# Patient Record
Sex: MALE | Race: WHITE | NOT HISPANIC OR LATINO | Employment: FULL TIME | URBAN - METROPOLITAN AREA
[De-identification: names, ages, dates, MRNs, and addresses within clinical notes are randomized per-mention and may not be internally consistent; named-entity substitution may affect disease eponyms.]

---

## 2017-01-27 ENCOUNTER — OFFICE VISIT (OUTPATIENT)
Dept: PSYCHIATRY | Facility: CLINIC | Age: 27
End: 2017-01-27
Payer: COMMERCIAL

## 2017-01-27 DIAGNOSIS — F43.23 ADJUSTMENT DISORDER WITH MIXED ANXIETY AND DEPRESSED MOOD: Primary | ICD-10-CM

## 2017-01-27 PROCEDURE — 90791 PSYCH DIAGNOSTIC EVALUATION: CPT | Mod: S$GLB,,, | Performed by: PSYCHOLOGIST

## 2017-01-27 NOTE — PROGRESS NOTES
Psychiatry Initial Visit (PhD/LCSW)  Diagnostic Interview - CPT 39110    Date: 1/27/2017    Site: [facility]    Referral source: mother-in-law    Clinical status of patient: Outpatient    Harinder Elliott, a 26 y.o. male, for initial evaluation visit.  Met with patient.    Chief complaint/reason for encounter: depression and interpersonal    History of present illness: Pt is a 26 y.o  man (x3 years) with one child and one on the way. He is seeking help with ambivalence about his marriage Over the course of the marriage he has felt increasing dissatisfaction regarding their differences interests, lifestyle choices, and hopes for the future. He cares for his wife and his child but he is uncertain as to whether he can stay in the marriage and be happy. He c/o of depressed mood, irritability, decreased appetite, and low energy. He is hoping that psychotherapy will help him resolve his ambivalence on one direction or another and offer him suggestions as to how he can communicate with his wife in a more open manner.     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 He and his wife have been  3 years. They have one child and one on the way. She is stufying to be a hairdresser. Pt is the younger of 2 sibs; parents are alive and together. He has B.A from [employer] in . Pt's [employer] background is [employer].     Substance use:   Alcohol:  social   Drugs: none   Tobacco: none   Caffeine: about 2 cups per day     Current medications and drug reactions (include OTC, herbal): see medication list     Strengths and liabilities: Strength: Patient accepts guidance/feedback, Strength: Patient is expressive/articulate., Strength: Patient is intelligent., Strength: Patient is motivated for change., Strength: Patient is physically healthy., Strength: Patient has positive support network., Strength: Patient has reasonable judgment.    Current Evaluation:     Mental Status Exam:  General Appearance:  unremarkable, age appropriate   Speech: normal tone, normal rate, normal pitch, normal volume      Level of Cooperation: cooperative      Thought Processes: normal and logical   Mood: anxious, depressed      Thought Content: normal, no suicidality, no homicidality, delusions, or paranoia   Affect: congruent and appropriate   Orientation: Oriented x3   Memory: intact per interview    Attention Span & Concentration: intact   Fund of General Knowledge: intact and appropriate to age and level of education   Abstract Reasoning: adequate    Judgment & Insight: good     Language  intact     Diagnostic Impression - Plan:     Diagnosis: Adjustment disorder with mixed anxiety and depressed mood    Plan:individual psychotherapy    Return to Clinic: 1 week    Length of Service (minutes): 60

## 2017-01-30 ENCOUNTER — OFFICE VISIT (OUTPATIENT)
Dept: PSYCHIATRY | Facility: CLINIC | Age: 27
End: 2017-01-30
Payer: COMMERCIAL

## 2017-01-30 DIAGNOSIS — F43.23 ADJUSTMENT DISORDER WITH MIXED ANXIETY AND DEPRESSED MOOD: Primary | ICD-10-CM

## 2017-01-30 DIAGNOSIS — Z63.0 PARTNER RELATIONAL PROBLEM: ICD-10-CM

## 2017-01-30 PROCEDURE — 90834 PSYTX W PT 45 MINUTES: CPT | Mod: S$GLB,,, | Performed by: PSYCHOLOGIST

## 2017-01-30 SDOH — SOCIAL DETERMINANTS OF HEALTH (SDOH): PROBLEMS IN RELATIONSHIP WITH SPOUSE OR PARTNER: Z63.0

## 2017-01-30 NOTE — PROGRESS NOTES
Individual Psychotherapy (PhD/LCSW)    1/30/2017    Site:  [facility]         Therapeutic Intervention: Met with patient.  Outpatient - Insight oriented psychotherapy 45 min - CPT code 90097    Chief complaint/reason for encounter: depression, anxiety, and interpersonal     Interval history and content of current session: Pt remains ambivalent and indecisive about his marriage. He has not yet broached the subject with his wife. He recognizes that whatever conclusion he comes to (whether he stays or goes) he first will have to talk with her about his feelings. He is dreading this because he sees her as basically a good, loving person who is happy in their marriage and he doesn't want to hurt her or his children. On the other hand, he feels burdened by excessive responsibility and a lack of interpersonal energy between them due to her complacency. We talked about how he might approach her and he will decide whether he wants to bring her with him at our next meeting or continue to talk individually at this time.      Treatment plan:  · Target symptoms: depression, anxiety , adjustment, partner relational problem   · Why chosen therapy is appropriate versus another modality: relevant to diagnosis, patient responds to this modality  · Outcome monitoring methods: self-report, observation  · Therapeutic intervention type: insight oriented psychotherapy    Risk parameters:  Patient reports no suicidal ideation  Patient reports no homicidal ideation  Patient reports no self-injurious behavior  Patient reports no violent behavior    Verbal deficits: None    Patient's response to intervention:  The patient's response to intervention is accepting.    Progress toward goals and other mental status changes:  The patient's progress toward goals is fair .    Diagnosis: Adjustment D/O with mixed anxiety and depressed mood    Plan:  individual psychotherapy and family psychotherapy, as indicated     Return to clinic: 1  week    Length of Service (minutes): 45

## 2017-01-30 NOTE — PROGRESS NOTES
Psychiatry Initial Visit (PhD/LCSW)  Diagnostic Interview - CPT 04356    Date: 1/30/2017    Site: [facility]    Referral source: mother-in-law    Clinical status of patient: Outpatient    Harinder Elliott, a 26 y.o. male, for initial evaluation visit.  Met with patient.    Chief complaint/reason for encounter: depression and interpersonal    History of present illness: Pt is a 26 y.o  man (x3 years) with one child and one on the way. He is seeking help with ambivalence about his marriage Over the course of the marriage he has felt increasing dissatisfaction regarding their differences interests, lifestyle choices, and hopes for the future. He cares for his wife and his child but he is uncertain as to whether he can stay in the marriage and be happy. He c/o of depressed mood, irritability, decreased appetite, and low energy. He is hoping that psychotherapy will help him resolve his ambivalence on one direction or another and offer him suggestions as to how he can communicate with his wife in a more open manner.     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 He and his wife have been  3 years. They have one child and one on the way. She is stufying to be a hairdresser. Pt is the younger of 2 sibs; parents are alive and together. He has B.A from [employer] in . Pt's [employer] background is [employer].     Substance use:   Alcohol:  social   Drugs: none   Tobacco: none   Caffeine: about 2 cups per day     Current medications and drug reactions (include OTC, herbal): see medication list     Strengths and liabilities: Strength: Patient accepts guidance/feedback, Strength: Patient is expressive/articulate., Strength: Patient is intelligent., Strength: Patient is motivated for change., Strength: Patient is physically healthy., Strength: Patient has positive support network., Strength: Patient has reasonable judgment.    Current Evaluation:     Mental Status Exam:  General Appearance:  unremarkable, age appropriate   Speech: normal tone, normal rate, normal pitch, normal volume      Level of Cooperation: cooperative      Thought Processes: normal and logical   Mood: anxious, depressed      Thought Content: normal, no suicidality, no homicidality, delusions, or paranoia   Affect: congruent and appropriate   Orientation: Oriented x3   Memory: intact per interview    Attention Span & Concentration: intact   Fund of General Knowledge: intact and appropriate to age and level of education   Abstract Reasoning: adequate    Judgment & Insight: good     Language  intact     Diagnostic Impression - Plan:     Diagnosis: Adjustment disorder with mixed anxiety and depressed mood    Plan:individual psychotherapy    Return to Clinic: 1 week    Length of Service (minutes): 60

## 2017-02-07 ENCOUNTER — OFFICE VISIT (OUTPATIENT)
Dept: PSYCHIATRY | Facility: CLINIC | Age: 27
End: 2017-02-07
Payer: COMMERCIAL

## 2017-02-07 DIAGNOSIS — F43.23 ADJUSTMENT DISORDER WITH MIXED ANXIETY AND DEPRESSED MOOD: Primary | ICD-10-CM

## 2017-02-07 DIAGNOSIS — Z63.0 PARTNER RELATIONAL PROBLEM: ICD-10-CM

## 2017-02-07 PROCEDURE — 90834 PSYTX W PT 45 MINUTES: CPT | Mod: S$GLB,,, | Performed by: PSYCHOLOGIST

## 2017-02-07 SDOH — SOCIAL DETERMINANTS OF HEALTH (SDOH): PROBLEMS IN RELATIONSHIP WITH SPOUSE OR PARTNER: Z63.0

## 2017-02-08 NOTE — PROGRESS NOTES
Individual Psychotherapy (PhD/LCSW)    2/7/2017    Site:  [facility]         Therapeutic Intervention: Met with patient.  Outpatient - Insight oriented psychotherapy 45 min - CPT code 34960    Chief complaint/reason for encounter: depression, anxiety, and interpersonal     Interval history and content of current session: Pt has come to the conclusion that he needs to talk with his wife about his marital dissatisfaction. Session focused on helping him articulate his feelings in a way that will be non-judgemental, honest, caring, and truthful. He notes that the interactions between them have become increasingly irritable and he feels he will need to address his feelings sooner rather than later.       Treatment plan:  · Target symptoms: depression, anxiety , adjustment, partner relational problem   · Why chosen therapy is appropriate versus another modality: relevant to diagnosis, patient responds to this modality  · Outcome monitoring methods: self-report, observation  · Therapeutic intervention type: insight oriented psychotherapy    Risk parameters:  Patient reports no suicidal ideation  Patient reports no homicidal ideation  Patient reports no self-injurious behavior  Patient reports no violent behavior    Verbal deficits: None    Patient's response to intervention:  The patient's response to intervention is accepting.    Progress toward goals and other mental status changes:  The patient's progress toward goals is fair .    Diagnosis: Adjustment D/O with mixed anxiety and depressed mood    Plan:  individual psychotherapy and family psychotherapy, as indicated     Return to clinic: 1 week    Length of Service (minutes): 45

## 2017-02-15 ENCOUNTER — OFFICE VISIT (OUTPATIENT)
Dept: PSYCHIATRY | Facility: CLINIC | Age: 27
End: 2017-02-15
Payer: COMMERCIAL

## 2017-02-15 DIAGNOSIS — Z63.0 PARTNER RELATIONAL PROBLEM: ICD-10-CM

## 2017-02-15 DIAGNOSIS — F43.23 ADJUSTMENT DISORDER WITH MIXED ANXIETY AND DEPRESSED MOOD: Primary | ICD-10-CM

## 2017-02-15 PROCEDURE — 90834 PSYTX W PT 45 MINUTES: CPT | Mod: S$GLB,,, | Performed by: PSYCHOLOGIST

## 2017-02-15 SDOH — SOCIAL DETERMINANTS OF HEALTH (SDOH): PROBLEMS IN RELATIONSHIP WITH SPOUSE OR PARTNER: Z63.0

## 2017-02-15 NOTE — PROGRESS NOTES
Individual Psychotherapy (PhD/LCSW)    2/15/2017    Site:  [facility]         Therapeutic Intervention: Met with patient.  Outpatient - Insight oriented psychotherapy 45 min - CPT code 64100    Chief complaint/reason for encounter: depression, anxiety, and interpersonal     Interval history and content of current session: Pt continues to hold off on talking to his wife about his marital dissatisfaction. He has been able to clarify his feelings and describe them better: the emptiness he feels in their interactions [other than around the children]; the lack of engagement; their differing interest and values;  the double bind between feeling guilt or resentment; etc.  He was able to say that, all things being equal, he wishes they could work things out. However, he does not see how this can be this is possible. Challenged him to recognize that he can't know what's possible unless he opens up and tells his truth. Also, reiterated that whatever happens, he needs to allow the marriage to make it or not on its own merits and refrain from allowing himself to become involved with anyone else. He agreed and reiterated that this was not a factor in his dissatisfaction. He was also able to see at the end of the session that if he revealed more of himself (his thoughts and attitudes) to her, he wasn't sure that she would want to be with him.      Treatment plan:  · Target symptoms: depression, anxiety , adjustment, partner relational problem   · Why chosen therapy is appropriate versus another modality: relevant to diagnosis, patient responds to this modality  · Outcome monitoring methods: self-report, observation  · Therapeutic intervention type: insight oriented psychotherapy    Risk parameters:  Patient reports no suicidal ideation  Patient reports no homicidal ideation  Patient reports no self-injurious behavior  Patient reports no violent behavior    Verbal deficits: None    Patient's response to intervention:  The  patient's response to intervention is accepting.    Progress toward goals and other mental status changes:  The patient's progress toward goals is fair .    Diagnosis: Adjustment D/O with mixed anxiety and depressed mood    Plan:  individual psychotherapy and family psychotherapy, as indicated     Return to clinic: 1 week    Length of Service (minutes): 45

## 2017-02-21 ENCOUNTER — OFFICE VISIT (OUTPATIENT)
Dept: PSYCHIATRY | Facility: CLINIC | Age: 27
End: 2017-02-21
Payer: COMMERCIAL

## 2017-02-21 DIAGNOSIS — F43.23 ADJUSTMENT DISORDER WITH MIXED ANXIETY AND DEPRESSED MOOD: Primary | ICD-10-CM

## 2017-02-21 DIAGNOSIS — Z63.0 PARTNER RELATIONAL PROBLEM: ICD-10-CM

## 2017-02-21 PROCEDURE — 90847 FAMILY PSYTX W/PT 50 MIN: CPT | Mod: S$GLB,,, | Performed by: PSYCHOLOGIST

## 2017-02-21 SDOH — SOCIAL DETERMINANTS OF HEALTH (SDOH): PROBLEMS IN RELATIONSHIP WITH SPOUSE OR PARTNER: Z63.0

## 2017-02-21 NOTE — PROGRESS NOTES
Family Psychotherapy (MD)    2/21/2017    Site: [facility]    Length of service: 60    Therapeutic intervention: 90847-Family therapy with patient; needed because marital problems     Persons present: patient and spouse     Interval history: Pt seen with spouse. He was able to talk with his wife about his marital dissatisfaction and ambivalence about the marriage. Wife was surprised and hurt. She feels he is looking at things from a one sided point of view and creating a kind of self-fulfilling prophecy by projecting on to her feelings that that she doesn't necessarily have. Pt acknowledged the way he values the marriage as well as the problem he has with wanting more time for himself. Spouse states she understands he needs time for himself but asks that he be open about this, give her notice, be reliable about following up on his commitments, and respond to her when she calls or texts. She is very clear that she wants the marriage to work and believes it can. Pt appears less sure about the prognosis for the marriage but says he wants it to work as well.     Target symptoms: depression, anxiety, partner/relational problem      Patient's interpersonal/verbal exchanges: 90847-Family therapy with patient:  active listening and self-disclosure    Progress toward goals: progressing slowly    Diagnosis: Adjustment d/o with anxiety and depressed mood; partner relational problem     Plan: individual psychotherapy  family psychotherapy    Return to clinic: as scheduled

## 2017-02-22 ENCOUNTER — OFFICE VISIT (OUTPATIENT)
Dept: PSYCHIATRY | Facility: CLINIC | Age: 27
End: 2017-02-22
Payer: COMMERCIAL

## 2017-02-22 DIAGNOSIS — Z63.0 PARTNER RELATIONAL PROBLEM: ICD-10-CM

## 2017-02-22 DIAGNOSIS — F43.23 ADJUSTMENT DISORDER WITH MIXED ANXIETY AND DEPRESSED MOOD: Primary | ICD-10-CM

## 2017-02-22 PROCEDURE — 90834 PSYTX W PT 45 MINUTES: CPT | Mod: S$GLB,,, | Performed by: PSYCHOLOGIST

## 2017-02-22 SDOH — SOCIAL DETERMINANTS OF HEALTH (SDOH): PROBLEMS IN RELATIONSHIP WITH SPOUSE OR PARTNER: Z63.0

## 2017-02-22 NOTE — PROGRESS NOTES
"Individual Psychotherapy (PhD/LCSW)    2/22/2017    Site:  [facility]         Therapeutic Intervention: Met with patient.  Outpatient - Insight oriented psychotherapy 45 min - CPT code 71516    Chief complaint/reason for encounter: depression, anxiety, and interpersonal     Interval history and content of current session: Pt and his wife met with me yesterday to discuss the impact and implications of what he revealed to her about his marital dissatisfaction. They are now talking openly about their feelings. Pt can see how he has unintentionally contributed to the problems that are making him unhappy in the marriage (eg, by avoiding her need to be in contact he stimulates her need for contact). Pt also notes that spouse has become more attentive to her behavior that is an irritant for him (eg picking up after herself). Pt discussed fears that his desire to stay in the marriage is not for the best reasons (eg he doesn't want to be seen as a selfish person). We discussed the concept of multidetermination and how this concept makes it "ok" to have both positive and negative reasons for staying in the marriage. Also noted that whether or not the marriage fails or succeeds, he needs to give it his best shot and he realizes that he has not done that yet (nor has she). So, for the present he will continue to work with his wife to do what each can to make the marriage work as well as it can.     Treatment plan:  · Target symptoms: depression, anxiety , adjustment, partner relational problem   · Why chosen therapy is appropriate versus another modality: relevant to diagnosis, patient responds to this modality  · Outcome monitoring methods: self-report, observation  · Therapeutic intervention type: insight oriented psychotherapy    Risk parameters:  Patient reports no suicidal ideation  Patient reports no homicidal ideation  Patient reports no self-injurious behavior  Patient reports no violent behavior    Verbal " deficits: None    Patient's response to intervention:  The patient's response to intervention is accepting.    Progress toward goals and other mental status changes:  The patient's progress toward goals is fair .    Diagnosis: Adjustment D/O with mixed anxiety and depressed mood    Plan:  individual psychotherapy and family psychotherapy, as indicated     Return to clinic: 1 week    Length of Service (minutes): 45

## 2017-03-01 ENCOUNTER — OFFICE VISIT (OUTPATIENT)
Dept: PSYCHIATRY | Facility: CLINIC | Age: 27
End: 2017-03-01
Payer: COMMERCIAL

## 2017-03-01 DIAGNOSIS — F43.23 ADJUSTMENT DISORDER WITH MIXED ANXIETY AND DEPRESSED MOOD: Primary | ICD-10-CM

## 2017-03-01 DIAGNOSIS — Z63.0 PARTNER RELATIONAL PROBLEM: ICD-10-CM

## 2017-03-01 PROCEDURE — 90847 FAMILY PSYTX W/PT 50 MIN: CPT | Mod: S$GLB,,, | Performed by: PSYCHOLOGIST

## 2017-03-01 SDOH — SOCIAL DETERMINANTS OF HEALTH (SDOH): PROBLEMS IN RELATIONSHIP WITH SPOUSE OR PARTNER: Z63.0

## 2017-03-01 NOTE — PROGRESS NOTES
"Family Psychotherapy (MD)    3/1/2017    Site: [facility]    Length of service: 60    Therapeutic intervention: 90847-Family therapy with patient; needed because marital problems     Persons present: patient and spouse     Interval history: Pt seen with spouse. They agreed that things have started to move in a better direction in their marriage since they first saw me together (see my note 2/21/17). They are talking more openly and doing more things together. However, when they went to a [employer] party over the weekend, they ran into problems where he became very engaged in socializing and she felt neglected. They were able to talk through their feelings about this later; but, it left a residue of hurt feelings on her part and frustration on his.  Noted that he proposed they go to the party to include her in his social involvements with work friends. They both recognized this as "a positive" for their relationship. Addressed how they could learn from the experience to enhance it for both of them by each making small changes in their behavior. Gave them an observation task for their next session in which each will identify things that happen that are positive and hope that they will continue to happen.     Target symptoms: depression, anxiety, partner/relational problem      Patient's interpersonal/verbal exchanges: 90847-Family therapy with patient:  active listening and self-disclosure    Progress toward goals: progressing slowly    Diagnosis: Adjustment d/o with anxiety and depressed mood; partner relational problem     Plan: individual psychotherapy  family psychotherapy    Return to clinic: as scheduled  "

## 2017-04-03 ENCOUNTER — OFFICE VISIT (OUTPATIENT)
Dept: PSYCHIATRY | Facility: CLINIC | Age: 27
End: 2017-04-03
Payer: COMMERCIAL

## 2017-04-03 DIAGNOSIS — Z63.0 PARTNER RELATIONAL PROBLEM: ICD-10-CM

## 2017-04-03 DIAGNOSIS — F43.23 ADJUSTMENT DISORDER WITH MIXED ANXIETY AND DEPRESSED MOOD: Primary | ICD-10-CM

## 2017-04-03 PROCEDURE — 90834 PSYTX W PT 45 MINUTES: CPT | Mod: S$GLB,,, | Performed by: PSYCHOLOGIST

## 2017-04-03 SDOH — SOCIAL DETERMINANTS OF HEALTH (SDOH): PROBLEMS IN RELATIONSHIP WITH SPOUSE OR PARTNER: Z63.0

## 2017-04-03 NOTE — PROGRESS NOTES
"Individual Psychotherapy (PhD/LCSW)    4/3/2017    Site:  [facility]         Therapeutic Intervention: Met with patient.  Outpatient - Insight oriented psychotherapy 45 min - CPT code 20670    Chief complaint/reason for encounter: depression, anxiety, and interpersonal     Interval history and content of current session: Pt continues to feel beset with ambivalence about the marriage. He states he is putting more effort into his role as , but he is uncertain if it is making a difference in his feelings towards his wife (or her towards him). He worries that he is has an underlying apathy about things that may spill over into the marriage. He finds therapy helpful in mitigating his self-denigrating criticism of himself as being a "bad person" for being ambivalent. He agreed to consciously direct his attention to anything positive that happens in his marital/family life over the next week and bring in a list of same to our next session.     Treatment plan:  · Target symptoms: depression, anxiety , adjustment, partner relational problem   · Why chosen therapy is appropriate versus another modality: relevant to diagnosis, patient responds to this modality  · Outcome monitoring methods: self-report, observation  · Therapeutic intervention type: insight oriented psychotherapy    Risk parameters:  Patient reports no suicidal ideation  Patient reports no homicidal ideation  Patient reports no self-injurious behavior  Patient reports no violent behavior    Verbal deficits: None    Patient's response to intervention:  The patient's response to intervention is accepting.    Progress toward goals and other mental status changes:  The patient's progress toward goals is fair .    Diagnosis: Adjustment D/O with mixed anxiety and depressed mood    Plan:  individual psychotherapy and family psychotherapy, as indicated     Return to clinic: 1 week    Length of Service (minutes): 45  "

## 2017-04-13 ENCOUNTER — OFFICE VISIT (OUTPATIENT)
Dept: PSYCHIATRY | Facility: CLINIC | Age: 27
End: 2017-04-13
Payer: COMMERCIAL

## 2017-04-13 DIAGNOSIS — Z63.0 PARTNER RELATIONAL PROBLEM: ICD-10-CM

## 2017-04-13 DIAGNOSIS — F43.23 ADJUSTMENT DISORDER WITH MIXED ANXIETY AND DEPRESSED MOOD: Primary | ICD-10-CM

## 2017-04-13 PROCEDURE — 90834 PSYTX W PT 45 MINUTES: CPT | Mod: S$GLB,,, | Performed by: PSYCHOLOGIST

## 2017-04-13 SDOH — SOCIAL DETERMINANTS OF HEALTH (SDOH): PROBLEMS IN RELATIONSHIP WITH SPOUSE OR PARTNER: Z63.0

## 2017-04-13 NOTE — PROGRESS NOTES
Individual Psychotherapy (PhD/LCSW)    4/13/2017    Site:  [facility]         Therapeutic Intervention: Met with patient.  Outpatient - Insight oriented psychotherapy 45 min - CPT code 30097    Chief complaint/reason for encounter: depression, anxiety, and interpersonal     Interval history and content of current session: Pt states that things stable with him and his wife, but his feelings of ambivalence about the marriage and his future (with or without his wife) remain essentially unchanged. We discussed his fx of origin dynamics and what emerged was that he is afraid of repeating the experience he observed in his parents' lives. He says he does not want to settle for a humdrum existence as he has observed in them. He notes that during college he needed the reassurance of a stable relationship with his girlfriend (now his wife). But once he began to realize that he could live with more energy and aliveness by being more social and ambitious he began to be dissatisfied in his marriage because he sees his wife like his parents. He agreed that he needs to be open and talk with his wife about the way he sees his family, his hopes to avoid this, and his difficulty disassociating her from this kind of lifestyle.    Treatment plan:  · Target symptoms: depression, anxiety , adjustment, partner relational problem   · Why chosen therapy is appropriate versus another modality: relevant to diagnosis, patient responds to this modality  · Outcome monitoring methods: self-report, observation  · Therapeutic intervention type: insight oriented psychotherapy    Risk parameters:  Patient reports no suicidal ideation  Patient reports no homicidal ideation  Patient reports no self-injurious behavior  Patient reports no violent behavior    Verbal deficits: None    Patient's response to intervention:  The patient's response to intervention is accepting.    Progress toward goals and other mental status changes:  The patient's  progress toward goals is fair .    Diagnosis: Adjustment D/O with mixed anxiety and depressed mood    Plan:  individual psychotherapy and family psychotherapy, as indicated     Return to clinic: 1 week    Length of Service (minutes): 45

## 2017-07-13 ENCOUNTER — TELEPHONE (OUTPATIENT)
Dept: GASTROENTEROLOGY | Facility: CLINIC | Age: 27
End: 2017-07-13

## 2017-07-13 NOTE — TELEPHONE ENCOUNTER
----- Message from Zenobia Sigala sent at 7/13/2017 11:09 AM CDT -----  Contact: Self- [phone]  Ned- pt called to schedule a np appt with Dr. Marino- this would be for a c-scope consult- also suffering with diarrhea and cramping after meals- please call pt back at [phone]

## 2017-09-08 ENCOUNTER — LAB VISIT (OUTPATIENT)
Dept: LAB | Facility: HOSPITAL | Age: 27
End: 2017-09-08
Attending: INTERNAL MEDICINE
Payer: COMMERCIAL

## 2017-09-08 ENCOUNTER — OFFICE VISIT (OUTPATIENT)
Dept: GASTROENTEROLOGY | Facility: CLINIC | Age: 27
End: 2017-09-08
Payer: COMMERCIAL

## 2017-09-08 VITALS
DIASTOLIC BLOOD PRESSURE: 72 MMHG | HEART RATE: 80 BPM | SYSTOLIC BLOOD PRESSURE: 120 MMHG | WEIGHT: 160.25 LBS | BODY MASS INDEX: 22.35 KG/M2

## 2017-09-08 DIAGNOSIS — Z86.010 HISTORY OF COLON POLYPS: ICD-10-CM

## 2017-09-08 DIAGNOSIS — R14.0 BLOATING SYMPTOM: Primary | ICD-10-CM

## 2017-09-08 DIAGNOSIS — R14.0 BLOATING SYMPTOM: ICD-10-CM

## 2017-09-08 PROBLEM — Z86.0100 HISTORY OF COLON POLYPS: Status: ACTIVE | Noted: 2017-09-08

## 2017-09-08 LAB
ALBUMIN SERPL BCP-MCNC: 4.2 G/DL
ALP SERPL-CCNC: 94 U/L
ALT SERPL W/O P-5'-P-CCNC: 10 U/L
ANION GAP SERPL CALC-SCNC: 11 MMOL/L
AST SERPL-CCNC: 15 U/L
BASOPHILS # BLD AUTO: 0.03 K/UL
BASOPHILS NFR BLD: 0.6 %
BILIRUB DIRECT SERPL-MCNC: 0.2 MG/DL
BILIRUB SERPL-MCNC: 0.5 MG/DL
BUN SERPL-MCNC: 10 MG/DL
CALCIUM SERPL-MCNC: 9.4 MG/DL
CHLORIDE SERPL-SCNC: 105 MMOL/L
CO2 SERPL-SCNC: 26 MMOL/L
CREAT SERPL-MCNC: 0.9 MG/DL
DIFFERENTIAL METHOD: ABNORMAL
EOSINOPHIL # BLD AUTO: 0.1 K/UL
EOSINOPHIL NFR BLD: 2.2 %
ERYTHROCYTE [DISTWIDTH] IN BLOOD BY AUTOMATED COUNT: 12.7 %
EST. GFR  (AFRICAN AMERICAN): >60 ML/MIN/1.73 M^2
EST. GFR  (NON AFRICAN AMERICAN): >60 ML/MIN/1.73 M^2
GLUCOSE SERPL-MCNC: 70 MG/DL
HCT VFR BLD AUTO: 41.1 %
HGB BLD-MCNC: 14 G/DL
IGA SERPL-MCNC: 224 MG/DL
LIPASE SERPL-CCNC: 35 U/L
LYMPHOCYTES # BLD AUTO: 1.5 K/UL
LYMPHOCYTES NFR BLD: 32.9 %
MCH RBC QN AUTO: 27.6 PG
MCHC RBC AUTO-ENTMCNC: 34.1 G/DL
MCV RBC AUTO: 81 FL
MONOCYTES # BLD AUTO: 0.3 K/UL
MONOCYTES NFR BLD: 7.3 %
NEUTROPHILS # BLD AUTO: 2.7 K/UL
NEUTROPHILS NFR BLD: 57 %
PLATELET # BLD AUTO: 279 K/UL
PMV BLD AUTO: 9.2 FL
POTASSIUM SERPL-SCNC: 3.8 MMOL/L
PROT SERPL-MCNC: 7.5 G/DL
RBC # BLD AUTO: 5.07 M/UL
SODIUM SERPL-SCNC: 142 MMOL/L
TSH SERPL DL<=0.005 MIU/L-ACNC: 1.09 UIU/ML
WBC # BLD AUTO: 4.65 K/UL

## 2017-09-08 PROCEDURE — 99204 OFFICE O/P NEW MOD 45 MIN: CPT | Mod: S$GLB,,, | Performed by: INTERNAL MEDICINE

## 2017-09-08 PROCEDURE — 3008F BODY MASS INDEX DOCD: CPT | Mod: S$GLB,,, | Performed by: INTERNAL MEDICINE

## 2017-09-08 PROCEDURE — 80076 HEPATIC FUNCTION PANEL: CPT

## 2017-09-08 PROCEDURE — 80048 BASIC METABOLIC PNL TOTAL CA: CPT

## 2017-09-08 PROCEDURE — 83690 ASSAY OF LIPASE: CPT

## 2017-09-08 PROCEDURE — 82784 ASSAY IGA/IGD/IGG/IGM EACH: CPT

## 2017-09-08 PROCEDURE — 36415 COLL VENOUS BLD VENIPUNCTURE: CPT

## 2017-09-08 PROCEDURE — 99999 PR PBB SHADOW E&M-EST. PATIENT-LVL II: CPT | Mod: PBBFAC,,, | Performed by: INTERNAL MEDICINE

## 2017-09-08 PROCEDURE — 83516 IMMUNOASSAY NONANTIBODY: CPT

## 2017-09-08 PROCEDURE — 84443 ASSAY THYROID STIM HORMONE: CPT

## 2017-09-08 PROCEDURE — 30000890 MISCELLANEOUS SENDOUT TEST

## 2017-09-08 PROCEDURE — 85025 COMPLETE CBC W/AUTO DIFF WBC: CPT
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
## 2017-09-10 DIAGNOSIS — R71.8 LOW MEAN CORPUSCULAR VOLUME (MCV): Primary | ICD-10-CM

## 2017-09-11 LAB — TTG IGA SER IA-ACNC: 5 UNITS

## 2017-09-12 ENCOUNTER — TELEPHONE (OUTPATIENT)
Dept: GASTROENTEROLOGY | Facility: CLINIC | Age: 27
End: 2017-09-12

## 2017-09-12 NOTE — TELEPHONE ENCOUNTER
Pt informed of test results. He will call to schedule his labs after he schedules his colonoscopy.

## 2017-09-12 NOTE — TELEPHONE ENCOUNTER
----- Message from Graham Marino MD sent at 9/10/2017  4:41 PM CDT -----  Calin valenzuela- please tell Harinder that overall his labs look ok.  Recommend fasting Ferritin and iron studies morning of colonoscopy of in next few weeks.  Orders placed.

## 2017-09-13 LAB
MISCELLANEOUS TEST NAME: NORMAL
REFERENCE LAB: NORMAL
SPECIMEN TYPE: NORMAL
TEST RESULT: NORMAL

## 2017-10-11 ENCOUNTER — OFFICE VISIT (OUTPATIENT)
Dept: INTERNAL MEDICINE | Facility: CLINIC | Age: 27
End: 2017-10-11
Payer: COMMERCIAL

## 2017-10-11 ENCOUNTER — PATIENT MESSAGE (OUTPATIENT)
Dept: INTERNAL MEDICINE | Facility: CLINIC | Age: 27
End: 2017-10-11

## 2017-10-11 VITALS
OXYGEN SATURATION: 97 % | WEIGHT: 161.19 LBS | SYSTOLIC BLOOD PRESSURE: 116 MMHG | BODY MASS INDEX: 22.56 KG/M2 | DIASTOLIC BLOOD PRESSURE: 62 MMHG | HEIGHT: 71 IN | HEART RATE: 82 BPM

## 2017-10-11 DIAGNOSIS — N50.82 SCROTAL PAIN: ICD-10-CM

## 2017-10-11 LAB
BILIRUB SERPL-MCNC: NORMAL MG/DL
BLOOD URINE, POC: NORMAL
COLOR, POC UA: YELLOW
GLUCOSE UR QL STRIP: NORMAL
KETONES UR QL STRIP: NORMAL
LEUKOCYTE ESTERASE URINE, POC: NORMAL
NITRITE, POC UA: NORMAL
PH, POC UA: 7
PROTEIN, POC: NORMAL
SPECIFIC GRAVITY, POC UA: 1.01
UROBILINOGEN, POC UA: NORMAL

## 2017-10-11 PROCEDURE — 99999 PR PBB SHADOW E&M-EST. PATIENT-LVL III: CPT | Mod: PBBFAC,,, | Performed by: STUDENT IN AN ORGANIZED HEALTH CARE EDUCATION/TRAINING PROGRAM

## 2017-10-11 PROCEDURE — 99213 OFFICE O/P EST LOW 20 MIN: CPT | Mod: 25,S$GLB,, | Performed by: STUDENT IN AN ORGANIZED HEALTH CARE EDUCATION/TRAINING PROGRAM

## 2017-10-11 PROCEDURE — 87591 N.GONORRHOEAE DNA AMP PROB: CPT

## 2017-10-11 PROCEDURE — 81002 URINALYSIS NONAUTO W/O SCOPE: CPT | Mod: S$GLB,,, | Performed by: STUDENT IN AN ORGANIZED HEALTH CARE EDUCATION/TRAINING PROGRAM

## 2017-10-12 ENCOUNTER — PATIENT MESSAGE (OUTPATIENT)
Dept: INTERNAL MEDICINE | Facility: CLINIC | Age: 27
End: 2017-10-12

## 2017-10-12 LAB
C TRACH DNA SPEC QL NAA+PROBE: NOT DETECTED
N GONORRHOEA DNA SPEC QL NAA+PROBE: NOT DETECTED

## 2017-10-12 NOTE — PROGRESS NOTES
Preceptor note    Patient's history and physical discussed, please refer to resident physician's note for specific details. Pt seen and with resident physician. Medical record reviewed. I agree with resident's assessment and plan.

## 2017-10-13 ENCOUNTER — HOSPITAL ENCOUNTER (OUTPATIENT)
Dept: RADIOLOGY | Facility: HOSPITAL | Age: 27
Discharge: HOME | End: 2017-10-13
Attending: STUDENT IN AN ORGANIZED HEALTH CARE EDUCATION/TRAINING PROGRAM
Payer: COMMERCIAL

## 2017-10-13 DIAGNOSIS — N50.82 SCROTAL PAIN: Primary | ICD-10-CM

## 2017-10-13 DIAGNOSIS — N50.82 SCROTAL PAIN: ICD-10-CM

## 2017-10-13 PROCEDURE — 76705 ECHO EXAM OF ABDOMEN: CPT | Mod: TC

## 2017-10-13 PROCEDURE — 76870 US EXAM SCROTUM: CPT | Mod: 26,,, | Performed by: RADIOLOGY

## 2017-10-13 PROCEDURE — 76705 ECHO EXAM OF ABDOMEN: CPT | Mod: 26,,, | Performed by: RADIOLOGY

## 2017-10-13 PROCEDURE — 76870 US EXAM SCROTUM: CPT | Mod: TC

## 2018-07-23 ENCOUNTER — PATIENT MESSAGE (OUTPATIENT)
Dept: ENDOSCOPY | Facility: HOSPITAL | Age: 28
End: 2018-07-23

## 2018-07-25 DIAGNOSIS — Z86.010 HISTORY OF COLON POLYPS: Primary | ICD-10-CM

## 2018-11-15 ENCOUNTER — OFFICE VISIT (OUTPATIENT)
Dept: URGENT CARE | Facility: CLINIC | Age: 28
End: 2018-11-15
Payer: COMMERCIAL

## 2018-11-15 VITALS
SYSTOLIC BLOOD PRESSURE: 103 MMHG | TEMPERATURE: 97 F | DIASTOLIC BLOOD PRESSURE: 72 MMHG | OXYGEN SATURATION: 98 % | BODY MASS INDEX: 23.8 KG/M2 | RESPIRATION RATE: 18 BRPM | HEIGHT: 71 IN | WEIGHT: 170 LBS | HEART RATE: 73 BPM

## 2018-11-15 DIAGNOSIS — J01.00 ACUTE MAXILLARY SINUSITIS, RECURRENCE NOT SPECIFIED: Primary | ICD-10-CM

## 2018-11-15 PROCEDURE — 96372 THER/PROPH/DIAG INJ SC/IM: CPT | Mod: S$GLB,,, | Performed by: FAMILY MEDICINE

## 2018-11-15 PROCEDURE — 3008F BODY MASS INDEX DOCD: CPT | Mod: CPTII,S$GLB,, | Performed by: FAMILY MEDICINE

## 2018-11-15 PROCEDURE — 99214 OFFICE O/P EST MOD 30 MIN: CPT | Mod: 25,S$GLB,, | Performed by: FAMILY MEDICINE

## 2018-11-15 RX ORDER — AMOXICILLIN AND CLAVULANATE POTASSIUM 875; 125 MG/1; MG/1
1 TABLET, FILM COATED ORAL 2 TIMES DAILY
Qty: 20 TABLET | Refills: 0 | Status: SHIPPED | OUTPATIENT
Start: 2018-11-15 | End: 2018-11-25

## 2018-11-15 RX ORDER — TRIAMCINOLONE ACETONIDE 55 UG/1
2 SPRAY, METERED NASAL DAILY
Qty: 10.8 ML | Refills: 0 | Status: SHIPPED | OUTPATIENT
Start: 2018-11-15 | End: 2019-01-29

## 2018-11-15 RX ORDER — BETAMETHASONE SODIUM PHOSPHATE AND BETAMETHASONE ACETATE 3; 3 MG/ML; MG/ML
6 INJECTION, SUSPENSION INTRA-ARTICULAR; INTRALESIONAL; INTRAMUSCULAR; SOFT TISSUE
Status: COMPLETED | OUTPATIENT
Start: 2018-11-15 | End: 2018-11-15

## 2018-11-15 RX ORDER — SERTRALINE HYDROCHLORIDE 25 MG/1
25 TABLET, FILM COATED ORAL 2 TIMES DAILY
Refills: 5 | COMMUNITY
Start: 2018-10-20 | End: 2021-10-06 | Stop reason: SDUPTHER

## 2018-11-15 RX ADMIN — BETAMETHASONE SODIUM PHOSPHATE AND BETAMETHASONE ACETATE 6 MG: 3; 3 INJECTION, SUSPENSION INTRA-ARTICULAR; INTRALESIONAL; INTRAMUSCULAR; SOFT TISSUE at 10:11

## 2018-11-15 NOTE — PROGRESS NOTES
"Subjective:       Patient ID: Harinder Elliott is a 28 y.o. male.    Vitals:  height is 5' 11" (1.803 m) and weight is 77.1 kg (170 lb). His oral temperature is 97.2 °F (36.2 °C). His blood pressure is 103/72 and his pulse is 73. His respiration is 18 and oxygen saturation is 98%.     Chief Complaint: Sinus Problem    This is a 28 y.o. male who presents today with a chief complaint of   Sinus congestion and cant breath out of nose. Cough, scratchy throat taking Advil cold and sinus no relief       Sinus Problem   This is a new problem. The current episode started yesterday. The problem has been gradually worsening since onset. There has been no fever. He is experiencing no pain. Associated symptoms include congestion, coughing, headaches, sinus pressure and a sore throat. Pertinent negatives include no chills, diaphoresis, ear pain or shortness of breath. Treatments tried: advil cold and sinus. The treatment provided no relief.       Constitution: Positive for fatigue. Negative for chills, sweating and fever.   HENT: Positive for congestion, postnasal drip, sinus pressure and sore throat. Negative for ear pain and voice change.    Neck: Negative for painful lymph nodes.   Eyes: Negative for eye redness.   Respiratory: Positive for cough. Negative for chest tightness, sputum production, bloody sputum, COPD, shortness of breath, stridor, wheezing and asthma.    Gastrointestinal: Negative for nausea and vomiting.   Musculoskeletal: Negative for muscle ache.   Skin: Negative for rash.   Allergic/Immunologic: Negative for seasonal allergies and asthma.   Neurological: Positive for headaches.   Hematologic/Lymphatic: Negative for swollen lymph nodes.       Objective:      Physical Exam   Constitutional: He appears well-developed and well-nourished.   HENT:   Head: Normocephalic and atraumatic.   Nose: Mucosal edema and rhinorrhea present. Right sinus exhibits maxillary sinus tenderness and frontal sinus tenderness. " Left sinus exhibits maxillary sinus tenderness and frontal sinus tenderness.   Mouth/Throat: Posterior oropharyngeal erythema present. No oropharyngeal exudate.   Cardiovascular: Normal rate, regular rhythm and normal heart sounds.   Pulmonary/Chest: Effort normal and breath sounds normal.   Lymphadenopathy:     He has cervical adenopathy (nontender).   Nursing note and vitals reviewed.      Assessment:       1. Acute maxillary sinusitis, recurrence not specified        Plan:         Acute maxillary sinusitis, recurrence not specified  -     betamethasone acetate-betamethasone sodium phosphate injection 6 mg  -     amoxicillin-clavulanate 875-125mg (AUGMENTIN) 875-125 mg per tablet; Take 1 tablet by mouth 2 (two) times daily. for 10 days  Dispense: 20 tablet; Refill: 0  -     triamcinolone (NASACORT) 55 mcg nasal inhaler; 2 sprays by Nasal route once daily.  Dispense: 10.8 mL; Refill: 0

## 2018-11-15 NOTE — PATIENT INSTRUCTIONS
Sinusitis (Antibiotic Treatment)    The sinuses are air-filled spaces within the bones of the face. They connect to the inside of the nose. Sinusitis is an inflammation of the tissue lining the sinus cavity. Sinus inflammation can occur during a cold. It can also be due to allergies to pollens and other particles in the air. Sinusitis can cause symptoms of sinus congestion and fullness. A sinus infection causes fever, headache and facial pain. There is often green or yellow drainage from the nose or into the back of the throat (post-nasal drip). You have been given antibiotics to treat this condition.  Home care:  · Take the full course of antibiotics as instructed. Do not stop taking them, even if you feel better.  · Drink plenty of water, hot tea, and other liquids. This may help thin mucus. It also may promote sinus drainage.  · Heat may help soothe painful areas of the face. Use a towel soaked in hot water. Or,  the shower and direct the hot spray onto your face. Using a vaporizer along with a menthol rub at night may also help.   · An expectorant containing guaifenesin may help thin the mucus and promote drainage from the sinuses.  · Over-the-counter decongestants may be used unless a similar medicine was prescribed. Nasal sprays work the fastest. Use one that contains phenylephrine or oxymetazoline. First blow the nose gently. Then use the spray. Do not use these medicines more often than directed on the label or symptoms may get worse. You may also use tablets containing pseudoephedrine. Avoid products that combine ingredients, because side effects may be increased. Read labels. You can also ask the pharmacist for help. (NOTE: Persons with high blood pressure should not use decongestants. They can raise blood pressure.)  · Over-the-counter antihistamines may help if allergies contributed to your sinusitis.    · Do not use nasal rinses or irrigation during an acute sinus infection, unless told to by  your health care provider. Rinsing may spread the infection to other sinuses.  · Use acetaminophen or ibuprofen to control pain, unless another pain medicine was prescribed. (If you have chronic liver or kidney disease or ever had a stomach ulcer, talk with your doctor before using these medicines. Aspirin should never be used in anyone under 18 years of age who is ill with a fever. It may cause severe liver damage.)  · Don't smoke. This can worsen symptoms.  Follow-up care  Follow up with your healthcare provider or our staff if you are not improving within the next week.  When to seek medical advice  Call your healthcare provider if any of these occur:  · Facial pain or headache becoming more severe  · Stiff neck  · Unusual drowsiness or confusion  · Swelling of the forehead or eyelids  · Vision problems, including blurred or double vision  · Fever of 100.4ºF (38ºC) or higher, or as directed by your healthcare provider  · Seizure  · Breathing problems  · Symptoms not resolving within 10 days  Date Last Reviewed: 4/13/2015  © 2879-6086 [employer] [address] [place] [address], [place] [address]. All rights reserved. This information is not intended as a substitute for professional medical care. Always follow your healthcare professional's instructions.

## 2018-12-19 ENCOUNTER — HOSPITAL ENCOUNTER (EMERGENCY)
Facility: HOSPITAL | Age: 28
Discharge: HOME | End: 2018-12-19
Attending: EMERGENCY MEDICINE
Payer: COMMERCIAL

## 2018-12-19 ENCOUNTER — NURSE TRIAGE (OUTPATIENT)
Dept: ADMINISTRATIVE | Facility: CLINIC | Age: 28
End: 2018-12-19

## 2018-12-19 VITALS
OXYGEN SATURATION: 97 % | BODY MASS INDEX: 23.8 KG/M2 | HEART RATE: 91 BPM | SYSTOLIC BLOOD PRESSURE: 111 MMHG | WEIGHT: 170 LBS | HEIGHT: 71 IN | DIASTOLIC BLOOD PRESSURE: 58 MMHG | TEMPERATURE: 100 F

## 2018-12-19 DIAGNOSIS — R50.9 HYPERTHERMIA: ICD-10-CM

## 2018-12-19 DIAGNOSIS — R25.1 TREMORS OF NERVOUS SYSTEM: ICD-10-CM

## 2018-12-19 DIAGNOSIS — R00.0 TACHYCARDIA: Primary | ICD-10-CM

## 2018-12-19 LAB
ALBUMIN SERPL BCP-MCNC: 4.7 G/DL
ALP SERPL-CCNC: 92 U/L
ALT SERPL W/O P-5'-P-CCNC: 12 U/L
ANION GAP SERPL CALC-SCNC: 14 MMOL/L
AST SERPL-CCNC: 18 U/L
BASOPHILS # BLD AUTO: 0.04 K/UL
BASOPHILS NFR BLD: 0.3 %
BILIRUB SERPL-MCNC: 0.8 MG/DL
BUN SERPL-MCNC: 17 MG/DL
CALCIUM SERPL-MCNC: 9.5 MG/DL
CHLORIDE SERPL-SCNC: 102 MMOL/L
CK SERPL-CCNC: 174 U/L
CO2 SERPL-SCNC: 24 MMOL/L
CREAT SERPL-MCNC: 0.8 MG/DL
DIFFERENTIAL METHOD: ABNORMAL
EOSINOPHIL # BLD AUTO: 0.1 K/UL
EOSINOPHIL NFR BLD: 0.5 %
ERYTHROCYTE [DISTWIDTH] IN BLOOD BY AUTOMATED COUNT: 12.2 %
EST. GFR  (AFRICAN AMERICAN): >60 ML/MIN/1.73 M^2
EST. GFR  (NON AFRICAN AMERICAN): >60 ML/MIN/1.73 M^2
GLUCOSE SERPL-MCNC: 99 MG/DL
HCT VFR BLD AUTO: 45.3 %
HGB BLD-MCNC: 14.7 G/DL
IMM GRANULOCYTES # BLD AUTO: 0.04 K/UL
IMM GRANULOCYTES NFR BLD AUTO: 0.3 %
LYMPHOCYTES # BLD AUTO: 0.6 K/UL
LYMPHOCYTES NFR BLD: 4.6 %
MCH RBC QN AUTO: 26.6 PG
MCHC RBC AUTO-ENTMCNC: 32.5 G/DL
MCV RBC AUTO: 82 FL
MONOCYTES # BLD AUTO: 0.6 K/UL
MONOCYTES NFR BLD: 4.4 %
NEUTROPHILS # BLD AUTO: 11.6 K/UL
NEUTROPHILS NFR BLD: 89.9 %
NRBC BLD-RTO: 0 /100 WBC
PLATELET # BLD AUTO: 288 K/UL
PMV BLD AUTO: 9.7 FL
POTASSIUM SERPL-SCNC: 3.7 MMOL/L
PROT SERPL-MCNC: 7.9 G/DL
RBC # BLD AUTO: 5.53 M/UL
SODIUM SERPL-SCNC: 140 MMOL/L
WBC # BLD AUTO: 12.96 K/UL

## 2018-12-19 PROCEDURE — 99283 EMERGENCY DEPT VISIT LOW MDM: CPT

## 2018-12-19 PROCEDURE — 85025 COMPLETE CBC W/AUTO DIFF WBC: CPT

## 2018-12-19 PROCEDURE — 25000003 PHARM REV CODE 250: Performed by: EMERGENCY MEDICINE

## 2018-12-19 PROCEDURE — 82550 ASSAY OF CK (CPK): CPT

## 2018-12-19 PROCEDURE — 99284 EMERGENCY DEPT VISIT MOD MDM: CPT | Mod: ,,, | Performed by: EMERGENCY MEDICINE

## 2018-12-19 PROCEDURE — 80053 COMPREHEN METABOLIC PANEL: CPT

## 2018-12-19 RX ADMIN — SODIUM CHLORIDE 1000 ML: 0.9 INJECTION, SOLUTION INTRAVENOUS at 03:12

## 2018-12-19 NOTE — ED PROVIDER NOTES
Encounter Date: 12/19/2018    SCRIBE #1 NOTE: I, Last Farley, am scribing for, and in the presence of,  Dr. LEDY Hill. I have scribed the entire note.       History     Chief Complaint   Patient presents with    Emesis     pt reports possible serotonin syndrome, states that he takes zoloft daily and has been feeling weak, nauseated, and vomting      Time patient was seen by the provider: 5:24 AM    28 y.o. male here with concerns for serotonin syndrome. Pt has hx of depression and on Zoloft. Pt has been on this medicine for some time. Last night after dinner at 9pm became nauseated and took wife's 8mg Zofran. About an hour and a half later he felt more nauseated, anxious, jittery, shaky. He was googling symptoms and saw serotonin syndrome and came to ED for further evaluation. On my exam, pt continues to feel jittery and anxious, still nauseated but has improved, denies pain or blurry vision.          Review of patient's allergies indicates:  No Known Allergies  Past Medical History:   Diagnosis Date    Colon polyp     Depression     Migraine     Ureteral stricture      Past Surgical History:   Procedure Laterality Date    ARTHROSCOPY-SHOULDER Left 10/11/2016    [facility]    ARTHROSCOPY-SHOULDER WITH SUBACROMIAL DECOMPRESSION Left 10/11/2016    [facility]    COLON SURGERY      colon polyp    HERNIA REPAIR      KIDNEY SURGERY      RECONSTRUCTION-JOINT-AC-SHOULDER / THERMAL CAPSULORRHAPHY / AC JOINT RESECTION Left 10/11/2016    [facility]    lali labriceasar      WISDOM TOOTH EXTRACTION       Family History   Problem Relation Age of Onset    Hypertension Father     Hyperlipidemia Father     Pulmonary fibrosis Maternal Grandfather     Hyperlipidemia Paternal Grandfather     Heart disease Paternal Grandfather      Social History     Tobacco Use    Smoking status: Never Smoker    Smokeless tobacco:  Never Used   Substance Use Topics    Alcohol use: Yes     Comment: occasional    Drug use: No     Review of Systems   Constitutional:        Jittery, Anxious, Shaky.    Gastrointestinal: Positive for nausea.     General: No fever.  No chills.  Eyes: No visual changes.  Head: No headache.    Integument: No rashes or lesions.  Chest: No shortness of breath.  Cardiovascular: No chest pain.  Abdomen: No abdominal pain.   or vomiting.  Urinary: No abnormal urination.  Neurologic: No focal weakness.  No numbness.  Hematologic: No easy bruising.  Endocrine: No excessive thirst or urination.    Physical Exam     Initial Vitals [12/19/18 0233]   BP Pulse Resp Temp SpO2   135/70 (!) 114 18 98.2 °F (36.8 °C) 100 %      MAP       --         Physical Exam    Nursing note and vitals reviewed.    Appearance: No acute distress.  Skin: No rashes seen.  Good turgor.  No abrasions.  No ecchymoses. No diaphoresis.  Eyes: No conjunctival injection. Mild Nystagmus  ENT: Oropharynx clear.    Chest: Clear to auscultation bilaterally.  Good air movement.  No wheezes.  No rhonchi.  Cardiovascular: Regular rate and rhythm.  No murmurs. No gallops. No rubs.  Abdomen: Soft.  Not distended.  Nontender.  No guarding.  No rebound. No Masses  Musculoskeletal: Good range of motion all joints.  No deformities.  Neck supple.  No meningismus.  Neurologic: No hyperreflexia, myoclonus, Equal strength in upper and lower extremities bilaterally.  Normal sensation.  No facial droop.  Normal speech.    Mental Status:  Alert and oriented x 3.  Appropriate, conversant.            ED Course   Procedures  Labs Reviewed   CBC W/ AUTO DIFFERENTIAL - Abnormal; Notable for the following components:       Result Value    WBC 12.96 (*)     MCH 26.6 (*)     Gran # (ANC) 11.6 (*)     Lymph # 0.6 (*)     Gran% 89.9 (*)     Lymph% 4.6 (*)     All other components within normal limits   COMPREHENSIVE METABOLIC PANEL   CK          Imaging Results    None          Medical  Decision Making:   History:   Old Medical Records: I decided to obtain old medical records.  Initial Assessment:   Pt here with concerning for possible serotonin syndrome vs viral illness. Pt is slightly tachycardic and hyperthermic. On reevaluation after 30 min pt feels somewhat improved but not back to baseline. Labs show mild leukocytosis otherwise unremarkable. Given 1 liter fluids with improvement of tachycardia. After 3 hours in ED pt feels back to normal. Stable vitals, normal temp. This coincides with duration of action of Zofran he took. Pt otherwise stable for discharge and advised to avoid Zofran in future.     Advised pt to follow up with PCP or return if concerning symptoms arise. Pt understands and agrees with plan. Will d/c home.      Clinical Tests:   Lab Tests: Ordered and Reviewed            Scribe Attestation:   Scribe #1: I performed the above scribed service and the documentation accurately describes the services I performed. I attest to the accuracy of the note.               Clinical Impression:   The primary encounter diagnosis was Tachycardia. Diagnoses of Hyperthermia and Tremors of nervous system were also pertinent to this visit.                             Jeferson Hill MD  12/19/18 [id number]

## 2018-12-19 NOTE — TELEPHONE ENCOUNTER
Takes zoloft and took zofran and was not aware of drug interaction. Pt took Zofran that was not prescribed for him at about 9 pm. Woke up 30 min ago feeling bad. Zoloft was taken at 9 pm and zofran was taken around the same time. Pt feeling shaky, vomiting and a change in his interior body temp.pt was advised to ED. Immediate call back placed and advised to call poison control before going to ED.  Pt advised to call poison control at this time.    Reason for Disposition   Took another person's prescription drug    Protocols used: ST MEDICATION QUESTION CALL-A-AH

## 2018-12-19 NOTE — ED TRIAGE NOTES
Harinder Elliott, a 28 y.o. male presents to the ED     Triage note:  Chief Complaint   Patient presents with    Emesis     pt reports possible serotonin syndrome, states that he takes zoloft daily and has been feeling weak, nauseated, and vomting      Pt states he took 8mg zofran at 2100 last night but then threw up 4 times afterwards. Pt still complains of nausea currently. He c/o generalized body aches described as pins and needles. Also chills. Temp recheck is 100.4.      Review of patient's allergies indicates:  No Known Allergies  Past Medical History:   Diagnosis Date    Colon polyp     Depression     Migraine     Ureteral stricture

## 2019-01-28 ENCOUNTER — TELEPHONE (OUTPATIENT)
Dept: UROLOGY | Facility: CLINIC | Age: 29
End: 2019-01-28

## 2019-01-29 ENCOUNTER — OFFICE VISIT (OUTPATIENT)
Dept: UROLOGY | Facility: CLINIC | Age: 29
End: 2019-01-29
Payer: COMMERCIAL

## 2019-01-29 VITALS
WEIGHT: 171.5 LBS | SYSTOLIC BLOOD PRESSURE: 131 MMHG | BODY MASS INDEX: 24.01 KG/M2 | HEIGHT: 71 IN | DIASTOLIC BLOOD PRESSURE: 69 MMHG | HEART RATE: 83 BPM

## 2019-01-29 DIAGNOSIS — Z30.2 ENCOUNTER FOR MALE STERILIZATION PROCEDURE: Primary | ICD-10-CM

## 2019-01-29 PROBLEM — Z86.010 HISTORY OF COLON POLYPS: Status: RESOLVED | Noted: 2017-09-08 | Resolved: 2019-01-29

## 2019-01-29 PROBLEM — N50.82 SCROTAL PAIN: Status: RESOLVED | Noted: 2017-10-11 | Resolved: 2019-01-29

## 2019-01-29 PROBLEM — Z86.0100 HISTORY OF COLON POLYPS: Status: RESOLVED | Noted: 2017-09-08 | Resolved: 2019-01-29

## 2019-01-29 PROCEDURE — 3008F PR BODY MASS INDEX (BMI) DOCUMENTED: ICD-10-PCS | Mod: CPTII,S$GLB,, | Performed by: UROLOGY

## 2019-01-29 PROCEDURE — 99999 PR PBB SHADOW E&M-EST. PATIENT-LVL III: ICD-10-PCS | Mod: PBBFAC,,, | Performed by: UROLOGY

## 2019-01-29 PROCEDURE — 3008F BODY MASS INDEX DOCD: CPT | Mod: CPTII,S$GLB,, | Performed by: UROLOGY

## 2019-01-29 PROCEDURE — 99204 PR OFFICE/OUTPT VISIT, NEW, LEVL IV, 45-59 MIN: ICD-10-PCS | Mod: S$GLB,,, | Performed by: UROLOGY

## 2019-01-29 PROCEDURE — 99999 PR PBB SHADOW E&M-EST. PATIENT-LVL III: CPT | Mod: PBBFAC,,, | Performed by: UROLOGY

## 2019-01-29 PROCEDURE — 99204 OFFICE O/P NEW MOD 45 MIN: CPT | Mod: S$GLB,,, | Performed by: UROLOGY

## 2019-01-29 RX ORDER — LIDOCAINE HYDROCHLORIDE 10 MG/ML
20 INJECTION INFILTRATION; PERINEURAL ONCE
Status: CANCELLED | OUTPATIENT
Start: 2019-01-29 | End: 2019-01-29

## 2019-01-29 NOTE — TELEPHONE ENCOUNTER
----- Message from Valencia Matute sent at 1/28/2019 11:36 AM CST -----  Contact: pt: [phone]  Needs Advice    Reason for call: pt would like to have consult for vas sooner than Thurs, and also would like to have procedure done as soon as possible         Communication Preference: pt: [phone]

## 2019-01-29 NOTE — TELEPHONE ENCOUNTER
Pt notified of new appt date time. Pt verbalized understanding. Confirmed new date time and location.

## 2019-01-29 NOTE — H&P (VIEW-ONLY)
Chief Complaint:   Undesired fertility    HPI:  Mr. Elliott is a 28 y.o.  male who presents for evaluation re vasectomy. He has 2 children, ages 1 and 3 yo, and he is certain that he desires no more. He is aware of other forms of contraception.  He's currently using condoms for contraception. He is aware that vasectomy is to be considered permanent/irreversible.    He denies orchalgia.  No dysuria.  No hematuria.    ROS:  Harinder Elliott denies headache, blurred vision, fever, nausea, vomiting, chills, flank discomfort, abdominal pain, bleeding per rectum, cough, SOB, recent loss of consciousness, recent mental status changes, seizures, dizziness, or upper or lower extremity weakness.    Past Medical History    Past Medical History:   Diagnosis Date    Colon polyp     Depression     Migraine     Ureteral stricture        Past Surgical History    Past Surgical History:   Procedure Laterality Date    ARTHROSCOPY-SHOULDER Left 10/11/2016    [facility]    ARTHROSCOPY-SHOULDER WITH SUBACROMIAL DECOMPRESSION Left 10/11/2016    [facility]    COLON SURGERY      colon polyp    HERNIA REPAIR      KIDNEY SURGERY      RECONSTRUCTION-JOINT-AC-SHOULDER / THERMAL CAPSULORRHAPHY / AC JOINT RESECTION Left 10/11/2016    [facility]    torn labrim      WISDOM TOOTH EXTRACTION         Social History    Social History     Socioeconomic History    Marital status:      Spouse name: Not on file    Number of children: Not on file    Years of education: Not on file    Highest education level: Not on file   Social Needs    Financial resource strain: Not on file    Food insecurity - worry: Not on file    Food insecurity - inability: Not on file    Transportation needs - medical: Not on file    Transportation needs - non-medical: Not on file   Occupational History    Occupation: sells wine   Tobacco Use     Smoking status: Never Smoker    Smokeless tobacco: Never Used   Substance and Sexual Activity    Alcohol use: Yes     Comment: occasional    Drug use: No    Sexual activity: Not on file   Other Topics Concern    Not on file   Social History Narrative    Not on file       Family History  Family History   Problem Relation Age of Onset    Hypertension Father     Hyperlipidemia Father     Pulmonary fibrosis Maternal Grandfather     Hyperlipidemia Paternal Grandfather     Heart disease Paternal Grandfather          Medications    Current Outpatient Medications:     sertraline (ZOLOFT) 25 MG tablet, Take 25 mg by mouth 2 (two) times daily., Disp: , Rfl: 5    sumatriptan (IMITREX) 100 MG tablet, Take 1 tablet (100 mg total) by mouth every 2 (two) hours as needed., Disp: 9 tablet, Rfl: 0    Allergies  Review of patient's allergies indicates:  No Known Allergies      ?  PHYSICAL EXAM:    The patient generally appears in good health, is appropriately interactive, and is in no apparent distress.     Eyes: anicteric sclerae, moist conjunctivae; no lid-lag; PERRLA     HENT: Atraumatic; oropharynx clear with moist mucous membranes and no mucosal ulcerations;normal hard and soft palate.  No evidence of lymphadenopathy.    Neck: Trachea midline.  No thyromegaly.    Musculoskeletal: No abnormal gait.    Skin: No lesions.    Mental: Cooperative with normal affect.  Is oriented to time, place, and person.    Neuro: Grossly intact.    Chest: Normal inspiratory effort.   No accessory muscles.  No audible wheezes.  Respirations symmetric on inspiration and expiration.    Heart: Regular rhythm.      Abdomen:  Soft, non-tender. No masses or organomegaly. Bladder is not palpable. No evidence of flank discomfort. No evidence of inguinal hernia.    Genitourinary: The penis has no evidence of plaques or induration. The urethral meatus is normal. The testes, epididymides, and cord structures are normal in size and contour  bilaterally. The scrotum is normal in size and contour.    Extremities: No clubbing, cyanosis, or edema          A/P:  Harinder Elliott is a 28 y.o. male who presents for evaluation re vasectomy.    1.I discussed with the patient risks and benefits, as well as alternatives. We discussed possible complications at length, including fever, infection, bleeding--possibly requiring reoperation,testicular injury or atrophy with loss of function, chronic pain, persistent or recurrent presence of sperm in the ejaculate, vasal recanalization, as well as the risks attendant to the anesthetic.  2.We discussed that he should stop aspirin, ibuprofen, and similar products at least one week prior to the procedure. Acetominophen is okay to use for headaches, pain, etc.  3. He should bring an athletic supporter and loose fitting sweat pants on the day of the procedure.  4. We discussed that he must continue to use contraception until two consecutive semen analyses (checked at approximately 4-6 weeks post-vasectomy) reveal azoospermia.  5. He will schedule an elective vasectomy.

## 2019-01-29 NOTE — PATIENT INSTRUCTIONS
Having a Vasectomy: Before, During, and After the Procedure  Vasectomy is an outpatient (same day) procedure. It can be done in a doctors office, clinic, or hospital. Your doctor will talk with you about preparing for surgery. He or she will also discuss the possible risks and complications with you. After the procedure, follow all your doctors advice for recovery.  Preparing for Surgery      The cut ends of the vas may be tied, closed with a clip, or sealed by heat (cauterized).    Your doctor will talk with you about getting ready for surgery. You may be asked to do the following:  · Sign a consent form. This must be done at least a few days before surgery. It gives your doctor permission to do the procedure. It also states that a vasectomy is not guaranteed to make you sterile.  · Dont take aspirin, ibuprofen, or naproxen for 1 week before surgery or 1 week after. These medications can cause bleeding after the procedure. Also, tell your doctor if you take any medications, supplements, or herbal remedies.  · Tell your doctor if youve had any prior scrotal surgery.  · Arrange for an adult family member or friend to give you a ride home after surgery.  · Shower and clean your scrotum the day of surgery. Your doctor may also ask you to shave your scrotum.  · Bring an athletic supporter (jockstrap) and a pair of loose fitting pants to the doctors office or hospital.  · Eat no more than a light snack before surgery.  During Surgery  The entire procedure usually lasts less than 30 minutes.  · Youll be asked to undress and lie on a table.  · You may be given medication to help you relax. To prevent pain during surgery, youll be given an injection of local anesthetic in your scrotum or lower groin.  · Once the area is numb, one or two small incisions are made in the scrotum.   · The vas deferens are lifted through the incision and cut. The ends of the vas are then sealed off using one of several methods.  · If  needed, the incision is closed with stitches.  · You can rest for a while until youre ready to go home.  Recovering at Home  For about a week, your scrotum may look bruised and slightly swollen. You may also have a small amount of bloody discharge from the incision. This is normal.  To help make your recovery more comfortable, follow the tips below.  · Stay off your feet as much as possible for the first 2 days.   · Wear an athletic supporter or snug cotton briefs for support.  · Ice the area for 24 hours  · Take medications with acetaminophen (such as Tylenol) to relieve any discomfort. Dont use aspirin, ibuprofen, or naproxen.  · Avoid heavy lifting, exercise, or intercourse for 7 days.  · Remember: You must use another form of birth control until youre completely sterile.  Call your doctor if you notice any of the following after surgery:   · Increasing pain or swelling in your scrotum  · A large black-and-blue area, or a growing lump  · Fever or chills  · Increasing redness or drainage of the incision  · Trouble urinating    Sex After Vasectomy  Vasectomy doesnt change your sexual function. So when you start having sex again, it should feel the same as before. A vasectomy also shouldnt affect your relationship with your partner. Its important to remember, though, that you wont become sterile right away. It will take time before you can have sex without the need for birth control.  · Until youre sterile: After a vasectomy, some active sperm still remain in your semen. It will take time and many ejaculations before the sperm are completely gone. During this period, you must use another birth control method to prevent pregnancy. To make sure no sperm are left in your semen, youll need to have one or more semen exams. You usually collect a semen sample at home and bring it to a lab. The sample is then checked under a microscope. Youre sterile only when these samples show no evidence of sperm. Ask your  doctor whether additional follow-up is needed.  · After youre sterile: After your doctor tells you youre sterile, you no longer need to use any form of birth control. Youre free to have sex without the fear of unwanted pregnancy. However, a vasectomy does not protect you from sexually transmitted diseases (STDs). If you have more than one sex partner, be sure to practice safer sex by using condoms.  © 7467-0880 [facility], [address] [place] [address], [place] [address]. All rights reserved. This information is not intended as a substitute for professional medical care. Always follow your healthcare professional's instructions.    Vasectomy: Risks and Complications  A vasectomy is an outpatient procedure. This means youll go home the same day. Its done in a doctors office, clinic, or hospital. Before your procedure, youll be asked to read and sign a consent form. This form states youre aware of the possible risks and complications. It also says that you understand that the procedure, though most often successful, cant promise to make you sterile. Be sure you have all your questions answered before you sign this form. Below is a list of risks and possible complications of the procedure.  Risks and Possible Complications of Vasectomy   Vasectomy is safe. But it does have risks. They include the following:  · Bleeding or infection.  · Sperm granuloma. This is a small, harmless lump. It may form where the vas deferens is sealed off.  · Loss of Testicle  · Epididymitis.This is inflammation that may cause scrotal aching. It often goes away without treatment. Anti-inflammatory medications can provide relief.  · Reconnection of the vas deferens. This can occur in rare cases. It makes you fertile again. This can result in an unwanted pregnancy.  · Sperm antibodies.These are a common response of the body to absorbed sperm. The antibodies can make you sterile. This is true even if you later try to reverse your  vasectomy.  · Long-term testicular discomfort.This may occur after surgery. But its very rare.    © 7617-7796 [facility], [address] [place] [address], [place] [address]. All rights reserved. This information is not intended as a substitute for professional medical care. Always follow your healthcare professional's instructions.

## 2019-01-29 NOTE — PROGRESS NOTES
Chief Complaint:   Undesired fertility    HPI:  Mr. Elliott is a 28 y.o.  male who presents for evaluation re vasectomy. He has 2 children, ages 1 and 3 yo, and he is certain that he desires no more. He is aware of other forms of contraception.  He's currently using condoms for contraception. He is aware that vasectomy is to be considered permanent/irreversible.    He denies orchalgia.  No dysuria.  No hematuria.    ROS:  Harinder Elliott denies headache, blurred vision, fever, nausea, vomiting, chills, flank discomfort, abdominal pain, bleeding per rectum, cough, SOB, recent loss of consciousness, recent mental status changes, seizures, dizziness, or upper or lower extremity weakness.    Past Medical History    Past Medical History:   Diagnosis Date    Colon polyp     Depression     Migraine     Ureteral stricture        Past Surgical History    Past Surgical History:   Procedure Laterality Date    ARTHROSCOPY-SHOULDER Left 10/11/2016    Performed by Gene Rene [facility] at [facility], [place], operated by [facility] OR    ARTHROSCOPY-SHOULDER WITH SUBACROMIAL DECOMPRESSION Left 10/11/2016    Performed by Gene Rene MD at [facility], [place], operated by [facility] OR    COLON SURGERY      colon polyp    HERNIA REPAIR      KIDNEY SURGERY      RECONSTRUCTION-JOINT-AC-SHOULDER / THERMAL CAPSULORRHAPHY / AC JOINT RESECTION Left 10/11/2016    Performed by Gene Rene MD at [facility], [place], operated by [facility] OR    torn labrim      WISDOM TOOTH EXTRACTION         Social History    Social History     Socioeconomic History    Marital status:      Spouse name: Not on file    Number of children: Not on file    Years of education: Not on file    Highest education level: Not on file   Social Needs    Financial resource strain: Not on file    Food insecurity - worry: Not on file    Food insecurity - inability: Not on file    Transportation needs - medical: Not on file    Transportation needs - non-medical: Not on file   Occupational History    Occupation: sells wine   Tobacco Use     Smoking status: Never Smoker    Smokeless tobacco: Never Used   Substance and Sexual Activity    Alcohol use: Yes     Comment: occasional    Drug use: No    Sexual activity: Not on file   Other Topics Concern    Not on file   Social History Narrative    Not on file       Family History  Family History   Problem Relation Age of Onset    Hypertension Father     Hyperlipidemia Father     Pulmonary fibrosis Maternal Grandfather     Hyperlipidemia Paternal Grandfather     Heart disease Paternal Grandfather          Medications    Current Outpatient Medications:     sertraline (ZOLOFT) 25 MG tablet, Take 25 mg by mouth 2 (two) times daily., Disp: , Rfl: 5    sumatriptan (IMITREX) 100 MG tablet, Take 1 tablet (100 mg total) by mouth every 2 (two) hours as needed., Disp: 9 tablet, Rfl: 0    Allergies  Review of patient's allergies indicates:  No Known Allergies      ?  PHYSICAL EXAM:    The patient generally appears in good health, is appropriately interactive, and is in no apparent distress.     Eyes: anicteric sclerae, moist conjunctivae; no lid-lag; PERRLA     HENT: Atraumatic; oropharynx clear with moist mucous membranes and no mucosal ulcerations;normal hard and soft palate.  No evidence of lymphadenopathy.    Neck: Trachea midline.  No thyromegaly.    Musculoskeletal: No abnormal gait.    Skin: No lesions.    Mental: Cooperative with normal affect.  Is oriented to time, place, and person.    Neuro: Grossly intact.    Chest: Normal inspiratory effort.   No accessory muscles.  No audible wheezes.  Respirations symmetric on inspiration and expiration.    Heart: Regular rhythm.      Abdomen:  Soft, non-tender. No masses or organomegaly. Bladder is not palpable. No evidence of flank discomfort. No evidence of inguinal hernia.    Genitourinary: The penis has no evidence of plaques or induration. The urethral meatus is normal. The testes, epididymides, and cord structures are normal in size and contour  bilaterally. The scrotum is normal in size and contour.    Extremities: No clubbing, cyanosis, or edema          A/P:  Harinder Elliott is a 28 y.o. male who presents for evaluation re vasectomy.    1.I discussed with the patient risks and benefits, as well as alternatives. We discussed possible complications at length, including fever, infection, bleeding--possibly requiring reoperation,testicular injury or atrophy with loss of function, chronic pain, persistent or recurrent presence of sperm in the ejaculate, vasal recanalization, as well as the risks attendant to the anesthetic.  2.We discussed that he should stop aspirin, ibuprofen, and similar products at least one week prior to the procedure. Acetominophen is okay to use for headaches, pain, etc.  3. He should bring an athletic supporter and loose fitting sweat pants on the day of the procedure.  4. We discussed that he must continue to use contraception until two consecutive semen analyses (checked at approximately 4-6 weeks post-vasectomy) reveal azoospermia.  5. He will schedule an elective vasectomy.

## 2019-01-31 ENCOUNTER — HOSPITAL ENCOUNTER (OUTPATIENT)
Dept: UROLOGY | Facility: HOSPITAL | Age: 29
Discharge: HOME | End: 2019-01-31
Attending: UROLOGY
Payer: COMMERCIAL

## 2019-01-31 VITALS
DIASTOLIC BLOOD PRESSURE: 88 MMHG | HEIGHT: 71 IN | BODY MASS INDEX: 23.58 KG/M2 | TEMPERATURE: 98 F | RESPIRATION RATE: 18 BRPM | SYSTOLIC BLOOD PRESSURE: 147 MMHG | WEIGHT: 168.44 LBS | HEART RATE: 76 BPM

## 2019-01-31 DIAGNOSIS — Z30.2 ENCOUNTER FOR MALE STERILIZATION PROCEDURE: ICD-10-CM

## 2019-01-31 PROCEDURE — 27200994 HC ELECTROCAUTERY DEVICE

## 2019-01-31 PROCEDURE — 55250 PR REMOVAL OF SPERM DUCT(S): ICD-10-PCS | Mod: ,,, | Performed by: UROLOGY

## 2019-01-31 PROCEDURE — 55250 REMOVAL OF SPERM DUCT(S): CPT

## 2019-01-31 PROCEDURE — 55250 REMOVAL OF SPERM DUCT(S): CPT | Mod: ,,, | Performed by: UROLOGY

## 2019-01-31 RX ORDER — OXYCODONE AND ACETAMINOPHEN 5; 325 MG/1; MG/1
1 TABLET ORAL EVERY 4 HOURS PRN
Qty: 8 TABLET | Refills: 0 | Status: SHIPPED | OUTPATIENT
Start: 2019-01-31 | End: 2019-02-10

## 2019-01-31 RX ORDER — LIDOCAINE HYDROCHLORIDE 10 MG/ML
20 INJECTION INFILTRATION; PERINEURAL ONCE
Status: COMPLETED | OUTPATIENT
Start: 2019-01-31 | End: 2019-01-31

## 2019-01-31 RX ORDER — CEPHALEXIN 500 MG/1
500 CAPSULE ORAL 4 TIMES DAILY
Qty: 8 CAPSULE | Refills: 0 | Status: SHIPPED | OUTPATIENT
Start: 2019-01-31 | End: 2019-02-02

## 2019-01-31 RX ADMIN — LIDOCAINE HYDROCHLORIDE 20 ML: 10 INJECTION INFILTRATION; PERINEURAL at 04:01

## 2019-01-31 NOTE — PATIENT INSTRUCTIONS
What to Expect After a Vasectomy  You cannot drive or operate heavy machinery on the day of the procedure. Begin prescription antibiotics today and take as directed until finished. Dr. Sosa will give you a prescription for a narcotic pain medication. You may choose to take the prescription medication or Tylenol only for minor discomfort. Do not take any NSAIDS (e.g., Motrin, Naprosyn, etc.) or aspirin for at least one week, as these products can thin the blood.    Apply ice packs to the scrotal area for 24-48 hours. Avoid direct contact of the ice pack with the skin. Scrotal supports, jock straps, or fitted underwear help elevate the scrotum and reduce discomfort.    You may shower the next day. Gently apply soapy water to the scrotum to wash. Rinse and dry yourself by blotting the skin, not rubbing.    Avoid strenuous physical exercises or sexual relations for at least one week after a vasectomy.    Continue to use birth control for at least 6-8 weeks or 20 ejaculations. You are still considered fertile until your urologist examines a post-vasectomy semen analysis after 20 ejaculations and a second one a few days after the first. Drop off the specimens at the 4th floor [facility] department between 8am and 4pm.    Do NOT resume unprotected sexual activity until your physician finds no sperm in your semen.    All stitches will dissolve on their own in 1-2 weeks.    Signs and Symptoms to Report  · A large amount of bleeding at the site.  · An unusual amount of pain.  · A large amount of swelling in the scrotum.  · Fever and chills.  · Any signs of infection, such as redness at the site or foul-smelling discharge    Risks  The risks of complication after vasectomy are very low.    A few of the risks include:  · Bleeding.  · Infection.  · Scrotal hematoma - a collection of blood in the scrotum.  · Inflammation of the epididymis - inflammation of a structure next to the testicle that helps in maturation of the  sperm.  · Sperm granuloma - a collection of sperm that leaks out from the vas deferens, forming a small nodule or lump. This does not usually cause any discomfort, but you may feel it in the scrotum.  · Recanalization - the restoration of the lumen or transport tube between the two ends of the vas deferens, possibly causing fertility.    If you have any questions or concerns, please call Dr. Sosa at [phone] during regular office hours. If there are any problems after hours or on weekends, you may call [phone] and ask for the urology resident on call.

## 2019-01-31 NOTE — PROCEDURES
Date: 01/31/2019     Surgeon: Sheila    Assistant: None    Procedure performed: Bilateral vasectomy    Blood loss: Min.    Specimen: None    Procedure in detail:  After informed consent was obtained, the patient was placed in the supine position.  The skin was sterilized with a prep.  Attention was then turned to the patient's left hemiscrotum.    The vas was isolated on this side.  Local anesthesia was applied with 1% lidocaine.  The skin overlying the vas was incised sharply.  The vas was then isolated and grasped with a ring forceps.  It was brought through the incision.  The adventia overlying the vas was incised sharply.  The vas was then doubly clamped with a hemostat.  The vas was divided between the two hemostats with a 15 blade scalpel.    The ends of the vas were cauterized and tied with 2-0 silk sutures.  Two sutures were placed on each side. Electrocautery was used to obtain hemostasis.  A fascial interposition was then done with a 3-0 chromic.    The wound was then closed with a 3-0 chromic.    Attention was then turned to the right hemiscrotum and the exact same procedure was done. The vas was isolated on this side.  Local anesthesia was applied with 1% lidocaine.  The skin overlying the vas was incised sharply.  The vas was then isolated and grasped with a ring forceps.  It was brought through the incision.  The adventia overlying the vas was incised sharply.  The vas was then doubly clamped with a hemostat.  The vas was divided between the two hemostats with a 15 blade scalpel.    The ends of the vas were cauterized and tied with 2-0 silk sutures.  Two sutures were placed on each side. Electrocautery was used to obtain hemostasis.  A fascial interposition was then done with a 3-0 chromic.    The wound was then closed with a 3-0 chromic.    He tolerated the procedure well without complication.  He was advised to continue contraception until he brings in 2 semen samples that show no sperm.  He is also  to avoid lifting, strenuous exercise, intercourse, NSAIDS, and ASA for 1 week.  He will be discharged home is stable condition.  He is to follow up prn.

## 2019-02-06 ENCOUNTER — PATIENT MESSAGE (OUTPATIENT)
Dept: UROLOGY | Facility: CLINIC | Age: 29
End: 2019-02-06

## 2019-02-18 ENCOUNTER — PATIENT MESSAGE (OUTPATIENT)
Dept: UROLOGY | Facility: CLINIC | Age: 29
End: 2019-02-18

## 2019-04-18 ENCOUNTER — TELEPHONE (OUTPATIENT)
Dept: UROLOGY | Facility: CLINIC | Age: 29
End: 2019-04-18

## 2019-04-18 NOTE — TELEPHONE ENCOUNTER
Patient was informed that his post vas specimen showed 0 sperm.  He was instructed to wait at least 1 week before submitting another sample.  He was instructed to have multiple ejaculations in the meantime and continue to use some form of birth control until he is cleared. He voiced understanding.

## 2019-04-30 ENCOUNTER — OFFICE VISIT (OUTPATIENT)
Dept: URGENT CARE | Facility: CLINIC | Age: 29
End: 2019-04-30
Payer: COMMERCIAL

## 2019-04-30 VITALS
OXYGEN SATURATION: 99 % | TEMPERATURE: 98 F | BODY MASS INDEX: 23.8 KG/M2 | DIASTOLIC BLOOD PRESSURE: 80 MMHG | RESPIRATION RATE: 18 BRPM | WEIGHT: 170 LBS | HEIGHT: 71 IN | SYSTOLIC BLOOD PRESSURE: 128 MMHG | HEART RATE: 99 BPM

## 2019-04-30 DIAGNOSIS — S91.331A PUNCTURE WOUND OF RIGHT FOOT, INITIAL ENCOUNTER: Primary | ICD-10-CM

## 2019-04-30 PROCEDURE — 3008F PR BODY MASS INDEX (BMI) DOCUMENTED: ICD-10-PCS | Mod: CPTII,S$GLB,, | Performed by: PHYSICIAN ASSISTANT

## 2019-04-30 PROCEDURE — 3008F BODY MASS INDEX DOCD: CPT | Mod: CPTII,S$GLB,, | Performed by: PHYSICIAN ASSISTANT

## 2019-04-30 PROCEDURE — 99214 OFFICE O/P EST MOD 30 MIN: CPT | Mod: S$GLB,,, | Performed by: PHYSICIAN ASSISTANT

## 2019-04-30 PROCEDURE — 99214 PR OFFICE/OUTPT VISIT, EST, LEVL IV, 30-39 MIN: ICD-10-PCS | Mod: S$GLB,,, | Performed by: PHYSICIAN ASSISTANT

## 2019-04-30 RX ORDER — MUPIROCIN 20 MG/G
OINTMENT TOPICAL
Qty: 22 G | Refills: 1 | Status: SHIPPED | OUTPATIENT
Start: 2019-04-30 | End: 2020-10-13

## 2019-04-30 NOTE — PROGRESS NOTES
"Subjective:       Patient ID: Harinder Elliott is a 28 y.o. male.    Vitals:  height is 5' 11" (1.803 m) and weight is 77.1 kg (170 lb). His oral temperature is 98.3 °F (36.8 °C). His blood pressure is 128/80 and his pulse is 99. His respiration is 18 and oxygen saturation is 99%.     Chief Complaint: Trauma    This is a 28 y.o. male who presents today with a chief complaint of stepped on a nail in his right foot.  He was wearing shoes at the time.  His last tdap was 3 years ago when his 1st child was born, but he may have had one 1 year ago when his second child was born.    Trauma   The incident occurred less than 1 hour ago. Injury mechanism: stepped on a nail. The injury occurred in the context of other (nail to foot ). Restrained: shoes  There is an injury to the right foot. The pain is moderate. It is unknown if a foreign body is present. Pertinent negatives include no abdominal pain, light-headedness or loss of consciousness. There have been no prior injuries to these areas. His tetanus status is unknown.       Constitution: Negative for fatigue.   HENT: Negative for facial swelling and facial trauma.    Neck: Negative for neck stiffness.   Cardiovascular: Negative for chest trauma.   Eyes: Negative for eye trauma, double vision and blurred vision.   Gastrointestinal: Negative for abdominal trauma, abdominal pain and rectal bleeding.   Genitourinary: Negative for hematuria, genital trauma and pelvic pain.   Musculoskeletal: Positive for pain and trauma. Negative for joint swelling, abnormal ROM of joint and pain with walking.   Skin: Positive for puncture wound. Negative for color change, wound, abrasion, laceration and erythema.   Neurological: Negative for dizziness, history of vertigo, light-headedness, coordination disturbances, altered mental status and loss of consciousness.   Hematologic/Lymphatic: Negative for history of bleeding disorder.   Psychiatric/Behavioral: Negative for altered mental " status.       Objective:      Physical Exam   Constitutional: He is oriented to person, place, and time. He appears well-developed and well-nourished. No distress.   HENT:   Head: Normocephalic and atraumatic.   Eyes: Conjunctivae are normal.   Neck: Normal range of motion. Neck supple.   Cardiovascular: Normal rate and regular rhythm. Exam reveals no gallop and no friction rub.   No murmur heard.  Pulmonary/Chest: Effort normal and breath sounds normal. He has no wheezes. He has no rales.   Musculoskeletal: Normal range of motion.        Right foot: There is tenderness and laceration (puncture wound to plantar foot). There is normal range of motion, no bony tenderness and no swelling.        Feet:    Neurological: He is alert and oriented to person, place, and time.   Skin: Skin is warm and dry. No rash noted. No erythema.   Psychiatric: He has a normal mood and affect. His behavior is normal. Judgment and thought content normal.   Nursing note and vitals reviewed.      4:39 PM - the wound was cleaned with Betadine.  Bactroban ointment and a Band-Aid was applied.  Patient tolerated well.    Assessment:       1. Puncture wound of right foot, initial encounter        Plan:         Puncture wound of right foot, initial encounter  -     mupirocin (BACTROBAN) 2 % ointment; Apply to affected area 3 times daily  Dispense: 22 g; Refill: 1        Harinder was seen today for trauma.    Diagnoses and all orders for this visit:    Puncture wound of right foot, initial encounter  -     mupirocin (BACTROBAN) 2 % ointment; Apply to affected area 3 times daily      Patient Instructions   - Rest.    - Drink plenty of fluids.    - Tylenol or Ibuprofen as directed as needed for fever/pain.    - Follow up with your PCP or specialty clinic as directed in the next 1-2 weeks if not improved or as needed.  You can call [phone] to schedule an appointment with the appropriate provider.    - Go to the ED if your symptoms worsen.  - You  must understand that you have received an Urgent Care treatment only and that you may be released before all of your medical problems are known or treated.   - You, the patient, will arrange for follow up care as instructed.   - If your condition worsens or fails to improve we recommend that you receive another evaluation at the ER immediately or contact your PCP to discuss your concerns or return here.       Puncture Wound: Foot       A puncture wound occurs when a pointed object (such as a nail) pushes into the skin. It may go into the tissues below the skin of the foot, including fat and muscle. This type of wound is narrow and deep. They can be hard to clean. Puncture wounds are at high risk for becoming infected. One type of serious infection is more likely if you were wearing a rubber-soled shoe at the time of injury. Bacteria from the sole of the shoe may be dragged into the wound. Symptoms of infection may appear as late as 2 to 3 weeks after the injury. Be sure to watch for symptoms of infection and call your healthcare provider right away if any them appear.  X-rays may be done to see whether any objects remain under the skin. Your may also need a tetanus shot. This is given if you are not up to date on this vaccination and the object that caused the wound may lead to tetanus.  Puncture wounds can easily become infected.   Home care  · When you sit or lie down, raise the foot above the level of your heart. This helps reduce swelling and pain.  · Do not put weight on the injured foot if it hurts to do so or if you were told to keep weight off the injury.  · Your healthcare provider may prescribe an antibiotic. This is to help prevent infection. Follow all instructions for taking this medicine. Take the medicine every day until it is gone or you are told to stop. You should not have any left over.  · The healthcare provider may prescribe medicines for pain. Follow instructions for taking them.  · You can  take acetaminophen or ibuprofen for pain, unless you were given a different pain medicine to use.   · Follow the healthcare providers instructions on how to care for the wound.  · Keep the wound clean and dry. Do not get the wound wet until you are told it is okay to do so. If the area gets wet, gently pat it dry with a clean cloth. Replace the wet bandage with a dry one.  · If a bandage was applied and it becomes wet or dirty, replace it. Otherwise, leave it in place for the first 24 hours.  · Once you can get the wound wet, you may shower as usual but do not soak the wound in water (no tub baths or swimming)  · Check the wound daily for symptoms of infection. These include:  ¨ Increasing redness or swelling around the wound  ¨ Increased warmth of the wound  ¨ Worsening pain  ¨ Red streaking lines away from the wound  ¨ Draining pus  Follow-up care  Follow up with your healthcare provider as advised.   When to seek medical advice  Call your healthcare provider right away if any of these occur:  · Any symptoms of infection (listed above)  · Fever of 100.4°F (38.ºC) or higher, or as directed by your healthcare provider  · Wound changes colors  · Numbness around the wound  · Decreased movement around the injured area  Date Last Reviewed: 8/24/2015 © 2000-2017 [employer]. [address] [place] [address], [place] [address]. All rights reserved. This information is not intended as a substitute for professional medical care. Always follow your healthcare professional's instructions.

## 2019-04-30 NOTE — PATIENT INSTRUCTIONS
- Rest.    - Drink plenty of fluids.    - Tylenol or Ibuprofen as directed as needed for fever/pain.    - Follow up with your PCP or specialty clinic as directed in the next 1-2 weeks if not improved or as needed.  You can call [phone] to schedule an appointment with the appropriate provider.    - Go to the ED if your symptoms worsen.  - You must understand that you have received an Urgent Care treatment only and that you may be released before all of your medical problems are known or treated.   - You, the patient, will arrange for follow up care as instructed.   - If your condition worsens or fails to improve we recommend that you receive another evaluation at the ER immediately or contact your PCP to discuss your concerns or return here.       Puncture Wound: Foot       A puncture wound occurs when a pointed object (such as a nail) pushes into the skin. It may go into the tissues below the skin of the foot, including fat and muscle. This type of wound is narrow and deep. They can be hard to clean. Puncture wounds are at high risk for becoming infected. One type of serious infection is more likely if you were wearing a rubber-soled shoe at the time of injury. Bacteria from the sole of the shoe may be dragged into the wound. Symptoms of infection may appear as late as 2 to 3 weeks after the injury. Be sure to watch for symptoms of infection and call your healthcare provider right away if any them appear.  X-rays may be done to see whether any objects remain under the skin. Your may also need a tetanus shot. This is given if you are not up to date on this vaccination and the object that caused the wound may lead to tetanus.  Puncture wounds can easily become infected.   Home care  · When you sit or lie down, raise the foot above the level of your heart. This helps reduce swelling and pain.  · Do not put weight on the injured foot if it hurts to do so or if you were told to keep weight off the injury.  · Your  healthcare provider may prescribe an antibiotic. This is to help prevent infection. Follow all instructions for taking this medicine. Take the medicine every day until it is gone or you are told to stop. You should not have any left over.  · The healthcare provider may prescribe medicines for pain. Follow instructions for taking them.  · You can take acetaminophen or ibuprofen for pain, unless you were given a different pain medicine to use.   · Follow the healthcare providers instructions on how to care for the wound.  · Keep the wound clean and dry. Do not get the wound wet until you are told it is okay to do so. If the area gets wet, gently pat it dry with a clean cloth. Replace the wet bandage with a dry one.  · If a bandage was applied and it becomes wet or dirty, replace it. Otherwise, leave it in place for the first 24 hours.  · Once you can get the wound wet, you may shower as usual but do not soak the wound in water (no tub baths or swimming)  · Check the wound daily for symptoms of infection. These include:  ¨ Increasing redness or swelling around the wound  ¨ Increased warmth of the wound  ¨ Worsening pain  ¨ Red streaking lines away from the wound  ¨ Draining pus  Follow-up care  Follow up with your healthcare provider as advised.   When to seek medical advice  Call your healthcare provider right away if any of these occur:  · Any symptoms of infection (listed above)  · Fever of 100.4°F (38.ºC) or higher, or as directed by your healthcare provider  · Wound changes colors  · Numbness around the wound  · Decreased movement around the injured area  Date Last Reviewed: 8/24/2015 © 2000-2017 [employer]. [address] [place] [address], [place] [address]. All rights reserved. This information is not intended as a substitute for professional medical care. Always follow your healthcare professional's instructions.

## 2019-06-12 ENCOUNTER — PATIENT MESSAGE (OUTPATIENT)
Dept: UROLOGY | Facility: CLINIC | Age: 29
End: 2019-06-12

## 2019-09-09 ENCOUNTER — PATIENT MESSAGE (OUTPATIENT)
Dept: INTERNAL MEDICINE | Facility: CLINIC | Age: 29
End: 2019-09-09

## 2019-09-09 ENCOUNTER — PATIENT MESSAGE (OUTPATIENT)
Dept: UROLOGY | Facility: CLINIC | Age: 29
End: 2019-09-09

## 2019-09-09 ENCOUNTER — TELEPHONE (OUTPATIENT)
Dept: UROLOGY | Facility: CLINIC | Age: 29
End: 2019-09-09

## 2019-09-24 ENCOUNTER — PATIENT MESSAGE (OUTPATIENT)
Dept: UROLOGY | Facility: CLINIC | Age: 29
End: 2019-09-24

## 2019-10-02 ENCOUNTER — OFFICE VISIT (OUTPATIENT)
Dept: UROLOGY | Facility: CLINIC | Age: 29
End: 2019-10-02
Payer: COMMERCIAL

## 2019-10-02 VITALS
DIASTOLIC BLOOD PRESSURE: 80 MMHG | BODY MASS INDEX: 23.36 KG/M2 | HEIGHT: 71 IN | HEART RATE: 77 BPM | SYSTOLIC BLOOD PRESSURE: 124 MMHG | WEIGHT: 166.88 LBS

## 2019-10-02 DIAGNOSIS — N41.9 PROSTATITIS, UNSPECIFIED PROSTATITIS TYPE: ICD-10-CM

## 2019-10-02 DIAGNOSIS — N50.819 TESTICLE PAIN: Primary | ICD-10-CM

## 2019-10-02 PROCEDURE — 99214 OFFICE O/P EST MOD 30 MIN: CPT | Mod: 25,S$GLB,, | Performed by: NURSE PRACTITIONER

## 2019-10-02 PROCEDURE — 81002 URINALYSIS NONAUTO W/O SCOPE: CPT | Mod: S$GLB,,, | Performed by: NURSE PRACTITIONER

## 2019-10-02 PROCEDURE — 99214 PR OFFICE/OUTPT VISIT, EST, LEVL IV, 30-39 MIN: ICD-10-PCS | Mod: 25,S$GLB,, | Performed by: NURSE PRACTITIONER

## 2019-10-02 PROCEDURE — 81002 PR URINALYSIS NONAUTO W/O SCOPE: ICD-10-PCS | Mod: S$GLB,,, | Performed by: NURSE PRACTITIONER

## 2019-10-02 PROCEDURE — 99999 PR PBB SHADOW E&M-EST. PATIENT-LVL III: ICD-10-PCS | Mod: PBBFAC,,, | Performed by: NURSE PRACTITIONER

## 2019-10-02 PROCEDURE — 3008F PR BODY MASS INDEX (BMI) DOCUMENTED: ICD-10-PCS | Mod: CPTII,S$GLB,, | Performed by: NURSE PRACTITIONER

## 2019-10-02 PROCEDURE — 3008F BODY MASS INDEX DOCD: CPT | Mod: CPTII,S$GLB,, | Performed by: NURSE PRACTITIONER

## 2019-10-02 PROCEDURE — 99999 PR PBB SHADOW E&M-EST. PATIENT-LVL III: CPT | Mod: PBBFAC,,, | Performed by: NURSE PRACTITIONER

## 2019-10-02 RX ORDER — OXAPROZIN 600 MG/1
600 TABLET, FILM COATED ORAL 2 TIMES DAILY WITH MEALS
Qty: 28 TABLET | Refills: 0 | Status: SHIPPED | OUTPATIENT
Start: 2019-10-02 | End: 2019-10-16

## 2019-10-02 NOTE — PATIENT INSTRUCTIONS
Chronic Prostatitis/Chronic Pelvic Pain Syndrome (CP/CPPS)      With a healthy prostate, urine flows easily through the urethra.     Chronic prostatitis/chronic pelvic pain syndrome (CP/CPPS) is ongoing pain in the area of the prostate gland. CP/CPPS is the most common of the 4 types of prostatitis which also include acute bacterial prostatitis, chronic bacterial prostatitis, and asymptomatic inflammatory prostatitis. The prostate gland is part of the male reproductive system. It sits just below the bladder and surrounds the urethra. The urethra is the tube that takes urine and semen out of the body. CP/CPPS is the most common form of pain of the gland. It is also known as nonbacterial prostatitis. Symptoms such as pain and trouble urinating may come and go.  What causes CP/CPPS?  The exact cause of CP/CPPS isnt known. It may be caused by an infection that comes back again and again. It may be caused by inflammation of the gland. Muscle spasms in the pelvis may be a cause. Other causes of CP/CPPS may include:  · Stress that tightens the pelvic muscles  · Urine flowing back up into the prostate ducts  · Not ejaculating often  In many cases, the cause isnt clear.  What are the symptoms of CP/CPPS?  Some men dont have symptoms. Or, they may have symptoms that come and go. The symptoms can include:  · Pain in the genitals and pelvic area  · Trouble urinating  · Pain while urinating  · Pain during or after ejaculation  How is CP/CPPS diagnosed?  Your healthcare provider will ask about your medical history and your symptoms. He or she may give you a physical exam, including a rectal exam. Your urine, blood, and semen may be tested for bacteria or certain chemicals. In some cases, you may have other tests. You may have an ultrasound or a transrectal ultrasound-guided biopsy. This is done to take tiny pieces of tissue to look at with a microscope. Or, you may have imaging tests such as a CT scan, MRI, or urodynamic  studies that look at urine flow and other issues. These are done to look at your abdomen and pelvic areas.  How is CP/CPPS treated?  The goal of treatment is to help relieve symptoms. Treatments can include one or more of these:  · Antibiotics  · Anti-inflammatory or muscle-relaxing medicines  · Alpha-blocker medicines, which relax the muscles in and around the gland  · Sitz baths  · Prostate massage  · Dietary changes  · Biofeedback  · Surgery  · Other medicines or herbal treatments  Date Last Reviewed: 1/1/2017 © 2000-2017 [employer]. [address] [place] [id number]. All rights reserved. This information is not intended as a substitute for professional medical care. Always follow your healthcare professional's instructions.

## 2019-10-03 NOTE — PROGRESS NOTES
CHIEF COMPLAINT:    Mr. Elliott is a 29 y.o. male presenting for testicle pain.  PRESENTING ILLNESS:    Harinder Elliott is a 29 y.o. male who presents for testicle pain. This is his initial clinic visit.     1/31/19 Vasectomy    Today patient presents to clinic for bilateral testicle and lower abdominal pain during intercourse that started 2-3 weeks ago. Patient reports mild discomfort with intercourse, more intense pain during ejaculation and then dull, ache following intercourse. Pain will resolve on its own after 1-2 hours. Pain only occurs during intercourse. Denies scrotal swelling, penile pain or swelling, or penile discharge. Denies dysuria, hematuria or flank pain. Denies fever or chills. Denies urinary issues. FOS strong.       REVIEW OF SYSTEMS:    Review of Systems    Constitutional: Negative for fever and chills.   HENT: Negative for hearing loss.   Eyes: Negative for visual disturbance.   Respiratory: Negative for shortness of breath.   Cardiovascular: Negative for chest pain.   Gastrointestinal: Negative for nausea, vomiting, and constipation.   Genitourinary:  Negative for urgency, frequency, difficulty urinating, nocturia, incontinence, dysuria, hematuria, intermittency, hesitancy, incomplete bladder emptying, and decreased urine volume.   Neurological: Negative for dizziness.   Hematological: Does not bruise/bleed easily.   Psychiatric/Behavioral: Negative for confusion.       PATIENT HISTORY:    Past Medical History:   Diagnosis Date    Colon polyp     Depression     Migraine     Ureteral stricture        Past Surgical History:   Procedure Laterality Date    COLON SURGERY      colon polyp    HERNIA REPAIR      KIDNEY SURGERY      torn labrim      WISDOM TOOTH EXTRACTION         Family History   Problem Relation Age of Onset    Hypertension Father     Hyperlipidemia Father     Pulmonary fibrosis Maternal Grandfather     Hyperlipidemia Paternal Grandfather     Heart disease  Paternal Grandfather        Social History     Socioeconomic History    Marital status:      Spouse name: Not on file    Number of children: Not on file    Years of education: Not on file    Highest education level: Not on file   Occupational History    Occupation: sells wine   Social Needs    Financial resource strain: Not on file    Food insecurity:     Worry: Not on file     Inability: Not on file    Transportation needs:     Medical: Not on file     Non-medical: Not on file   Tobacco Use    Smoking status: Never Smoker    Smokeless tobacco: Never Used   Substance and Sexual Activity    Alcohol use: Yes     Comment: occasional    Drug use: No    Sexual activity: Not on file   Lifestyle    Physical activity:     Days per week: Not on file     Minutes per session: Not on file    Stress: Not on file   Relationships    Social connections:     Talks on phone: Not on file     Gets together: Not on file     Attends Yazidi service: Not on file     Active member of club or organization: Not on file     Attends meetings of clubs or organizations: Not on file     Relationship status: Not on file   Other Topics Concern    Not on file   Social History Narrative    Not on file       Allergies:  Patient has no known allergies.    Medications:    Current Outpatient Medications:     sertraline (ZOLOFT) 25 MG tablet, Take 25 mg by mouth 2 (two) times daily., Disp: , Rfl: 5    mupirocin (BACTROBAN) 2 % ointment, Apply to affected area 3 times daily, Disp: 22 g, Rfl: 1    oxaprozin (DAYPRO) 600 mg tablet, Take 1 tablet (600 mg total) by mouth 2 (two) times daily with meals. for 14 days, Disp: 28 tablet, Rfl: 0    sumatriptan (IMITREX) 100 MG tablet, Take 1 tablet (100 mg total) by mouth every 2 (two) hours as needed. (Patient not taking: Reported on 10/2/2019), Disp: 9 tablet, Rfl: 0    PHYSICAL EXAMINATION:    Constitutional: He is oriented to person, place, and time. He appears well-developed and  well-nourished.  He is in no apparent distress.    Neck: Normal ROM.     Cardiovascular: Normal rate.      Pulmonary/Chest: Effort normal. No respiratory distress.     Abdominal:  He exhibits no distension.  There is no CVA tenderness.     Lymphadenopathy:        Right: No supraclavicular adenopathy present.        Left: No supraclavicular adenopathy present.     Neurological: He is alert and oriented to person, place, and time.     Skin: Skin is warm and dry.     Extremities: Normal ROM    Psych: Cooperative with normal affect.    Genitourinary: The penis is normal. The urethral meatus is normal. The testes, epididymides, and cord structures are normal in size and contour bilaterally. The scrotum is normal in size and contour.    The prostate is 20 g.  Prostate is smooth, no nodules noted. + tenderness on exam.    Physical Exam      LABS:    U/a: sp grav 1.020, pH 5, negative    No results found for: PSA, PSADIAG, PSATOTAL, PSAFREE, PSAFREEPCT    IMPRESSION:    Encounter Diagnoses   Name Primary?    Testicle pain Yes    Prostatitis, unspecified prostatitis type          PLAN:  -Discussed prostatitis.  Discussed side effects, indications, and MOA for daypro. Prescription sent to the pharmacy. Pt verbalized understanding. Take with meals. If symptoms continue after 14 days of daypro, will continue with additional 14 days.  Avoid straining / constipation  Avoid Bladder Irritants: Tea, coffee, caffeine, alcohol, artificial sweeteners, citrus, spicy foods, acidic foods,chocolate, tomato-based foods, smoking  Good water intake  Do not hold urine, void soon after urge arises  If sex is uncomfortable or painful, avoid until symptoms get better.  -Patient had 1 specimen following vasectomy with no sperm seen under [facility]. Recommended for him to submit 1 more specimen after symptoms improve. Specimen cup provided to patient.  -RTC 4 weeks or worsening of symptoms      I spent 35 minutes with the patient of which more than  half was spent in coordinating the patient's care as well as in direct consultation with the patient in regards to our treatment and plan.

## 2019-10-22 ENCOUNTER — OFFICE VISIT (OUTPATIENT)
Dept: URGENT CARE | Facility: CLINIC | Age: 29
End: 2019-10-22
Payer: COMMERCIAL

## 2019-10-22 VITALS
TEMPERATURE: 97 F | HEIGHT: 71 IN | OXYGEN SATURATION: 98 % | BODY MASS INDEX: 24.5 KG/M2 | RESPIRATION RATE: 20 BRPM | HEART RATE: 69 BPM | SYSTOLIC BLOOD PRESSURE: 126 MMHG | WEIGHT: 175 LBS | DIASTOLIC BLOOD PRESSURE: 81 MMHG

## 2019-10-22 DIAGNOSIS — L03.012 PARONYCHIA OF LEFT THUMB: Primary | ICD-10-CM

## 2019-10-22 PROCEDURE — 99214 PR OFFICE/OUTPT VISIT, EST, LEVL IV, 30-39 MIN: ICD-10-PCS | Mod: S$GLB,,, | Performed by: PHYSICIAN ASSISTANT

## 2019-10-22 PROCEDURE — 3008F BODY MASS INDEX DOCD: CPT | Mod: CPTII,S$GLB,, | Performed by: PHYSICIAN ASSISTANT

## 2019-10-22 PROCEDURE — 99214 OFFICE O/P EST MOD 30 MIN: CPT | Mod: S$GLB,,, | Performed by: PHYSICIAN ASSISTANT

## 2019-10-22 PROCEDURE — 3008F PR BODY MASS INDEX (BMI) DOCUMENTED: ICD-10-PCS | Mod: CPTII,S$GLB,, | Performed by: PHYSICIAN ASSISTANT

## 2019-10-22 RX ORDER — CEPHALEXIN 500 MG/1
500 CAPSULE ORAL EVERY 12 HOURS
Qty: 14 CAPSULE | Refills: 0 | Status: SHIPPED | OUTPATIENT
Start: 2019-10-22 | End: 2019-10-29

## 2019-10-22 RX ORDER — MUPIROCIN 20 MG/G
OINTMENT TOPICAL
Qty: 22 G | Refills: 1 | Status: SHIPPED | OUTPATIENT
Start: 2019-10-22 | End: 2020-10-13

## 2019-10-22 NOTE — PATIENT INSTRUCTIONS
Paronychia of the Finger or Toe  Paronychia is an infection near a fingernail or toenail. It usually occurs when an opening in the cuticle or an ingrown toenail lets bacteria under the skin.  The infection will need to be drained if pus is present. If the infection has been caught early, you may need only antibiotic treatment. Healing will take about 1 to 2 weeks.  Home care  Follow these guidelines when caring for yourself at home:  · Clean and soak the toe or finger. Do this 2 times a day for the first 3 days. To do so:  ¨ Soak your foot or hand in a tub of warm water for 5 minutes. Or hold your toe or finger under a faucet of warm running water for 5 minutes.  ¨ Clean any crust away with soap and water using a cotton swab.  ¨ Put antibiotic ointment on the infected area.  · Change the dressing daily or any time it gets dirty.  · If you were given antibiotics, take them as directed until they are all gone.  · If your infection is on a toe, wear comfortable shoes with a lot of toe room. You can also wear open-toed sandals while your toe heals.  · You may use over-the-counter medicine (acetaminophen or ibuprofen to help with pain, unless another medicine was prescribed. If you have chronic liver or kidney disease, talk with your healthcare provider before using these medicines. Also talk with your provider if you've had a stomach ulcer or GI (gastrointestinal) bleeding.  Prevention  The following can prevent paronychia:  · Avoid cutting or playing with your cuticles at home.  · Don't bite your nails.  · Don't suck on your thumbs or fingers.  Follow-up care  Follow up with your healthcare provider, or as advised.  When to seek medical advice  Call your healthcare provider right away if any of these occur:  · Redness, pain, or swelling of the finger or toe gets worse  · Red streaks in the skin leading away from the wound  · Pus or fluid draining from the nail area  · Fever of 100.4ºF (38ºC) or higher, or as directed  by your provider  Date Last Reviewed: 8/1/2016  © 7669-4941 [email or web address]. [address] [place] [address], [place] [address]. All rights reserved. This information is not intended as a substitute for professional medical care. Always follow your healthcare professional's instructions.      Please follow up with your Primary care provider within 2-5 days if your signs and symptoms have not resolved or worsen.     If your condition worsens or fails to improve we recommend that you receive another evaluation at the emergency room immediately or contact your primary medical clinic to discuss your concerns.   You must understand that you have received an Urgent Care treatment only and that you may be released before all of your medical problems are known or treated. You, the patient, will arrange for follow up care as instructed.     RED FLAGS/WARNING SYMPTOMS DISCUSSED WITH PATIENT THAT WOULD WARRANT EMERGENT MEDICAL ATTENTION. PATIENT VERBALIZED UNDERSTANDING.

## 2019-11-05 ENCOUNTER — TELEPHONE (OUTPATIENT)
Dept: UROLOGY | Facility: CLINIC | Age: 29
End: 2019-11-05

## 2019-11-18 ENCOUNTER — PATIENT MESSAGE (OUTPATIENT)
Dept: UROLOGY | Facility: CLINIC | Age: 29
End: 2019-11-18

## 2019-11-18 DIAGNOSIS — N41.9 PROSTATITIS, UNSPECIFIED PROSTATITIS TYPE: Primary | ICD-10-CM

## 2019-11-18 RX ORDER — OXAPROZIN 600 MG/1
600 TABLET, FILM COATED ORAL 2 TIMES DAILY WITH MEALS
Qty: 28 TABLET | Refills: 0 | Status: SHIPPED | OUTPATIENT
Start: 2019-11-18 | End: 2019-12-02

## 2020-02-14 ENCOUNTER — OFFICE VISIT (OUTPATIENT)
Dept: URGENT CARE | Facility: CLINIC | Age: 30
End: 2020-02-14
Payer: COMMERCIAL

## 2020-02-14 VITALS
BODY MASS INDEX: 24.5 KG/M2 | HEART RATE: 71 BPM | OXYGEN SATURATION: 98 % | TEMPERATURE: 97 F | SYSTOLIC BLOOD PRESSURE: 110 MMHG | RESPIRATION RATE: 18 BRPM | WEIGHT: 175 LBS | HEIGHT: 71 IN | DIASTOLIC BLOOD PRESSURE: 75 MMHG

## 2020-02-14 DIAGNOSIS — S69.91XA INJURY OF RIGHT THUMB, INITIAL ENCOUNTER: ICD-10-CM

## 2020-02-14 DIAGNOSIS — M79.644 THUMB PAIN, RIGHT: ICD-10-CM

## 2020-02-14 DIAGNOSIS — S66.411A STRAIN OF INTRINSIC MUSCLE OF RIGHT THUMB: Primary | ICD-10-CM

## 2020-02-14 PROCEDURE — 73140 XR FINGER 2 OR MORE VIEWS RIGHT: ICD-10-PCS | Mod: RT,S$GLB,, | Performed by: RADIOLOGY

## 2020-02-14 PROCEDURE — 99214 PR OFFICE/OUTPT VISIT, EST, LEVL IV, 30-39 MIN: ICD-10-PCS | Mod: S$GLB,,, | Performed by: NURSE PRACTITIONER

## 2020-02-14 PROCEDURE — 99214 OFFICE O/P EST MOD 30 MIN: CPT | Mod: S$GLB,,, | Performed by: NURSE PRACTITIONER

## 2020-02-14 PROCEDURE — 73140 X-RAY EXAM OF FINGER(S): CPT | Mod: RT,S$GLB,, | Performed by: RADIOLOGY

## 2020-02-14 RX ORDER — IBUPROFEN 200 MG
600 TABLET ORAL
Status: COMPLETED | OUTPATIENT
Start: 2020-02-14 | End: 2020-02-14

## 2020-02-14 RX ORDER — IBUPROFEN 600 MG/1
600 TABLET ORAL EVERY 8 HOURS PRN
Qty: 30 TABLET | Refills: 0 | Status: ON HOLD | OUTPATIENT
Start: 2020-02-14 | End: 2021-02-03 | Stop reason: HOSPADM

## 2020-02-14 RX ADMIN — Medication 600 MG: at 09:02

## 2020-02-14 NOTE — PATIENT INSTRUCTIONS
Return to Urgent Care or go to ER if symptoms worsen or fail to improve.  Follow up with PCP as recommended for further management.   Self-Care for Strains and Sprains  Most minor strains and sprains can be treated with self-care. Recovering from a strain or sprain may take 6 to 8 weeks. Your self-care goal is to reduce pain and immobilize the injury to speed healing.     A sprain injures ligaments (tissue that connects bones to bones).        A strain injures muscles or tendons (tissue that connects muscles to bones).   Support the injured area  Wrapping the injured area provides support for short, necessary activities. Be careful not to wrap the area too tightly. This could cut off the blood supply.  · Support a wrist, elbow, or shoulder with a sling.  · Wrap an ankle or knee with an elastic bandage.  · Tape a finger or toe to the one next to it.  Use cold and heat  Cold reduces swelling. Both cold and heat reduce pain. Heat should not be used in the initial treatment of the injury. When using cold or heat, always place a towel between the pack and your skin.  · Apply ice or a cold pack 10 to 15 minutes every hour youre awake for the first 2 days.  · After the swelling goes down, use cold or heat to control pain. Dont use heat late in the day, since it can cause swelling when youre not active.  Rest and elevate  Rest and elevation help your injury heal faster.  · Raise the injured area above your heart level.  · Keep the injured area from moving.  · Limit the use of the joint or limb.  Use medicine  · Aspirin reduces pain and swelling. (Note: Dont give aspirin to a child 18 or younger unless prescribed by the doctor.)  · Aspirin substitutes, such as ibuprofen, can reduce pain. Some substitutes reduce swelling, too. Ask your pharmacist which substitutes you can use.  Call your doctor if:  · The injured joint wont move, or bones make a grating sound when they move.  · You cant put weight on the injured area,  even after 24 hours.  · The injured body part is cold, blue, or numb.  · The joint or limb appears bent or crooked.  · Pain increases or doesnt improve in 4 days.  · When pressing along the injured area, you notice a spot that is especially painful.   Date Last Reviewed: 9/29/2015  © 6520-3336 [employer]. [address] [place] [address], [place] [id number]. All rights reserved. This information is not intended as a substitute for professional medical care. Always follow your healthcare professional's instructions.

## 2020-02-14 NOTE — PROGRESS NOTES
"Subjective:       Patient ID: Harinder Elliott is a 29 y.o. male.    Vitals:  height is 5' 11" (1.803 m) and weight is 79.4 kg (175 lb). His oral temperature is 97.1 °F (36.2 °C). His blood pressure is 110/75 and his pulse is 71. His respiration is 18 and oxygen saturation is 98%.     Chief Complaint: Hand Pain    This is a 29 y.o. male who presents today with a chief complaint of right thumb pain after playing with children last night. He had his palms down on floor and spinning. Applied ice and Advil last night. Right hand dominant.      Hand Pain    The incident occurred 6 to 12 hours ago. The incident occurred at home. The injury mechanism was twisted and repetitive motion. The pain is present in the right fingers. The quality of the pain is described as burning and aching. The pain is at a severity of 7/10. The pain is moderate. The pain has been intermittent since the incident. Associated symptoms include muscle weakness. Pertinent negatives include no chest pain, numbness or tingling. He has tried ice and NSAIDs for the symptoms. The treatment provided moderate relief.       Constitution: Positive for activity change. Negative for international travel in last 60 days.   HENT: Negative for sinus pain, sinus pressure and sore throat.    Neck: Negative for neck pain.   Cardiovascular: Negative for chest pain.   Respiratory: Negative for chest tightness.    Gastrointestinal: Negative for abdominal pain.   Genitourinary: Negative for dysuria.   Musculoskeletal: Positive for trauma, joint swelling and muscle ache. Negative for arthritis.   Allergic/Immunologic: Positive for immunizations up-to-date and flu shot.   Neurological: Negative for dizziness, light-headedness, altered mental status and numbness.   Psychiatric/Behavioral: Negative for altered mental status and confusion.       Objective:      Physical Exam   Constitutional: He is oriented to person, place, and time. He appears well-developed and " well-nourished. He is cooperative.  Non-toxic appearance. He does not appear ill. No distress.   HENT:   Head: Normocephalic and atraumatic.   Right Ear: Hearing, tympanic membrane, external ear and ear canal normal.   Left Ear: Hearing, tympanic membrane, external ear and ear canal normal.   Nose: Nose normal. No mucosal edema, rhinorrhea or nasal deformity. No epistaxis. Right sinus exhibits no maxillary sinus tenderness and no frontal sinus tenderness. Left sinus exhibits no maxillary sinus tenderness and no frontal sinus tenderness.   Mouth/Throat: Uvula is midline, oropharynx is clear and moist and mucous membranes are normal. No trismus in the jaw. Normal dentition. No uvula swelling. No posterior oropharyngeal erythema.   Eyes: Conjunctivae and lids are normal. Right eye exhibits no discharge. Left eye exhibits no discharge. No scleral icterus.   Neck: Trachea normal, normal range of motion, full passive range of motion without pain and phonation normal. Neck supple.   Cardiovascular: Intact distal pulses and normal pulses.   No murmur heard.  Pulmonary/Chest: Effort normal. No respiratory distress.   Abdominal: Soft. Normal appearance. He exhibits no distension, no pulsatile midline mass and no mass. There is no tenderness.   Musculoskeletal: He exhibits no edema or deformity.        Right hand: He exhibits decreased range of motion (slight decrease in 1 mcp joint flexion) and tenderness. Normal strength noted. He exhibits no thumb/finger opposition.        Hands:  Neurological: He is alert and oriented to person, place, and time. He exhibits normal muscle tone. Coordination normal.   Skin: Skin is warm, dry, intact, not diaphoretic and not pale.   Psychiatric: He has a normal mood and affect. His speech is normal and behavior is normal. Cognition and memory are normal.   Nursing note and vitals reviewed.      X-ray Finger 2 Or More Views Right    Result Date: 2/14/2020  EXAMINATION: XR FINGER 2 OR MORE  VIEWS RIGHT CLINICAL HISTORY: Unspecified injury of right wrist, hand and finger(s), initial encounter TECHNIQUE: AP oblique and lateral views of the right thumb acquired COMPARISON: None FINDINGS: No fracture.  No malalignment.  No opaque soft tissue foreign body.  Joint spaces appear preserved.  Suggested soft tissue swelling about the thenar region.     No fracture Electronically signed by: Morgan Durán Date:    02/14/2020 Time:    08:53    Xray right thumb reviewed-- no fracture; no displacement; soft tissue swelling--   Assessment:       1. Strain of intrinsic muscle of right thumb    2. Injury of right thumb, initial encounter    3. Thumb pain, right        Plan:         Strain of intrinsic muscle of right thumb  -     ibuprofen tablet 600 mg  -     BANDAGE ELASTIC 3IN ACE  -     ibuprofen (ADVIL,MOTRIN) 600 MG tablet; Take 1 tablet (600 mg total) by mouth every 8 (eight) hours as needed for Pain.  Dispense: 30 tablet; Refill: 0    Injury of right thumb, initial encounter  -     X-Ray Finger 2 or More Views Right; Future; Expected date: 02/14/2020  -     BANDAGE ELASTIC 3IN ACE  -     ibuprofen (ADVIL,MOTRIN) 600 MG tablet; Take 1 tablet (600 mg total) by mouth every 8 (eight) hours as needed for Pain.  Dispense: 30 tablet; Refill: 0    Thumb pain, right  -     X-Ray Finger 2 or More Views Right; Future; Expected date: 02/14/2020  -     ibuprofen tablet 600 mg  -     BANDAGE ELASTIC 3IN ACE  -     ibuprofen (ADVIL,MOTRIN) 600 MG tablet; Take 1 tablet (600 mg total) by mouth every 8 (eight) hours as needed for Pain.  Dispense: 30 tablet; Refill: 0

## 2020-02-27 ENCOUNTER — PATIENT MESSAGE (OUTPATIENT)
Dept: UROLOGY | Facility: CLINIC | Age: 30
End: 2020-02-27

## 2020-04-21 DIAGNOSIS — Z01.84 ANTIBODY RESPONSE EXAMINATION: ICD-10-CM

## 2020-04-29 ENCOUNTER — LAB VISIT (OUTPATIENT)
Dept: LAB | Facility: HOSPITAL | Age: 30
End: 2020-04-29
Attending: INTERNAL MEDICINE
Payer: COMMERCIAL

## 2020-04-29 DIAGNOSIS — Z01.84 ANTIBODY RESPONSE EXAMINATION: ICD-10-CM

## 2020-04-29 LAB — SARS-COV-2 IGG SERPL QL IA: NEGATIVE

## 2020-04-29 PROCEDURE — 86769 SARS-COV-2 COVID-19 ANTIBODY: CPT

## 2020-04-29 PROCEDURE — 36415 COLL VENOUS BLD VENIPUNCTURE: CPT

## 2020-07-15 ENCOUNTER — OFFICE VISIT (OUTPATIENT)
Dept: URGENT CARE | Facility: CLINIC | Age: 30
End: 2020-07-15
Payer: COMMERCIAL

## 2020-07-15 VITALS
OXYGEN SATURATION: 98 % | BODY MASS INDEX: 23.1 KG/M2 | HEIGHT: 71 IN | SYSTOLIC BLOOD PRESSURE: 118 MMHG | RESPIRATION RATE: 17 BRPM | WEIGHT: 165 LBS | HEART RATE: 86 BPM | DIASTOLIC BLOOD PRESSURE: 80 MMHG | TEMPERATURE: 99 F

## 2020-07-15 DIAGNOSIS — Z20.822 EXPOSURE TO COVID-19 VIRUS: Primary | ICD-10-CM

## 2020-07-15 DIAGNOSIS — R05.9 COUGH: ICD-10-CM

## 2020-07-15 DIAGNOSIS — U07.1 COVID-19 VIRUS DETECTED: ICD-10-CM

## 2020-07-15 LAB — SARS-COV-2 RNA RESP QL NAA+PROBE: DETECTED

## 2020-07-15 PROCEDURE — 99212 PR OFFICE/OUTPT VISIT, EST, LEVL II, 10-19 MIN: ICD-10-PCS | Mod: S$GLB,,, | Performed by: FAMILY MEDICINE

## 2020-07-15 PROCEDURE — 99212 OFFICE O/P EST SF 10 MIN: CPT | Mod: S$GLB,,, | Performed by: FAMILY MEDICINE

## 2020-07-15 PROCEDURE — U0003 INFECTIOUS AGENT DETECTION BY NUCLEIC ACID (DNA OR RNA); SEVERE ACUTE RESPIRATORY SYNDROME CORONAVIRUS 2 (SARS-COV-2) (CORONAVIRUS DISEASE [COVID-19]), AMPLIFIED PROBE TECHNIQUE, MAKING USE OF HIGH THROUGHPUT TECHNOLOGIES AS DESCRIBED BY CMS-2020-01-R: HCPCS

## 2020-07-15 NOTE — PROGRESS NOTES
"Subjective:       Patient ID: Harinder Elliott is a 30 y.o. male.    Vitals:  height is 5' 11" (1.803 m) and weight is 74.8 kg (165 lb). His temperature is 98.6 °F (37 °C). His blood pressure is 118/80 and his pulse is 86. His respiration is 17 and oxygen saturation is 98%.     Chief Complaint: Sore Throat    This is a 30 y.o. male who presents today with a chief complaint of   Sore throat, sinus pressure, and post nasal drip. Slight cough and SOB. Sx started 07/14/2020. Pt exposed to family member with positivbe covid     Sore Throat   This is a new problem. The current episode started yesterday. The problem has been gradually worsening. Neither side of throat is experiencing more pain than the other. There has been no fever. The patient is experiencing no pain. Associated symptoms include congestion, coughing and shortness of breath. Pertinent negatives include no ear pain, stridor or vomiting. He has had no exposure to strep or mono. He has tried NSAIDs for the symptoms. The treatment provided mild relief.       Constitution: Positive for chills, fatigue and generalized weakness. Negative for sweating and fever.   HENT: Positive for congestion, postnasal drip, sinus pressure and sore throat. Negative for ear pain, sinus pain and voice change.    Neck: Negative for painful lymph nodes.   Eyes: Negative for eye redness.   Respiratory: Positive for cough and shortness of breath. Negative for chest tightness, sputum production, bloody sputum, COPD, stridor, wheezing and asthma.    Gastrointestinal: Positive for nausea. Negative for vomiting.   Musculoskeletal: Positive for muscle ache.   Skin: Negative for rash.   Allergic/Immunologic: Negative for seasonal allergies and asthma.   Hematologic/Lymphatic: Negative for swollen lymph nodes.       Objective:      Physical Exam   Constitutional: He does not appear ill.   HENT:   Head: Atraumatic.   Abdominal: Normal appearance.   Neurological: He is alert.   Nursing " note and vitals reviewed.        Assessment:       1. Exposure to Covid-19 Virus    2. Cough      Vitals stable. No evidence of respiratory distress noted, able to speak in complete sentences without pause.     Requesting COVID-19 testing post exposure and given symptoms and risk factors.   Tested for COVID-19 today. Educated on COVID-19 and COVID-19 testing. Advised on COVID-19 precautions. Advised on return/follow-up precautions. Advised on ER precautions. Answered all patient questions. Patient verbalized understanding and voiced agreement with current treatment plan.     Plan:         Exposure to Covid-19 Virus    Cough  -     Cancel: COVID-19 Routine Screening    Other orders  -     COVID-19 Routine Screening    recommended OTC symptom relief.

## 2020-07-15 NOTE — PATIENT INSTRUCTIONS

## 2020-09-23 ENCOUNTER — TELEPHONE (OUTPATIENT)
Dept: GASTROENTEROLOGY | Facility: CLINIC | Age: 30
End: 2020-09-23

## 2020-09-23 NOTE — TELEPHONE ENCOUNTER
----- Message from Leandra Guzman sent at 9/22/2020  4:33 PM CDT -----    ----- Message -----  From: Betsey Sandoval  Sent: 9/22/2020  12:08 PM CDT  To: Ned [facility]O Staff    Pt wife called to schedule an appt with Dr. Marino for testing.  Call back [phone]

## 2020-09-24 ENCOUNTER — OFFICE VISIT (OUTPATIENT)
Dept: GASTROENTEROLOGY | Facility: CLINIC | Age: 30
End: 2020-09-24
Payer: COMMERCIAL

## 2020-09-24 DIAGNOSIS — R10.31 RIGHT LOWER QUADRANT PAIN: ICD-10-CM

## 2020-09-24 DIAGNOSIS — Z91.018 FOOD ALLERGY: ICD-10-CM

## 2020-09-24 DIAGNOSIS — Z86.010 HISTORY OF COLON POLYPS: ICD-10-CM

## 2020-09-24 DIAGNOSIS — K21.9 GASTROESOPHAGEAL REFLUX DISEASE, ESOPHAGITIS PRESENCE NOT SPECIFIED: ICD-10-CM

## 2020-09-24 DIAGNOSIS — R19.4 CHANGE IN BOWEL HABITS: Primary | ICD-10-CM

## 2020-09-24 DIAGNOSIS — R10.32 LEFT LOWER QUADRANT PAIN: ICD-10-CM

## 2020-09-24 DIAGNOSIS — Z83.79 FAMILY HISTORY OF CELIAC SPRUE: ICD-10-CM

## 2020-09-24 PROCEDURE — 99204 PR OFFICE/OUTPT VISIT, NEW, LEVL IV, 45-59 MIN: ICD-10-PCS | Mod: 95,,, | Performed by: INTERNAL MEDICINE

## 2020-09-24 PROCEDURE — 99204 OFFICE O/P NEW MOD 45 MIN: CPT | Mod: 95,,, | Performed by: INTERNAL MEDICINE

## 2020-09-24 NOTE — PROGRESS NOTES
The patient location is:  at his home  The chief complaint leading to consultation is:  GERD, lower abdominal pain, change in bowel habits, history of colon polyp    Visit type:  Telemedicine video visit    Face to Face time with patient:  40 minutes of total time spent on the encounter, which includes face to face time and non-face to face time preparing to see the patient (eg, review of tests), Obtaining and/or reviewing separately obtained history, Documenting clinical information in the electronic or other health record, Independently interpreting results (not separately reported) and communicating results to the patient/family/caregiver, or Care coordination (not separately reported).       Each patient to whom he or she provides medical services by telemedicine is:  (1) informed of the relationship between the physician and patient and the respective role of any other health care provider with respect to management of the patient; and (2) notified that he or she may decline to receive medical services by telemedicine and may withdraw from such care at any time.    Notes:         [facility] Consultation Note    Reason for Consult:  The primary encounter diagnosis was Change in bowel habits. Diagnoses of Left lower quadrant pain, Right lower quadrant pain, Gastroesophageal reflux disease, esophagitis presence not specified, History of colon polyps, Family history of celiac sprue, and Food allergy were also pertinent to this visit.    PCP:   Primary Doctor No   No address on file    Referring MD:  Aaareferral Self  No address on file    Initial History of Present Illness (HPI):  This is a 30 y.o. male here for evaluation of lower abdominal cramps inability to gain weight GERD and change in bowel habits.  Harinder was last seen around August of 2009 for crampy abdominal pain pasty stools and heartburn that have been going on for about 3-4 years before that he said when he was in 8th grade he had  some bright red blood per rectum [facility] what they described as 6 cm colon polyp that took 2 times remove it completely they said it was completely benign no need for any follow-up that it was benign colon polyp.  He did have a colonoscopy after that in October 2009 that found 2 benign polyps no dysplasia.  He was complete all the way into the terminal ileum.  In August of 2009 he had an EGD which showed no H pylori no celiac sprue celiac sprue serologies have been negative in the past.  He denies any fever chills no shortness of breath occasional nausea no vomiting no blood in his stool has maybe 3 bowel movements a day lose pasty sometimes oral or grease or fat is seen no blood.  His sister has celiac sprue he does not think he has celiac sprue is testings been negative in the past he is not on a gluten free diet but gluten does not seem to be bothering him he does notice meet bothers him the most pork he would like referral to an allergist for further evaluation he denies any fever chills no shortness of breath no weight loss but inability to gain weight.  He occasional have bilateral lower quadrant abdominal pain or discomfort similar what he had maybe about 12 years ago.  He has no problems urinating not taking a lot a NSAIDs.  Occasional heartburn no dysphagia no odynophagia.  No family history of any GI malignancies no FAP no attenuated FAP no MA P no Khalil syndrome nobody with celiac sprue or inflammatory bowel disease nobody with Peutz-Jeghers syndrome nobody with juvenile polyposis syndrome.  He would like to schedule an EGD and colonoscopy for further evaluation.  No flushing no wheezing no palpitations.  No nocturnal symptoms.    Abdominal pain - as above  Reflux - as above  Dysphagia - no   Bowel habits -as above  GI bleeding - none  NSAID usage - none    Interval HPI 09/24/2020:  The patient's last visit with me was on 9/8/2017.      ROS:  Constitutional: No  fevers, chills, No weight loss  ENT:  As above heartburn no dysphagia no odynophagia no hoarseness  CV: No chest pain, no palpitation  Pulm: No cough, No shortness of breath, no wheezing, no flushing no palpitation  Ophtho: No vision changes  GI: see HPI  Derm: No rash, no itching  Heme: No lymphadenopathy, No easy bruising  MSK: No significant arthritis  : No dysuria, No hematuria  Endo: No hot or cold intolerance  Neuro: No syncope, No seizure, no strokes  Psych: No uncontrolled anxiety, No uncontrolled depression    Medical History:  has a past medical history of Colon polyp, Depression, Migraine, and Ureteral stricture.    Surgical History:  has a past surgical history that includes Kidney surgery; Hernia repair; [place] tooth extraction; Colon surgery; and torn labrim.    Family History: family history includes Celiac disease in his sister; Heart disease in his paternal grandfather; Hyperlipidemia in his father and paternal grandfather; Hypertension in his father; Pulmonary fibrosis in his maternal grandfather; Skin cancer in his mother..     Social History:  reports that he has never smoked. He has never used smokeless tobacco. He reports current alcohol use. He reports that he does not use drugs.    Review of patient's allergies indicates:  No Known Allergies    Medication List with Changes/Refills   Current Medications    IBUPROFEN (ADVIL,MOTRIN) 600 MG TABLET    Take 1 tablet (600 mg total) by mouth every 8 (eight) hours as needed for Pain.    MUPIROCIN (BACTROBAN) 2 % OINTMENT    Apply to affected area 3 times daily    MUPIROCIN (BACTROBAN) 2 % OINTMENT    Apply to affected area 3 times daily    SERTRALINE (ZOLOFT) 25 MG TABLET    Take 25 mg by mouth 2 (two) times daily.    SUMATRIPTAN (IMITREX) 100 MG TABLET    Take 1 tablet (100 mg total) by mouth every 2 (two) hours as needed.         Objective Findings:    Vital Signs:  There were no vitals taken for this visit.  There is no height or weight on file  to calculate BMI.    Physical Exam:  Telemedicine video visit  ABD:  Nondistended  GEN:  Well-nourished well-developed not in any acute distress  Neurologic:  Alert and oriented x4  Psychiatric:  Normal speech mentation and affect    Labs:  Lab Results   Component Value Date    WBC 12.96 (H) 12/19/2018    HGB 14.7 12/19/2018    HCT 45.3 12/19/2018     12/19/2018    ALT 12 12/19/2018    AST 18 12/19/2018     12/19/2018    K 3.7 12/19/2018     12/19/2018    CREATININE 0.8 12/19/2018    BUN 17 12/19/2018    CO2 24 12/19/2018    TSH 1.087 09/08/2017               Medical Decision Making:  History obtained from patient and his wife today the video visit  Labs have been reviewed  Celiac sprue talk given  EGD and colonoscopy talk given  Stool studies  2 days apart talk given  Fasting lab work talk given  Possibility of future small-bowel imaging talk given  Prior CT images personally reviewed by myself  Prior EGD colonoscopy images and path reviewed      Assessment:  1. Change in bowel habits    2. Left lower quadrant pain    3. Right lower quadrant pain    4. Gastroesophageal reflux disease, esophagitis presence not specified    5. History of colon polyps    6. Family history of celiac sprue    7. Food allergy         Recommendations:   1.  Change in bowel habits with oral or grease seen in stool and history of colon polyp in the past and GERD  Will do stool studies  least 2 days apart will set up patient for EGD and colonoscopy for further evaluation.  2.  Family history of celiac sprue in a first-degree relative will check for celiac sprue serology.  3.  Concerns for food allergy patient would like referral to allergist for testing.  4.  History of large colon polyp in 8th grade removed at [facility] took 2 procedures  May need imaging of the small bowel plus or minus video capsule to exclude further juvenile polyps  5.  Return to GI clinic in 8 weeks for follow-up okay for  telemedicine video visit    Follow up in about 8 weeks (around 11/19/2020).      Order summary:  Orders Placed This Encounter    Clostridium difficile EIA    Hepatic function panel    Basic metabolic panel    Lipase    CBC auto differential    Iron and TIBC    Ferritin    TSH    Giardia / Cryptosporidum, EIA    Stool Exam-Ova,Cysts,Parasites    Stool Exam-Ova,Cysts,Parasites    Stool Exam-Ova,Cysts,Parasites    Pancreatic elastase, fecal    Fecal fat, qualitative    Micropsoridia species, PCR    Cyclospora    Anti-Ent. Histolytica Ab    Strongyloides IgG Antibodies    Gastrointestinal Pathogens Panel, PCR    Ambulatory referral/consult to Allergy    Case request GI: EGD (ESOPHAGOGASTRODUODENOSCOPY), COLONOSCOPY         Thank you so much for allowing me to participate in the care of Harinder Marino MD

## 2020-09-24 NOTE — Clinical Note
Leandra please schedule Harinder for 12 hr fasting labs tomorrow morning Friday September 25, 2020 at 8:00 a.m. 2nd floor [facility] [place] lab.  Orders placed    Harinder will then come up to the GI clinic in  his hat for his toilet in his 4 stool containers  least 2 days apart 1 of those stools needs to be a solid stool please label that container fecal elastase.    Orders for EGD and colonoscopy placed    Return to GI clinic in 8 weeks okay for telemedicine video visit.

## 2020-09-25 ENCOUNTER — LAB VISIT (OUTPATIENT)
Dept: LAB | Facility: HOSPITAL | Age: 30
End: 2020-09-25
Attending: INTERNAL MEDICINE
Payer: COMMERCIAL

## 2020-09-25 DIAGNOSIS — Z86.010 HISTORY OF COLON POLYPS: ICD-10-CM

## 2020-09-25 DIAGNOSIS — R10.31 RIGHT LOWER QUADRANT PAIN: ICD-10-CM

## 2020-09-25 DIAGNOSIS — K21.9 GASTROESOPHAGEAL REFLUX DISEASE, ESOPHAGITIS PRESENCE NOT SPECIFIED: ICD-10-CM

## 2020-09-25 DIAGNOSIS — R19.4 CHANGE IN BOWEL HABITS: ICD-10-CM

## 2020-09-25 DIAGNOSIS — R71.8 LOW MEAN CORPUSCULAR VOLUME (MCV): ICD-10-CM

## 2020-09-25 DIAGNOSIS — Z83.79 FAMILY HISTORY OF CELIAC SPRUE: ICD-10-CM

## 2020-09-25 DIAGNOSIS — R10.32 LEFT LOWER QUADRANT PAIN: ICD-10-CM

## 2020-09-25 LAB
ALBUMIN SERPL BCP-MCNC: 4.5 G/DL (ref 3.5–5.2)
ALP SERPL-CCNC: 64 U/L (ref 55–135)
ALT SERPL W/O P-5'-P-CCNC: 9 U/L (ref 10–44)
ANION GAP SERPL CALC-SCNC: 7 MMOL/L (ref 8–16)
AST SERPL-CCNC: 17 U/L (ref 10–40)
BASOPHILS # BLD AUTO: 0.04 K/UL (ref 0–0.2)
BASOPHILS NFR BLD: 1.1 % (ref 0–1.9)
BILIRUB DIRECT SERPL-MCNC: 0.3 MG/DL (ref 0.1–0.3)
BILIRUB SERPL-MCNC: 0.7 MG/DL (ref 0.1–1)
BUN SERPL-MCNC: 13 MG/DL (ref 6–20)
CALCIUM SERPL-MCNC: 9.2 MG/DL (ref 8.7–10.5)
CHLORIDE SERPL-SCNC: 104 MMOL/L (ref 95–110)
CO2 SERPL-SCNC: 29 MMOL/L (ref 23–29)
CREAT SERPL-MCNC: 0.8 MG/DL (ref 0.5–1.4)
DIFFERENTIAL METHOD: ABNORMAL
EOSINOPHIL # BLD AUTO: 0.1 K/UL (ref 0–0.5)
EOSINOPHIL NFR BLD: 1.3 % (ref 0–8)
ERYTHROCYTE [DISTWIDTH] IN BLOOD BY AUTOMATED COUNT: 12.8 % (ref 11.5–14.5)
EST. GFR  (AFRICAN AMERICAN): >60 ML/MIN/1.73 M^2
EST. GFR  (NON AFRICAN AMERICAN): >60 ML/MIN/1.73 M^2
FERRITIN SERPL-MCNC: 41 NG/ML (ref 20–300)
FERRITIN SERPL-MCNC: 41 NG/ML (ref 20–300)
GLUCOSE SERPL-MCNC: 91 MG/DL (ref 70–110)
HCT VFR BLD AUTO: 41.7 % (ref 40–54)
HGB BLD-MCNC: 13.1 G/DL (ref 14–18)
HGB BLD-MCNC: 13.1 G/DL (ref 14–18)
IMM GRANULOCYTES # BLD AUTO: 0.01 K/UL (ref 0–0.04)
IMM GRANULOCYTES NFR BLD AUTO: 0.3 % (ref 0–0.5)
IRON SERPL-MCNC: 101 UG/DL (ref 45–160)
IRON SERPL-MCNC: 101 UG/DL (ref 45–160)
LIPASE SERPL-CCNC: 36 U/L (ref 4–60)
LYMPHOCYTES # BLD AUTO: 1.4 K/UL (ref 1–4.8)
LYMPHOCYTES NFR BLD: 37.4 % (ref 18–48)
MCH RBC QN AUTO: 27.1 PG (ref 27–31)
MCHC RBC AUTO-ENTMCNC: 31.4 G/DL (ref 32–36)
MCV RBC AUTO: 86 FL (ref 82–98)
MONOCYTES # BLD AUTO: 0.3 K/UL (ref 0.3–1)
MONOCYTES NFR BLD: 8.8 % (ref 4–15)
NEUTROPHILS # BLD AUTO: 1.9 K/UL (ref 1.8–7.7)
NEUTROPHILS NFR BLD: 51.1 % (ref 38–73)
NRBC BLD-RTO: 0 /100 WBC
PLATELET # BLD AUTO: 270 K/UL (ref 150–350)
PMV BLD AUTO: 9.5 FL (ref 9.2–12.9)
POTASSIUM SERPL-SCNC: 4.3 MMOL/L (ref 3.5–5.1)
PROT SERPL-MCNC: 7.3 G/DL (ref 6–8.4)
RBC # BLD AUTO: 4.83 M/UL (ref 4.6–6.2)
SATURATED IRON: 23 % (ref 20–50)
SATURATED IRON: 23 % (ref 20–50)
SODIUM SERPL-SCNC: 140 MMOL/L (ref 136–145)
TOTAL IRON BINDING CAPACITY: 445 UG/DL (ref 250–450)
TOTAL IRON BINDING CAPACITY: 445 UG/DL (ref 250–450)
TRANSFERRIN SERPL-MCNC: 301 MG/DL (ref 200–375)
TRANSFERRIN SERPL-MCNC: 301 MG/DL (ref 200–375)
TSH SERPL DL<=0.005 MIU/L-ACNC: 1.16 UIU/ML (ref 0.4–4)
WBC # BLD AUTO: 3.74 K/UL (ref 3.9–12.7)

## 2020-09-25 PROCEDURE — 83690 ASSAY OF LIPASE: CPT

## 2020-09-25 PROCEDURE — 86682 HELMINTH ANTIBODY: CPT

## 2020-09-25 PROCEDURE — 80048 BASIC METABOLIC PNL TOTAL CA: CPT

## 2020-09-25 PROCEDURE — 86753 PROTOZOA ANTIBODY NOS: CPT

## 2020-09-25 PROCEDURE — 84443 ASSAY THYROID STIM HORMONE: CPT

## 2020-09-25 PROCEDURE — 80076 HEPATIC FUNCTION PANEL: CPT

## 2020-09-25 PROCEDURE — 83540 ASSAY OF IRON: CPT

## 2020-09-25 PROCEDURE — 82728 ASSAY OF FERRITIN: CPT

## 2020-09-25 PROCEDURE — 85025 COMPLETE CBC W/AUTO DIFF WBC: CPT

## 2020-09-28 LAB — STRONGYLOIDES ANTIBODY IGG: NEGATIVE

## 2020-09-29 LAB — E HISTOLYT AB SER IA-ACNC: NEGATIVE

## 2020-10-05 ENCOUNTER — PATIENT MESSAGE (OUTPATIENT)
Dept: GASTROENTEROLOGY | Facility: CLINIC | Age: 30
End: 2020-10-05

## 2020-10-13 ENCOUNTER — OFFICE VISIT (OUTPATIENT)
Dept: ALLERGY | Facility: CLINIC | Age: 30
End: 2020-10-13
Payer: COMMERCIAL

## 2020-10-13 VITALS — HEART RATE: 62 BPM | BODY MASS INDEX: 23.12 KG/M2 | HEIGHT: 71 IN | OXYGEN SATURATION: 98 % | WEIGHT: 165.13 LBS

## 2020-10-13 DIAGNOSIS — G43.009 MIGRAINE WITHOUT AURA AND WITHOUT STATUS MIGRAINOSUS, NOT INTRACTABLE: ICD-10-CM

## 2020-10-13 DIAGNOSIS — Z83.79 FAMILY HISTORY OF CELIAC SPRUE: ICD-10-CM

## 2020-10-13 DIAGNOSIS — Z91.018 FOOD ALLERGY: Primary | ICD-10-CM

## 2020-10-13 DIAGNOSIS — R19.4 CHANGE IN BOWEL HABITS: ICD-10-CM

## 2020-10-13 PROCEDURE — 95004 PERQ TESTS W/ALRGNC XTRCS: CPT | Mod: S$GLB,,, | Performed by: ALLERGY & IMMUNOLOGY

## 2020-10-13 PROCEDURE — 99999 PR PBB SHADOW E&M-EST. PATIENT-LVL III: ICD-10-PCS | Mod: PBBFAC,,, | Performed by: ALLERGY & IMMUNOLOGY

## 2020-10-13 PROCEDURE — 99244 OFF/OP CNSLTJ NEW/EST MOD 40: CPT | Mod: S$GLB,,, | Performed by: ALLERGY & IMMUNOLOGY

## 2020-10-13 PROCEDURE — 99999 PR PBB SHADOW E&M-EST. PATIENT-LVL III: CPT | Mod: PBBFAC,,, | Performed by: ALLERGY & IMMUNOLOGY

## 2020-10-13 PROCEDURE — 99244 PR OFFICE CONSULTATION,LEVEL IV: ICD-10-PCS | Mod: S$GLB,,, | Performed by: ALLERGY & IMMUNOLOGY

## 2020-10-13 PROCEDURE — 95004 PR ALLERGY SKIN TESTS,ALLERGENS: ICD-10-PCS | Mod: S$GLB,,, | Performed by: ALLERGY & IMMUNOLOGY

## 2020-10-13 NOTE — LETTER
October 13, 2020      Graham Marino MD  [address]  [place] [address]           [facility]  [address]  [facility] [address]  Phone: [phone]  Fax: [phone]          Patient: Harinder Elliott   MR Number: [id number]   YOB: 1990   Date of Visit: 10/13/2020       Dear Dr. Graham Marino:    Thank you for referring Harinder Elliott to me for evaluation. Attached you will find relevant portions of my assessment and plan of care.    If you have questions, please do not hesitate to call me. I look forward to following Harinder Elliott along with you.    Sincerely,    MIRIAM Nair III, MD    Enclosure  CC:  No Recipients    If you would like to receive this communication electronically, please contact [email or web address] or [phone] to request more information on [employer] access.    For providers and/or their staff who would like to refer a patient to [facility], please contact us through our one-stop-shop provider referral line, [facility], at [phone].    If you feel you have received this communication in error or would no longer like to receive these types of communications, please e-mail [email or web address]

## 2020-10-13 NOTE — PROGRESS NOTES
Harinder Elliott is referred by Dr. Graham Marino for a consult regarding a possible food allergy.  He is here alone.  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      When he was in the eighth grade, he developed rectal bleeding and was evaluated at  [facility].  He was subsequently found have a large polyp that required a second colonoscopy to remove it.  He has not any further rectal bleeding or known colon polyps.    Several years ago he developed recurrent abdominal symptoms with cramping abdominal pain,  nausea, and occasional diarrhea.  He did change to a vegan diet and felt better.      He has a son age five and a daughter age three.  After his second child, he was not able to follow a vegan diet.    Since then he has continued to symptoms particularly when he eats certain foods including dairy products, meat, and spicy foods.  He does have increased symptoms after eating cheese.    He says that he was tested at [facility] for lactose intolerance and this was negative.  He has less symptoms with Lactaid milk but does not have any change in symptoms with Lactaid pills.    His sister has celiac disease.  His test was negative.  He is scheduled for an EGD and colonoscopy.    He denies any rhinitis or conjunctivitis.  He denies any cough, wheezing or shortness of breath.  He denies any history asthma.    He denies any urticaria or angioedema.      He does have history of heartburn in the past but is currently asymptomatic.    He has a history of migraine headaches but is currently not having any symptoms.    [facility] PEQ ALLERGY QUESTIONNAIRE LONG 10/12/2020   Head or facial pain: No symptoms   Eyes: No symptoms   Ears: No symptoms   Do you have ear infections? No   Do you have ear tubes? No   Did you have ear surgery? No   Nose: No symptoms   Did you have a blocked nose? No   Did you have nasal surgery? No   Has your nose ever been broken? No   Throat: No symptoms   Sinuses: No symptoms   Have you had  x-rays done for your sinuses? No   Have you had a CT scan done for your sinuses? No   Lungs: No symptoms   Was your last chest x-ray normal or abnormal, if applicable? Normal   Have you ever has a tuberculosis skin test?  Yes   Was your tuberculosis skin test positive or negative? Negative   Have you ever had a lung-function test? No   Have you had a flu shot this year? No   Have you had the pneumonia vaccine?  No   Do you have any known problems with your immune system? No   Do you suspect you may have problems with your immune system? No   Do you have frequent infections? No   Skin: No symptoms   When did these symptoms first occur? A few years.   Are they getting worse or better? Worse   How often do these symptoms occur? Weekly   When do these symptoms occur? When i eat.   Do they occur year round? Yes   If there is any seasonal variation in your symptoms, when are they worse? No   Is there a particular time of the day or night when the symptoms are worse? No   Is there anything you have identified, which can cause symptoms or make them worse? (such as dust, grass, plant or animal products, mold, heat, cold, strong odors, exercise) Possibly meat and cheese   Is there anything you have identified, which can make symptoms better?  Vegan diet maybe   What medications have you tried in the past to help control these symptoms?  Lactaid   Please list all the vitamins or herbal medications you are taking. N/a   Have you ever seen an allergist for these symptoms? No   Have you ever had skin tests? No   Have you ever had any other type of allergy testing? No   Have you ever had allergy shots? No   Do you have food allergies? No   Do you have insect allergies? No   Do you have latex allergies? No   Constitution No symptoms   Cardiovascular: No symptoms   Gastrointestinal: Abdominal bloating, Diarrhea   Genital/ urinary: No symptoms   Musculoskeletal: No symptoms   Endocrine: No symptoms   Hematologic: No symptoms   Please  note which family members have allergies or asthma and specify which they have. Sister has celicic   How long have you lived at your current address? 1 year   Has your residence ever had water or flood damage? No   Is there any evidence of mold in the house? No   Does your house have: Central air conditioning, Wood-burning fireplace, Gas heat, Humidifier, Electrostatic air , HEPA filters   Does your bedroom have: Ceiling fan, Potted plants   What type of pillow do you have, for example feather, foam and fiberfill?  Feather   Do you have pets? Yes   Please list the type of pet(s), how many, how long you have had the pet(s), whether or not the pet(s) are living inside or outside, and whether the pet(s) aggravate your symptoms.  2 dogs for 8 years. No affect on allergiea   Does anyone in the house smoke? 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  No     Physical Examination:  General: Well-developed, well-nourished, no acute distress.  Head: No sinus tenderness.  Eyes: Conjunctivae:  No bulbar or palpebral conjunctival injection.  Ears: EAC's clear.  TM's clear.  No pre-auricular nodes.  Nose: Nasal Mucosa:  Pink.  Septum: No apparent deviation.  Turbinates:  No significant edema.  Polyps/Mass:  None visible.  Teeth/Gums:  No bleeding noted.  Oropharynx: No exudates.  Neck: Supple without thyromegaly. No cervical lymphadenopathy.    Respiratory/Chest: Effort: Good.  Auscultation:  Clear bilaterally.  Cardiovascular:  No murmur, rubs, or gallop heard.   GI:  Non-tender.  No masses.  No organomegaly.  Extremities:  No cyanosis, clubbing, or edema.  Skin: Good turgor.  No urticaria or angioedema.  Neuro/Psych: Oriented x 3.    Food skin tests 10/13/2020:  3+ histamine control.  All tests were negative.    Assessment:  1.  Abdominal cramping, nausea, and diarrhea, no evidence of food allergy.  2.  History of colon polyps.  3.  Mild GERD.  4.  Migraine  headaches.    Recommendations:  1.  Continue to monitor diet.  2.  Avoid foods that cause increased symptoms.  3.  EGD and colonoscopy as scheduled.  3.  Return to clinic as needed.

## 2020-10-14 DIAGNOSIS — Z12.11 SPECIAL SCREENING FOR MALIGNANT NEOPLASMS, COLON: Primary | ICD-10-CM

## 2020-10-14 RX ORDER — SODIUM, POTASSIUM,MAG SULFATES 17.5-3.13G
1 SOLUTION, RECONSTITUTED, ORAL ORAL DAILY
Qty: 1 KIT | Refills: 0 | Status: SHIPPED | OUTPATIENT
Start: 2020-10-14 | End: 2020-10-16

## 2020-10-15 ENCOUNTER — LAB VISIT (OUTPATIENT)
Dept: LAB | Facility: HOSPITAL | Age: 30
End: 2020-10-15
Attending: INTERNAL MEDICINE
Payer: COMMERCIAL

## 2020-10-15 DIAGNOSIS — R10.32 LEFT LOWER QUADRANT PAIN: ICD-10-CM

## 2020-10-15 DIAGNOSIS — R10.31 RIGHT LOWER QUADRANT PAIN: ICD-10-CM

## 2020-10-15 DIAGNOSIS — Z83.79 FAMILY HISTORY OF CELIAC SPRUE: ICD-10-CM

## 2020-10-15 DIAGNOSIS — K21.9 GASTROESOPHAGEAL REFLUX DISEASE: ICD-10-CM

## 2020-10-15 DIAGNOSIS — Z86.010 HISTORY OF COLON POLYPS: ICD-10-CM

## 2020-10-15 DIAGNOSIS — R19.4 CHANGE IN BOWEL HABITS: ICD-10-CM

## 2020-10-15 PROCEDURE — 87209 SMEAR COMPLEX STAIN: CPT

## 2020-10-19 ENCOUNTER — CLINICAL SUPPORT (OUTPATIENT)
Dept: URGENT CARE | Facility: CLINIC | Age: 30
End: 2020-10-19
Payer: COMMERCIAL

## 2020-10-19 DIAGNOSIS — U07.1 COVID-19: Primary | ICD-10-CM

## 2020-10-19 DIAGNOSIS — Z23 FLU VACCINE NEED: ICD-10-CM

## 2020-10-19 LAB — O+P STL MICRO: NORMAL

## 2020-10-19 PROCEDURE — 86769 SARS-COV-2 COVID-19 ANTIBODY: CPT

## 2020-10-19 PROCEDURE — 90686 FLU VACCINE (QUAD) GREATER THAN OR EQUAL TO 3YO PRESERVATIVE FREE IM: ICD-10-PCS | Mod: S$GLB,,, | Performed by: NURSE PRACTITIONER

## 2020-10-19 PROCEDURE — 90471 IMMUNIZATION ADMIN: CPT | Mod: S$GLB,,, | Performed by: NURSE PRACTITIONER

## 2020-10-19 PROCEDURE — 90686 IIV4 VACC NO PRSV 0.5 ML IM: CPT | Mod: S$GLB,,, | Performed by: NURSE PRACTITIONER

## 2020-10-19 PROCEDURE — 90471 FLU VACCINE (QUAD) GREATER THAN OR EQUAL TO 3YO PRESERVATIVE FREE IM: ICD-10-PCS | Mod: S$GLB,,, | Performed by: NURSE PRACTITIONER

## 2020-10-20 LAB — SARS-COV-2 IGG SERPLBLD QL IA.RAPID: POSITIVE

## 2020-12-22 ENCOUNTER — IMMUNIZATION (OUTPATIENT)
Dept: INTERNAL MEDICINE | Facility: CLINIC | Age: 30
End: 2020-12-22
Payer: COMMERCIAL

## 2020-12-22 DIAGNOSIS — Z23 NEED FOR VACCINATION: ICD-10-CM

## 2020-12-22 PROCEDURE — 0001A COVID-19, MRNA, LNP-S, PF, 30 MCG/0.3 ML DOSE VACCINE: CPT | Mod: CV19,,, | Performed by: INTERNAL MEDICINE

## 2020-12-22 PROCEDURE — 91300 COVID-19, MRNA, LNP-S, PF, 30 MCG/0.3 ML DOSE VACCINE: ICD-10-PCS | Mod: ,,, | Performed by: INTERNAL MEDICINE

## 2020-12-22 PROCEDURE — 0001A COVID-19, MRNA, LNP-S, PF, 30 MCG/0.3 ML DOSE VACCINE: ICD-10-PCS | Mod: CV19,,, | Performed by: INTERNAL MEDICINE

## 2020-12-22 PROCEDURE — 91300 COVID-19, MRNA, LNP-S, PF, 30 MCG/0.3 ML DOSE VACCINE: CPT | Mod: ,,, | Performed by: INTERNAL MEDICINE

## 2021-01-07 ENCOUNTER — LAB VISIT (OUTPATIENT)
Dept: LAB | Facility: HOSPITAL | Age: 31
End: 2021-01-07
Payer: COMMERCIAL

## 2021-01-07 DIAGNOSIS — R10.32 LEFT LOWER QUADRANT PAIN: ICD-10-CM

## 2021-01-07 DIAGNOSIS — Z86.010 HISTORY OF COLON POLYPS: ICD-10-CM

## 2021-01-07 DIAGNOSIS — K21.9 GASTROESOPHAGEAL REFLUX DISEASE: ICD-10-CM

## 2021-01-07 DIAGNOSIS — R10.31 RIGHT LOWER QUADRANT PAIN: ICD-10-CM

## 2021-01-07 DIAGNOSIS — Z83.79 FAMILY HISTORY OF CELIAC SPRUE: ICD-10-CM

## 2021-01-07 DIAGNOSIS — R19.4 CHANGE IN BOWEL HABITS: ICD-10-CM

## 2021-01-07 PROCEDURE — 87209 SMEAR COMPLEX STAIN: CPT

## 2021-01-11 ENCOUNTER — CLINICAL SUPPORT (OUTPATIENT)
Dept: URGENT CARE | Facility: CLINIC | Age: 31
End: 2021-01-11
Payer: COMMERCIAL

## 2021-01-11 DIAGNOSIS — Z11.59 SCREENING FOR VIRAL DISEASE: Primary | ICD-10-CM

## 2021-01-11 LAB
CTP QC/QA: YES
O+P STL MICRO: NORMAL
SARS-COV-2 RDRP RESP QL NAA+PROBE: NEGATIVE

## 2021-01-11 PROCEDURE — U0002 COVID-19 LAB TEST NON-CDC: HCPCS | Mod: QW,S$GLB,, | Performed by: PHYSICIAN ASSISTANT

## 2021-01-11 PROCEDURE — U0002: ICD-10-PCS | Mod: QW,S$GLB,, | Performed by: PHYSICIAN ASSISTANT

## 2021-01-13 ENCOUNTER — IMMUNIZATION (OUTPATIENT)
Dept: INTERNAL MEDICINE | Facility: CLINIC | Age: 31
End: 2021-01-13
Payer: COMMERCIAL

## 2021-01-13 DIAGNOSIS — Z23 NEED FOR VACCINATION: ICD-10-CM

## 2021-01-13 PROCEDURE — 0002A COVID-19, MRNA, LNP-S, PF, 30 MCG/0.3 ML DOSE VACCINE: CPT | Mod: PBBFAC | Performed by: INTERNAL MEDICINE

## 2021-01-13 PROCEDURE — 91300 COVID-19, MRNA, LNP-S, PF, 30 MCG/0.3 ML DOSE VACCINE: CPT | Mod: PBBFAC | Performed by: INTERNAL MEDICINE

## 2021-01-22 ENCOUNTER — OCCUPATIONAL HEALTH (OUTPATIENT)
Dept: URGENT CARE | Facility: CLINIC | Age: 31
End: 2021-01-22

## 2021-01-22 DIAGNOSIS — R06.02 SHORTNESS OF BREATH: ICD-10-CM

## 2021-01-22 DIAGNOSIS — R50.9 FEVER, UNSPECIFIED FEVER CAUSE: ICD-10-CM

## 2021-01-22 DIAGNOSIS — R50.9 FEVER, UNSPECIFIED FEVER CAUSE: Primary | ICD-10-CM

## 2021-01-22 DIAGNOSIS — R52 ACHES: ICD-10-CM

## 2021-01-22 LAB
CTP QC/QA: YES
SARS-COV-2 RDRP RESP QL NAA+PROBE: NEGATIVE

## 2021-01-22 PROCEDURE — U0002 COVID-19 LAB TEST NON-CDC: HCPCS | Mod: QW,S$GLB,, | Performed by: INTERNAL MEDICINE

## 2021-01-22 PROCEDURE — U0002: ICD-10-PCS | Mod: QW,S$GLB,, | Performed by: INTERNAL MEDICINE

## 2021-01-23 ENCOUNTER — TELEPHONE (OUTPATIENT)
Dept: PRIMARY CARE CLINIC | Facility: OTHER | Age: 31
End: 2021-01-23

## 2021-01-30 ENCOUNTER — PATIENT MESSAGE (OUTPATIENT)
Dept: ENDOSCOPY | Facility: HOSPITAL | Age: 31
End: 2021-01-30

## 2021-02-01 ENCOUNTER — PATIENT MESSAGE (OUTPATIENT)
Dept: ENDOSCOPY | Facility: HOSPITAL | Age: 31
End: 2021-02-01

## 2021-02-03 ENCOUNTER — TELEPHONE (OUTPATIENT)
Dept: ENDOSCOPY | Facility: HOSPITAL | Age: 31
End: 2021-02-03

## 2021-02-03 ENCOUNTER — HOSPITAL ENCOUNTER (OUTPATIENT)
Facility: HOSPITAL | Age: 31
Discharge: HOME | End: 2021-02-03
Attending: INTERNAL MEDICINE | Admitting: INTERNAL MEDICINE
Payer: COMMERCIAL

## 2021-02-03 ENCOUNTER — ANESTHESIA (OUTPATIENT)
Dept: ENDOSCOPY | Facility: HOSPITAL | Age: 31
End: 2021-02-03
Payer: COMMERCIAL

## 2021-02-03 ENCOUNTER — ANESTHESIA EVENT (OUTPATIENT)
Dept: ENDOSCOPY | Facility: HOSPITAL | Age: 31
End: 2021-02-03
Payer: COMMERCIAL

## 2021-02-03 VITALS
HEIGHT: 71 IN | WEIGHT: 165 LBS | BODY MASS INDEX: 23.1 KG/M2 | HEART RATE: 69 BPM | OXYGEN SATURATION: 98 % | TEMPERATURE: 98 F | SYSTOLIC BLOOD PRESSURE: 100 MMHG | DIASTOLIC BLOOD PRESSURE: 70 MMHG | RESPIRATION RATE: 15 BRPM

## 2021-02-03 DIAGNOSIS — R19.4 CHANGE IN BOWEL HABITS: ICD-10-CM

## 2021-02-03 PROCEDURE — 45385 COLONOSCOPY W/LESION REMOVAL: CPT | Mod: ,,, | Performed by: INTERNAL MEDICINE

## 2021-02-03 PROCEDURE — 25000003 PHARM REV CODE 250: Performed by: NURSE ANESTHETIST, CERTIFIED REGISTERED

## 2021-02-03 PROCEDURE — 63600175 PHARM REV CODE 636 W HCPCS: Performed by: NURSE ANESTHETIST, CERTIFIED REGISTERED

## 2021-02-03 PROCEDURE — 27201012 HC FORCEPS, HOT/COLD, DISP: Performed by: INTERNAL MEDICINE

## 2021-02-03 PROCEDURE — 37000008 HC ANESTHESIA 1ST 15 MINUTES: Performed by: INTERNAL MEDICINE

## 2021-02-03 PROCEDURE — 88342 IMHCHEM/IMCYTCHM 1ST ANTB: CPT | Mod: 26,,, | Performed by: PATHOLOGY

## 2021-02-03 PROCEDURE — 45385 PR COLONOSCOPY,REMV LESN,SNARE: ICD-10-PCS | Mod: ,,, | Performed by: INTERNAL MEDICINE

## 2021-02-03 PROCEDURE — 27201089 HC SNARE, DISP (ANY): Performed by: INTERNAL MEDICINE

## 2021-02-03 PROCEDURE — 45385 COLONOSCOPY W/LESION REMOVAL: CPT | Performed by: INTERNAL MEDICINE

## 2021-02-03 PROCEDURE — 88305 TISSUE EXAM BY PATHOLOGIST: CPT | Mod: 26,,, | Performed by: PATHOLOGY

## 2021-02-03 PROCEDURE — 82657 ENZYME CELL ACTIVITY: CPT | Performed by: PATHOLOGY

## 2021-02-03 PROCEDURE — E9220 PRA ENDO ANESTHESIA: HCPCS | Mod: ,,, | Performed by: NURSE ANESTHETIST, CERTIFIED REGISTERED

## 2021-02-03 PROCEDURE — 45380 COLONOSCOPY AND BIOPSY: CPT | Performed by: INTERNAL MEDICINE

## 2021-02-03 PROCEDURE — 43239 EGD BIOPSY SINGLE/MULTIPLE: CPT | Mod: 51,,, | Performed by: INTERNAL MEDICINE

## 2021-02-03 PROCEDURE — 88305 TISSUE EXAM BY PATHOLOGIST: ICD-10-PCS | Mod: 26,,, | Performed by: PATHOLOGY

## 2021-02-03 PROCEDURE — E9220 PRA ENDO ANESTHESIA: ICD-10-PCS | Mod: ,,, | Performed by: NURSE ANESTHETIST, CERTIFIED REGISTERED

## 2021-02-03 PROCEDURE — 25000003 PHARM REV CODE 250: Performed by: INTERNAL MEDICINE

## 2021-02-03 PROCEDURE — 45380 PR COLONOSCOPY,BIOPSY: ICD-10-PCS | Mod: 59,,, | Performed by: INTERNAL MEDICINE

## 2021-02-03 PROCEDURE — 37000009 HC ANESTHESIA EA ADD 15 MINS: Performed by: INTERNAL MEDICINE

## 2021-02-03 PROCEDURE — 43239 EGD BIOPSY SINGLE/MULTIPLE: CPT | Performed by: INTERNAL MEDICINE

## 2021-02-03 PROCEDURE — 88342 IMHCHEM/IMCYTCHM 1ST ANTB: CPT | Performed by: PATHOLOGY

## 2021-02-03 PROCEDURE — 43239 PR EGD, FLEX, W/BIOPSY, SGL/MULTI: ICD-10-PCS | Mod: 51,,, | Performed by: INTERNAL MEDICINE

## 2021-02-03 PROCEDURE — 88342 CHG IMMUNOCYTOCHEMISTRY: ICD-10-PCS | Mod: 26,,, | Performed by: PATHOLOGY

## 2021-02-03 PROCEDURE — 45380 COLONOSCOPY AND BIOPSY: CPT | Mod: 59,,, | Performed by: INTERNAL MEDICINE

## 2021-02-03 PROCEDURE — 88305 TISSUE EXAM BY PATHOLOGIST: CPT | Performed by: PATHOLOGY

## 2021-02-03 RX ORDER — SODIUM CHLORIDE 9 MG/ML
INJECTION, SOLUTION INTRAVENOUS CONTINUOUS
Status: DISCONTINUED | OUTPATIENT
Start: 2021-02-03 | End: 2021-02-03 | Stop reason: HOSPADM

## 2021-02-03 RX ORDER — PROPOFOL 10 MG/ML
VIAL (ML) INTRAVENOUS
Status: DISCONTINUED | OUTPATIENT
Start: 2021-02-03 | End: 2021-02-03

## 2021-02-03 RX ORDER — LIDOCAINE HCL/PF 100 MG/5ML
SYRINGE (ML) INTRAVENOUS
Status: DISCONTINUED | OUTPATIENT
Start: 2021-02-03 | End: 2021-02-03

## 2021-02-03 RX ADMIN — Medication 100 MG: at 09:02

## 2021-02-03 RX ADMIN — PROPOFOL 50 MG: 10 INJECTION, EMULSION INTRAVENOUS at 10:02

## 2021-02-03 RX ADMIN — SODIUM CHLORIDE: 0.9 INJECTION, SOLUTION INTRAVENOUS at 09:02

## 2021-02-03 RX ADMIN — PROPOFOL 50 MG: 10 INJECTION, EMULSION INTRAVENOUS at 09:02

## 2021-02-03 RX ADMIN — GLYCOPYRROLATE 0.2 MG: 0.2 INJECTION, SOLUTION INTRAMUSCULAR; INTRAVITREAL at 09:02

## 2021-02-03 RX ADMIN — PROPOFOL 100 MG: 10 INJECTION, EMULSION INTRAVENOUS at 09:02

## 2021-02-09 DIAGNOSIS — Z83.79 FAMILY HISTORY OF CELIAC SPRUE: Primary | ICD-10-CM

## 2021-02-09 LAB
FINAL PATHOLOGIC DIAGNOSIS: NORMAL
GROSS: NORMAL
Lab: NORMAL
MICROSCOPIC EXAM: NORMAL

## 2021-02-10 ENCOUNTER — TELEPHONE (OUTPATIENT)
Dept: GASTROENTEROLOGY | Facility: CLINIC | Age: 31
End: 2021-02-10

## 2021-02-11 LAB
FINAL PATHOLOGIC DIAGNOSIS: NORMAL
GROSS: NORMAL
Lab: NORMAL

## 2021-02-12 ENCOUNTER — LAB VISIT (OUTPATIENT)
Dept: LAB | Facility: HOSPITAL | Age: 31
End: 2021-02-12
Attending: INTERNAL MEDICINE
Payer: COMMERCIAL

## 2021-02-12 DIAGNOSIS — Z83.79 FAMILY HISTORY OF CELIAC SPRUE: ICD-10-CM

## 2021-02-12 PROCEDURE — 36415 COLL VENOUS BLD VENIPUNCTURE: CPT

## 2021-02-12 PROCEDURE — 83516 IMMUNOASSAY NONANTIBODY: CPT | Mod: 59

## 2021-02-15 LAB
GLIADIN PEPTIDE IGA SER-ACNC: 5 UNITS
GLIADIN PEPTIDE IGG SER-ACNC: 2 UNITS
IGA SERPL-MCNC: 233 MG/DL (ref 70–400)
TTG IGA SER-ACNC: 5 UNITS
TTG IGG SER-ACNC: 2 UNITS

## 2021-03-03 ENCOUNTER — OFFICE VISIT (OUTPATIENT)
Dept: URGENT CARE | Facility: CLINIC | Age: 31
End: 2021-03-03
Payer: COMMERCIAL

## 2021-03-03 VITALS
HEIGHT: 71 IN | OXYGEN SATURATION: 98 % | SYSTOLIC BLOOD PRESSURE: 115 MMHG | WEIGHT: 165 LBS | DIASTOLIC BLOOD PRESSURE: 76 MMHG | TEMPERATURE: 99 F | BODY MASS INDEX: 23.1 KG/M2 | RESPIRATION RATE: 15 BRPM | HEART RATE: 70 BPM

## 2021-03-03 DIAGNOSIS — M77.8 TENDONITIS OF WRIST, RIGHT: Primary | ICD-10-CM

## 2021-03-03 DIAGNOSIS — M25.531 RIGHT WRIST PAIN: ICD-10-CM

## 2021-03-03 PROCEDURE — 99214 PR OFFICE/OUTPT VISIT, EST, LEVL IV, 30-39 MIN: ICD-10-PCS | Mod: S$GLB,,, | Performed by: PHYSICIAN ASSISTANT

## 2021-03-03 PROCEDURE — 73110 X-RAY EXAM OF WRIST: CPT | Mod: RT,S$GLB,, | Performed by: RADIOLOGY

## 2021-03-03 PROCEDURE — 3008F BODY MASS INDEX DOCD: CPT | Mod: CPTII,S$GLB,, | Performed by: PHYSICIAN ASSISTANT

## 2021-03-03 PROCEDURE — 99214 OFFICE O/P EST MOD 30 MIN: CPT | Mod: S$GLB,,, | Performed by: PHYSICIAN ASSISTANT

## 2021-03-03 PROCEDURE — 3008F PR BODY MASS INDEX (BMI) DOCUMENTED: ICD-10-PCS | Mod: CPTII,S$GLB,, | Performed by: PHYSICIAN ASSISTANT

## 2021-03-03 PROCEDURE — 73110 XR WRIST COMPLETE 3 VIEWS RIGHT: ICD-10-PCS | Mod: RT,S$GLB,, | Performed by: RADIOLOGY

## 2021-03-03 RX ORDER — IBUPROFEN 600 MG/1
600 TABLET ORAL EVERY 6 HOURS PRN
Qty: 30 TABLET | Refills: 0 | Status: SHIPPED | OUTPATIENT
Start: 2021-03-03 | End: 2022-03-03

## 2021-07-19 ENCOUNTER — LAB VISIT (OUTPATIENT)
Dept: URGENT CARE | Facility: CLINIC | Age: 31
End: 2021-07-19
Payer: COMMERCIAL

## 2021-07-19 DIAGNOSIS — R52 BODY ACHES: ICD-10-CM

## 2021-07-19 DIAGNOSIS — R52 BODY ACHES: Primary | ICD-10-CM

## 2021-07-19 LAB
CTP QC/QA: YES
SARS-COV-2 RDRP RESP QL NAA+PROBE: NEGATIVE

## 2021-07-19 PROCEDURE — U0002: ICD-10-PCS | Mod: QW,S$GLB,, | Performed by: PREVENTIVE MEDICINE

## 2021-07-19 PROCEDURE — U0002 COVID-19 LAB TEST NON-CDC: HCPCS | Mod: QW,S$GLB,, | Performed by: PREVENTIVE MEDICINE

## 2021-10-04 ENCOUNTER — PATIENT MESSAGE (OUTPATIENT)
Dept: INTERNAL MEDICINE | Facility: CLINIC | Age: 31
End: 2021-10-04

## 2021-10-06 ENCOUNTER — OFFICE VISIT (OUTPATIENT)
Dept: INTERNAL MEDICINE | Facility: CLINIC | Age: 31
End: 2021-10-06
Payer: COMMERCIAL

## 2021-10-06 DIAGNOSIS — F41.8 MIXED ANXIETY AND DEPRESSIVE DISORDER: Primary | ICD-10-CM

## 2021-10-06 PROCEDURE — 99213 OFFICE O/P EST LOW 20 MIN: CPT | Mod: 95,,, | Performed by: FAMILY MEDICINE

## 2021-10-06 PROCEDURE — 99213 PR OFFICE/OUTPT VISIT, EST, LEVL III, 20-29 MIN: ICD-10-PCS | Mod: 95,,, | Performed by: FAMILY MEDICINE

## 2021-10-06 PROCEDURE — 1159F MED LIST DOCD IN RCRD: CPT | Mod: CPTII,95,, | Performed by: FAMILY MEDICINE

## 2021-10-06 PROCEDURE — 1159F PR MEDICATION LIST DOCUMENTED IN MEDICAL RECORD: ICD-10-PCS | Mod: CPTII,95,, | Performed by: FAMILY MEDICINE

## 2021-10-06 PROCEDURE — 1160F PR REVIEW ALL MEDS BY PRESCRIBER/CLIN PHARMACIST DOCUMENTED: ICD-10-PCS | Mod: CPTII,95,, | Performed by: FAMILY MEDICINE

## 2021-10-06 PROCEDURE — 1160F RVW MEDS BY RX/DR IN RCRD: CPT | Mod: CPTII,95,, | Performed by: FAMILY MEDICINE

## 2021-10-06 RX ORDER — SERTRALINE HYDROCHLORIDE 50 MG/1
75 TABLET, FILM COATED ORAL DAILY
Qty: 135 TABLET | Refills: 1 | Status: SHIPPED | OUTPATIENT
Start: 2021-10-06 | End: 2021-12-09

## 2021-10-12 ENCOUNTER — OFFICE VISIT (OUTPATIENT)
Dept: PSYCHIATRY | Facility: CLINIC | Age: 31
End: 2021-10-12
Payer: COMMERCIAL

## 2021-10-12 DIAGNOSIS — F41.1 GAD (GENERALIZED ANXIETY DISORDER): ICD-10-CM

## 2021-10-12 DIAGNOSIS — F41.0 PANIC ATTACKS: Primary | ICD-10-CM

## 2021-10-12 PROCEDURE — 99205 OFFICE O/P NEW HI 60 MIN: CPT | Mod: 95,,, | Performed by: NURSE PRACTITIONER

## 2021-10-12 PROCEDURE — 99205 PR OFFICE/OUTPT VISIT, NEW, LEVL V, 60-74 MIN: ICD-10-PCS | Mod: 95,,, | Performed by: NURSE PRACTITIONER

## 2021-11-13 ENCOUNTER — IMMUNIZATION (OUTPATIENT)
Dept: INTERNAL MEDICINE | Facility: CLINIC | Age: 31
End: 2021-11-13
Payer: COMMERCIAL

## 2021-11-13 DIAGNOSIS — Z23 NEED FOR VACCINATION: Primary | ICD-10-CM

## 2021-11-13 PROCEDURE — 0004A COVID-19, MRNA, LNP-S, PF, 30 MCG/0.3 ML DOSE VACCINE: CPT | Mod: CV19,PBBFAC

## 2021-11-28 ENCOUNTER — PATIENT MESSAGE (OUTPATIENT)
Dept: PSYCHIATRY | Facility: CLINIC | Age: 31
End: 2021-11-28
Payer: COMMERCIAL

## 2021-12-08 ENCOUNTER — PATIENT MESSAGE (OUTPATIENT)
Dept: DERMATOLOGY | Facility: CLINIC | Age: 31
End: 2021-12-08

## 2021-12-08 ENCOUNTER — OFFICE VISIT (OUTPATIENT)
Dept: DERMATOLOGY | Facility: CLINIC | Age: 31
End: 2021-12-08
Payer: COMMERCIAL

## 2021-12-08 DIAGNOSIS — L30.9 HAND ECZEMA: Primary | ICD-10-CM

## 2021-12-08 PROCEDURE — 99204 OFFICE O/P NEW MOD 45 MIN: CPT | Mod: 95,,, | Performed by: STUDENT IN AN ORGANIZED HEALTH CARE EDUCATION/TRAINING PROGRAM

## 2021-12-08 PROCEDURE — 99204 PR OFFICE/OUTPT VISIT, NEW, LEVL IV, 45-59 MIN: ICD-10-PCS | Mod: 95,,, | Performed by: STUDENT IN AN ORGANIZED HEALTH CARE EDUCATION/TRAINING PROGRAM

## 2021-12-08 RX ORDER — CLOBETASOL PROPIONATE 0.5 MG/G
CREAM TOPICAL 2 TIMES DAILY
Qty: 60 G | Refills: 2 | Status: SHIPPED | OUTPATIENT
Start: 2021-12-08 | End: 2023-02-20

## 2021-12-09 ENCOUNTER — OFFICE VISIT (OUTPATIENT)
Dept: PSYCHIATRY | Facility: CLINIC | Age: 31
End: 2021-12-09
Payer: COMMERCIAL

## 2021-12-09 DIAGNOSIS — F41.1 GAD (GENERALIZED ANXIETY DISORDER): Primary | ICD-10-CM

## 2021-12-09 DIAGNOSIS — F41.0 PANIC ATTACKS: ICD-10-CM

## 2021-12-09 PROCEDURE — 99214 OFFICE O/P EST MOD 30 MIN: CPT | Mod: 95,,, | Performed by: NURSE PRACTITIONER

## 2021-12-09 PROCEDURE — 99214 PR OFFICE/OUTPT VISIT, EST, LEVL IV, 30-39 MIN: ICD-10-PCS | Mod: 95,,, | Performed by: NURSE PRACTITIONER

## 2021-12-09 RX ORDER — ESCITALOPRAM OXALATE 5 MG/1
5 TABLET ORAL DAILY
Qty: 30 TABLET | Refills: 1 | Status: SHIPPED | OUTPATIENT
Start: 2021-12-09 | End: 2022-01-01

## 2021-12-21 ENCOUNTER — PATIENT MESSAGE (OUTPATIENT)
Dept: PSYCHIATRY | Facility: CLINIC | Age: 31
End: 2021-12-21
Payer: COMMERCIAL

## 2022-05-16 ENCOUNTER — PATIENT MESSAGE (OUTPATIENT)
Dept: PSYCHIATRY | Facility: CLINIC | Age: 32
End: 2022-05-16
Payer: COMMERCIAL

## 2022-08-06 ENCOUNTER — PATIENT MESSAGE (OUTPATIENT)
Dept: PSYCHIATRY | Facility: CLINIC | Age: 32
End: 2022-08-06
Payer: COMMERCIAL

## 2022-08-10 DIAGNOSIS — F41.1 GAD (GENERALIZED ANXIETY DISORDER): Primary | ICD-10-CM

## 2022-08-10 RX ORDER — ESCITALOPRAM OXALATE 5 MG/1
5 TABLET ORAL DAILY
Qty: 30 TABLET | Refills: 0 | Status: SHIPPED | OUTPATIENT
Start: 2022-08-10 | End: 2022-09-09

## 2022-08-10 RX ORDER — ALPRAZOLAM 0.25 MG/1
0.25 TABLET ORAL DAILY PRN
Qty: 10 TABLET | Refills: 0 | Status: SHIPPED | OUTPATIENT
Start: 2022-08-10 | End: 2022-08-20

## 2022-08-10 RX ORDER — ESCITALOPRAM OXALATE 5 MG/1
5 TABLET ORAL DAILY
Qty: 90 TABLET | Refills: 0 | OUTPATIENT
Start: 2022-08-10

## 2022-09-01 ENCOUNTER — OFFICE VISIT (OUTPATIENT)
Dept: PSYCHIATRY | Facility: CLINIC | Age: 32
End: 2022-09-01
Payer: COMMERCIAL

## 2022-09-01 DIAGNOSIS — F41.1 GAD (GENERALIZED ANXIETY DISORDER): Primary | ICD-10-CM

## 2022-09-01 DIAGNOSIS — F41.0 PANIC ATTACKS: ICD-10-CM

## 2022-09-01 PROCEDURE — 99214 OFFICE O/P EST MOD 30 MIN: CPT | Mod: 95,,, | Performed by: NURSE PRACTITIONER

## 2022-09-01 PROCEDURE — 99214 PR OFFICE/OUTPT VISIT, EST, LEVL IV, 30-39 MIN: ICD-10-PCS | Mod: 95,,, | Performed by: NURSE PRACTITIONER

## 2022-09-01 RX ORDER — ESCITALOPRAM OXALATE 10 MG/1
10 TABLET ORAL DAILY
Qty: 30 TABLET | Refills: 2 | Status: SHIPPED | OUTPATIENT
Start: 2022-09-01 | End: 2022-12-07

## 2022-09-01 NOTE — PROGRESS NOTES
Outpatient Psychiatry Follow-Up Visit (MD/NP)    9/1/2022     The patient location is: [place], [place]  The chief complaint leading to consultation is: Anxiety and panic attacks    Visit type: audiovisual    Face to Face time with patient: 25 minutes  30 minutes of total time spent on the encounter, which includes face to face time and non-face to face time preparing to see the patient (eg, review of tests), Obtaining and/or reviewing separately obtained history, Documenting clinical information in the electronic or other health record, Independently interpreting results (not separately reported) and communicating results to the patient/family/caregiver, or Care coordination (not separately reported).         Each patient to whom he or she provides medical services by telemedicine is:  (1) informed of the relationship between the physician and patient and the respective role of any other health care provider with respect to management of the patient; and (2) notified that he or she may decline to receive medical services by telemedicine and may withdraw from such care at any time.    Notes:       Clinical Status of Patient:  Outpatient (Ambulatory)    Chief Complaint:  Harinder Elliott is a 32 y.o. male who presents today for follow-up of anxiety and panic attacks .  Met with patient.      Interval History and Content of Current Session:  Interim Events/Subjective Report/Content of Current Session:  He arrived on time for his virtual visit. He stated he is still taking Lexapro 5 mg po daily and only takes xanax 0.25 mg po daily prn as needed for severe panic attacks. He stated he does not think Lexapro 5 mg daily is effective enough in improving his anxiety. He stated he has been having increased panic attacks and worsening of anxiety. He rated his anxiety at 7/10 with 10/10 being the most severe. He stated he had 6-7 panic attacks since his last visit and coped with it thought deep breathing and focusing on  "his senses. Provided supportive therapy and skills to manage his panic attacks. He denied feeling depressed and denied feelings of guilt, helplessness, hopelessness, anhedonia, amotivation, or fatigue. He reported overall good sleep and appetite. Patient denied SI/HI/AH/VH. No objective signs of ozzie, hypomania, or psychosis. He denied alcohol abuse or any type of illicit drug use.      Psychiatric Review Of Systems - Is patient experiencing or having changes in:  sleep: no. Overall sleeps well   appetite: no  weight: no  energy/anergy: no  interest/pleasure/anhedonia: no  Motivation: no. Replaced by other   somatic symptoms: no  anxiety/panic: yes.   guilty/hopelessness: no  concentration: no  S.I.B.s/risky behavior: no  Irritability: yes but mild over the last 2 weeks because around getting his house ready.  Racing thoughts: yes. Only at night  Impulsive behaviors: no  Paranoia:no  AVH:no  Suicidal thoughts/plan/intent: no    Current Medication:  Xanax (took 1/2 xanax) Rarely takes it  Lexapro 5 mg po daily        Previous Medications:  Paxil (was not effective and didn't like the way he felt while taking it  Zoloft 75 mg po daily ineffective and "decreased sex drive and apathy".        Psychotherapy:  Target symptoms: anxiety , panic attacks  Why chosen therapy is appropriate versus another modality: relevant to diagnosis, patient responds to this modality, evidence based practice  Outcome monitoring methods: self-report, observation  Therapeutic intervention type: insight oriented psychotherapy, behavior modifying psychotherapy, supportive psychotherapy  Topics discussed/themes: building skills sets for symptom management, symptom recognition  The patient's response to the intervention is accepting, motivated. The patient's progress toward treatment goals is good.   Duration of intervention: 10 minutes.    Review of Systems   PSYCHIATRIC: Pertinant items are noted in the narrative.  CONSTITUTIONAL: No weight " gain or loss.   MUSCULOSKELETAL: No pain or stiffness of the joints.  NEUROLOGIC: No weakness, sensory changes, seizures, confusion, memory loss, tremor or other abnormal movements.  ENDOCRINE: No polydipsia or polyuria.  INTEGUMENTARY: No rashes or lacerations.  EYES: No exophthalmos, jaundice or blindness.  ENT: No dizziness, tinnitus or hearing loss.  RESPIRATORY: No shortness of breath.  CARDIOVASCULAR: No tachycardia or chest pain.  GASTROINTESTINAL: No nausea, vomiting, pain, constipation or diarrhea.  GENITOURINARY: No frequency, dysuria or sexual dysfunction.  HEMATOLOGIC/LYMPHATIC: No excessive bleeding, prolonged or excessive bleeding after dental extraction/injury.  ALLERGIC/IMMUNOLOGIC: No allergic response to materials, foods or animals at this time.    Past Medical, Family and Social History: The patient's past medical, family and social history have been reviewed and updated as appropriate within the electronic medical record - see encounter notes.    Compliance: yes    Side effects:  sexual side effects  and apathy with Zoloft 75 mg daily    Risk Parameters:  Patient reports no suicidal ideation  Patient reports no homicidal ideation  Patient reports no self-injurious behavior  Patient reports no violent behavior    Exam (detailed: at least 9 elements; comprehensive: all 15 elements)   Constitutional  Vitals:  Most recent vital signs, dated greater than 90 days prior to this appointment, were reviewed.   There were no vitals filed for this visit.     General:  unremarkable, age appropriate     Musculoskeletal  Muscle Strength/Tone:  not examined   Gait & Station:  non-ataxic     Psychiatric  Speech:  no latency; no press   Mood & Affect:  Anxious  anxious   Thought Process:  normal and logical   Associations:  intact   Thought Content:  normal, no suicidality, no homicidality, delusions, or paranoia   Insight:  intact, has awareness of illness   Judgement: behavior is adequate to circumstances    Orientation:  grossly intact   Memory: intact for content of interview   Language: grossly intact, able to name, able to repeat   Attention Span & Concentration:  able to focus   Fund of Knowledge:  intact and appropriate to age and level of education, familiar with aspects of current personal life     Assessment and Diagnosis   Status/Progress: Based on the examination today, the patient's problem(s) is/are improved.  New problems have not been presented today.   Co-morbidities, Diagnostic uncertainty and Lack of compliance are not complicating management of the primary condition.  There are no active rule-out diagnoses for this patient at this time.     General Impression: Still feels anxious with Lexapro 5 mg po daily.       Patient meets the criteria for generalized anxiety disorder; panic attacks, and some depressive symptoms that they don't meed the criteria for MDD because his depressive symptoms never lasted 2 weeks more days than not.          ICD-10-CM ICD-9-CM   1. JOSE RAFAEL (generalized anxiety disorder)  F41.1 300.02   2. Panic attacks  F41.0 300.01         Intervention/Counseling/Treatment Plan     Treatment Plan/Recommendations:   Medication Management: Continue current medications. The risks and benefits of medication were discussed with the patient.  -Increase Lexapro to 10 mg po daily for anxiety and panic attacks  -Recommended psychotherapy and a referral is sent  We discussed risk of Serotonin Syndrome including symptoms in order of appearance: diarrhea, restlessness, extreme agitation, autonomic instability, hyperthermia, rigidity, coma, and death.  -Continue utilization of effective CBT skills, positive self-talk, cognitive reframing, meditation, mindfulness, deep breathing, and relaxations skills.  --Discussed informed consent, diagnosis, risks and benefits of proposed treatment above vs alternative treatments vs no treatment, and potential side effects of these treatments. The patient expresses  understanding of the above and displays the capacity to agree with this treatment given said understanding. Patient also agrees that, currently, the benefits outweigh the risks and would like to pursue treatment at this time. Answered all questions and discussed follow up. Encouraged patient to contact us with any questions or concerns.   -Encouraged Patient to keep future appointments.  -Take medications as prescribed   -Patient to present to ED for thoughts to harm herself or others    Return to Clinic: 1 month or earlier as needed      MINERVA Mills-BC

## 2022-09-01 NOTE — PATIENT INSTRUCTIONS
I have a list of recommended books for anxiety  The Anxiety and Worry Workbook: The Cognitive Behavioral Solution by Xavier Page and Vito Ramirez  How To Control Your Anxiety Before It Controls You. Chu Nugent  The Gifts of Imperfection: Let Go of Who You Think Youre Supposed to Be and Embrace Who You Are by Justen Mckeon  How to Stop Worrying and Start Living by Jeremiah Jackman   The anxiety and Phobia Workbook by Dangelo Rosado  I have listed a name off APPs for meditations such headspace, calm, and virtual hope box.

## 2022-09-08 RX ORDER — ESCITALOPRAM OXALATE 10 MG/1
10 TABLET ORAL DAILY
Qty: 30 TABLET | Refills: 11 | OUTPATIENT
Start: 2022-09-08 | End: 2023-09-08

## 2022-10-06 ENCOUNTER — OFFICE VISIT (OUTPATIENT)
Dept: OPTOMETRY | Facility: CLINIC | Age: 32
End: 2022-10-06
Payer: COMMERCIAL

## 2022-10-06 DIAGNOSIS — Z46.0 FITTING AND ADJUSTMENT OF SPECTACLES AND CONTACT LENSES: ICD-10-CM

## 2022-10-06 DIAGNOSIS — H52.203 MYOPIA OF BOTH EYES WITH ASTIGMATISM: ICD-10-CM

## 2022-10-06 DIAGNOSIS — Z97.3 WEARS CONTACT LENSES: ICD-10-CM

## 2022-10-06 DIAGNOSIS — Z01.00 COMPLETE EYE EXAM, ENCOUNTER FOR: Primary | ICD-10-CM

## 2022-10-06 DIAGNOSIS — H52.13 MYOPIA OF BOTH EYES WITH ASTIGMATISM: ICD-10-CM

## 2022-10-06 PROCEDURE — 92004 COMPRE OPH EXAM NEW PT 1/>: CPT | Mod: S$GLB,,, | Performed by: OPTOMETRIST

## 2022-10-06 PROCEDURE — 99999 PR PBB SHADOW E&M-EST. PATIENT-LVL III: ICD-10-PCS | Mod: PBBFAC,,, | Performed by: OPTOMETRIST

## 2022-10-06 PROCEDURE — 92310 PR CONTACT LENS FITTING (NO CHANGE): ICD-10-PCS | Mod: CSM,S$GLB,, | Performed by: OPTOMETRIST

## 2022-10-06 PROCEDURE — 92015 PR REFRACTION: ICD-10-PCS | Mod: S$GLB,,, | Performed by: OPTOMETRIST

## 2022-10-06 PROCEDURE — 92015 DETERMINE REFRACTIVE STATE: CPT | Mod: S$GLB,,, | Performed by: OPTOMETRIST

## 2022-10-06 PROCEDURE — 92004 PR EYE EXAM, NEW PATIENT,COMPREHESV: ICD-10-PCS | Mod: S$GLB,,, | Performed by: OPTOMETRIST

## 2022-10-06 PROCEDURE — 99999 PR PBB SHADOW E&M-EST. PATIENT-LVL III: CPT | Mod: PBBFAC,,, | Performed by: OPTOMETRIST

## 2022-10-06 PROCEDURE — 1159F MED LIST DOCD IN RCRD: CPT | Mod: CPTII,S$GLB,, | Performed by: OPTOMETRIST

## 2022-10-06 PROCEDURE — 1159F PR MEDICATION LIST DOCUMENTED IN MEDICAL RECORD: ICD-10-PCS | Mod: CPTII,S$GLB,, | Performed by: OPTOMETRIST

## 2022-10-06 PROCEDURE — 92310 CONTACT LENS FITTING OU: CPT | Mod: CSM,S$GLB,, | Performed by: OPTOMETRIST

## 2022-10-06 NOTE — PROGRESS NOTES
HPI    CC: Pt is here today for a Routine eye exam with contacts. He states that   he has noticed a gradual decrease in his vision far away.  SARAH: 5 years    (+) Changes in vision , distance  (-) Pain  (-) Irritation   (-) Itching   (-) Flashes  (-) Floaters  (+) Glasses wearer  (+) CL wearer  (-) Uses eye gtts    Does patient want a refraction today? yes    (-) Eye injury  (-) Eye surgery   (-)POHx  (-)FOHx    (-)DM  No results found for: HGBA1C        Last edited by Ajith Ty on 10/6/2022 10:51 AM.        ROS    Negative for: Constitutional, Gastrointestinal, Neurological, Skin,   Genitourinary, Musculoskeletal, HENT, Endocrine, Cardiovascular, Eyes,   Respiratory, Psychiatric, Allergic/Imm, Heme/Lymph  Last edited by Mabel Gutierrez, OD on 10/6/2022 11:35 AM.        Assessment /Plan     For exam results, see Encounter Report.    Complete eye exam, encounter for    Myopia of both eyes with astigmatism    Fitting and adjustment of spectacles and contact lenses    Wears contact lenses    MONITOR. ED PT ON ALL EXAM FINDINGS  RX FINAL SPECS AND CLS. OK TO CALL TO MODIFY CL RX PRN   [facility] WNL OD, OS   RTC 1 YR//PRN FOR REE/DFE

## 2022-10-10 ENCOUNTER — PATIENT MESSAGE (OUTPATIENT)
Dept: OPTOMETRY | Facility: CLINIC | Age: 32
End: 2022-10-10
Payer: COMMERCIAL

## 2023-01-24 ENCOUNTER — PATIENT MESSAGE (OUTPATIENT)
Dept: OPTOMETRY | Facility: CLINIC | Age: 33
End: 2023-01-24
Payer: COMMERCIAL

## 2023-02-14 ENCOUNTER — OFFICE VISIT (OUTPATIENT)
Dept: URGENT CARE | Facility: CLINIC | Age: 33
End: 2023-02-14
Payer: COMMERCIAL

## 2023-02-14 VITALS
WEIGHT: 170 LBS | SYSTOLIC BLOOD PRESSURE: 126 MMHG | BODY MASS INDEX: 23.8 KG/M2 | OXYGEN SATURATION: 98 % | HEART RATE: 79 BPM | TEMPERATURE: 98 F | DIASTOLIC BLOOD PRESSURE: 81 MMHG | HEIGHT: 71 IN | RESPIRATION RATE: 17 BRPM

## 2023-02-14 DIAGNOSIS — J20.9 ACUTE BRONCHITIS, UNSPECIFIED ORGANISM: Primary | ICD-10-CM

## 2023-02-14 DIAGNOSIS — M54.2 NECK PAIN: ICD-10-CM

## 2023-02-14 LAB
CTP QC/QA: YES
SARS-COV-2 AG RESP QL IA.RAPID: NEGATIVE

## 2023-02-14 PROCEDURE — 87811 SARS-COV-2 COVID19 W/OPTIC: CPT | Mod: QW,S$GLB,, | Performed by: FAMILY MEDICINE

## 2023-02-14 PROCEDURE — 99214 OFFICE O/P EST MOD 30 MIN: CPT | Mod: S$GLB,,, | Performed by: FAMILY MEDICINE

## 2023-02-14 PROCEDURE — 3079F PR MOST RECENT DIASTOLIC BLOOD PRESSURE 80-89 MM HG: ICD-10-PCS | Mod: CPTII,S$GLB,, | Performed by: FAMILY MEDICINE

## 2023-02-14 PROCEDURE — 3074F SYST BP LT 130 MM HG: CPT | Mod: CPTII,S$GLB,, | Performed by: FAMILY MEDICINE

## 2023-02-14 PROCEDURE — 3008F BODY MASS INDEX DOCD: CPT | Mod: CPTII,S$GLB,, | Performed by: FAMILY MEDICINE

## 2023-02-14 PROCEDURE — 3008F PR BODY MASS INDEX (BMI) DOCUMENTED: ICD-10-PCS | Mod: CPTII,S$GLB,, | Performed by: FAMILY MEDICINE

## 2023-02-14 PROCEDURE — 1160F RVW MEDS BY RX/DR IN RCRD: CPT | Mod: CPTII,S$GLB,, | Performed by: FAMILY MEDICINE

## 2023-02-14 PROCEDURE — 1159F PR MEDICATION LIST DOCUMENTED IN MEDICAL RECORD: ICD-10-PCS | Mod: CPTII,S$GLB,, | Performed by: FAMILY MEDICINE

## 2023-02-14 PROCEDURE — 1160F PR REVIEW ALL MEDS BY PRESCRIBER/CLIN PHARMACIST DOCUMENTED: ICD-10-PCS | Mod: CPTII,S$GLB,, | Performed by: FAMILY MEDICINE

## 2023-02-14 PROCEDURE — 3074F PR MOST RECENT SYSTOLIC BLOOD PRESSURE < 130 MM HG: ICD-10-PCS | Mod: CPTII,S$GLB,, | Performed by: FAMILY MEDICINE

## 2023-02-14 PROCEDURE — 1159F MED LIST DOCD IN RCRD: CPT | Mod: CPTII,S$GLB,, | Performed by: FAMILY MEDICINE

## 2023-02-14 PROCEDURE — 3079F DIAST BP 80-89 MM HG: CPT | Mod: CPTII,S$GLB,, | Performed by: FAMILY MEDICINE

## 2023-02-14 PROCEDURE — 99214 PR OFFICE/OUTPT VISIT, EST, LEVL IV, 30-39 MIN: ICD-10-PCS | Mod: S$GLB,,, | Performed by: FAMILY MEDICINE

## 2023-02-14 PROCEDURE — 87811 SARS CORONAVIRUS 2 ANTIGEN POCT, MANUAL READ: ICD-10-PCS | Mod: QW,S$GLB,, | Performed by: FAMILY MEDICINE

## 2023-02-14 RX ORDER — BENZONATATE 100 MG/1
100 CAPSULE ORAL EVERY 6 HOURS PRN
Qty: 30 CAPSULE | Refills: 1 | Status: SHIPPED | OUTPATIENT
Start: 2023-02-14 | End: 2024-02-14

## 2023-02-14 RX ORDER — IBUPROFEN 600 MG/1
600 TABLET ORAL EVERY 8 HOURS PRN
Qty: 30 TABLET | Refills: 0 | Status: SHIPPED | OUTPATIENT
Start: 2023-02-14 | End: 2024-03-14

## 2023-02-14 RX ORDER — PROMETHAZINE HYDROCHLORIDE AND DEXTROMETHORPHAN HYDROBROMIDE 6.25; 15 MG/5ML; MG/5ML
5 SYRUP ORAL NIGHTLY PRN
Qty: 118 ML | Refills: 0 | Status: SHIPPED | OUTPATIENT
Start: 2023-02-14 | End: 2023-02-24

## 2023-02-14 RX ORDER — CYCLOBENZAPRINE HCL 10 MG
10 TABLET ORAL 3 TIMES DAILY PRN
Qty: 21 TABLET | Refills: 0 | Status: SHIPPED | OUTPATIENT
Start: 2023-02-14 | End: 2023-02-24

## 2023-02-14 RX ORDER — DOXYCYCLINE 100 MG/1
100 CAPSULE ORAL 2 TIMES DAILY
Qty: 14 CAPSULE | Refills: 0 | Status: SHIPPED | OUTPATIENT
Start: 2023-02-14 | End: 2023-02-21

## 2023-02-14 NOTE — PROGRESS NOTES
"Subjective:       Patient ID: Harinder Elliott is a 32 y.o. male.    Vitals:  height is 5' 10.98" (1.803 m) and weight is 77.1 kg (170 lb). His oral temperature is 98.1 °F (36.7 °C). His blood pressure is 126/81 and his pulse is 79. His respiration is 17 and oxygen saturation is 98%.     Chief Complaint: Sinus Problem    32 y.o male presents today c/o cough, nasal congestion, headaches, body aches, fatigue x3 days.   Patient has been taking Advil with mild improvement.   Patient did not take medicine today.     Sinus Problem  This is a new problem. The current episode started in the past 7 days. There has been no fever. Associated symptoms include chills, congestion and coughing. Pertinent negatives include no diaphoresis, ear pain, headaches, hoarse voice, neck pain, shortness of breath, sinus pressure, sneezing or sore throat. Treatments tried: Advil.     Constitution: Positive for chills. Negative for sweating.   HENT:  Positive for congestion. Negative for ear pain, sinus pressure and sore throat.    Neck: Negative for neck pain.   Respiratory:  Positive for cough. Negative for shortness of breath.    Allergic/Immunologic: Negative for sneezing.   Neurological:  Negative for headaches.     Objective:      Physical Exam   Constitutional: He does not appear ill. No distress. normal  HENT:   Head: Normocephalic and atraumatic.   Nose: Rhinorrhea and congestion present.   Mouth/Throat: Mucous membranes are moist. Posterior oropharyngeal erythema present.   Eyes: Pupils are equal, round, and reactive to light. Extraocular movement intact   Neck: Neck supple.   Cardiovascular: Normal rate, regular rhythm, normal heart sounds and normal pulses.   Pulmonary/Chest: Effort normal. He has rhonchi.   Abdominal: Normal appearance. Soft.   Musculoskeletal:      Cervical back: He exhibits tenderness (right trapezius distribution).   Neurological: He is alert.   Nursing note and vitals reviewed.      Results for orders " placed or performed in visit on 02/14/23   SARS Coronavirus 2 Antigen, POCT Manual Read   Result Value Ref Range    SARS Coronavirus 2 Antigen Negative Negative     Acceptable Yes       Assessment:       1. Acute bronchitis, unspecified organism    2. Neck pain          Plan:         Acute bronchitis, unspecified organism  -     SARS Coronavirus 2 Antigen, POCT Manual Read  -     doxycycline (VIBRAMYCIN) 100 MG Cap; Take 1 capsule (100 mg total) by mouth 2 (two) times daily. for 7 days  Dispense: 14 capsule; Refill: 0  -     benzonatate (TESSALON PERLES) 100 MG capsule; Take 1 capsule (100 mg total) by mouth every 6 (six) hours as needed for Cough.  Dispense: 30 capsule; Refill: 1  -     promethazine-dextromethorphan (PROMETHAZINE-DM) 6.25-15 mg/5 mL Syrp; Take 5 mLs by mouth nightly as needed (cough).  Dispense: 118 mL; Refill: 0    Neck pain  -     cyclobenzaprine (FLEXERIL) 10 MG tablet; Take 1 tablet (10 mg total) by mouth 3 (three) times daily as needed for Muscle spasms.  Dispense: 21 tablet; Refill: 0  -     ibuprofen (ADVIL,MOTRIN) 600 MG tablet; Take 1 tablet (600 mg total) by mouth every 8 (eight) hours as needed for Pain.  Dispense: 30 tablet; Refill: 0    Heat application to neck. RTC prn worsening symptoms

## 2023-02-20 DIAGNOSIS — M54.2 NECK PAIN: Primary | ICD-10-CM

## 2023-02-20 RX ORDER — METHYLPREDNISOLONE 4 MG/1
TABLET ORAL
Qty: 21 EACH | Refills: 0 | Status: SHIPPED | OUTPATIENT
Start: 2023-02-20 | End: 2023-03-13

## 2023-02-22 NOTE — PROGRESS NOTES
Continues to appreciate neck pain.  Has been minimally responding to Tylenol and over-the-counter anti-inflammatories.  Was initially more diffuse to the entire neck and trapezius and was causing headache, but now has localized to mid neck on the left.  OMT was done which helped a little bit.    Medrol Dosepak prescribed.  If not improving with this then imaging will be recommended.

## 2023-04-12 ENCOUNTER — OFFICE VISIT (OUTPATIENT)
Dept: SPORTS MEDICINE | Facility: CLINIC | Age: 33
End: 2023-04-12
Payer: COMMERCIAL

## 2023-04-12 ENCOUNTER — HOSPITAL ENCOUNTER (OUTPATIENT)
Dept: RADIOLOGY | Facility: HOSPITAL | Age: 33
Discharge: HOME | End: 2023-04-12
Attending: PHYSICIAN ASSISTANT
Payer: COMMERCIAL

## 2023-04-12 VITALS
SYSTOLIC BLOOD PRESSURE: 142 MMHG | WEIGHT: 186 LBS | BODY MASS INDEX: 26.04 KG/M2 | HEART RATE: 82 BPM | HEIGHT: 71 IN | DIASTOLIC BLOOD PRESSURE: 90 MMHG

## 2023-04-12 DIAGNOSIS — M54.12 CERVICAL RADICULOPATHY: ICD-10-CM

## 2023-04-12 DIAGNOSIS — M54.2 CERVICAL PAIN: ICD-10-CM

## 2023-04-12 DIAGNOSIS — M54.2 CERVICAL PAIN: Primary | ICD-10-CM

## 2023-04-12 DIAGNOSIS — F40.240 CLAUSTROPHOBIA: ICD-10-CM

## 2023-04-12 PROCEDURE — 99999 PR PBB SHADOW E&M-EST. PATIENT-LVL III: ICD-10-PCS | Mod: PBBFAC,,, | Performed by: PHYSICIAN ASSISTANT

## 2023-04-12 PROCEDURE — 99204 OFFICE O/P NEW MOD 45 MIN: CPT | Mod: S$GLB,,, | Performed by: PHYSICIAN ASSISTANT

## 2023-04-12 PROCEDURE — 3008F PR BODY MASS INDEX (BMI) DOCUMENTED: ICD-10-PCS | Mod: CPTII,S$GLB,, | Performed by: PHYSICIAN ASSISTANT

## 2023-04-12 PROCEDURE — 72050 X-RAY EXAM NECK SPINE 4/5VWS: CPT | Mod: 26,,, | Performed by: RADIOLOGY

## 2023-04-12 PROCEDURE — 3008F BODY MASS INDEX DOCD: CPT | Mod: CPTII,S$GLB,, | Performed by: PHYSICIAN ASSISTANT

## 2023-04-12 PROCEDURE — 3077F SYST BP >= 140 MM HG: CPT | Mod: CPTII,S$GLB,, | Performed by: PHYSICIAN ASSISTANT

## 2023-04-12 PROCEDURE — 99999 PR PBB SHADOW E&M-EST. PATIENT-LVL III: CPT | Mod: PBBFAC,,, | Performed by: PHYSICIAN ASSISTANT

## 2023-04-12 PROCEDURE — 1160F RVW MEDS BY RX/DR IN RCRD: CPT | Mod: CPTII,S$GLB,, | Performed by: PHYSICIAN ASSISTANT

## 2023-04-12 PROCEDURE — 72050 XR CERVICAL SPINE AP LAT WITH FLEX EXTEN: ICD-10-PCS | Mod: 26,,, | Performed by: RADIOLOGY

## 2023-04-12 PROCEDURE — 1159F PR MEDICATION LIST DOCUMENTED IN MEDICAL RECORD: ICD-10-PCS | Mod: CPTII,S$GLB,, | Performed by: PHYSICIAN ASSISTANT

## 2023-04-12 PROCEDURE — 1160F PR REVIEW ALL MEDS BY PRESCRIBER/CLIN PHARMACIST DOCUMENTED: ICD-10-PCS | Mod: CPTII,S$GLB,, | Performed by: PHYSICIAN ASSISTANT

## 2023-04-12 PROCEDURE — 3080F DIAST BP >= 90 MM HG: CPT | Mod: CPTII,S$GLB,, | Performed by: PHYSICIAN ASSISTANT

## 2023-04-12 PROCEDURE — 3080F PR MOST RECENT DIASTOLIC BLOOD PRESSURE >= 90 MM HG: ICD-10-PCS | Mod: CPTII,S$GLB,, | Performed by: PHYSICIAN ASSISTANT

## 2023-04-12 PROCEDURE — 3077F PR MOST RECENT SYSTOLIC BLOOD PRESSURE >= 140 MM HG: ICD-10-PCS | Mod: CPTII,S$GLB,, | Performed by: PHYSICIAN ASSISTANT

## 2023-04-12 PROCEDURE — 99204 PR OFFICE/OUTPT VISIT, NEW, LEVL IV, 45-59 MIN: ICD-10-PCS | Mod: S$GLB,,, | Performed by: PHYSICIAN ASSISTANT

## 2023-04-12 PROCEDURE — 72050 X-RAY EXAM NECK SPINE 4/5VWS: CPT | Mod: TC

## 2023-04-12 PROCEDURE — 1159F MED LIST DOCD IN RCRD: CPT | Mod: CPTII,S$GLB,, | Performed by: PHYSICIAN ASSISTANT

## 2023-04-12 RX ORDER — DIAZEPAM 5 MG/1
5 TABLET ORAL EVERY 6 HOURS PRN
Qty: 2 TABLET | Refills: 0 | Status: SHIPPED | OUTPATIENT
Start: 2023-04-12 | End: 2023-05-12

## 2023-04-12 NOTE — PROGRESS NOTES
Subjective:     Chief Complaint: Harinder Elliott is a 32 y.o. male who had concerns including Pain of the Spine and Pain of the Neck.    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 The following morning, he woke up with a headache and neck pain. He reports feeling severe pain in the bilateral occipital region. He thought this was a migraine so he took imitrex, which decreased the headache, but the neck pain continued. He was seen by Dr. Sanchez around 6 weeks ago and received OMT with mild relief of pain. He has been completing a HEP consisting of cervical stretches x 6 weeks. He took a medrol dose pack starting 2/26/23 with a decrease in pain. He has also been taking daily Advil 600mg. He is very active and runs/weight lifts 5x a week. He stopped running due to this pain since the pain began.  Pain is predominantly located along the right side of his neck which radiates to his shoulder and base of skull. Pain increases with neck rotation to the left. He occasionally feels numbness and tingling radiating down his right arm to his palms with specific neck range of motion. He is here today to discuss treatment options.      Review of Systems   Constitutional: Negative. Negative for chills, fever, weight gain and weight loss.   HENT:  Negative for congestion and sore throat.    Eyes:  Negative for blurred vision and double vision.   Cardiovascular:  Negative for chest pain, leg swelling and palpitations.   Respiratory:  Negative for cough and shortness of breath.    Hematologic/Lymphatic: Does not bruise/bleed easily.   Skin:  Negative for itching, poor wound healing and rash.   Musculoskeletal:  Positive for joint pain. Negative for back pain, joint swelling, muscle weakness, myalgias and stiffness.   Gastrointestinal:  Negative for abdominal pain, constipation,  diarrhea, nausea and vomiting.   Genitourinary: Negative.  Negative for frequency and hematuria.   Neurological:  Negative for dizziness, headaches, numbness, paresthesias and sensory change.   Psychiatric/Behavioral:  Negative for altered mental status and depression. The patient is not nervous/anxious.    Allergic/Immunologic: Negative for hives.               Objective:     General: Harinder is well-developed, well-nourished, appears stated age, in no acute distress, alert and oriented to time, place and person.     General    Vitals reviewed.  Constitutional: He is oriented to person, place, and time. He appears well-developed and well-nourished. No distress.   Cardiovascular:  Normal rate and regular rhythm.            Pulmonary/Chest: Effort normal. No respiratory distress.   Neurological: He is alert and oriented to person, place, and time.   Psychiatric: He has a normal mood and affect. His behavior is normal. Thought content normal.         Back (L-Spine & T-Spine) / Neck (C-Spine) Exam     Tenderness   The patient is tender to palpation of the right trapezial, right scapular and right SCM.   The patient is experiencing no tenderness in the right left trapezial, left scapular, left SCM, right SC joint and left SC joint. Posterior midline palpation reveals tenderness of the Lower C-Spine. Right paramedian tenderness of the Occ and Lower C-Spine. Left paramedian tenderness of the Occ.     Neck (C-Spine) Range of Motion   Flexion:      Moderate and normal  Extension:  Limited and moderate  Right Lateral Bend: abnormal  Left Lateral Bend: abnormalNeck lateral bend left: pain.  Right Rotation: abnormalNeck rotation right: pain.  Left Rotation: normal    Spinal Sensation   Right Side Sensation  C-Spine Level: normal   Left Side Sensation  C-Spine Level: normal    Neck (C-Spine) Tests   Spurling's Test   Left:  Negative  Right: positive  Cervical Distraction Test  Right:  Negative  Left:  negative    Other   He has  no scoliosis .  Spinal Kyphosis:  Absent  Spinal Lordosis:  Absent  Head Tilt:  Negative      Muscle Strength   Right Upper Extremity   Shoulder Abduction: 5/5   Shoulder Internal Rotation: 5/5   Shoulder External Rotation: 5/5   Supraspinatus: 5/5   Subscapularis: 5/5   Biceps: 5/5   Deltoid:  5/5  Triceps:  5/5  Wrist extension: 5/5   Wrist flexion: 5/5   Finger Flexors:  5/5  Finger Extensors:  5/5  Left Upper Extremity  Shoulder Abduction: 5/5   Shoulder Internal Rotation: 5/5   Shoulder External Rotation: 5/5   Supraspinatus: 5/5   Subscapularis: 5/5   Biceps: 5/5   Deltoid:  5/5  Triceps:  5/5  Wrist extension: 5/5   Wrist flexion: 5/5   Finger Flexors:  5/5  Finger Extensors:  5/5      RADIOGRAPHS: 4/12/23  Cervical spine:  FINDINGS:  The alignment of the cervical spine is normal.     The vertebral body heights are well maintained.     The disc spaces are well maintained.     No significant osteophyte formation or degenerative change.     No fracture no osseous lesions.     Flexion and extension views demonstrate no translational abnormalities.     The prevertebral soft tissues appear normal.    Assessment:     Encounter Diagnoses   Name Primary?    Cervical pain Yes    Cervical radiculopathy     Claustrophobia         Plan:     I made the decision to obtain old records of the patient including previous notes and imaging. New imaging was ordered today of the extremity or extremities evaluated. I independently reviewed and interpreted the radiographs and/or MRIs today as well as prior imaging. Reviewed radiographs with patient in detail.    2. MRI of cervical spine to evaluate for nerve impingement    3. NSAIDS prn pain    4. Valium sent to pharmacy to take 30 min prior to MRI and repeat if necessary    5. Continue cervical neck stretches and ROM    6. Possible referral to formal PT pending MRI results.    7. Will call patient with MRI results.  Patient questionnaires may have been collected.

## 2023-04-18 ENCOUNTER — HOSPITAL ENCOUNTER (OUTPATIENT)
Dept: RADIOLOGY | Facility: OTHER | Age: 33
Discharge: HOME | End: 2023-04-18
Attending: PHYSICIAN ASSISTANT
Payer: COMMERCIAL

## 2023-04-18 DIAGNOSIS — M54.12 CERVICAL RADICULOPATHY: ICD-10-CM

## 2023-04-18 DIAGNOSIS — M54.2 CERVICAL PAIN: ICD-10-CM

## 2023-04-18 PROCEDURE — 72141 MRI NECK SPINE W/O DYE: CPT | Mod: TC

## 2023-04-18 PROCEDURE — 72141 MRI NECK SPINE W/O DYE: CPT | Mod: 26,,, | Performed by: RADIOLOGY

## 2023-04-18 PROCEDURE — 72141 MRI CERVICAL SPINE WITHOUT CONTRAST: ICD-10-PCS | Mod: 26,,, | Performed by: RADIOLOGY

## 2023-04-19 ENCOUNTER — TELEPHONE (OUTPATIENT)
Dept: SPORTS MEDICINE | Facility: CLINIC | Age: 33
End: 2023-04-19
Payer: COMMERCIAL

## 2023-04-19 DIAGNOSIS — M54.2 CERVICAL PAIN: Primary | ICD-10-CM

## 2023-04-19 NOTE — TELEPHONE ENCOUNTER
Reviewed cervical MRI with patient. Normal MRI. Recommended formal PT for cervical ROM and strengthening and dry needling.

## 2023-04-21 ENCOUNTER — CLINICAL SUPPORT (OUTPATIENT)
Dept: REHABILITATION | Facility: HOSPITAL | Age: 33
End: 2023-04-21
Payer: COMMERCIAL

## 2023-04-21 DIAGNOSIS — M54.2 CERVICAL PAIN: ICD-10-CM

## 2023-04-21 PROCEDURE — 97161 PT EVAL LOW COMPLEX 20 MIN: CPT | Performed by: PHYSICAL THERAPIST

## 2023-04-21 PROCEDURE — 97112 NEUROMUSCULAR REEDUCATION: CPT | Performed by: PHYSICAL THERAPIST

## 2023-04-21 PROCEDURE — 97140 MANUAL THERAPY 1/> REGIONS: CPT | Performed by: PHYSICAL THERAPIST

## 2023-04-27 ENCOUNTER — CLINICAL SUPPORT (OUTPATIENT)
Dept: REHABILITATION | Facility: HOSPITAL | Age: 33
End: 2023-04-27
Payer: COMMERCIAL

## 2023-04-27 DIAGNOSIS — M54.2 CERVICAL PAIN: Primary | ICD-10-CM

## 2023-04-27 PROCEDURE — 97140 MANUAL THERAPY 1/> REGIONS: CPT | Performed by: PHYSICAL THERAPIST

## 2023-04-27 PROCEDURE — 97112 NEUROMUSCULAR REEDUCATION: CPT | Performed by: PHYSICAL THERAPIST

## 2023-04-27 NOTE — PLAN OF CARE
[facility]  Physical Therapy Initial Evaluation    Date: 4/21/2023   Name: Harinder Elliott  Clinic Number: [id number]    Therapy Diagnosis:   Encounter Diagnosis   Name Primary?    Cervical pain      Physician: Doretha Mata PA-C    Physician Orders: PT Eval and Treat   Medical Diagnosis from Referral: M54.2 (ICD-10-CM) - Cervicalgia  Evaluation Date: 4/21/2023  Authorization Period Expiration: 4/18/2024  Plan of Care Expiration: 8/1/2023  Next MD appointment:NA  Visit # / Visits authorized: 1/ 1    Time In: 0900  Time Out: 1010  Total Appointment Time (timed & untimed codes): 60 minutes    Precautions: Standard    Subjective   Date of onset: 2 weeks ago  History of current condition - Harinder reports: Gradual onset of neck pain which started a few months ago. Resolved on its own but increased  2 weeks ago and has not resolved. Increases with working at a computer. Does not hurt with exercise.      Pain:  Current 2/10, worst 5/10, best 0/10   Location: Neck, R shoulder  Description: Aching  Aggravating Factors: Sitting and working  Easing Factors:  Exercise    Pts goals: Reduce pain and continue tennis    Medical History:   Past Medical History:   Diagnosis Date    Colon polyp     Depression     Migraine     Ureteral stricture        Surgical History:   Harinder Elliott  has a past surgical history that includes Kidney surgery; Hernia repair; [place] tooth extraction; Colon surgery; torn labrim; Esophagogastroduodenoscopy (N/A, 2/3/2021); Colonoscopy (N/A, 2/3/2021); and Vasectomy.    Medications:   Harinder has a current medication list which includes the following prescription(s): alprazolam, benzonatate, diazepam, escitalopram oxalate, ibuprofen, and sumatriptan.    Allergies:   Review of patient's allergies indicates:  No Known Allergies     Imaging, MRI studies: Mild degenerative changes of the lower cervical spine.  Reversal of cervical lordosis.    Prior Therapy: L  shoulder labral repair  Exercise Routine/Sports Participation: Tennis, exercise  Social History: lives home with family  Occupation: [employer] manager  Prior Level of Function: full  Current Level of Function: Pain with work related activities    Objective     Observation: Cervical extension with bilateral shoulder flexion. Bilateral shoulder depression, decreased shoulder flexion on the R    Cervical Range of Motion:    Limitation (%) Pain   Flexion 25 -     Extension 25 +     Right Rotation 25 +     Left Rotation 25 +     Right Side Bending 25 -   Left Side Bending 25 -   C0-C1 Flex/Ext 10 -   C0-C1 Sidebending - 0   C1-2 Rotation 25 to L -   C1-3 Rotation 0 -      Shoulder Range of Motion:   Shoulder Left Right   Flexion 180 175   Abduction 180 175    100   IR 30 30     Distraction -   Spurlings -   ULTT A -   Cervical rotation impairment -       Special Tests:  Vertebral Artery -   Omar -   Lateral Flexion Alar Ligament -   Transverse Ligament -   Shoulder Abduction Test -   Quadrant Testing +     Cervical joint mobility: Hypomobile upper cervical spine, CT junction    Thoracic mobility: Decreased thoracic extension, rotation to L    Palpation: increased guarding along the paraspinals      Sensation: intact to light touch    Flexibility: short lat on R, long upper trapezius      TREATMENT   Treatment Time In: 0930  Treatment Time Out: 1000  Total Treatment time (time-based codes) separate from Evaluation: 30 minutes      Harinder received the following manual therapy techniques: Joint mobilizations were applied to the: thoracic and cervical spine for 15 minutes, including:  CT junction manipulation, supine  MET C1/2 rotation to L    Palpation for trigger points along the cervical and thoracic spine, trapezius. Those trigger points were dry needled and attached to ESTIM for 10 minutes    Harinder participated in neuromuscular re-education activities to improve: Balance, Coordination, Kinesthetic, and  Proprioception for 15 minutes. The following activities were included:  C1/2 SNAG 2 x 5  Thoracic rotation on wall 2 x 10      Education provided:   Diagnosis and prognosis    Written Home Exercises Provided: Yes  Exercises were reviewed and Harinder was able to demonstrate them prior to the end of the session.  Harinder demonstrated good understanding of the education provided.     See EMR under Patient Instructions for exercises provided 4/21/2023.    Assessment   Harinder is a 32 y.o. male referred to outpatient Physical Therapy with a medical diagnosis of cervical spine pain. Pt presents with impaired cervical, thoracic, and shoulder mobility. Will assess for motor control impairments next session. Responded well to interventions, will need to continue with activity modification, mobility exercises.     Pt prognosis is Excellent.   Pt will benefit from skilled outpatient Physical Therapy to address the deficits stated above and in the chart below, provide pt/family education, and to maximize pt's level of independence.     Plan of care discussed with patient: Yes  Pt's spiritual, cultural and educational needs considered and patient is agreeable to the plan of care and goals as stated below:     Anticipated Barriers for therapy: None    Medical Necessity is demonstrated by the following  History  Co-morbidities and personal factors that may impact the plan of care Co-morbidities:   No deficits    Personal Factors:   no deficits     low   Examination  Body Structures and Functions, activity limitations and participation restrictions that may impact the plan of care Body Regions:   neck    Body Systems:    gross symmetry  ROM  strength    Participation Restrictions:   Pain at work    Activity limitations:   Learning and applying knowledge  No deficits    General Tasks and Commands  {No deficits    Communication  No deficits    Mobility  no deficits    Self care  No deficits    Domestic Life  No  deficits    Interactions/Relationships  No deficits    Life Areas  NA    Community and Social Life  No deficits         low   Clinical Presentation stable and uncomplicated low   Decision Making/ Complexity Score: low       GOALS: Short Term Goals: 6  weeks  1.Report decreased neck pain  <   / =  1  /10  to increase tolerance for exercise  2. Increase cervical ROM by 5-10 degrees in order to perform ADLs with decreased difficulty.  3. Increase strength in B shoulders/scapular stabilizers by 1/3 MMT grade to increase tolerance for ADL and work activities.  4. Pt to tolerate HEP to improve ROM and independence with ADL's    Long Term Goals: 12 weeks  1.Report decreased neck pain  <   / =  0  /10  to increase tolerance for exercise  2. Increase UE/neck flexibility in order to improve posture.    3.Increased strength in B shoulders/scapular stabilizers to >/= 4/5 MMT grade to increase tolerance for ADL and work activities.    Plan   Plan of care Certification: 4/21/2023 to 8/1/2023.    Outpatient Physical Therapy 1 times weekly for 10 weeks to include the following interventions: manual therapy, therapeutic exercise, therapeutic activities, and neuromuscular re-education.    Froy Rothman, PT, DPT  Board Certified in Orthopedic Physical Therapy  Fellow, [employer] Orth[employer] Manual Physical Therapists

## 2023-04-28 NOTE — PROGRESS NOTES
Physical Therapy Daily Treatment Note     Name: Harinder Elliott  Clinic Number: [id number]    Therapy Diagnosis:   Encounter Diagnosis   Name Primary?    Cervical pain Yes     Physician: Doretha Mata PA-C    Visit Date: 4/27/2023  Physician Orders: PT Eval and Treat   Medical Diagnosis from Referral: M54.2 (ICD-10-CM) - Cervicalgia  Evaluation Date: 4/21/2023  Authorization Period Expiration: 4/18/2024  Plan of Care Expiration: 8/1/2023  Next MD appointment:NA  Visit # / Visits authorized: 1/ 20    Time In: 1445  Time Out: 1600  Total Billable Time: 50 minutes    Precautions: Standard    Subjective     Pt reports: Reduced pain for about 5 days following the initial tx. Returning as the week goes on.  He was compliant with home exercise program.  Response to previous treatment: Decreased pain  Functional change: Improved mobility    Pain: 2/10  Location: right neck      Objective   Deficits: Decreased mid cervical and thoracic extension. Decreased shoulder flexion on R. Decreased MT, SA, LT strength on  R    Harinder received the following manual therapy techniques: Joint mobilizations were applied to the: thoracic and cervical spine for 25 minutes, including:  CT junction manipulation, supine  Seated mid thoracic manipulation  MET L SB, mid cervical     Palpation for trigger points along the cervical and thoracic spine, trapezius. Those trigger points were dry needled and attached to ESTIM for 10 minutes     Harinder participated in neuromuscular re-education activities to improve: Balance, Coordination, Kinesthetic, and Proprioception for 25 minutes. The following activities were included:  C1/2 SNAG 2 x 5  Self extension mobilization  Thoracic rotation on wall 2 x 10  Supine thoracic extension over roll, with arm movement 20x  Quadruped T and Y 2 x burn      Home Exercises Provided and Patient Education Provided     Education provided:   Diagnosis and prognosis     Written Home Exercises Provided:  Yes  Exercises were reviewed and Harinder was able to demonstrate them prior to the end of the session.  Harinder demonstrated good understanding of the education provided.      See EMR under Patient Instructions for exercises provided 4/21/2023.    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 Does very well with mid thoracic manipulations. Decreased strength may be resulting in excess strain on the CT junction.       Harinder Is progressing well towards his goals.   Pt prognosis is Excellent.     Pt will continue to benefit from skilled outpatient physical therapy to address the deficits listed in the problem list box on initial evaluation, provide pt/family education and to maximize pt's level of independence in the home and community environment.     Pt's spiritual, cultural and educational needs considered and pt agreeable to plan of care and goals.    Anticipated barriers to physical therapy: None    GOALS: Short Term Goals: 6  weeks  1.Report decreased neck pain  <   / =  1  /10  to increase tolerance for exercise  2. Increase cervical ROM by 5-10 degrees in order to perform ADLs with decreased difficulty.  3. Increase strength in B shoulders/scapular stabilizers by 1/3 MMT grade to increase tolerance for ADL and work activities.  4. Pt to tolerate HEP to improve ROM and independence with ADL's     Long Term Goals: 12 weeks  1.Report decreased neck pain  <   / =  0  /10  to increase tolerance for exercise  2. Increase UE/neck flexibility in order to improve posture.    3.Increased strength in B shoulders/scapular stabilizers to >/= 4/5 MMT grade to increase tolerance for ADL and work activities.    Plan   Plan of care Certification: 4/21/2023 to 8/1/2023.     Outpatient Physical Therapy 1 times weekly for 10 weeks to include the following  interventions: manual therapy, therapeutic exercise, therapeutic activities, and neuromuscular re-education.    Froy Rothman, PT, DPT  Board Certified in Orthopedic Physical Therapy  Fellow, American Academy [employer] Orth[employer] Manual Physical Therapists

## 2023-05-05 ENCOUNTER — CLINICAL SUPPORT (OUTPATIENT)
Dept: REHABILITATION | Facility: HOSPITAL | Age: 33
End: 2023-05-05
Payer: COMMERCIAL

## 2023-05-05 DIAGNOSIS — M54.2 CERVICAL PAIN: Primary | ICD-10-CM

## 2023-05-05 PROCEDURE — 97112 NEUROMUSCULAR REEDUCATION: CPT | Performed by: PHYSICAL THERAPIST

## 2023-05-05 PROCEDURE — 97140 MANUAL THERAPY 1/> REGIONS: CPT | Performed by: PHYSICAL THERAPIST

## 2023-05-08 NOTE — PROGRESS NOTES
Physical Therapy Daily Treatment Note     Name: Harinder Elliott  Clinic Number: [id number]    Therapy Diagnosis:   Encounter Diagnosis   Name Primary?    Cervical pain Yes     Physician: Doretha Mata PA-C    Visit Date: 5/5/2023  Physician Orders: PT Eval and Treat   Medical Diagnosis from Referral: M54.2 (ICD-10-CM) - Cervicalgia  Evaluation Date: 4/21/2023  Authorization Period Expiration: 4/18/2024  Plan of Care Expiration: 8/1/2023  Next MD appointment:NA  Visit # / Visits authorized: 2/ 20    Time In: 0810  Time Out: 0910  Total Billable Time: 50 minutes    Precautions: Standard    Subjective     Pt reports: Has been feeling good since last week  He was compliant with home exercise program.  Response to previous treatment: Decreased pain  Functional change: Improved mobility    Pain: 2/10  Location: right neck      Objective   Deficits: Decreased mid cervical and thoracic extension. Decreased shoulder flexion on R. Decreased MT, SA, LT strength on  R    Harinder received the following manual therapy techniques: Joint mobilizations were applied to the: thoracic and cervical spine for 15 minutes, including:  CT junction manipulation, supine  Seated mid thoracic manipulation  MET L SB, mid cervical     NP - Palpation for trigger points along the cervical and thoracic spine, trapezius. Those trigger points were dry needled and attached to ESTIM for 10 minutes     Harinder participated in neuromuscular re-education activities to improve: Balance, Coordination, Kinesthetic, and Proprioception for 35 minutes. The following activities were included:  C1/2 SNAG 2 x 5  Self extension mobilization  Thoracic rotation on wall 2 x 10  Supine thoracic extension over roll, with arm movement 20x  Quadruped T and Y 3 x burn - 3 lbs  Kettle bell shoulder press to shrug 3 x 10        Home Exercises Provided and Patient Education Provided     Education provided:   Diagnosis and prognosis     Written Home Exercises  Provided: Yes  Exercises were reviewed and Harinder was able to demonstrate them prior to the end of the session.  Harinder demonstrated good understanding of the education provided.      See EMR under Patient Instructions for exercises provided 4/21/2023.    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 Goal will be to improve trapezius and SA strength, thoracic mobility. Continue with POC.      Harinder Is progressing well towards his goals.   Pt prognosis is Excellent.     Pt will continue to benefit from skilled outpatient physical therapy to address the deficits listed in the problem list box on initial evaluation, provide pt/family education and to maximize pt's level of independence in the home and community environment.     Pt's spiritual, cultural and educational needs considered and pt agreeable to plan of care and goals.    Anticipated barriers to physical therapy: None    GOALS: Short Term Goals: 6  weeks  1.Report decreased neck pain  <   / =  1  /10  to increase tolerance for exercise  2. Increase cervical ROM by 5-10 degrees in order to perform ADLs with decreased difficulty.  3. Increase strength in B shoulders/scapular stabilizers by 1/3 MMT grade to increase tolerance for ADL and work activities.  4. Pt to tolerate HEP to improve ROM and independence with ADL's     Long Term Goals: 12 weeks  1.Report decreased neck pain  <   / =  0  /10  to increase tolerance for exercise  2. Increase UE/neck flexibility in order to improve posture.    3.Increased strength in B shoulders/scapular stabilizers to >/= 4/5 MMT grade to increase tolerance for ADL and work activities.    Plan   Plan of care Certification: 4/21/2023 to 8/1/2023.     Outpatient Physical Therapy 1 times weekly for 10 weeks to include the following interventions: manual therapy, therapeutic exercise, therapeutic activities, and neuromuscular re-education.    Froy Rothman  PT, DPT  Board Certified in Orthopedic Physical Therapy  Fellow, American Academy [employer] Orth[employer] Manual Physical Therapists

## 2023-05-15 ENCOUNTER — PATIENT MESSAGE (OUTPATIENT)
Dept: OPTOMETRY | Facility: CLINIC | Age: 33
End: 2023-05-15
Payer: COMMERCIAL

## 2023-05-24 DIAGNOSIS — S69.92XA INJURY OF LEFT WRIST, INITIAL ENCOUNTER: Primary | ICD-10-CM

## 2023-05-25 ENCOUNTER — HOSPITAL ENCOUNTER (OUTPATIENT)
Dept: RADIOLOGY | Facility: HOSPITAL | Age: 33
Discharge: HOME | End: 2023-05-25
Attending: PHYSICIAN ASSISTANT
Payer: COMMERCIAL

## 2023-05-25 DIAGNOSIS — S69.92XA INJURY OF LEFT WRIST, INITIAL ENCOUNTER: ICD-10-CM

## 2023-05-25 PROCEDURE — 73110 X-RAY EXAM OF WRIST: CPT | Mod: 26,LT,, | Performed by: RADIOLOGY

## 2023-05-25 PROCEDURE — 73110 XR WRIST COMPLETE 3 VIEWS LEFT: ICD-10-PCS | Mod: 26,LT,, | Performed by: RADIOLOGY

## 2023-05-25 PROCEDURE — 73110 X-RAY EXAM OF WRIST: CPT | Mod: TC,LT

## 2023-05-26 ENCOUNTER — OFFICE VISIT (OUTPATIENT)
Dept: ORTHOPEDICS | Facility: CLINIC | Age: 33
End: 2023-05-26
Payer: COMMERCIAL

## 2023-05-26 DIAGNOSIS — M77.8 LEFT WRIST TENDONITIS: Primary | ICD-10-CM

## 2023-05-26 PROCEDURE — 20605: ICD-10-PCS | Mod: LT,S$GLB,, | Performed by: PHYSICIAN ASSISTANT

## 2023-05-26 PROCEDURE — 1159F PR MEDICATION LIST DOCUMENTED IN MEDICAL RECORD: ICD-10-PCS | Mod: CPTII,S$GLB,, | Performed by: PHYSICIAN ASSISTANT

## 2023-05-26 PROCEDURE — 99203 OFFICE O/P NEW LOW 30 MIN: CPT | Mod: 25,S$GLB,, | Performed by: PHYSICIAN ASSISTANT

## 2023-05-26 PROCEDURE — 99203 PR OFFICE/OUTPT VISIT, NEW, LEVL III, 30-44 MIN: ICD-10-PCS | Mod: 25,S$GLB,, | Performed by: PHYSICIAN ASSISTANT

## 2023-05-26 PROCEDURE — 1159F MED LIST DOCD IN RCRD: CPT | Mod: CPTII,S$GLB,, | Performed by: PHYSICIAN ASSISTANT

## 2023-05-26 PROCEDURE — 20605 DRAIN/INJ JOINT/BURSA W/O US: CPT | Mod: LT,S$GLB,, | Performed by: PHYSICIAN ASSISTANT

## 2023-05-26 PROCEDURE — 99999 PR PBB SHADOW E&M-EST. PATIENT-LVL II: CPT | Mod: PBBFAC,,, | Performed by: PHYSICIAN ASSISTANT

## 2023-05-26 PROCEDURE — 99999 PR PBB SHADOW E&M-EST. PATIENT-LVL II: ICD-10-PCS | Mod: PBBFAC,,, | Performed by: PHYSICIAN ASSISTANT

## 2023-05-26 RX ORDER — TRIAMCINOLONE ACETONIDE 40 MG/ML
40 INJECTION, SUSPENSION INTRA-ARTICULAR; INTRAMUSCULAR
Status: DISCONTINUED | OUTPATIENT
Start: 2023-05-26 | End: 2023-05-26 | Stop reason: HOSPADM

## 2023-05-26 RX ADMIN — TRIAMCINOLONE ACETONIDE 40 MG: 40 INJECTION, SUSPENSION INTRA-ARTICULAR; INTRAMUSCULAR at 08:05

## 2023-05-26 NOTE — PROCEDURES
Intermediate Joint Aspiration/Injection: L wrist ECU    Date/Time: 5/26/2023 8:00 AM  Performed by: Jamie L. Russo-Digeorge, PA-C  Authorized by: Jamie L. Russo-Digeorge, PA-C     Consent Done?:  Yes (Verbal)  Indications:  Pain  Site marked: The procedure site was marked    Timeout: Prior to procedure the correct patient, procedure, and site was verified      Location:  Wrist  Wrist joint: Left wrist ECU.  Prep: Patient was prepped and draped in usual sterile fashion    Ultrasonic Guidance for needle placement: No  Needle size:  25 G  Approach:  Dorsal  Medications:  40 mg triamcinolone acetonide 40 mg/mL  Patient tolerance:  Patient tolerated the procedure well with no immediate complications

## 2023-05-26 NOTE — PROGRESS NOTES
[facility]  History & Physical  Orthopedics    SUBJECTIVE:      Chief Complaint: Left wrist    Referring Provider: Luciano Quick Dr. is the supervising physician for this encounter/patient    History of Present Illness:  Patient is a 33 y.o. right hand dominant male who presents today with complaints of left wrist pain for about 6 months. He was playing basketball when he fell and landed on the wrist, pain has lingered since. Pain located to the ulnar wrist, present when he deviates the wrist or twists to open a door. He has not tried any treatments for this. Denies any numbness/tingling. No surgical history on the hands.     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    Onset of symptoms/DOI was 6+ months.    Symptoms are aggravated by activity and movement.    Symptoms are alleviated by rest.    Symptoms consist of pain.    The patient rates their pain as a 5/10.    Attempted treatment(s) and/or interventions include activity modifications, rest.     The patient denies any fevers, chills, N/V, D/C and presents for evaluation.       Past Medical History:   Diagnosis Date    Colon polyp     Depression     Migraine     Ureteral stricture      Past Surgical History:   Procedure Laterality Date    COLON SURGERY      colon polyp    COLONOSCOPY N/A 2/3/2021    Procedure: COLONOSCOPY;  Surgeon: Graham Marino MD;  Location: [address]);  Service: Endoscopy;  Laterality: N/A;  COVID + on 7/15/20 , per endo protocol no further testing needed at this time -    ESOPHAGOGASTRODUODENOSCOPY N/A 2/3/2021    Procedure: EGD (ESOPHAGOGASTRODUODENOSCOPY);  Surgeon: Graham Marino MD;  Location: [address]);  Service: Endoscopy;  Laterality: N/A;  COVID + on 7/15/20 , per endo protocol no further testing needed at this time -    HERNIA REPAIR      KIDNEY SURGERY      torn labrim      VASECTOMY      WISDOM TOOTH EXTRACTION       Review of patient's allergies  indicates:  No Known Allergies  Social History     Social History Narrative    Not on file     Family History   Problem Relation Age of Onset    Skin cancer Mother     Hypertension Father     Hyperlipidemia Father     Pulmonary fibrosis Maternal Grandfather     Hyperlipidemia Paternal Grandfather     Heart disease Paternal Grandfather     Celiac disease Sister     Heart disease Paternal Grandmother     Cirrhosis Neg Hx     Colon cancer Neg Hx     Colon polyps Neg Hx     Crohn's disease Neg Hx     Esophageal cancer Neg Hx     Inflammatory bowel disease Neg Hx     Liver cancer Neg Hx     Liver disease Neg Hx     Rectal cancer Neg Hx     Stomach cancer Neg Hx     Ulcerative colitis Neg Hx     Lymphoma Neg Hx     Hemochromatosis Neg Hx     Marquis's disease Neg Hx     Tuberculosis Neg Hx     Scleroderma Neg Hx     Rheum arthritis Neg Hx     Multiple sclerosis Neg Hx     Melanoma Neg Hx     Lupus Neg Hx     Psoriasis Neg Hx          Current Outpatient Medications:     ALPRAZolam (XANAX) 0.25 MG tablet, Take 1 tablet (0.25 mg total) by mouth daily as needed for Anxiety., Disp: 10 tablet, Rfl: 0    benzonatate (TESSALON PERLES) 100 MG capsule, Take 1 capsule (100 mg total) by mouth every 6 (six) hours as needed for Cough., Disp: 30 capsule, Rfl: 1    diazePAM (VALIUM) 5 MG tablet, Take 1 tablet (5 mg total) by mouth every 6 (six) hours as needed for Anxiety., Disp: 2 tablet, Rfl: 0    EScitalopram oxalate (LEXAPRO) 10 MG tablet, Take 1 tablet (10 mg total) by mouth once daily., Disp: 90 tablet, Rfl: 0    ibuprofen (ADVIL,MOTRIN) 600 MG tablet, Take 1 tablet (600 mg total) by mouth every 8 (eight) hours as needed for Pain., Disp: 30 tablet, Rfl: 0    sumatriptan (IMITREX) 100 MG tablet, Take 1 tablet (100 mg total) by mouth every 2 (two) hours as needed., Disp: 9 tablet, Rfl: 0      Review of Systems:  Constitutional: no fever or chills  Eyes: no visual changes  ENT: no nasal congestion or sore throat  Respiratory: no cough  or shortness of breath  Cardiovascular: no chest pain  Gastrointestinal: no nausea or vomiting, tolerating diet  Musculoskeletal: pain and soreness    OBJECTIVE:      Vital Signs (Most Recent):  There were no vitals filed for this visit.  There is no height or weight on file to calculate BMI.      Physical Exam:  Constitutional: The patient appears well-developed and well-nourished. No distress.   Skin: No lesions appreciated  Head: Normocephalic and atraumatic.   Nose: Nose normal.   Ears: No deformities seen  Eyes: Conjunctivae and EOM are normal.   Neck: No tracheal deviation present.   Cardiovascular: Normal rate and intact distal pulses.    Pulmonary/Chest: Effort normal. No respiratory distress.   Abdominal: There is no guarding.   Neurological: The patient is alert.   Psychiatric: The patient has a normal mood and affect.     Left Hand/Wrist Examination:    Observation/Inspection:  Swelling  none    Deformity  none  Discoloration  none     Scars   none    Atrophy  none    HAND/WRIST EXAMINATION:  Finkelstein's Test   Neg  WHAT Test    Neg  Snuff box tenderness   Neg  Bryant's Test    Neg  Hook of Hamate Tenderness  Neg  CMC grind    Neg  Circumduction test   Neg  TTP to the distal ulna along the ECU tendon    Neurovascular Exam:  Digits WWP, brisk CR < 3s throughout  NVI motor/LTS to M/R/U nerves, radial pulse 2+  Tinel's Test - Carpal Tunnel  Neg  Tinel's Test - Cubital Tunnel  Neg  Phalen's Test    Neg  Median Nerve Compression Test Neg    ROM hand full, painless    ROM wrist full, pain to ulnar wrist with radial deviation only.    ROM elbow full, painless    Abdomen not guarded  Respirations nonlabored  Perfusion intact    Diagnostic Results:     Imaging - I independently viewed the patient's imaging as well as the radiology report.  Xrays of the patient's left wrist  demonstrate no evidence of any acute fractures or dislocations or significant degenerative changes.    EMG - none    ASSESSMENT/PLAN:       33 y.o. yo male with Left ulnar sided wrist pain: ECU tendonitis vs TFCC    Plan: The patient and I had a thorough discussion today.  We discussed the working diagnosis as well as several other potential alternative diagnoses.  Treatment options were discussed, both conservative and surgical.  Conservative treatment options would include things such as activity modifications, workplace modifications, a period of rest, oral vs topical OTC and prescription anti-inflammatory medications, occupational therapy, splinting/bracing, immobilization, corticosteroid injections, and others.  Surgical options were discussed as well.     At this time, the patient would like to proceed with a trial of steroid injection to left ulnar wrist ECU performed today without any issues. We discuss NSAIDs/Voltaren gel massage, ice and wrist widget brace. RTC on prn basis.    Should the patient's symptoms worsen, persist, or fail to improve they should return for reevaluation and I would be happy to see them back anytime.           Please do not hesitate to reach out to us via email, phone, or MyChart with any questions, concerns, or feedback.

## 2023-06-03 NOTE — PROGRESS NOTES
"Subjective:       Patient ID: Harinder Elliott is a 29 y.o. male.    Vitals:  height is 5' 11" (1.803 m) and weight is 79.4 kg (175 lb). His oral temperature is 97.2 °F (36.2 °C). His blood pressure is 126/81 and his pulse is 69. His respiration is 20 and oxygen saturation is 98%.     Chief Complaint: Nail Problem (Left Thumb)    This is a 29 y.o. male who presents today with a chief complaint of ingrown nail to the left thumb for 1 week.   Patient states the area is throbbing.  He has pain with palpation or hitting the wound.  He has taken Advil without relief.     Nail Problem   This is a new problem. The current episode started 1 to 4 weeks ago (1 week ago). The problem occurs constantly. The problem has been gradually worsening. Pertinent negatives include no arthralgias, chest pain, chills, congestion, coughing, fatigue, fever, headaches, joint swelling, myalgias, nausea, rash, sore throat, vertigo or vomiting. Exacerbated by: Palpation, The treatment provided no relief.       Constitution: Negative for chills, fatigue and fever.   HENT: Negative for congestion and sore throat.    Neck: Negative for painful lymph nodes.   Cardiovascular: Negative for chest pain and leg swelling.   Eyes: Negative for double vision and blurred vision.   Respiratory: Negative for cough and shortness of breath.    Gastrointestinal: Negative for nausea, vomiting and diarrhea.   Genitourinary: Negative for dysuria, frequency and urgency.   Musculoskeletal: Negative for joint pain, joint swelling, muscle cramps and muscle ache.   Skin: Positive for color change. Negative for pale, rash and erythema.   Allergic/Immunologic: Negative for seasonal allergies.   Neurological: Negative for dizziness, history of vertigo, light-headedness, passing out and headaches.   Hematologic/Lymphatic: Negative for swollen lymph nodes, easy bruising/bleeding and history of blood clots. Does not bruise/bleed easily.   Psychiatric/Behavioral: " Transport to the bedside. Pt to pre op with transport and mother.   Negative for nervous/anxious, sleep disturbance and depression. The patient is not nervous/anxious.        Objective:      Physical Exam   Constitutional: He is oriented to person, place, and time. He appears well-developed and well-nourished.       HENT:   Head: Normocephalic and atraumatic. Head is without abrasion, without contusion and without laceration.   Right Ear: External ear normal.   Left Ear: External ear normal.   Nose: Nose normal.   Mouth/Throat: Oropharynx is clear and moist and mucous membranes are normal.   Eyes: Pupils are equal, round, and reactive to light. Conjunctivae, EOM and lids are normal.   Neck: Trachea normal, full passive range of motion without pain and phonation normal. Neck supple.   Cardiovascular: Normal rate, regular rhythm and normal heart sounds.   Pulmonary/Chest: Effort normal and breath sounds normal. No stridor. No respiratory distress.   Musculoskeletal: Normal range of motion.   Neurological: He is alert and oriented to person, place, and time.   Skin: Skin is warm, dry, intact and no rash. Capillary refill takes less than 2 seconds. abrasion, burn, bruising, erythema and ecchymosis  Psychiatric: He has a normal mood and affect. His speech is normal and behavior is normal. Judgment and thought content normal. Cognition and memory are normal.   Nursing note and vitals reviewed.        Assessment:       1. Paronychia of left thumb        Plan:         Paronychia of left thumb  -     mupirocin (BACTROBAN) 2 % ointment; Apply to affected area 3 times daily  Dispense: 22 g; Refill: 1  -     cephALEXin (KEFLEX) 500 MG capsule; Take 1 capsule (500 mg total) by mouth every 12 (twelve) hours. for 7 days  Dispense: 14 capsule; Refill: 0      Patient Instructions     Paronychia of the Finger or Toe  Paronychia is an infection near a fingernail or toenail. It usually occurs when an opening in the cuticle or an ingrown toenail lets bacteria under the skin.  The infection will need to  be drained if pus is present. If the infection has been caught early, you may need only antibiotic treatment. Healing will take about 1 to 2 weeks.  Home care  Follow these guidelines when caring for yourself at home:  · Clean and soak the toe or finger. Do this 2 times a day for the first 3 days. To do so:  ¨ Soak your foot or hand in a tub of warm water for 5 minutes. Or hold your toe or finger under a faucet of warm running water for 5 minutes.  ¨ Clean any crust away with soap and water using a cotton swab.  ¨ Put antibiotic ointment on the infected area.  · Change the dressing daily or any time it gets dirty.  · If you were given antibiotics, take them as directed until they are all gone.  · If your infection is on a toe, wear comfortable shoes with a lot of toe room. You can also wear open-toed sandals while your toe heals.  · You may use over-the-counter medicine (acetaminophen or ibuprofen to help with pain, unless another medicine was prescribed. If you have chronic liver or kidney disease, talk with your healthcare provider before using these medicines. Also talk with your provider if you've had a stomach ulcer or GI (gastrointestinal) bleeding.  Prevention  The following can prevent paronychia:  · Avoid cutting or playing with your cuticles at home.  · Don't bite your nails.  · Don't suck on your thumbs or fingers.  Follow-up care  Follow up with your healthcare provider, or as advised.  When to seek medical advice  Call your healthcare provider right away if any of these occur:  · Redness, pain, or swelling of the finger or toe gets worse  · Red streaks in the skin leading away from the wound  · Pus or fluid draining from the nail area  · Fever of 100.4ºF (38ºC) or higher, or as directed by your provider  Date Last Reviewed: 8/1/2016  © 7855-3930 [employer]. [address] [place] [address], [place] [address]. All rights reserved. This information is not intended as a substitute for professional  medical care. Always follow your healthcare professional's instructions.      Please follow up with your Primary care provider within 2-5 days if your signs and symptoms have not resolved or worsen.     If your condition worsens or fails to improve we recommend that you receive another evaluation at the emergency room immediately or contact your primary medical clinic to discuss your concerns.   You must understand that you have received an Urgent Care treatment only and that you may be released before all of your medical problems are known or treated. You, the patient, will arrange for follow up care as instructed.     RED FLAGS/WARNING SYMPTOMS DISCUSSED WITH PATIENT THAT WOULD WARRANT EMERGENT MEDICAL ATTENTION. PATIENT VERBALIZED UNDERSTANDING.

## 2023-07-14 ENCOUNTER — HOSPITAL ENCOUNTER (OUTPATIENT)
Dept: RADIOLOGY | Facility: HOSPITAL | Age: 33
Discharge: HOME | End: 2023-07-14
Attending: PHYSICIAN ASSISTANT
Payer: COMMERCIAL

## 2023-07-14 DIAGNOSIS — M25.561 CHRONIC PAIN OF RIGHT KNEE: Primary | ICD-10-CM

## 2023-07-14 DIAGNOSIS — M76.31 IT BAND SYNDROME, RIGHT: ICD-10-CM

## 2023-07-14 DIAGNOSIS — G89.29 CHRONIC PAIN OF RIGHT KNEE: ICD-10-CM

## 2023-07-14 DIAGNOSIS — M25.561 CHRONIC PAIN OF RIGHT KNEE: ICD-10-CM

## 2023-07-14 DIAGNOSIS — G89.29 CHRONIC PAIN OF RIGHT KNEE: Primary | ICD-10-CM

## 2023-07-14 PROCEDURE — 73721 MRI KNEE WITHOUT CONTRAST RIGHT: ICD-10-PCS | Mod: 26,RT,, | Performed by: RADIOLOGY

## 2023-07-14 PROCEDURE — 73721 MRI JNT OF LWR EXTRE W/O DYE: CPT | Mod: TC,RT

## 2023-07-14 PROCEDURE — 73721 MRI JNT OF LWR EXTRE W/O DYE: CPT | Mod: 26,RT,, | Performed by: RADIOLOGY

## 2023-07-14 NOTE — PROGRESS NOTES
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  He was previously seen at an outside orthopedic clinic where he received a steroid injection in his right knee and was diagnosed with IT band syndrome.  Pain resolved and returned approximately 3 months ago.  Patient is very active and runs and plays tennis frequently.  Pain is located along the lateral aspect of his knee.  He reports minimal swelling in the right knee.  Denies instability or mechanical symptoms.  Pain at its worst is 4/10.  He has been using Voltaren gel over his IT band with minimal relief of pain.    MRI of right knee ordered to rule out lateral meniscus tear and evaluate IT band.    Will call patient with results.

## 2023-07-18 NOTE — PROGRESS NOTES
Subjective:     Chief Complaint: Harinder Elliott is a 33 y.o. male who had concerns including Injections of the Right Knee.    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  He was previously seen at an outside orthopedic clinic where he received a steroid injection in his right knee and was diagnosed with IT band syndrome.  Pain resolved and returned approximately 3 months ago.  Patient is very active and runs and plays tennis frequently.  Pain is located along the lateral aspect of his right knee.  He reports minimal swelling in the right knee.  Denies instability or mechanical symptoms.  Pain at its worst is 4/10.  He has been using Voltaren gel over his IT band with minimal relief of pain.      Review of Systems   Constitutional: Negative. Negative for chills, fever, weight gain and weight loss.   HENT:  Negative for congestion and sore throat.    Eyes:  Negative for blurred vision and double vision.   Cardiovascular:  Negative for chest pain, leg swelling and palpitations.   Respiratory:  Negative for cough and shortness of breath.    Hematologic/Lymphatic: Does not bruise/bleed easily.   Skin:  Negative for itching, poor wound healing and rash.   Musculoskeletal:  Positive for joint pain. Negative for back pain, joint swelling, muscle weakness, myalgias and stiffness.   Gastrointestinal:  Negative for abdominal pain, constipation, diarrhea, nausea and vomiting.   Genitourinary: Negative.  Negative for frequency and hematuria.   Neurological:  Negative for dizziness, headaches, numbness, paresthesias and sensory change.   Psychiatric/Behavioral:  Negative for altered mental status and depression. The patient is not nervous/anxious.    Allergic/Immunologic: Negative for hives.               Objective:     General: Harinder is well-developed, well-nourished, appears stated age, in no acute distress, alert and oriented to time, place and person.      General    Vitals reviewed.  Constitutional: He is oriented to person, place, and time. He appears well-developed and well-nourished. No distress.   HENT:   Head: Normocephalic and atraumatic.   Eyes: EOM are normal.   Cardiovascular:  Normal rate and regular rhythm.            Pulmonary/Chest: Effort normal. No respiratory distress.   Neurological: He is alert and oriented to person, place, and time. He has normal reflexes. No cranial nerve deficit. Coordination normal.   Psychiatric: He has a normal mood and affect. His behavior is normal. Judgment and thought content normal.     General Musculoskeletal Exam   Gait: normal       Right Knee Exam     Inspection   Erythema: absent  Scars: absent  Swelling: absent  Effusion: absent  Deformity: absent  Bruising: absent    Tenderness   The patient is tender to palpation of the iliotibial band.    Range of Motion   Extension:  0 normal   Flexion:  140 normal     Tests   Meniscus   Mylene:  Medial - negative Lateral - negative  Ligament Examination   Lachman: normal (-1 to 2mm)   PCL-Posterior Drawer: normal (0 to 2mm)     MCL - Valgus: normal (0 to 2mm)  LCL - Varus: normal  Pivot Shift: normal (Equal)  Reverse Pivot Shift: normal (Equal)  Dial Test at 30 degrees: normal (< 5 degrees)  Dial Test at 90 degrees: normal (< 5 degrees)  Posterolateral Corner: stable  Patella   Passive Patellar Tilt: neutral  Patellar Glide (quadrants): Lateral - 1   Medial - 2  Patellar Grind: negative    Other   Sensation: normal    Left Knee Exam     Inspection   Erythema: absent  Scars: absent  Swelling: absent  Effusion: absent  Deformity: absent  Bruising: absent    Tenderness   The patient is experiencing no tenderness.     Range of Motion   Extension:  0 normal   Flexion:  140 normal     Tests   Meniscus   Mylene:  Medial - negative Lateral - negative  Stability   Lachman: normal (-1 to 2mm)   PCL-Posterior Drawer: normal (0 to 2mm)  MCL - Valgus: normal (0 to 2mm)  LCL -  Varus: normal (0 to 2mm)  Pivot Shift: normal (Equal)  Reverse Pivot Shift: normal (Equal)  Dial Test at 30 degrees: normal (< 5 degrees)  Dial Test at 90 degrees: normal (< 5 degrees)  Posterolateral Corner: stable  Patella   Passive Patellar Tilt: neutral  Patellar Glide (Quadrants): Lateral - 1 Medial - 2  Patellar Grind: negative    Other   Sensation: normal    Right Hip Exam     Tests   Imelda: positive  Left Hip Exam     Tests   Imelda: positive          Muscle Strength   Right Lower Extremity   Hip Abduction: 5/5   Quadriceps:  5/5   Hamstrin/5   Left Lower Extremity   Hip Abduction: 5/5   Quadriceps:  5/5   Hamstrin/5     Reflexes     Left Side  Achilles:  2+  Quadriceps:  2+    Right Side   Achilles:  2+  Quadriceps:  2+    Vascular Exam     Right Pulses  Dorsalis Pedis:      2+  Posterior Tibial:      2+        Left Pulses  Dorsalis Pedis:      2+  Posterior Tibial:      2+      MRI right knee: 23  FINDINGS:  Examination is degraded by patient motion artifact.     Menisci: Medial and lateral menisci are intact.  Anterior and posterior root ligaments are normal.     Ligaments: ACL, PCL, MCL and posterior-lateral corner structures are normal.     Extensor Mechanism: Patellofemoral alignment is maintained.  Quadriceps and patellar tendons are intact.  MPFL and medial/lateral retinacula are normal.     Cartilage:     *Patellofemoral: Intact without partial or full-thickness defects.  No subchondral edema.  *Medial tibiofemoral: Intact without partial or full-thickness defects.  No subchondral edema.  *Lateral tibiofemoral: 0.4 cm focal area of subchondral cystic change with overlying chondral fissuring at the far posterior weight-bearing portion of the femoral condyle.  Tibial cartilage is preserved.  Bones: Ill-defined focus of signal hypointensity within the posterior aspect of the proximal medial tibia, likely a healed NOF.  No fractures.  No avascular necrosis.  No marrow infiltrative process.      Miscellaneous: No joint effusion.  No popliteal cyst.  0.8 x 1.2 cm ganglion cyst adjacent to the posterior aspect of the lateral distal femoral metaphysis.  Medial gastrocnemius, lateral gastrocnemius, distal semimembranosus and visualized pes anserine tendons are intact.  Iliotibial band is normal.  Neurovascular structures are intact.    Bilateral knee radiographs: 7/19/23  FINDINGS:  Evidence of some chondromalacia change posterior patellar articular surface on lateral views.  No fracture dislocation or joint effusion.  Remote hypertrophic change ossification left infrapatellar extensor tendon at anterior tibial tubercle, Osgood Brewster change.    Assessment:     Encounter Diagnoses   Name Primary?    Acute pain of right knee     It band syndrome, right Yes        Plan:     We have discussed a variety of treatment options including medications, injections, physical therapy and other alternative treatments. I also explained the indications, risks and benefits of surgery. Given the patients hx and examination today, I believe he would benefit from physical therapy. Pt agrees and would like to proceed with physical therapy for Active release therapy.    I made the decision to obtain old records of the patient including previous notes and imaging. I independently reviewed and interpreted lab results today as well as prior imaging.      1. Diclofenac gel PRN over painful area  2. Ambulatory referral to physical therapy for active release therapy for IT band syndrome.  3. Ice compress to the affected area 2-3x a day for 15-20 minutes as needed for pain management.  4. Injection Procedure right IT band  A time out was performed, including verification of patient ID, procedure, site and side, availability of information and equipment, review of safety issues, and agreement with consent, the procedure site was marked.    After time out was performed, the patient was prepped aseptically with povidone-iodine  swabsticks. A diagnostic and therapeutic injection of 1:1cc Betamethasone/Naropin was given under sterile technique using a 25g x 1.5 needle from the Lateral  aspect of the right Iliotibial Band in the left lateral position.      Harinder Elliott had no adverse reactions to the medication. Pain decreased. He was instructed to apply ice to the joint for 20 minutes and avoid strenuous activities for 24-36 hours following the injection. He was warned of possible blood sugar and/or blood pressure changes during that time. Following that time, he can resume regular activities.    He was reminded to call the clinic immediately for any adverse side effects as explained in clinic today.    5. I made the decision to obtain old records of the patient including previous notes and imaging. New imaging was ordered today of the extremity or extremities evaluated. I independently reviewed and interpreted the radiographs and/or MRIs today as well as prior imaging.Reviewed MRI with Dr. Cates. He recommended the treatment above.    6. RTC to see Doretha ROPER prn.      All of the patient's questions were answered and the patient will contact us if they have any questions or concerns in the interim.        Patient questionnaires may have been collected.

## 2023-07-19 ENCOUNTER — OFFICE VISIT (OUTPATIENT)
Dept: SPORTS MEDICINE | Facility: CLINIC | Age: 33
End: 2023-07-19
Payer: COMMERCIAL

## 2023-07-19 ENCOUNTER — HOSPITAL ENCOUNTER (OUTPATIENT)
Dept: RADIOLOGY | Facility: HOSPITAL | Age: 33
Discharge: HOME | End: 2023-07-19
Attending: PHYSICIAN ASSISTANT
Payer: COMMERCIAL

## 2023-07-19 VITALS
HEART RATE: 82 BPM | HEIGHT: 71 IN | SYSTOLIC BLOOD PRESSURE: 132 MMHG | BODY MASS INDEX: 26.04 KG/M2 | DIASTOLIC BLOOD PRESSURE: 85 MMHG | WEIGHT: 186 LBS

## 2023-07-19 DIAGNOSIS — M25.561 ACUTE PAIN OF RIGHT KNEE: ICD-10-CM

## 2023-07-19 DIAGNOSIS — M76.31 IT BAND SYNDROME, RIGHT: Primary | ICD-10-CM

## 2023-07-19 PROCEDURE — 3075F PR MOST RECENT SYSTOLIC BLOOD PRESS GE 130-139MM HG: ICD-10-PCS | Mod: CPTII,S$GLB,, | Performed by: PHYSICIAN ASSISTANT

## 2023-07-19 PROCEDURE — 3075F SYST BP GE 130 - 139MM HG: CPT | Mod: CPTII,S$GLB,, | Performed by: PHYSICIAN ASSISTANT

## 2023-07-19 PROCEDURE — 3079F PR MOST RECENT DIASTOLIC BLOOD PRESSURE 80-89 MM HG: ICD-10-PCS | Mod: CPTII,S$GLB,, | Performed by: PHYSICIAN ASSISTANT

## 2023-07-19 PROCEDURE — 20610 DRAIN/INJ JOINT/BURSA W/O US: CPT | Mod: RT,S$GLB,, | Performed by: PHYSICIAN ASSISTANT

## 2023-07-19 PROCEDURE — 99213 OFFICE O/P EST LOW 20 MIN: CPT | Mod: 25,S$GLB,, | Performed by: PHYSICIAN ASSISTANT

## 2023-07-19 PROCEDURE — 20610 PR DRAIN/INJECT LARGE JOINT/BURSA: ICD-10-PCS | Mod: RT,S$GLB,, | Performed by: PHYSICIAN ASSISTANT

## 2023-07-19 PROCEDURE — 3079F DIAST BP 80-89 MM HG: CPT | Mod: CPTII,S$GLB,, | Performed by: PHYSICIAN ASSISTANT

## 2023-07-19 PROCEDURE — 73564 XR KNEE ORTHO BILAT WITH FLEXION: ICD-10-PCS | Mod: 26,50,, | Performed by: RADIOLOGY

## 2023-07-19 PROCEDURE — 73564 X-RAY EXAM KNEE 4 OR MORE: CPT | Mod: TC,50

## 2023-07-19 PROCEDURE — 3008F PR BODY MASS INDEX (BMI) DOCUMENTED: ICD-10-PCS | Mod: CPTII,S$GLB,, | Performed by: PHYSICIAN ASSISTANT

## 2023-07-19 PROCEDURE — 99213 PR OFFICE/OUTPT VISIT, EST, LEVL III, 20-29 MIN: ICD-10-PCS | Mod: 25,S$GLB,, | Performed by: PHYSICIAN ASSISTANT

## 2023-07-19 PROCEDURE — 3008F BODY MASS INDEX DOCD: CPT | Mod: CPTII,S$GLB,, | Performed by: PHYSICIAN ASSISTANT

## 2023-07-19 PROCEDURE — 99999 PR PBB SHADOW E&M-EST. PATIENT-LVL IV: CPT | Mod: PBBFAC,,, | Performed by: PHYSICIAN ASSISTANT

## 2023-07-19 PROCEDURE — 1159F MED LIST DOCD IN RCRD: CPT | Mod: CPTII,S$GLB,, | Performed by: PHYSICIAN ASSISTANT

## 2023-07-19 PROCEDURE — 1160F RVW MEDS BY RX/DR IN RCRD: CPT | Mod: CPTII,S$GLB,, | Performed by: PHYSICIAN ASSISTANT

## 2023-07-19 PROCEDURE — 1160F PR REVIEW ALL MEDS BY PRESCRIBER/CLIN PHARMACIST DOCUMENTED: ICD-10-PCS | Mod: CPTII,S$GLB,, | Performed by: PHYSICIAN ASSISTANT

## 2023-07-19 PROCEDURE — 1159F PR MEDICATION LIST DOCUMENTED IN MEDICAL RECORD: ICD-10-PCS | Mod: CPTII,S$GLB,, | Performed by: PHYSICIAN ASSISTANT

## 2023-07-19 PROCEDURE — 99999 PR PBB SHADOW E&M-EST. PATIENT-LVL IV: ICD-10-PCS | Mod: PBBFAC,,, | Performed by: PHYSICIAN ASSISTANT

## 2023-07-19 PROCEDURE — 73564 X-RAY EXAM KNEE 4 OR MORE: CPT | Mod: 26,50,, | Performed by: RADIOLOGY

## 2023-07-19 RX ORDER — ROPIVACAINE HYDROCHLORIDE 2 MG/ML
1 INJECTION, SOLUTION EPIDURAL; INFILTRATION ONCE
Status: COMPLETED | OUTPATIENT
Start: 2023-07-19 | End: 2023-07-19

## 2023-07-19 RX ORDER — BETAMETHASONE SODIUM PHOSPHATE AND BETAMETHASONE ACETATE 3; 3 MG/ML; MG/ML
6 INJECTION, SUSPENSION INTRA-ARTICULAR; INTRALESIONAL; INTRAMUSCULAR; SOFT TISSUE
Status: COMPLETED | OUTPATIENT
Start: 2023-07-19 | End: 2023-07-19

## 2023-07-19 RX ORDER — MELOXICAM 15 MG/1
15 TABLET ORAL DAILY
Qty: 30 TABLET | Refills: 0 | Status: SHIPPED | OUTPATIENT
Start: 2023-07-19 | End: 2023-08-18

## 2023-07-19 RX ADMIN — BETAMETHASONE SODIUM PHOSPHATE AND BETAMETHASONE ACETATE 6 MG: 3; 3 INJECTION, SUSPENSION INTRA-ARTICULAR; INTRALESIONAL; INTRAMUSCULAR; SOFT TISSUE at 01:07

## 2023-07-19 RX ADMIN — ROPIVACAINE HYDROCHLORIDE 1 ML: 2 INJECTION, SOLUTION EPIDURAL; INFILTRATION at 01:07

## 2023-07-20 ENCOUNTER — PATIENT MESSAGE (OUTPATIENT)
Dept: SPORTS MEDICINE | Facility: CLINIC | Age: 33
End: 2023-07-20
Payer: COMMERCIAL

## 2023-07-28 ENCOUNTER — CLINICAL SUPPORT (OUTPATIENT)
Dept: REHABILITATION | Facility: HOSPITAL | Age: 33
End: 2023-07-28
Payer: COMMERCIAL

## 2023-07-28 DIAGNOSIS — M25.561 ACUTE PAIN OF RIGHT KNEE: ICD-10-CM

## 2023-07-28 DIAGNOSIS — M25.561 LATERAL KNEE PAIN, RIGHT: ICD-10-CM

## 2023-07-28 DIAGNOSIS — M76.31 IT BAND SYNDROME, RIGHT: ICD-10-CM

## 2023-07-28 PROCEDURE — 97112 NEUROMUSCULAR REEDUCATION: CPT

## 2023-07-28 PROCEDURE — 97161 PT EVAL LOW COMPLEX 20 MIN: CPT

## 2023-07-28 NOTE — PLAN OF CARE
[facility]   Physical Therapy Initial Evaluation      Name: Harinder Elliott  Clinic Number: [id number]    Therapy Diagnosis:   Encounter Diagnoses   Name Primary?    Acute pain of right knee     It band syndrome, right         Physician: Doretha Mata PA-C    Physician Orders: PT Eval and Treat   Medical Diagnosis from Referral: It band syndrome, right; Acute pain of right knee   Evaluation Date: 7/28/2023  Authorization Period Expiration: 07/19/202307/18/2024   Plan of Care Expiration: 10/28/23  Progress Note Due: 8/28/23  Visit # / Visits authorized: 1/ 1   FOTO: 56  DC @ 70%    Precautions: Standard     Time In: 808  Time Out: 900  Total Appointment Time (timed & untimed codes): 52 minutes    Subjective     Date of onset: 1 year ago     History of current condition - Harinder reports: has history of knee pain which he saw someone at [facility] a while back (~1-2years ago), received CSI in joint and had relief up until this past April 2023 after he started playing tennis for the first time in a while and had knee pain afterwards. Denies trauma, or specfic point in time where he felt like he tweaked it during tennis, denies specific injury, twisting, or falling. Pain remained so he went and saw FARRAH Coyne and then it improved a bit initially but ultimately continued worsening. Had MRI/Xray. Doretha said it wasn't concerning. Dx'd with ITB syndrome, given CSI in lateral IT Band which he states did not help. He doesn note that his prior injection was in the joint and that it eliminated pain. Denies swelling.     He states mostly Lateral R knee pain that sometimes is tender upwards towards high and over his hip. The pain is typically always there but He notices it is worse with Walking up and down stairs, deep squatting, and after activity (tennis). His activities include: Tennis 2x/week. Bike 1-2x/week for ~30-45 min - hasn't been doing bike to lay low. Most significantly,  he notes that his R knee just feels generally fatigued compared to left.     Imaging: MRI studies: R KNEE  1. Small focal area of subchondral cystic change with overlying chondral fissuring at the far posterior weight-bearing lateral femoral condyle.  2. Small ganglion cyst adjacent to the posterior aspect of the lateral distal femoral metaphysis.  3. No meniscal or ligamentous injury.    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)  Prior Level of Function: active, tennis, biking pain free  Current Level of Function: right knee pain, constant, unable to work without pain     Pain:  Current 3/10, worst 6/10, best 0/10   Location: LEFT KNEE   Description:   Aggravating Factors: day after playing tennis; towards end of playing tennis   Easing Factors: nothing and rest    Patients goals: pain-free     Medical History:   Past Medical History:   Diagnosis Date    Colon polyp     Depression     Migraine     Ureteral stricture        Surgical History:   Harinder Elliott  has a past surgical history that includes Kidney surgery; Hernia repair; [place] tooth extraction; Colon surgery; torn labrim; Esophagogastroduodenoscopy (N/A, 2/3/2021); Colonoscopy (N/A, 2/3/2021); and Vasectomy.    Medications:   Harinder has a current medication list which includes the following prescription(s): alprazolam, benzonatate, diazepam, escitalopram oxalate, ibuprofen, meloxicam, and sumatriptan.    Allergies:   Review of patient's allergies indicates:  No Known Allergies     Objective      Observation: 33 year old male    Pain reproduced with:  (+) full flexion/extension compression  (+) varus   (+) single leg squat  (+) Double leg squat     Handheld Dynamometer Strength (Quads):  R = 69 + 72 + 71 = 70 LBS  L = 80 + 86 + 89 = 85 LBS       Range of Motion (Active):   Knee Right  Left    Flexion 150  p! 150   Extension  0       Lower Extremity Strength   Right LE Left LE  "  Quadriceps: See above See above   Hamstrings: 4/5 4/5   Hip flexion (supine): 4/5 4/5   Hip extension:  4/5 4/5   PGM: 3/5 3/5   Hip ER:  3/5 3/5   Hip IR: 3/5 3/5     Special Tests:   Right   Valgus Stress Test -   Varus Stress test - P!   Lachman's test -   Posterior Lachman -   Stacy's Test -   Thessaly's Test nt   Patellar Grind Test -     Joint Mobility: patellar mobility - normal     Palpation: distal and proximal IT Band, R glute med/min; no pain over greater trochanter    Sensation: light touch in tact BLE    Flexibility:    Hamstrings: R = - ; L = -    Imelda's test: R = - ; L = -   Sergio test: R = - ; L = -      Limitation/Restriction for FOTO KNEE Survey    Therapist reviewed FOTO scores for Harinder Elliott on 7/28/2023.   FOTO documents entered into [email or web address] - see Media section.    Limitation Score: 56%         Treatment     Total Treatment time (time-based codes) separate from Evaluation: 20 minutes     Harinder received the treatments listed below:      Harinder participated in neuromuscular re-education activities to improve: Balance, Coordination, Kinesthetic, Sense, Proprioception and Posture for 20 minutes. The following activities were included:    SLR 10x  Upright SLR  Banded clams R 3" x 10  Sidelying hip abd on wall 10x   W/ IR/ER 10x    Patient Education and Home Exercises     Education provided:   - anatomy  - prognosis  - HEP    Written Home Exercises Provided: yes. Exercises were reviewed and Harinder was able to demonstrate them prior to the end of the session.  Harinder demonstrated good  understanding of the education provided. See EMR under Patient Instructions for exercises provided during therapy sessions.    Assessment     Harinder is a 33 y.o. male referred to outpatient Physical Therapy with a medical diagnosis of Acute pain of right knee It band syndrome, right . Patient referred to PT for R knee pain and IT Band Syndrome. Pt present with chronic R lateral knee and thigh pain s/p CSI " to R ITB. Pt demonstrating pain with overpressure and both flex/extension, decreased right hip rotator strength. Most significantly, right quad is demonstrating a 15 lb deficit compared to unaffected side. His IT band test (manpreet's) was negative, but he was tender along IT Band and glute med/min on right. IT Band is probably but may be secondary versus primary problem. Overall, patient is good candidate for PT.    Patient prognosis is Good.   Patient will benefit from skilled outpatient Physical Therapy to address the deficits stated above and in the chart below, provide patient /family education, and to maximize patientt's level of independence.     Plan of care discussed with patient: Yes  Patient's spiritual, cultural and educational needs considered and patient is agreeable to the plan of care and goals as stated below:     Anticipated Barriers for therapy: none    Medical Necessity is demonstrated by the following  History  Co-morbidities and personal factors that may impact the plan of care [x] LOW: no personal factors / co-morbidities  [] MODERATE: 1-2 personal factors / co-morbidities  [] HIGH: 3+ personal factors / co-morbidities    Moderate / High Support Documentation:   Co-morbidities affecting plan of care:     Personal Factors:   no deficits     Examination  Body Structures and Functions, activity limitations and participation restrictions that may impact the plan of care [x] LOW: addressing 1-2 elements  [] MODERATE: 3+ elements  [] HIGH: 4+ elements (please support below)    Moderate / High Support Documentation:      Clinical Presentation [x] LOW: stable  [] MODERATE: Evolving  [] HIGH: Unstable     Decision Making/ Complexity Score: low       Goals:  Short Term Goals: 4 weeks   1. Indep with HEP  2. Increase pain-free R knee AROM with overpressure WNL   3. Pt reports DECREASED R knee pain >75% of time is </= /10    Long Term Goals: 8 weeks   1. R quad increased strength within 5 lbs of left quad  2.  Increase strength of R hip IR/ER, and glute med to 4/5 pain-free  3. Pt reports pain-free stairs and squatting  4. Pt full return to tennis without pain/symptoms afterwards  5. Decreased tenderness to IT Band or glute med as sign of decreased inflammation      Plan     Plan of care Certification: 7/28/2023 to 10/28/23.    Outpatient Physical Therapy 2 times weekly for 8 weeks to include the following interventions: Gait Training, Manual Therapy, Moist Heat/ Ice, Neuromuscular Re-ed, Patient Education, Self Care, Therapeutic Activities, and Therapeutic Exercise.     Regina Duarte, PT

## 2023-08-01 ENCOUNTER — CLINICAL SUPPORT (OUTPATIENT)
Dept: REHABILITATION | Facility: HOSPITAL | Age: 33
End: 2023-08-01
Payer: COMMERCIAL

## 2023-08-01 DIAGNOSIS — M25.561 LATERAL KNEE PAIN, RIGHT: Primary | ICD-10-CM

## 2023-08-01 PROCEDURE — 97110 THERAPEUTIC EXERCISES: CPT

## 2023-08-01 PROCEDURE — 97112 NEUROMUSCULAR REEDUCATION: CPT

## 2023-08-01 NOTE — PROGRESS NOTES
"[facility]   Physical Therapy Treatment Note      Name: Harinder Elliott  Clinic Number: [id number]    Therapy Diagnosis:   Encounter Diagnosis   Name Primary?    Lateral knee pain, right Yes     Physician: Doretha Mata PA-C    Visit Date: 8/1/2023    Physician Orders: PT Eval and Treat   Medical Diagnosis from Referral: It band syndrome, right; Acute pain of right knee   Evaluation Date: 7/28/2023  Authorization Period Expiration: 07/19/202307/18/2024   Plan of Care Expiration: 10/28/23  Progress Note Due: 8/28/23  Visit # / Visits authorized: 3 / 20  FOTO: 56  DC @ 70%    Time In: 203 pm  Time Out: 300 pm  Total Billable Time: 57 minutes    Subjective     Pt reports: no better, no worse. Does note that posterior knee pain with sitting down on ground sometimes.  He was compliant with home exercise program.  Response to previous treatment: no difference   Functional change: no difference     Pain: 6/10  Location: R knee     Objective      Objective Measures updated at progress report unless specified.     Treatment     Harinder received the treatments listed below:      Harinder received therapeutic exercises to develop strength, endurance, ROM, flexibility, posture and core stabilization for 38 minutes including:   Upright bike 5' knee ROM  Hip flexion abduction isometric 45" x 5   BFR 60% R QUAD 30-15-15-15:   LAQ 4#   SL leg press 25#  Supine SLR 3 x 10    Harinder participated in neuromuscular re-education activities to improve: Balance, Coordination, Kinesthetic, Sense, Proprioception and Posture for 19 minutes. The following activities were included:    B Captain Lance isometrics B 5 x 25"   B wall clams GTB 2 x 10  B hip hikes 2 x 20      Patient Education and Home Exercises       Education provided:   - continue HEP     Written Home Exercises Provided: Patient instructed to cont prior HEP. Exercises were reviewed and Harinder was able to demonstrate them prior to the end of " the session.  Harinder demonstrated good  understanding of the education provided. See EMR under Patient Instructions for exercises provided during therapy sessions    Assessment     Quad fatigued out during second exercise. Continue with focused R quad, glute, and hip strengthening.     Next visit: hammer curl, leg press, BFR SLR    Harinder Is progressing well towards his goals.   Pt prognosis is Good.     Pt will continue to benefit from skilled outpatient physical therapy to address the deficits listed in the problem list box on initial evaluation, provide pt/family education and to maximize pt's level of independence in the home and community environment.     Pt's spiritual, cultural and educational needs considered and pt agreeable to plan of care and goals.     Anticipated barriers to physical therapy: none    Goals:   Short Term Goals: 4 weeks   1. Indep with HEP  2. Increase pain-free R knee AROM with overpressure WNL   3. Pt reports DECREASED R knee pain >75% of time is </= /10     Long Term Goals: 8 weeks   1. R quad increased strength within 5 lbs of left quad  2. Increase strength of R hip IR/ER, and glute med to 4/5 pain-free  3. Pt reports pain-free stairs and squatting  4. Pt full return to tennis without pain/symptoms afterwards  5. Decreased tenderness to IT Band or glute med as sign of decreased inflammation    Plan     Continue with PT POC    Regina Duarte, PT

## 2023-08-08 ENCOUNTER — CLINICAL SUPPORT (OUTPATIENT)
Dept: REHABILITATION | Facility: HOSPITAL | Age: 33
End: 2023-08-08
Payer: COMMERCIAL

## 2023-08-08 DIAGNOSIS — M25.561 LATERAL KNEE PAIN, RIGHT: Primary | ICD-10-CM

## 2023-08-08 PROCEDURE — 97112 NEUROMUSCULAR REEDUCATION: CPT

## 2023-08-08 PROCEDURE — 97110 THERAPEUTIC EXERCISES: CPT

## 2023-08-08 NOTE — PROGRESS NOTES
"[facility]   Physical Therapy Treatment Note      Name: Harinder Elliott  Clinic Number: [id number]    Therapy Diagnosis:   Encounter Diagnosis   Name Primary?    Lateral knee pain, right Yes     Physician: Doretha Mata PA-C    Visit Date: 8/8/2023    Physician Orders: PT Eval and Treat   Medical Diagnosis from Referral: It band syndrome, right; Acute pain of right knee   Evaluation Date: 7/28/2023  Authorization Period Expiration: 07/19/2023 - 07/18/2024   Plan of Care Expiration: 10/28/23  Progress Note Due: 8/28/23  Visit # / Visits authorized: 4 / 20  FOTO: 56  DC @ 70%    Time In: 802 am  Time Out: 902 am  Total Billable Time: 60 minutes    Subjective     Pt reports: no worse. Was able to ride bike without increased symptoms. And was able to do HEP 3x.   He was compliant with home exercise program.  Response to previous treatment: no difference   Functional change: no difference     Pain: 3/10  Location: R knee     Objective      Objective Measures updated at progress report unless specified.     Treatment     Harinder received the treatments listed below:      Harinder received therapeutic exercises to develop strength, endurance, ROM, flexibility, posture and core stabilization for 45 minutes including:   Upright bike 5' L5 knee ROM  Hip flexion abduction isometric 45" x 5   BFR 80% R QUAD 30-15-15-15:   SLR   Leg Press (R) 100# 4 x 15  HS matrix (R) 35# 4 x 15  LAQ machine 10# 4 x 15  Standing hip abd 4# (R) 4 x 10    Harinder participated in neuromuscular re-education activities to improve: Balance, Coordination, Kinesthetic, Sense, Proprioception and Posture for 15 minutes. The following activities were included:    B Captain Lance isometrics B 10 x 10"   R lateral heel taps 6" 3 x 12  Lateral walks GTB @ ankles 3 laps <>turf   Wall sit 20" x 5     Patient Education and Home Exercises       Education provided:   - continue HEP     Written Home Exercises Provided: Patient " instructed to cont prior HEP. Exercises were reviewed and Harinder was able to demonstrate them prior to the end of the session.  Harinder demonstrated good  understanding of the education provided. See EMR under Patient Instructions for exercises provided during therapy sessions    Assessment     Quad fatigued out during second exercise. Pt had medial pain after lateral heel taps and during captain morgans (may be closed chain related). Continue with focused R quad, glute, and hip strengthening.     Next visit: hammer curl, leg press, BFR SLR    Harinder Is progressing well towards his goals.   Pt prognosis is Good.     Pt will continue to benefit from skilled outpatient physical therapy to address the deficits listed in the problem list box on initial evaluation, provide pt/family education and to maximize pt's level of independence in the home and community environment.     Pt's spiritual, cultural and educational needs considered and pt agreeable to plan of care and goals.     Anticipated barriers to physical therapy: none    Goals:   Short Term Goals: 4 weeks   1. Indep with HEP  2. Increase pain-free R knee AROM with overpressure WNL   3. Pt reports DECREASED R knee pain >75% of time is </= /10     Long Term Goals: 8 weeks   1. R quad increased strength within 5 lbs of left quad  2. Increase strength of R hip IR/ER, and glute med to 4/5 pain-free  3. Pt reports pain-free stairs and squatting  4. Pt full return to tennis without pain/symptoms afterwards  5. Decreased tenderness to IT Band or glute med as sign of decreased inflammation    Plan     Continue with PT EDILMA Duarte, PT

## 2023-08-10 ENCOUNTER — CLINICAL SUPPORT (OUTPATIENT)
Dept: REHABILITATION | Facility: HOSPITAL | Age: 33
End: 2023-08-10
Payer: COMMERCIAL

## 2023-08-10 DIAGNOSIS — M25.561 LATERAL KNEE PAIN, RIGHT: Primary | ICD-10-CM

## 2023-08-10 PROCEDURE — 97140 MANUAL THERAPY 1/> REGIONS: CPT

## 2023-08-10 PROCEDURE — 97112 NEUROMUSCULAR REEDUCATION: CPT

## 2023-08-10 PROCEDURE — 97110 THERAPEUTIC EXERCISES: CPT

## 2023-08-10 NOTE — PROGRESS NOTES
"[facility]   Physical Therapy Treatment Note      Name: Harinder Elliott  Clinic Number: [id number]    Therapy Diagnosis:   Encounter Diagnosis   Name Primary?    Lateral knee pain, right Yes     Physician: Doretha Mata PA-C    Visit Date: 8/10/2023    Physician Orders: PT Eval and Treat   Medical Diagnosis from Referral: It band syndrome, right; Acute pain of right knee   Evaluation Date: 7/28/2023  Authorization Period Expiration: 07/19/2023 - 07/18/2024   Plan of Care Expiration: 10/28/23  Progress Note Due: 8/28/23  Visit # / Visits authorized: 5 / 20  FOTO: 56  DC @ 70%    Time In: 802 am  Time Out: 902 am  Total Billable Time: 60 minutes    Subjective     Pt reports:had anterior knee pain after last session. At about a 6/10 today.   He was compliant with home exercise program.  Response to previous treatment: no difference   Functional change: worse    Pain: 6/10  Location: R knee     Objective      Objective Measures updated at progress report unless specified.     Observation:   DL squat: bilateral genu valgus, pain onset at 80-90 deg  SL squat: genu valgus    Joint mobility:  Decreased right patellar mobility   Left hip decreased lateral mobility   R AP talocrural hypomobility    Palpation: tender to inferior pole of patella       Treatment     Harinder received the treatments listed below:      Harinder received the following manual therapy techniques: Joint mobilizations, Manual traction, Myofacial release, Soft tissue Mobilization, Friction Massage and Functional Dry Needling were applied for 10 minutes, including:  Grade V ankle mob  Grade III-IV AP talocrural mobs      Harinder received therapeutic exercises to develop strength, endurance, ROM, flexibility, posture and core stabilization for 20 minutes including:   Re-assessment performed by Froy Rothman DPT  B Side plank 30" x 3  B Side plank hip abd 2 x 10  B SL bridge 2 x 10 ~ R knee pain    Harinder participated in " "neuromuscular re-education activities to improve: Balance, Coordination, Kinesthetic, Sense, Proprioception and Posture for 25 minutes. The following activities were included:    Seated R quad isometrics 5x45" 2 min rest breaks in between      Patient Education and Home Exercises       Education provided:   - continue HEP     Written Home Exercises Provided: Patient instructed to cont prior HEP. Exercises were reviewed and Harinder was able to demonstrate them prior to the end of the session.  Harinder demonstrated good  understanding of the education provided. See EMR under Patient Instructions for exercises provided during therapy sessions    Assessment     Patient exhibiting sx/symptoms of patellofemoral tendonopathy. PT Froy re-assessed patient after patient presented with more pain from last session. Pt began isometric tendon loading protocol and we will assess response to that. Updated HEP      Harinder Is progressing well towards his goals.   Pt prognosis is Good.     Pt will continue to benefit from skilled outpatient physical therapy to address the deficits listed in the problem list box on initial evaluation, provide pt/family education and to maximize pt's level of independence in the home and community environment.     Pt's spiritual, cultural and educational needs considered and pt agreeable to plan of care and goals.     Anticipated barriers to physical therapy: none    Goals:   Short Term Goals: 4 weeks   1. Indep with HEP  2. Increase pain-free R knee AROM with overpressure WNL   3. Pt reports DECREASED R knee pain >75% of time is </= /10     Long Term Goals: 8 weeks   1. R quad increased strength within 5 lbs of left quad  2. Increase strength of R hip IR/ER, and glute med to 4/5 pain-free  3. Pt reports pain-free stairs and squatting  4. Pt full return to tennis without pain/symptoms afterwards  5. Decreased tenderness to IT Band or glute med as sign of decreased inflammation    Plan     Continue with PT " POC    Regina Duarte, PT

## 2023-08-16 ENCOUNTER — CLINICAL SUPPORT (OUTPATIENT)
Dept: REHABILITATION | Facility: HOSPITAL | Age: 33
End: 2023-08-16
Payer: COMMERCIAL

## 2023-08-16 DIAGNOSIS — M25.561 LATERAL KNEE PAIN, RIGHT: Primary | ICD-10-CM

## 2023-08-16 PROCEDURE — 97110 THERAPEUTIC EXERCISES: CPT

## 2023-08-16 PROCEDURE — 97112 NEUROMUSCULAR REEDUCATION: CPT

## 2023-08-16 NOTE — PROGRESS NOTES
"[facility]   Physical Therapy Treatment Note      Name: Harinder Elliott  Clinic Number: [id number]    Therapy Diagnosis:   Encounter Diagnosis   Name Primary?    Lateral knee pain, right Yes     Physician: Doretha Mata PA-C    Visit Date: 8/16/2023    Physician Orders: PT Eval and Treat   Medical Diagnosis from Referral: It band syndrome, right; Acute pain of right knee   Evaluation Date: 7/28/2023  Authorization Period Expiration: 07/19/2023 - 07/18/2024   Plan of Care Expiration: 10/28/23  Progress Note Due: 8/28/23  Visit # / Visits authorized: 6 / 20  FOTO: 56  DC @ 70%    Time In: 808 am  Time Out: 901 am  Total Billable Time: 53 minutes    Subjective     Pt reports: wasn't worse afterwards, normal soreness, but no better. He only did the new HEP once  He was not compliant with home exercise program.  Response to previous treatment: no difference   Functional change: worse    Pain: 4/10  Location: R knee     Objective      Objective Measures updated at progress report unless specified.       Treatment     Harinder received the treatments listed below:      Harinder received therapeutic exercises to develop strength, endurance, ROM, flexibility, posture and core stabilization for 30 minutes including:     Lateral walks BTB @ ankles 15 steps <> 3 laps  B hip abduction 4# 3 x 10  B Side plank hip taps 3 x 10  B clam at wall BTB 3 x 10  B hip hike 3" 4# 3 x 30    Harinder participated in neuromuscular re-education activities to improve: Balance, Coordination, Kinesthetic, Sense, Proprioception and Posture for 23 minutes. The following activities were included    Seated R quad isometrics 5x45" 2 min rest breaks in between        Patient Education and Home Exercises       Education provided:   - continue HEP     Written Home Exercises Provided: Patient instructed to cont prior HEP. Exercises were reviewed and Harinder was able to demonstrate them prior to the end of the session.  " Harinder demonstrated good  understanding of the education provided. See EMR under Patient Instructions for exercises provided during therapy sessions    Assessment     Demonstrated how to do isometrics in chair at home without ball. Educated pt to perform this, if any of the HEP 1x/day. Pt playing 2 hrs of tennis tomorrow. Will assess symptoms upon RTC Friday.    Harinder Is progressing well towards his goals.   Pt prognosis is Good.     Pt will continue to benefit from skilled outpatient physical therapy to address the deficits listed in the problem list box on initial evaluation, provide pt/family education and to maximize pt's level of independence in the home and community environment.     Pt's spiritual, cultural and educational needs considered and pt agreeable to plan of care and goals.     Anticipated barriers to physical therapy: none    Goals:   Short Term Goals: 4 weeks   1. Indep with HEP  2. Increase pain-free R knee AROM with overpressure WNL   3. Pt reports DECREASED R knee pain >75% of time is </= /10     Long Term Goals: 8 weeks   1. R quad increased strength within 5 lbs of left quad  2. Increase strength of R hip IR/ER, and glute med to 4/5 pain-free  3. Pt reports pain-free stairs and squatting  4. Pt full return to tennis without pain/symptoms afterwards  5. Decreased tenderness to IT Band or glute med as sign of decreased inflammation    Plan     Continue with PT EDILMA Duarte, PT

## 2023-08-23 ENCOUNTER — CLINICAL SUPPORT (OUTPATIENT)
Dept: REHABILITATION | Facility: HOSPITAL | Age: 33
End: 2023-08-23
Payer: COMMERCIAL

## 2023-08-23 DIAGNOSIS — M25.561 LATERAL KNEE PAIN, RIGHT: Primary | ICD-10-CM

## 2023-08-23 PROCEDURE — 97110 THERAPEUTIC EXERCISES: CPT

## 2023-08-23 PROCEDURE — 97112 NEUROMUSCULAR REEDUCATION: CPT

## 2023-08-23 NOTE — PROGRESS NOTES
"[facility]   Physical Therapy Treatment Note      Name: Harinder Elliott  Clinic Number: [id number]    Therapy Diagnosis:   Encounter Diagnosis   Name Primary?    Lateral knee pain, right Yes     Physician: Doretha Mata PA-C    Visit Date: 8/23/2023    Physician Orders: PT Eval and Treat   Medical Diagnosis from Referral: It band syndrome, right; Acute pain of right knee   Evaluation Date: 7/28/2023  Authorization Period Expiration: 07/19/2023 - 07/18/2024   Plan of Care Expiration: 10/28/23  Progress Note Due: 8/28/23  Visit # / Visits authorized: 7 / 20  FOTO: 56  DC @ 70%    Time In: 302 pm  Time Out: 342 pm  Total Billable Time: 40 minutes    Subjective     Pt reports: was able to play tennis - knee felt fatigued but no pain. Ended up having covid last Friday so had to cancel his prior Monday aptmt. Has been able to do his HEP exercises and feels they are helping. Has mild retropatellar pain today that is less intense than normal.  He was not compliant with home exercise program.  Response to previous treatment: no difference   Functional change: worse    Pain: 2 /10  Location: R knee     Objective      Objective Measures updated at progress report unless specified.       Treatment     Harinder received the treatments listed below:      Harinder received therapeutic exercises to develop strength, endurance, ROM, flexibility, posture and core stabilization for 17 minutes including:     Lateral walks GTB @ ankles 15 steps <> 2 laps  B hip abduction 4# 3 x 10  B Side plank hip taps 3 x 10  B clam at wall BTB 3 x 10  B hip hike 3" 4# 3 x 30  B sidelplank hip abd 3 x 7    Harinder participated in neuromuscular re-education activities to improve: Balance, Coordination, Kinesthetic, Sense, Proprioception and Posture for 23 minutes. The following activities were included    Hammer machine R quad isometrics 5x45" 2 min rest breaks in between (doing        Patient Education and Home " Exercises       Education provided:   - continue HEP     Written Home Exercises Provided: Patient instructed to cont prior HEP. Exercises were reviewed and Harinder was able to demonstrate them prior to the end of the session.  Harinder demonstrated good  understanding of the education provided. See EMR under Patient Instructions for exercises provided during therapy sessions    Assessment     Harinder was able to play tennis pain-free without increased symptoms afterwards. Patient's daily pain has gone from a 6 to a 3/10 at rest. Improved symptoms by 50% with daily tendon loading protocol.     Harinder Is progressing well towards his goals.   Pt prognosis is Good.     Pt will continue to benefit from skilled outpatient physical therapy to address the deficits listed in the problem list box on initial evaluation, provide pt/family education and to maximize pt's level of independence in the home and community environment.     Pt's spiritual, cultural and educational needs considered and pt agreeable to plan of care and goals.     Anticipated barriers to physical therapy: none    Goals:   Short Term Goals: 4 weeks   1. Indep with HEP  2. Increase pain-free R knee AROM with overpressure WNL   3. Pt reports DECREASED R knee pain >75% of time is </= /10     Long Term Goals: 8 weeks   1. R quad increased strength within 5 lbs of left quad  2. Increase strength of R hip IR/ER, and glute med to 4/5 pain-free  3. Pt reports pain-free stairs and squatting  4. Pt full return to tennis without pain/symptoms afterwards  5. Decreased tenderness to IT Band or glute med as sign of decreased inflammation    Plan     Continue with PT EDILMA Duarte, PT

## 2023-08-25 ENCOUNTER — CLINICAL SUPPORT (OUTPATIENT)
Dept: REHABILITATION | Facility: HOSPITAL | Age: 33
End: 2023-08-25
Payer: COMMERCIAL

## 2023-08-25 DIAGNOSIS — M25.561 LATERAL KNEE PAIN, RIGHT: Primary | ICD-10-CM

## 2023-08-25 PROCEDURE — 97110 THERAPEUTIC EXERCISES: CPT

## 2023-08-25 PROCEDURE — 97112 NEUROMUSCULAR REEDUCATION: CPT

## 2023-08-25 NOTE — PROGRESS NOTES
"[facility]   Physical Therapy Treatment Note      Name: Harinder Elliott  Clinic Number: [id number]    Therapy Diagnosis:   Encounter Diagnosis   Name Primary?    Lateral knee pain, right Yes     Physician: Doretha Mata PA-C    Visit Date: 8/25/2023    Physician Orders: PT Eval and Treat   Medical Diagnosis from Referral: It band syndrome, right; Acute pain of right knee   Evaluation Date: 7/28/2023  Authorization Period Expiration: 07/19/2023 - 07/18/2024   Plan of Care Expiration: 10/28/23  Progress Note Due: 8/28/23  Visit # / Visits authorized: 8 / 20  FOTO: 56  DC @ 70%    Time In: 805 am  Time Out: 845 pm  Total Billable Time: 40 minutes  Subjective     Pt reports: little sore after last time.   He was not compliant with home exercise program.  Response to previous treatment: no difference   Functional change: worse    Pain: 2 /10  Location: R knee     Objective      Objective Measures updated at progress report unless specified.       Treatment     Harinder received the treatments listed below:      Harinder received therapeutic exercises to develop strength, endurance, ROM, flexibility, posture and core stabilization for 17 minutes including:     Lateral walks GTB @ ankles 15 steps <> 2 laps  B hip abduction 4# 3 x 10  B Side plank hip taps 3 x 10  B clam at wall BTB 3 x 10  B hip hike 3" 5# 3 x 30    Harinder participated in neuromuscular re-education activities to improve: Balance, Coordination, Kinesthetic, Sense, Proprioception and Posture for 23 minutes. The following activities were included    Hammer machine R quad isometrics 5x45" 2 min rest breaks in between (doing        Patient Education and Home Exercises       Education provided:   - continue HEP     Written Home Exercises Provided: Patient instructed to cont prior HEP. Exercises were reviewed and Harinder was able to demonstrate them prior to the end of the session.  Harinder demonstrated good  understanding of the " education provided. See EMR under Patient Instructions for exercises provided during therapy sessions    Assessment     Patient's daily pain has gone from a 6 to a 2-3/10 at rest. Trending in right direction. Improved symptoms by 50% with daily tendon loading protocol.     Harinder Is progressing well towards his goals.   Pt prognosis is Good.     Pt will continue to benefit from skilled outpatient physical therapy to address the deficits listed in the problem list box on initial evaluation, provide pt/family education and to maximize pt's level of independence in the home and community environment.     Pt's spiritual, cultural and educational needs considered and pt agreeable to plan of care and goals.     Anticipated barriers to physical therapy: none    Goals:   Short Term Goals: 4 weeks   1. Indep with HEP  2. Increase pain-free R knee AROM with overpressure WNL   3. Pt reports DECREASED R knee pain >75% of time is </= /10     Long Term Goals: 8 weeks   1. R quad increased strength within 5 lbs of left quad  2. Increase strength of R hip IR/ER, and glute med to 4/5 pain-free  3. Pt reports pain-free stairs and squatting  4. Pt full return to tennis without pain/symptoms afterwards  5. Decreased tenderness to IT Band or glute med as sign of decreased inflammation    Plan     Continue with PT EDILMA Duarte, PT

## 2023-08-31 ENCOUNTER — CLINICAL SUPPORT (OUTPATIENT)
Dept: REHABILITATION | Facility: HOSPITAL | Age: 33
End: 2023-08-31
Payer: COMMERCIAL

## 2023-08-31 DIAGNOSIS — M25.561 LATERAL KNEE PAIN, RIGHT: Primary | ICD-10-CM

## 2023-08-31 PROCEDURE — 97110 THERAPEUTIC EXERCISES: CPT | Performed by: PHYSICAL THERAPIST

## 2023-08-31 PROCEDURE — 97112 NEUROMUSCULAR REEDUCATION: CPT | Performed by: PHYSICAL THERAPIST

## 2023-09-02 NOTE — PROGRESS NOTES
"[facility]   Physical Therapy Treatment Note      Name: Harinder Elliott  Clinic Number: [id number]    Therapy Diagnosis:   Encounter Diagnosis   Name Primary?    Lateral knee pain, right Yes     Physician: Doretha Mata PA-C    Visit Date: 8/31/2023    Physician Orders: PT Eval and Treat   Medical Diagnosis from Referral: It band syndrome, right; Acute pain of right knee   Evaluation Date: 7/28/2023  Authorization Period Expiration: 07/19/2023 - 07/18/2024   Plan of Care Expiration: 10/28/23  Progress Note Due: 8/28/23  Visit # / Visits authorized: 9 / 20  FOTO: 56  DC @ 70%    Time In: 1500  Time Out: 1600  Total Billable Time: 50 minutes  Subjective     Pt reports: Overall feeling much better. Has been able to work on isometrics which he thinks is helping.   He was not compliant with home exercise program.  Response to previous treatment: no difference   Functional change: worse    Pain: 2 /10  Location: R knee     Objective      Objective Measures updated at progress report unless specified.       Treatment     Harinder received the treatments listed below:      Harinder received therapeutic exercises to develop strength, endurance, ROM, flexibility, posture and core stabilization for 25 minutes including:     Lateral walks GTB @ ankles 15 steps <> 2 laps  B hip abduction 4# 3 x 10  B Side plank hip taps 3 x 10  LAQ Matrix 55 lbs 4 x 8  DF self mobilizations    Harinder participated in neuromuscular re-education activities to improve: Balance, Coordination, Kinesthetic, Sense, Proprioception and Posture for 25 minutes. The following activities were included    Hammer machine R quad isometrics 5x45" 2 min rest breaks in between   HS Bridge        Patient Education and Home Exercises       Education provided:   - continue HEP     Written Home Exercises Provided: Patient instructed to cont prior HEP. Exercises were reviewed and Harinder was able to demonstrate them prior to the end of " Radha Garnica,   It was a pleasure to see David today. His fasting blood sugars ranges are . 105 to 125 mg/dL. He does consume a high sodium meal plan and was receptive to reduce his intake. He was receptive to continue to make dietary and lifestyle changes for management of his diabetes. He will follow up in one month to continue to review the initial educational material for diabetes education. Thank you very much for the referral and the opportunity to participate in the care of your patient. Have a great day!      the session.  Harinder demonstrated good  understanding of the education provided. See EMR under Patient Instructions for exercises provided during therapy sessions    Assessment     Progressed to isotonics with matrix machine. No pain. Can continue to progress through tendon loading program with secondary focuses on DF ROM and posterior chain strength.    Harinder Is progressing well towards his goals.   Pt prognosis is Good.     Pt will continue to benefit from skilled outpatient physical therapy to address the deficits listed in the problem list box on initial evaluation, provide pt/family education and to maximize pt's level of independence in the home and community environment.     Pt's spiritual, cultural and educational needs considered and pt agreeable to plan of care and goals.     Anticipated barriers to physical therapy: none    Goals:   Short Term Goals: 4 weeks   1. Indep with HEP  2. Increase pain-free R knee AROM with overpressure WNL   3. Pt reports DECREASED R knee pain >75% of time is </= /10     Long Term Goals: 8 weeks   1. R quad increased strength within 5 lbs of left quad  2. Increase strength of R hip IR/ER, and glute med to 4/5 pain-free  3. Pt reports pain-free stairs and squatting  4. Pt full return to tennis without pain/symptoms afterwards  5. Decreased tenderness to IT Band or glute med as sign of decreased inflammation    Plan     Continue with PT EDILMA Rothman, PT

## 2023-09-18 ENCOUNTER — OFFICE VISIT (OUTPATIENT)
Dept: PSYCHIATRY | Facility: CLINIC | Age: 33
End: 2023-09-18
Payer: COMMERCIAL

## 2023-09-18 DIAGNOSIS — F41.0 PANIC ATTACKS: ICD-10-CM

## 2023-09-18 DIAGNOSIS — F90.2 ATTENTION DEFICIT HYPERACTIVITY DISORDER (ADHD), COMBINED TYPE: Primary | ICD-10-CM

## 2023-09-18 DIAGNOSIS — F41.1 GAD (GENERALIZED ANXIETY DISORDER): ICD-10-CM

## 2023-09-18 PROCEDURE — 99214 OFFICE O/P EST MOD 30 MIN: CPT | Mod: 95,,, | Performed by: NURSE PRACTITIONER

## 2023-09-18 PROCEDURE — 99214 PR OFFICE/OUTPT VISIT, EST, LEVL IV, 30-39 MIN: ICD-10-PCS | Mod: 95,,, | Performed by: NURSE PRACTITIONER

## 2023-09-18 NOTE — PROGRESS NOTES
Outpatient Psychiatry Follow-Up Visit (MD/NP)    9/18/2023     The patient location is: [place], [place]  The chief complaint leading to consultation is: Anxiety and panic attacks    Visit type: audiovisual    Face to Face time with patient: 25 minutes  30 minutes of total time spent on the encounter, which includes face to face time and non-face to face time preparing to see the patient (eg, review of tests), Obtaining and/or reviewing separately obtained history, Documenting clinical information in the electronic or other health record, Independently interpreting results (not separately reported) and communicating results to the patient/family/caregiver, or Care coordination (not separately reported).         Each patient to whom he or she provides medical services by telemedicine is:  (1) informed of the relationship between the physician and patient and the respective role of any other health care provider with respect to management of the patient; and (2) notified that he or she may decline to receive medical services by telemedicine and may withdraw from such care at any time.    Notes:       Clinical Status of Patient:  Outpatient (Ambulatory)    Chief Complaint:  Harinder Elliott is a 33 y.o. male who presents today for follow-up of anxiety and panic attacks .  Met with patient.      Interval History and Content of Current Session:  Interim Events/Subjective Report/Content of Current Session:  He arrived on time for his virtual visit for evaluation for ADHD. 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 He stated he is still taking Lexapro 10 mg po daily to treat his anxiety finds it effective in treating his anxiety. He stated he only takes xanax 0.25 mg po daily prn as needed for severe panic attacks and rarely takes it.    He reported 2-3 mild panic attack since his last visit and he finds is treatment plan and his breathing and his grounding skills  "are helpful in managing his anxiety and panic attacks.     He rated his anxiety at 4-5/10 with 10/10 being the most severe. He denied feeling depressed and denied feelings of guilt, helplessness, hopelessness, anhedonia, amotivation, or fatigue. He reported okay sleep. Patient denied SI/HI/AH/VH. No objective signs of ozzie, hypomania, or psychosis. He denied alcohol abuse or any type of illicit drug use. He reported problems with attentions and focus that interfere with her daily functioning.       Psychiatric Review Of Systems - Is patient experiencing or having changes in:  sleep: no. Gets only about 6 hours of sleep per night  appetite: no  weight: no  energy/anergy: no  interest/pleasure/anhedonia: no  Motivation: no. Replaced by other   somatic symptoms: no  anxiety/panic: yes.   guilty/hopelessness: no  concentration: no  S.I.B.s/risky behavior: no  Irritability: yes but mild over the last 2 weeks because around getting his house ready.  Racing thoughts: yes. Only at night  Impulsive behaviors: no  Paranoia:no  AVH:no  Suicidal thoughts/plan/intent: no    Current Medication:  Xanax (took 1/2 xanax) Rarely takes it  Lexapro 10 mg po daily        Previous Medications:  Paxil (was not effective and didn't like the way he felt while taking it  Zoloft 75 mg po daily ineffective and "decreased sex drive and apathy".        ADHD ROS     A. A persistent pattern of inattention and/or hyperactivity-impulsivity that interferes with functioning  or development, as characterized by (1) and/or (2):  1. Inattention: Six (or more) of the following symptoms have persisted for at least 6 months to a degree that is inconsistent with developmental level and that negatively impacts directly on social and academic/occupational activities:  Note: The symptoms are not solely a manifestation of oppositional behavior, defiance, hostility, or failure to understand tasks or instructions.      For older adolescents and adults (age 17 and " "older), at least five symptoms are required:      Patient endorses:   a. Often fails to give close attention to details or makes careless mistakes in schoolwork,  at work, or during other activities (e.g., overlooks or misses details, work is inaccurate). No   b. Often has difficulty sustaining attention in tasks or play activities (e.g., has difficulty remaining. Yes, "I have difficulties with readying. I find myself reading 2 pages while I am thinking of some else and I have to reread it again. My wife will be talking to me and I would not be listening to what she is saying. In zoom meetings it's difficult to pay attention. I find my self checking my emails".  focused during lectures, conversations, or lengthy reading).  c. Often does not seem to listen when spoken to directly (e.g., mind seems elsewhere, even in  the absence of any obvious distraction). Yes, "my wife will be talking to me and I would be focusing on something else and it looks like my brain is going about something else".  d. Often does not follow through on instructions and fails to finish schoolwork, chores, or duties. in the workplace (e.g., starts tasks but quickly loses focus and is easily sidetracked). Yes, "I get distracted and side tracked easily".  e. Often has difficulty organizing tasks and activities (e.g., difficulty managing sequential tasks;  difficulty keeping materials and belongings in order; messy, disorganized work; has poor time  management; fails to meet deadlines). Yes, "I have difficulties with organization in tasks. I make a list and I had to put more things on the to do list".   f. Often avoids, dislikes, or is reluctant to engage in tasks that require sustained mental  effort (e.g., schoolwork or homework; for older adolescents and adults, preparing reports,  completing forms, reviewing lengthy papers). Yes, "I have to force myself to do lengthy reports and I spend more time on them. I don't like putting the mental focus " "on them".  g. Often loses things necessary for tasks or activities (e.g., school materials, pencils, books, tools,  wallets, keys, paperwork, eyeglasses, mobile telephones). Yes,"I put things in the most random places. I lose things and I ask where they are. I lose my phone and pin my phone a lot".   h. Is often easily distracted by extraneous stimuli (for older adolescents and adults, may include  unrelated thoughts). Yes, "If I am going to focus on something that needs to be the only thing I am doing. I cannot have the TV on or anything in the background".  i. Is often forgetful in daily activities (e.g., doing chores, running errands; for older adolescents  and adults, returning calls, paying bills, keeping appointments).  Yes, "I set alarms on my phone to pay my bills. I procrastinate on paying the bills. I forget to pay them to the point the power was disconnected and may warnings to pay the water bill".     2. Hyperactivity and impulsivity: Six (or more) of the following symptoms have persisted for at least 6 months to a degree that is inconsistent with developmental level and that negatively impacts directly on social and academic/occupational activities:     Note: The symptoms are not solely a manifestation of oppositional behavior, defiance, hostility, or a failure to understand tasks or instructions. For older adolescents and adults (age 17 and older), at least five symptoms are required.     Patient endorses:   a. Often fidgets with or taps hands or feet or squirms in seat. Yes   b. Often leaves seat in situations when remaining seated is expected (e.g., leaves his or her place  in the classroom, in the office or other workplace, or in other situations that require remaining  in place). Yes sometimes  c. Often runs about or climbs in situations where it is inappropriate. (Note: In adolescents or  adults, may be limited to feeling restless.) No  d. Often unable to play or engage in leisure activities " "quietly. Yes, "I do feel I need to talk"  e. Is often on the go, acting as if driven by a motor (e.g., is unable to be or uncomfortable  being still for extended time, as in restaurants, meetings; may be experienced by others as  being restless or difficult to keep up with). Yes, "I don't like to sit still at home".   f. Often talks excessively. Yes,   g. Often blurts out an answer before a question has been completed (e.g., completes peoples  sentences; cannot wait for turn in conversation). Yes, "I finish people's sentences a lot which is a bad quality"  h. Often has difficulty waiting his or her turn (e.g., while waiting in line). Yes, "I hate waiting in line or in traffic"  i. Often interrupts or intrudes on others (e.g., butts into conversations, games, or activities; may  start using other peoples things without asking or receiving permission; for adolescents and  adults, may intrude into or take over what others are doing). Yes. "I finish their sentences"   B. Several inattentive or hyperactive-impulsive symptoms were present prior to age 12 years.  C. Several inattentive or hyperactive-impulsive symptoms are present in two or more settings (e.g., at home, school, or work; with friends or relatives; in other activities).  D. There is clear evidence that the symptoms interfere with, or reduce the quality of, social, academic, or occupational functioning.  E. The symptoms do not occur exclusively during the course of schizophrenia or another psychotic disorder and are not better explained by another mental disorder (e.g., mood disorder, anxiety disorder, dissociative disorder, personality disorder, substance intoxication or withdrawal).     Several inattentive or hyperactive-impulsive symptoms were present prior to 12 years: Y    He stated she was not diagnosed with ADHD in childhood but struggled with some subjects in childhood. He stated, "I had some issues with a teacher and thought I was not paying " "attention and caused patient and teacher that year". As an adult, "I feel overall very stressed and anxious because I cannot focus enough and I procrastinate. I forget to pay my pills ontime and have to pay late fees, got warned few times of getting the water cut off, and one time I had the power cut off. There is work stress because of procrastination that leads to frustrating and working late at night. There were times I was in meetings without knowing what I need to do because I was not listening well and have to go ask my boss on what was said and what I need to do. I have increased anxiety due to not meeting the deadlines at times. I forget our friends birthday parties and that happened more than once that affected the relationship with our friends".     Patient reported attention deficit hyperactivity symptoms of having trouble focusing on meetings and conversations, being distracted during conversations, being disorganized, having trouble prioritizing tasks, avoiding lengthy mental tasks, being easily distracted, forgetfulness, and restless and fidgetiness.  Problems happen at home, the community, and occupationally. These symptoms have been happening over patient's entire life. Patient denied any history of heart palpitations, syncope, dizziness, dyspnea on exertion, shortness of breath, or chest pain. He denied any family history of arrhythmias, enlarged heart, or sudden cardiac death. No history of seizures. His blood pressure is within normal limits. We discussed stimulants and no stimulant treatment options. I discussed in detail the risks of stimulants including risk of abuse, tolerance, dependence, insomnia, and anxiety. Patient verbalized understanding.      Psychotherapy:  Target symptoms: anxiety , panic attacks  Why chosen therapy is appropriate versus another modality: relevant to diagnosis, patient responds to this modality, evidence based practice  Outcome monitoring methods: self-report, " observation  Therapeutic intervention type: insight oriented psychotherapy, behavior modifying psychotherapy, supportive psychotherapy  Topics discussed/themes: building skills sets for symptom management, symptom recognition  The patient's response to the intervention is accepting, motivated. The patient's progress toward treatment goals is good.   Duration of intervention: 5 minutes.    Review of Systems   PSYCHIATRIC: Pertinant items are noted in the narrative.  CONSTITUTIONAL: No weight gain or loss.   MUSCULOSKELETAL: No pain or stiffness of the joints.  NEUROLOGIC: No weakness, sensory changes, seizures, confusion, memory loss, tremor or other abnormal movements.  ENDOCRINE: No polydipsia or polyuria.  INTEGUMENTARY: No rashes or lacerations.  EYES: No exophthalmos, jaundice or blindness.  ENT: No dizziness, tinnitus or hearing loss.  RESPIRATORY: No shortness of breath.  CARDIOVASCULAR: No tachycardia or chest pain.  GASTROINTESTINAL: No nausea, vomiting, pain, constipation or diarrhea.  GENITOURINARY: No frequency, dysuria or sexual dysfunction.  HEMATOLOGIC/LYMPHATIC: No excessive bleeding, prolonged or excessive bleeding after dental extraction/injury.  ALLERGIC/IMMUNOLOGIC: No allergic response to materials, foods or animals at this time.    Past Medical, Family and Social History: The patient's past medical, family and social history have been reviewed and updated as appropriate within the electronic medical record - see encounter notes.    Compliance: yes    Side effects:  sexual side effects  and apathy with Zoloft 75 mg daily    Risk Parameters:  Patient reports no suicidal ideation  Patient reports no homicidal ideation  Patient reports no self-injurious behavior  Patient reports no violent behavior    Exam (detailed: at least 9 elements; comprehensive: all 15 elements)   Constitutional  Vitals:  Most recent vital signs, dated greater than 90 days prior to this appointment, were reviewed.   There  "were no vitals filed for this visit.     General:  unremarkable, age appropriate     Musculoskeletal  Muscle Strength/Tone:  not examined   Gait & Station:  non-ataxic     Psychiatric  Speech:  no latency; no press   Mood & Affect:  "Good"  appropriate   Thought Process:  normal and logical   Associations:  intact   Thought Content:  normal, no suicidality, no homicidality, delusions, or paranoia   Insight:  intact, has awareness of illness   Judgement: behavior is adequate to circumstances   Orientation:  grossly intact   Memory: intact for content of interview   Language: grossly intact, able to name, able to repeat   Attention Span & Concentration:  Easily distracted    Fund of Knowledge:  intact and appropriate to age and level of education, familiar with aspects of current personal life     Assessment and Diagnosis   Status/Progress: Based on the examination today, the patient's problem(s) is/are improved.  New problems have been presented today.   Co-morbidities, Diagnostic uncertainty and Lack of compliance are not complicating management of the primary condition.  There are no active rule-out diagnoses for this patient at this time.     General Impression: Doing well with lexapro 10 mg po daily and anxiety and depression are managed. Panic attacks are managed with medications and coping skills. He reported symptoms that meets the criteria for ADHD combined type. He is interested in trying focalin and is not interested in Strattera due to side effects.      Patient meets the criteria for generalized anxiety disorder; panic attacks, and some depressive symptoms that they don't meed the criteria for MDD because his depressive symptoms never lasted 2 weeks more days than not.          ICD-10-CM ICD-9-CM   1. Attention deficit hyperactivity disorder (ADHD), combined type  F90.2 314.01   2. JOSE RAFAEL (generalized anxiety disorder)  F41.1 300.02   3. Panic attacks  F41.0 300.01           Intervention/Counseling/Treatment " Plan     Treatment Plan/Recommendations:   Medication Management: Continue current medications. The risks and benefits of medication were discussed with the patient.  -Continue Lexapro 10 mg po daily for anxiety and panic attacks  -Recommended psychotherapy   We discussed risk of Serotonin Syndrome including symptoms in order of appearance: diarrhea, restlessness, extreme agitation, autonomic instability, hyperthermia, rigidity, coma, and death.  Discussed getting VS, UDS, and signing a controlled substance contract  Discussed treatment options such as stimulants and non stimulants for ADHD  Went over the risks, benefits, side effects and alternatives of taking Focalin, adderall, Wellbutrin, Clonidine, Tenex, Strattera, etc. He agreed to start Focalin  Start Focalin 2.5 mg po BID for ADHD  Patient has no contraindications: no h/o allergic rxn, agitation, tourette's, arrhythmia, cardiovascular disease, cardiac structural abnormalities, hyperthyroidism, glaucoma, Other psychiatric illness, etc.   Discussed risk of possible increase in anxiety- patient express understanding.   Discussed informed consent, diagnosis, risks and benefits of proposed treatment vs alternative treatments vs no treatment, and potential side effects of these treatments (death, dependency, psychosis, ozzie, aggression, TN, ticks, MI, stroke, arrhythmia, seizure, anaphylaxis or other allergic reactions, leukopenia, nervousness, anorexia, insomnia, tachycardia, palpitations, dizziness, BP changes, HR changes, visual disturbance, etc.). The patient expresses understanding of the above and displays the capacity to agree with this treatment given said understanding. Patient also agrees that, currently, the benefits outweigh the risks and would like to pursue treatment at this time.    -Continue utilization of effective CBT skills, positive self-talk, cognitive reframing, meditation, mindfulness, deep breathing, and relaxations skills.  --Discussed  informed consent, diagnosis, risks and benefits of proposed treatment above vs alternative treatments vs no treatment, and potential side effects of these treatments. The patient expresses understanding of the above and displays the capacity to agree with this treatment given said understanding. Patient also agrees that, currently, the benefits outweigh the risks and would like to pursue treatment at this time. Answered all questions and discussed follow up. Encouraged patient to contact us with any questions or concerns.   -Encouraged Patient to keep future appointments.  -Take medications as prescribed   -Patient to present to Emergency Department for thoughts to harm herself or others    Return to Clinic: 1 month or earlier as needed      REX MillsP-BC

## 2023-09-19 ENCOUNTER — PATIENT MESSAGE (OUTPATIENT)
Dept: PSYCHIATRY | Facility: CLINIC | Age: 33
End: 2023-09-19
Payer: COMMERCIAL

## 2023-09-19 RX ORDER — ESCITALOPRAM OXALATE 10 MG/1
10 TABLET ORAL DAILY
Qty: 90 TABLET | Refills: 0 | Status: SHIPPED | OUTPATIENT
Start: 2023-09-19 | End: 2023-12-28 | Stop reason: SDUPTHER

## 2023-09-29 ENCOUNTER — CLINICAL SUPPORT (OUTPATIENT)
Dept: PSYCHIATRY | Facility: CLINIC | Age: 33
End: 2023-09-29
Payer: COMMERCIAL

## 2023-09-29 VITALS
SYSTOLIC BLOOD PRESSURE: 129 MMHG | BODY MASS INDEX: 24.61 KG/M2 | HEART RATE: 81 BPM | DIASTOLIC BLOOD PRESSURE: 71 MMHG | WEIGHT: 176.5 LBS

## 2023-09-29 DIAGNOSIS — F90.2 ATTENTION DEFICIT HYPERACTIVITY DISORDER (ADHD), COMBINED TYPE: Primary | ICD-10-CM

## 2023-09-29 LAB
AMPHET+METHAMPHET UR QL: NEGATIVE
BARBITURATES UR QL SCN>200 NG/ML: NEGATIVE
BENZODIAZ UR QL SCN>200 NG/ML: NEGATIVE
BZE UR QL SCN: NEGATIVE
CANNABINOIDS UR QL SCN: NEGATIVE
CREAT UR-MCNC: 38 MG/DL (ref 23–375)
ETHANOL UR-MCNC: <10 MG/DL
METHADONE UR QL SCN>300 NG/ML: NEGATIVE
OPIATES UR QL SCN: NEGATIVE
PCP UR QL SCN>25 NG/ML: NEGATIVE
TOXICOLOGY INFORMATION: NORMAL

## 2023-09-29 PROCEDURE — 80307 DRUG TEST PRSMV CHEM ANLYZR: CPT | Performed by: PSYCHIATRY & NEUROLOGY

## 2023-09-30 ENCOUNTER — PATIENT MESSAGE (OUTPATIENT)
Dept: PSYCHIATRY | Facility: CLINIC | Age: 33
End: 2023-09-30
Payer: COMMERCIAL

## 2023-09-30 DIAGNOSIS — F90.0 ATTENTION DEFICIT HYPERACTIVITY DISORDER (ADHD), PREDOMINANTLY INATTENTIVE TYPE: Primary | ICD-10-CM

## 2023-09-30 RX ORDER — DEXMETHYLPHENIDATE HYDROCHLORIDE 2.5 MG/1
2.5 TABLET ORAL 2 TIMES DAILY
Qty: 60 TABLET | Refills: 0 | Status: SHIPPED | OUTPATIENT
Start: 2023-09-30 | End: 2023-10-30

## 2023-10-03 ENCOUNTER — PATIENT MESSAGE (OUTPATIENT)
Dept: PSYCHIATRY | Facility: CLINIC | Age: 33
End: 2023-10-03
Payer: COMMERCIAL

## 2023-10-03 DIAGNOSIS — F90.0 ATTENTION DEFICIT HYPERACTIVITY DISORDER (ADHD), PREDOMINANTLY INATTENTIVE TYPE: Primary | ICD-10-CM

## 2023-10-03 RX ORDER — DEXMETHYLPHENIDATE HYDROCHLORIDE 2.5 MG/1
2.5 TABLET ORAL 2 TIMES DAILY
Qty: 60 TABLET | Refills: 0 | Status: SHIPPED | OUTPATIENT
Start: 2023-10-03 | End: 2023-11-20

## 2023-10-25 ENCOUNTER — PATIENT MESSAGE (OUTPATIENT)
Dept: PSYCHIATRY | Facility: CLINIC | Age: 33
End: 2023-10-25
Payer: COMMERCIAL

## 2023-10-25 DIAGNOSIS — F90.0 ATTENTION DEFICIT HYPERACTIVITY DISORDER (ADHD), PREDOMINANTLY INATTENTIVE TYPE: Primary | ICD-10-CM

## 2023-10-25 RX ORDER — DEXTROAMPHETAMINE SACCHARATE, AMPHETAMINE ASPARTATE MONOHYDRATE, DEXTROAMPHETAMINE SULFATE AND AMPHETAMINE SULFATE 2.5; 2.5; 2.5; 2.5 MG/1; MG/1; MG/1; MG/1
10 CAPSULE, EXTENDED RELEASE ORAL EVERY MORNING
Qty: 30 CAPSULE | Refills: 0 | Status: SHIPPED | OUTPATIENT
Start: 2023-10-25 | End: 2023-11-20 | Stop reason: SDUPTHER

## 2023-11-13 ENCOUNTER — OFFICE VISIT (OUTPATIENT)
Dept: URGENT CARE | Facility: CLINIC | Age: 33
End: 2023-11-13
Payer: COMMERCIAL

## 2023-11-13 VITALS
HEART RATE: 72 BPM | BODY MASS INDEX: 23.83 KG/M2 | SYSTOLIC BLOOD PRESSURE: 145 MMHG | DIASTOLIC BLOOD PRESSURE: 77 MMHG | TEMPERATURE: 98 F | WEIGHT: 170.88 LBS | RESPIRATION RATE: 17 BRPM | OXYGEN SATURATION: 98 %

## 2023-11-13 DIAGNOSIS — Z72.51 RISK FOR SEXUALLY TRANSMITTED DISEASE: ICD-10-CM

## 2023-11-13 DIAGNOSIS — J06.9 VIRAL URI: Primary | ICD-10-CM

## 2023-11-13 LAB
CTP QC/QA: YES
CTP QC/QA: YES
HCV AB SERPL QL IA: NORMAL
HIV 1+2 AB+HIV1 P24 AG SERPL QL IA: NORMAL
MOLECULAR STREP A: NEGATIVE
POC MOLECULAR INFLUENZA A AGN: NEGATIVE
POC MOLECULAR INFLUENZA B AGN: NEGATIVE

## 2023-11-13 PROCEDURE — 87651 POCT STREP A MOLECULAR: ICD-10-PCS | Mod: QW,S$GLB,, | Performed by: NURSE PRACTITIONER

## 2023-11-13 PROCEDURE — 86592 SYPHILIS TEST NON-TREP QUAL: CPT | Performed by: NURSE PRACTITIONER

## 2023-11-13 PROCEDURE — 87502 INFLUENZA DNA AMP PROBE: CPT | Mod: QW,S$GLB,, | Performed by: NURSE PRACTITIONER

## 2023-11-13 PROCEDURE — 87389 HIV-1 AG W/HIV-1&-2 AB AG IA: CPT | Performed by: NURSE PRACTITIONER

## 2023-11-13 PROCEDURE — 87491 CHLMYD TRACH DNA AMP PROBE: CPT | Performed by: NURSE PRACTITIONER

## 2023-11-13 PROCEDURE — 87651 STREP A DNA AMP PROBE: CPT | Mod: QW,S$GLB,, | Performed by: NURSE PRACTITIONER

## 2023-11-13 PROCEDURE — 87502 POCT INFLUENZA A/B MOLECULAR: ICD-10-PCS | Mod: QW,S$GLB,, | Performed by: NURSE PRACTITIONER

## 2023-11-13 PROCEDURE — 36415 COLL VENOUS BLD VENIPUNCTURE: CPT | Performed by: NURSE PRACTITIONER

## 2023-11-13 PROCEDURE — 99213 PR OFFICE/OUTPT VISIT, EST, LEVL III, 20-29 MIN: ICD-10-PCS | Mod: S$GLB,,, | Performed by: NURSE PRACTITIONER

## 2023-11-13 PROCEDURE — 86803 HEPATITIS C AB TEST: CPT | Performed by: NURSE PRACTITIONER

## 2023-11-13 PROCEDURE — 99213 OFFICE O/P EST LOW 20 MIN: CPT | Mod: S$GLB,,, | Performed by: NURSE PRACTITIONER

## 2023-11-13 RX ORDER — DOXYCYCLINE 100 MG/1
100 CAPSULE ORAL EVERY 12 HOURS
Qty: 14 CAPSULE | Refills: 0 | Status: SHIPPED | OUTPATIENT
Start: 2023-11-13 | End: 2023-11-20

## 2023-11-13 NOTE — PROGRESS NOTES
Subjective:      Patient ID: Harinder Elliott is a 33 y.o. male.    Vitals:  weight is 77.5 kg (170 lb 13.7 oz). His oral temperature is 98.2 °F (36.8 °C). His blood pressure is 145/77 (abnormal) and his pulse is 72. His respiration is 17 and oxygen saturation is 98%.     Chief Complaint: Other Misc (Possible strep test and sti test. - Entered by patient), Exposure to STD, and Sore Throat    32 y/o male presents to  today, with his wife who is being seen for the same--c/o chlamydia exposure. Denies penile discharge.   Pt also reports he is feeling ill, and his daughter recently had strep throat. He is also having chills and body aches x1 week.     Exposure to STD  The patient's pertinent negatives include no penile discharge or penile pain. The current episode started in the past 7 days. The problem has been unchanged. The patient is experiencing no pain. Associated symptoms include a sore throat. Pertinent negatives include no abdominal pain, coughing, dysuria, fever, flank pain, frequency, headaches or urgency.   Sore Throat   The current episode started in the past 7 days. There has been no fever. The pain is at a severity of 0/10. The patient is experiencing no pain. Associated symptoms include congestion. Pertinent negatives include no abdominal pain, coughing or headaches. He has had exposure to strep. He has tried nothing for the symptoms.       Constitution: Positive for fatigue. Negative for fever.   HENT:  Positive for congestion, postnasal drip and sore throat.    Respiratory:  Negative for cough and wheezing.    Gastrointestinal:  Negative for abdominal pain.   Genitourinary:  Negative for dysuria, frequency, urgency, flank pain, hematuria, genital sore, penile discharge and penile pain.   Neurological:  Negative for headaches.      Objective:     Physical Exam   Constitutional: He is oriented to person, place, and time. He appears well-developed. He is cooperative.  Non-toxic appearance. He does  not appear ill. No distress.   HENT:   Head: Normocephalic.   Ears:   Right Ear: Hearing, tympanic membrane, external ear and ear canal normal.   Left Ear: Hearing, tympanic membrane, external ear and ear canal normal.   Nose: Congestion present. No mucosal edema, rhinorrhea or nasal deformity. No epistaxis. Right sinus exhibits no maxillary sinus tenderness and no frontal sinus tenderness. Left sinus exhibits no maxillary sinus tenderness and no frontal sinus tenderness.   Mouth/Throat: Uvula is midline and mucous membranes are normal. Mucous membranes are moist. No trismus in the jaw. Normal dentition. No uvula swelling. Posterior oropharyngeal erythema present. No oropharyngeal exudate or posterior oropharyngeal edema. Oropharynx is clear.   Eyes: Conjunctivae and lids are normal. No scleral icterus.   Neck: Trachea normal and phonation normal. Neck supple. No edema present. No erythema present. No neck rigidity present.   Cardiovascular: Normal rate and regular rhythm.   Pulmonary/Chest: Effort normal and breath sounds normal. No respiratory distress. He has no decreased breath sounds. He has no wheezes. He has no rhonchi.   Abdominal: Normal appearance.   Musculoskeletal: Normal range of motion.         General: No deformity. Normal range of motion.   Neurological: He is alert and oriented to person, place, and time. He exhibits normal muscle tone. Coordination normal.   Skin: Skin is warm, dry, intact, not diaphoretic and not pale.   Psychiatric: His speech is normal and behavior is normal. Judgment and thought content normal.   Nursing note and vitals reviewed.      Assessment:     1. Viral URI    2. Risk for sexually transmitted disease        Plan:       Viral URI  -     POCT Strep A, Molecular  -     POCT Influenza A/B MOLECULAR  -     Cancel: C.trach/N.gonor AMP RNA    Risk for sexually transmitted disease  -     C. trachomatis/N. gonorrhoeae by AMP DNA [facility]; Urine  -     HIV 1/2 Ag/Ab (4th Gen)  -      RPR  -     HEPATITIS C ANTIBODY  -     doxycycline (VIBRAMYCIN) 100 MG Cap; Take 1 capsule (100 mg total) by mouth every 12 (twelve) hours. for 7 days  Dispense: 14 capsule; Refill: 0      Patient Instructions   Results should be back in 2-3 days  Practice safe sex with condoms to prevent STIs  If tests are positive, avoid sex for 2 weeks     Oral fluids  Rest  Hot, steamy showers  Blow nose often  Ibuprofen or acetaminophen for aches/pain

## 2023-11-13 NOTE — PATIENT INSTRUCTIONS
Results should be back in 2-3 days  Practice safe sex with condoms to prevent STIs  If tests are positive, avoid sex for 2 weeks     Oral fluids  Rest  Hot, steamy showers  Blow nose often  Ibuprofen or acetaminophen for aches/pain    
Calm

## 2023-11-14 LAB — RPR SER QL: NORMAL

## 2023-11-15 LAB
C TRACH DNA SPEC QL NAA+PROBE: NOT DETECTED
N GONORRHOEA DNA SPEC QL NAA+PROBE: NOT DETECTED

## 2023-11-16 ENCOUNTER — TELEPHONE (OUTPATIENT)
Dept: URGENT CARE | Facility: CLINIC | Age: 33
End: 2023-11-16
Payer: COMMERCIAL

## 2023-11-20 ENCOUNTER — PATIENT MESSAGE (OUTPATIENT)
Dept: PSYCHIATRY | Facility: CLINIC | Age: 33
End: 2023-11-20
Payer: COMMERCIAL

## 2023-11-20 DIAGNOSIS — F90.0 ATTENTION DEFICIT HYPERACTIVITY DISORDER (ADHD), PREDOMINANTLY INATTENTIVE TYPE: ICD-10-CM

## 2023-11-20 RX ORDER — DEXTROAMPHETAMINE SACCHARATE, AMPHETAMINE ASPARTATE MONOHYDRATE, DEXTROAMPHETAMINE SULFATE AND AMPHETAMINE SULFATE 2.5; 2.5; 2.5; 2.5 MG/1; MG/1; MG/1; MG/1
10 CAPSULE, EXTENDED RELEASE ORAL EVERY MORNING
Qty: 30 CAPSULE | Refills: 0 | Status: SHIPPED | OUTPATIENT
Start: 2023-11-24 | End: 2023-11-30 | Stop reason: SDUPTHER

## 2023-11-20 RX ORDER — DEXTROAMPHETAMINE SACCHARATE, AMPHETAMINE ASPARTATE MONOHYDRATE, DEXTROAMPHETAMINE SULFATE AND AMPHETAMINE SULFATE 2.5; 2.5; 2.5; 2.5 MG/1; MG/1; MG/1; MG/1
10 CAPSULE, EXTENDED RELEASE ORAL EVERY MORNING
Qty: 30 CAPSULE | Refills: 0 | OUTPATIENT
Start: 2023-11-20 | End: 2023-12-20

## 2023-11-30 RX ORDER — DEXTROAMPHETAMINE SACCHARATE, AMPHETAMINE ASPARTATE MONOHYDRATE, DEXTROAMPHETAMINE SULFATE AND AMPHETAMINE SULFATE 2.5; 2.5; 2.5; 2.5 MG/1; MG/1; MG/1; MG/1
10 CAPSULE, EXTENDED RELEASE ORAL EVERY MORNING
Qty: 30 CAPSULE | Refills: 0 | Status: SHIPPED | OUTPATIENT
Start: 2023-11-30 | End: 2023-12-30

## 2023-12-28 RX ORDER — ESCITALOPRAM OXALATE 10 MG/1
10 TABLET ORAL DAILY
Qty: 90 TABLET | Refills: 0 | Status: SHIPPED | OUTPATIENT
Start: 2023-12-28

## 2024-01-02 ENCOUNTER — OFFICE VISIT (OUTPATIENT)
Dept: PSYCHIATRY | Facility: CLINIC | Age: 34
End: 2024-01-02
Payer: COMMERCIAL

## 2024-01-02 VITALS
WEIGHT: 170.5 LBS | SYSTOLIC BLOOD PRESSURE: 125 MMHG | DIASTOLIC BLOOD PRESSURE: 77 MMHG | BODY MASS INDEX: 23.78 KG/M2 | HEART RATE: 72 BPM

## 2024-01-02 DIAGNOSIS — F41.1 GAD (GENERALIZED ANXIETY DISORDER): ICD-10-CM

## 2024-01-02 DIAGNOSIS — F90.0 ATTENTION DEFICIT HYPERACTIVITY DISORDER (ADHD), PREDOMINANTLY INATTENTIVE TYPE: Primary | ICD-10-CM

## 2024-01-02 DIAGNOSIS — F41.0 PANIC ATTACKS: ICD-10-CM

## 2024-01-02 PROCEDURE — 99999 PR PBB SHADOW E&M-EST. PATIENT-LVL III: CPT | Mod: PBBFAC,,, | Performed by: NURSE PRACTITIONER

## 2024-01-02 PROCEDURE — 99214 OFFICE O/P EST MOD 30 MIN: CPT | Mod: S$GLB,,, | Performed by: NURSE PRACTITIONER

## 2024-01-02 PROCEDURE — 3078F DIAST BP <80 MM HG: CPT | Mod: CPTII,S$GLB,, | Performed by: NURSE PRACTITIONER

## 2024-01-02 PROCEDURE — 3008F BODY MASS INDEX DOCD: CPT | Mod: CPTII,S$GLB,, | Performed by: NURSE PRACTITIONER

## 2024-01-02 PROCEDURE — 3074F SYST BP LT 130 MM HG: CPT | Mod: CPTII,S$GLB,, | Performed by: NURSE PRACTITIONER

## 2024-01-02 RX ORDER — DEXMETHYLPHENIDATE HYDROCHLORIDE 10 MG/1
10 TABLET ORAL DAILY
Qty: 30 TABLET | Refills: 0 | Status: SHIPPED | OUTPATIENT
Start: 2024-01-02 | End: 2024-01-03

## 2024-01-02 RX ORDER — ESCITALOPRAM OXALATE 10 MG/1
TABLET ORAL
Qty: 45 TABLET | Refills: 2 | Status: SHIPPED | OUTPATIENT
Start: 2024-01-02

## 2024-01-02 NOTE — PROGRESS NOTES
Outpatient Psychiatry Follow-Up Visit (MD/NP)    1/2/2024     The patient location is: [facility]  The chief complaint leading to consultation is: Anxiety and panic attacks      Clinical Status of Patient:  Outpatient (Ambulatory)    Chief Complaint:  Harinder Elliott is a 33 y.o. male who presents today for follow-up of anxiety and panic attacks .  Met with patient.      Interval History and Content of Current Session:  Interim Events/Subjective Report/Content of Current Session:  He arrived on time for his in person visit. He stated he finds adderall xr 10 mg po daily to be somewhat helpful with focus and it increases his anxiety. He stated Focalin  2.5 mg po BID was not effective in treating his ADHD but didn't increase anxiety and is interested in increasing Focalin to 10 mg po daily. 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 He stated he is still taking Lexapro 10 mg po daily to treat his anxiety finds it not as effective as it used to in treating his anxiety. He agreed to increase lexapro 10 15 mg po daily.  He stated he only takes xanax 0.25 mg po daily prn as needed for severe panic attacks and rarely takes it.    He reported no panic attack since his last visit and he finds is treatment plan and his breathing and his grounding skills are helpful in managing his anxiety and panic attacks.     He rated his anxiety at 7-8/10 with 10/10 being the most severe. He denied feeling depressed and denied feelings of guilt, helplessness, hopelessness, anhedonia, amotivation, or fatigue. He reported okay sleep. Patient denied SI/HI/AH/VH. No objective signs of ozzie, hypomania, or psychosis. He denied alcohol abuse or any type of illicit drug use. He reported problems with attentions and focus that interfere with her daily functioning.       Psychiatric Review Of Systems - Is patient experiencing or having changes in:  sleep: no. Gets only about 6-7 hours of sleep per  "night  appetite: no  weight: yes lost some weight due to diet and exercise  energy/anergy: normal  interest/pleasure/anhedonia: no  Motivation: no  somatic symptoms: no  anxiety/panic: yes.   guilty/hopelessness: no  concentration: yes   S.I.B.s/risky behavior: no  Irritability: yes but mild   Racing thoughts: yes. Only at night  Impulsive behaviors: no  Paranoia:no  AVH:no  Suicidal thoughts/plan/intent: no    Current Medication:  Xanax (took 1/2 xanax) Rarely takes it  Lexapro 10 mg po daily  Adderall 10 mg po daily        Previous Medications:  Paxil (was not effective and didn't like the way he felt while taking it  Zoloft 75 mg po daily ineffective and "decreased sex drive and apathy".        ADHD ROS     A. A persistent pattern of inattention and/or hyperactivity-impulsivity that interferes with functioning  or development, as characterized by (1) and/or (2):  1. Inattention: Six (or more) of the following symptoms have persisted for at least 6 months to a degree that is inconsistent with developmental level and that negatively impacts directly on social and academic/occupational activities:  Note: The symptoms are not solely a manifestation of oppositional behavior, defiance, hostility, or failure to understand tasks or instructions.      For older adolescents and adults (age 17 and older), at least five symptoms are required:      Patient endorses:   a. Often fails to give close attention to details or makes careless mistakes in schoolwork,  at work, or during other activities (e.g., overlooks or misses details, work is inaccurate). No   b. Often has difficulty sustaining attention in tasks or play activities (e.g., has difficulty remaining. Yes, "I have difficulties with readying. I find myself reading 2 pages while I am thinking of some else and I have to reread it again. My wife will be talking to me and I would not be listening to what she is saying. In zoom meetings it's difficult to pay attention. I " "find my self checking my emails".  focused during lectures, conversations, or lengthy reading).  c. Often does not seem to listen when spoken to directly (e.g., mind seems elsewhere, even in  the absence of any obvious distraction). Yes, "my wife will be talking to me and I would be focusing on something else and it looks like my brain is going about something else".  d. Often does not follow through on instructions and fails to finish schoolwork, chores, or duties. in the workplace (e.g., starts tasks but quickly loses focus and is easily sidetracked). Yes, "I get distracted and side tracked easily".  e. Often has difficulty organizing tasks and activities (e.g., difficulty managing sequential tasks;  difficulty keeping materials and belongings in order; messy, disorganized work; has poor time  management; fails to meet deadlines). Yes, "I have difficulties with organization in tasks. I make a list and I had to put more things on the to do list".   f. Often avoids, dislikes, or is reluctant to engage in tasks that require sustained mental  effort (e.g., schoolwork or homework; for older adolescents and adults, preparing reports,  completing forms, reviewing lengthy papers). Yes, "I have to force myself to do lengthy reports and I spend more time on them. I don't like putting the mental focus on them".  g. Often loses things necessary for tasks or activities (e.g., school materials, pencils, books, tools,  wallets, keys, paperwork, eyeglasses, mobile telephones). Yes,"I put things in the most random places. I lose things and I ask where they are. I lose my phone and pin my phone a lot".   h. Is often easily distracted by extraneous stimuli (for older adolescents and adults, may include  unrelated thoughts). Yes, "If I am going to focus on something that needs to be the only thing I am doing. I cannot have the TV on or anything in the background".  i. Is often forgetful in daily activities (e.g., doing chores, " "running errands; for older adolescents  and adults, returning calls, paying bills, keeping appointments).  Yes, "I set alarms on my phone to pay my bills. I procrastinate on paying the bills. I forget to pay them to the point the power was disconnected and may warnings to pay the water bill".     2. Hyperactivity and impulsivity: Six (or more) of the following symptoms have persisted for at least 6 months to a degree that is inconsistent with developmental level and that negatively impacts directly on social and academic/occupational activities:     Note: The symptoms are not solely a manifestation of oppositional behavior, defiance, hostility, or a failure to understand tasks or instructions. For older adolescents and adults (age 17 and older), at least five symptoms are required.     Patient endorses:   a. Often fidgets with or taps hands or feet or squirms in seat. Yes   b. Often leaves seat in situations when remaining seated is expected (e.g., leaves his or her place  in the classroom, in the office or other workplace, or in other situations that require remaining  in place). Yes sometimes  c. Often runs about or climbs in situations where it is inappropriate. (Note: In adolescents or  adults, may be limited to feeling restless.) No  d. Often unable to play or engage in leisure activities quietly. Yes, "I do feel I need to talk"  e. Is often on the go, acting as if driven by a motor (e.g., is unable to be or uncomfortable  being still for extended time, as in restaurants, meetings; may be experienced by others as  being restless or difficult to keep up with). Yes, "I don't like to sit still at home".   f. Often talks excessively. Yes,   g. Often blurts out an answer before a question has been completed (e.g., completes peoples  sentences; cannot wait for turn in conversation). Yes, "I finish people's sentences a lot which is a bad quality"  h. Often has difficulty waiting his or her turn (e.g., while " "waiting in line). Yes, "I hate waiting in line or in traffic"  i. Often interrupts or intrudes on others (e.g., butts into conversations, games, or activities; may  start using other peoples things without asking or receiving permission; for adolescents and  adults, may intrude into or take over what others are doing). Yes. "I finish their sentences"   B. Several inattentive or hyperactive-impulsive symptoms were present prior to age 12 years.  C. Several inattentive or hyperactive-impulsive symptoms are present in two or more settings (e.g., at home, school, or work; with friends or relatives; in other activities).  D. There is clear evidence that the symptoms interfere with, or reduce the quality of, social, academic, or occupational functioning.  E. The symptoms do not occur exclusively during the course of schizophrenia or another psychotic disorder and are not better explained by another mental disorder (e.g., mood disorder, anxiety disorder, dissociative disorder, personality disorder, substance intoxication or withdrawal).     Several inattentive or hyperactive-impulsive symptoms were present prior to 12 years: Y    He stated she was not diagnosed with ADHD in childhood but struggled with some subjects in childhood. He stated, "I had some issues with a teacher and thought I was not paying attention and caused patient and teacher that year". As an adult, "I feel overall very stressed and anxious because I cannot focus enough and I procrastinate. I forget to pay my pills on time and have to pay late fees, got warned few times of getting the water cut off, and one time I had the power cut off. There is work stress because of procrastination that leads to frustrating and working late at night. There were times I was in meetings without knowing what I need to do because I was not listening well and have to go ask my boss on what was said and what I need to do. I have increased anxiety due to not meeting the " "deadlines at times. I forget our friends birthday parties and that happened more than once that affected the relationship with our friends".     Patient reported attention deficit hyperactivity symptoms of having trouble focusing on meetings and conversations, being distracted during conversations, being disorganized, having trouble prioritizing tasks, avoiding lengthy mental tasks, being easily distracted, forgetfulness, and restless and fidgetiness.  Problems happen at home, the community, and occupationally. These symptoms have been happening over patient's entire life. Patient denied any history of heart palpitations, syncope, dizziness, dyspnea on exertion, shortness of breath, or chest pain. He denied any family history of arrhythmias, enlarged heart, or sudden cardiac death. No history of seizures. His blood pressure is within normal limits. We discussed stimulants and no stimulant treatment options. I discussed in detail the risks of stimulants including risk of abuse, tolerance, dependence, insomnia, and anxiety. Patient verbalized understanding.      Psychotherapy:  Target symptoms: anxiety , panic attacks  Why chosen therapy is appropriate versus another modality: relevant to diagnosis, patient responds to this modality, evidence based practice  Outcome monitoring methods: self-report, observation  Therapeutic intervention type: insight oriented psychotherapy, behavior modifying psychotherapy, supportive psychotherapy  Topics discussed/themes: building skills sets for symptom management, symptom recognition  The patient's response to the intervention is accepting, motivated. The patient's progress toward treatment goals is good.   Duration of intervention: 5 minutes.    Review of Systems   PSYCHIATRIC: Pertinant items are noted in the narrative.  CONSTITUTIONAL: No weight gain or loss.   MUSCULOSKELETAL: No pain or stiffness of the joints.  NEUROLOGIC: No weakness, sensory changes, seizures, confusion, " "memory loss, tremor or other abnormal movements.  ENDOCRINE: No polydipsia or polyuria.  INTEGUMENTARY: No rashes or lacerations.  EYES: No exophthalmos, jaundice or blindness.  ENT: No dizziness, tinnitus or hearing loss.  RESPIRATORY: No shortness of breath.  CARDIOVASCULAR: No tachycardia or chest pain.  GASTROINTESTINAL: No nausea, vomiting, pain, constipation or diarrhea.  GENITOURINARY: No frequency, dysuria or sexual dysfunction.  HEMATOLOGIC/LYMPHATIC: No excessive bleeding, prolonged or excessive bleeding after dental extraction/injury.  ALLERGIC/IMMUNOLOGIC: No allergic response to materials, foods or animals at this time.    Past Medical, Family and Social History: The patient's past medical, family and social history have been reviewed and updated as appropriate within the electronic medical record - see encounter notes.    Compliance: yes    Side effects:  sexual side effects  and apathy with Zoloft 75 mg daily    Risk Parameters:  Patient reports no suicidal ideation  Patient reports no homicidal ideation  Patient reports no self-injurious behavior  Patient reports no violent behavior    Exam (detailed: at least 9 elements; comprehensive: all 15 elements)   Constitutional  Vitals:  Most recent vital signs, dated greater than 90 days prior to this appointment, were reviewed.   Vitals:    01/02/24 1006   BP: 125/77   Pulse: 72   Weight: 77.3 kg (170 lb 8.4 oz)        General:  unremarkable, age appropriate     Musculoskeletal  Muscle Strength/Tone:  not examined   Gait & Station:  non-ataxic     Psychiatric  Speech:  no latency; no press   Mood & Affect:  "okay" but increased anxiety  congruent and appropriate   Thought Process:  normal and logical   Associations:  intact   Thought Content:  normal, no suicidality, no homicidality, delusions, or paranoia   Insight:  intact, has awareness of illness   Judgement: behavior is adequate to circumstances   Orientation:  grossly intact   Memory: intact for " content of interview   Language: grossly intact, able to name, able to repeat   Attention Span & Concentration:  Easily distracted   but improved with adderall   Fund of Knowledge:  intact and appropriate to age and level of education, familiar with aspects of current personal life     Assessment and Diagnosis   Status/Progress: Based on the examination today, the patient's problem(s) is/are improved.  New problems have been presented today.   Co-morbidities, Diagnostic uncertainty and Lack of compliance are not complicating management of the primary condition.  There are no active rule-out diagnoses for this patient at this time.     General Impression: Doing well. Anxiety increasing and will increaser lexapro to 15 mg po daily for anxiety. Panic attacks are managed with medications and coping skills. He is interested in trying Focalin again and will increase Focalin to 10 mg po daily because adderall increased his anxiety. He is not interested in Strattera due to side effects. Future plan to try Qelbree if Focalin is not effective      Patient meets the criteria for generalized anxiety disorder; panic attacks, and some depressive symptoms that they don't meed the criteria for MDD because his depressive symptoms never lasted 2 weeks more days than not.          ICD-10-CM ICD-9-CM   1. Attention deficit hyperactivity disorder (ADHD), predominantly inattentive type  F90.0 314.00   2. JOSE RAFAEL (generalized anxiety disorder)  F41.1 300.02   3. Panic attacks  F41.0 300.01             Intervention/Counseling/Treatment Plan     Treatment Plan/Recommendations:   Medication Management: Continue current medications. The risks and benefits of medication were discussed with the patient.  In person visit 1/2/2024  -Increase Lexapro  to 15 mg po daily for anxiety and panic attacks  -Takes xanax 0.25 mg po daily prn rarely for panic attacks and his last refill was 8/10/22 # 10 tabs.  Informed pt of the risks of continuous  Benzodiazepine use including tolerance, dependence, dizziness, drowsiness, memory problems, and withdrawals that may be life threatening upon abrupt cessation. Also advised not to take Benzodiazepines with Opiates or other sedatives and also not to drive or operate heavy machinery while using Benzodiazepines. Avoid alcohol with Benzodiazapine. Patient agreed to take it and verbalized understanding    -Recommended psychotherapy   We discussed risk of Serotonin Syndrome including symptoms in order of appearance: diarrhea, restlessness, extreme agitation, autonomic instability, hyperthermia, rigidity, coma, and death.  Reviewed VS and all within the normal range  Reviewed UDS and it negative for all substance and he signed a controlled substance contract  Discussed treatment options such as stimulants and non stimulants for ADHD  Went over the risks, benefits, side effects and alternatives of taking Focalin, adderall, Qelbree, Wellbutrin, Clonidine, Tenex, Strattera, etc. He agreed to restart Focalin 10 mg po daily  Start Focalin xr 10 mg po daily for ADHD  Discontinue adderall xr 10 mg po daily for ADHD due to increased anxiety  Patient has no contraindications: no h/o allergic rxn, agitation, tourette's, arrhythmia, cardiovascular disease, cardiac structural abnormalities, hyperthyroidism, glaucoma, Other psychiatric illness, etc.   Discussed risk of possible increase in anxiety- patient express understanding.   Discussed informed consent, diagnosis, risks and benefits of proposed treatment vs alternative treatments vs no treatment, and potential side effects of these treatments (death, dependency, psychosis, ozzie, aggression, TN, ticks, MI, stroke, arrhythmia, seizure, anaphylaxis or other allergic reactions, leukopenia, nervousness, anorexia, insomnia, tachycardia, palpitations, dizziness, BP changes, HR changes, visual disturbance, etc.). The patient expresses understanding of the above and displays the capacity  to agree with this treatment given said understanding. Patient also agrees that, currently, the benefits outweigh the risks and would like to pursue treatment at this time.    -Continue utilization of effective CBT skills, positive self-talk, cognitive reframing, meditation, mindfulness, deep breathing, and relaxations skills.  --Discussed informed consent, diagnosis, risks and benefits of proposed treatment above vs alternative treatments vs no treatment, and potential side effects of these treatments. The patient expresses understanding of the above and displays the capacity to agree with this treatment given said understanding. Patient also agrees that, currently, the benefits outweigh the risks and would like to pursue treatment at this time. Answered all questions and discussed follow up. Encouraged patient to contact us with any questions or concerns.   -Encouraged Patient to keep future appointments.  -Take medications as prescribed   -Patient to present to Emergency Department for thoughts to harm herself or others    Return to Clinic: 1 month or earlier as needed      MINERVA Mills-BC

## 2024-01-02 NOTE — PATIENT INSTRUCTIONS
We discussed risk of Serotonin Syndrome including symtpoms in order of appearance: diarrhea, restlessness, extreme agitation, autonomic instability, fever, muscle rigidity, coma, and death.

## 2024-01-03 ENCOUNTER — PATIENT MESSAGE (OUTPATIENT)
Dept: PSYCHIATRY | Facility: CLINIC | Age: 34
End: 2024-01-03
Payer: COMMERCIAL

## 2024-01-03 DIAGNOSIS — F90.0 ATTENTION DEFICIT HYPERACTIVITY DISORDER (ADHD), PREDOMINANTLY INATTENTIVE TYPE: Primary | ICD-10-CM

## 2024-01-03 RX ORDER — METHYLPHENIDATE HYDROCHLORIDE 18 MG/1
18 TABLET ORAL DAILY
Qty: 30 TABLET | Refills: 0 | Status: SHIPPED | OUTPATIENT
Start: 2024-01-03 | End: 2024-02-04

## 2024-02-06 ENCOUNTER — PATIENT MESSAGE (OUTPATIENT)
Dept: PSYCHIATRY | Facility: CLINIC | Age: 34
End: 2024-02-06
Payer: COMMERCIAL

## 2024-02-06 RX ORDER — METHYLPHENIDATE HYDROCHLORIDE 27 MG/1
27 TABLET ORAL EVERY MORNING
Qty: 30 TABLET | Refills: 0 | Status: SHIPPED | OUTPATIENT
Start: 2024-02-06 | End: 2024-03-07

## 2024-02-26 ENCOUNTER — OFFICE VISIT (OUTPATIENT)
Dept: URGENT CARE | Facility: CLINIC | Age: 34
End: 2024-02-26
Payer: COMMERCIAL

## 2024-02-26 VITALS
HEIGHT: 71 IN | HEART RATE: 70 BPM | DIASTOLIC BLOOD PRESSURE: 81 MMHG | WEIGHT: 175.06 LBS | OXYGEN SATURATION: 98 % | RESPIRATION RATE: 17 BRPM | TEMPERATURE: 98 F | SYSTOLIC BLOOD PRESSURE: 126 MMHG | BODY MASS INDEX: 24.51 KG/M2

## 2024-02-26 DIAGNOSIS — U07.1 COVID-19 VIRUS INFECTION: Primary | ICD-10-CM

## 2024-02-26 LAB
CTP QC/QA: YES
CTP QC/QA: YES
POC MOLECULAR INFLUENZA A AGN: NEGATIVE
POC MOLECULAR INFLUENZA B AGN: NEGATIVE
SARS-COV-2 AG RESP QL IA.RAPID: POSITIVE

## 2024-02-26 PROCEDURE — 87502 INFLUENZA DNA AMP PROBE: CPT | Mod: QW,S$GLB,, | Performed by: FAMILY MEDICINE

## 2024-02-26 PROCEDURE — 87811 SARS-COV-2 COVID19 W/OPTIC: CPT | Mod: QW,S$GLB,, | Performed by: FAMILY MEDICINE

## 2024-02-26 PROCEDURE — 99213 OFFICE O/P EST LOW 20 MIN: CPT | Mod: S$GLB,,, | Performed by: FAMILY MEDICINE

## 2024-02-26 RX ORDER — BENZONATATE 100 MG/1
100 CAPSULE ORAL EVERY 6 HOURS PRN
Qty: 30 CAPSULE | Refills: 1 | Status: SHIPPED | OUTPATIENT
Start: 2024-02-26 | End: 2025-02-25

## 2024-02-26 RX ORDER — FLUTICASONE PROPIONATE 50 MCG
1 SPRAY, SUSPENSION (ML) NASAL DAILY
Qty: 9.9 ML | Refills: 0 | Status: SHIPPED | OUTPATIENT
Start: 2024-02-26

## 2024-02-26 NOTE — PROGRESS NOTES
"Subjective:      Patient ID: Harinder Elliott is a 33 y.o. male.    Vitals:  height is 5' 10.98" (1.803 m) and weight is 79.4 kg (175 lb 0.7 oz). His tympanic temperature is 98.1 °F (36.7 °C). His blood pressure is 126/81 and his pulse is 70. His respiration is 17 and oxygen saturation is 98%.     Chief Complaint: Cough    This is a 33 y.o male who presents today with chief complaint of productive cough, runny nose, body aches and fatigue that started about two days ago.   Home tx: Advil and sudafed and reports no improvement.   Patient denies SOB and chest pain.     Cough  This is a new problem. The current episode started in the past 7 days. The cough is Productive of sputum. Associated symptoms include chills, nasal congestion and a sore throat. Pertinent negatives include no chest pain, ear congestion, ear pain, fever, headaches, heartburn, hemoptysis, myalgias, postnasal drip, rash, rhinorrhea, shortness of breath, sweats, weight loss or wheezing. He has tried OTC cough suppressant for the symptoms. The treatment provided no relief. There is no history of asthma, bronchiectasis, bronchitis, COPD, emphysema, environmental allergies or pneumonia.       Constitution: Positive for chills. Negative for fever.   HENT:  Positive for sore throat. Negative for ear pain and postnasal drip.    Cardiovascular:  Negative for chest pain.   Respiratory:  Positive for cough. Negative for bloody sputum, shortness of breath and wheezing.    Gastrointestinal:  Negative for heartburn.   Musculoskeletal:  Negative for muscle ache.   Skin:  Negative for rash.   Allergic/Immunologic: Negative for environmental allergies.   Neurological:  Negative for headaches.      Objective:     Physical Exam   Constitutional: normal  HENT:   Head: Normocephalic and atraumatic.   Nose: Congestion present.   Mouth/Throat: Mucous membranes are moist. Posterior oropharyngeal erythema present.   Eyes: Pupils are equal, round, and reactive to " light. Extraocular movement intact   Neck: Neck supple.   Cardiovascular: Normal rate, regular rhythm, normal heart sounds and normal pulses.   Pulmonary/Chest: Effort normal and breath sounds normal.   Abdominal: Normal appearance. Soft.   Neurological: He is alert.   Nursing note and vitals reviewed.    Results for orders placed or performed in visit on 02/26/24   SARS Coronavirus 2 Antigen, POCT Manual Read   Result Value Ref Range    SARS Coronavirus 2 Antigen Positive (A) Negative     Acceptable Yes    POCT Influenza A/B MOLECULAR   Result Value Ref Range    POC Molecular Influenza A Ag Negative Negative, Not Reported    POC Molecular Influenza B Ag Negative Negative, Not Reported     Acceptable Yes       Assessment:     1. COVID-19 virus infection        Plan:       COVID-19 virus infection  -     SARS Coronavirus 2 Antigen, POCT Manual Read  -     POCT Influenza A/B MOLECULAR  -     benzonatate (TESSALON PERLES) 100 MG capsule; Take 1 capsule (100 mg total) by mouth every 6 (six) hours as needed for Cough.  Dispense: 30 capsule; Refill: 1  -     fluticasone propionate (FLONASE) 50 mcg/actuation nasal spray; 1 spray (50 mcg total) by Each Nostril route once daily.  Dispense: 9.9 mL; Refill: 0    Declined Paxlovid. Discussed symptom monitoring, contact notification and isolation period of 5 days. ER precautions

## 2024-02-28 ENCOUNTER — PATIENT MESSAGE (OUTPATIENT)
Dept: RESEARCH | Facility: HOSPITAL | Age: 34
End: 2024-02-28
Payer: COMMERCIAL

## 2024-03-13 ENCOUNTER — PATIENT MESSAGE (OUTPATIENT)
Dept: PSYCHIATRY | Facility: CLINIC | Age: 34
End: 2024-03-13
Payer: COMMERCIAL

## 2024-03-13 DIAGNOSIS — F90.0 ATTENTION DEFICIT HYPERACTIVITY DISORDER (ADHD), PREDOMINANTLY INATTENTIVE TYPE: Primary | ICD-10-CM

## 2024-03-13 RX ORDER — LISDEXAMFETAMINE DIMESYLATE 30 MG/1
30 CAPSULE ORAL EVERY MORNING
Qty: 30 CAPSULE | Refills: 0 | Status: SHIPPED | OUTPATIENT
Start: 2024-03-13 | End: 2024-04-19 | Stop reason: SDUPTHER

## 2024-03-14 ENCOUNTER — OFFICE VISIT (OUTPATIENT)
Dept: PRIMARY CARE CLINIC | Facility: CLINIC | Age: 34
End: 2024-03-14
Payer: COMMERCIAL

## 2024-03-14 ENCOUNTER — LAB VISIT (OUTPATIENT)
Dept: LAB | Facility: HOSPITAL | Age: 34
End: 2024-03-14
Payer: COMMERCIAL

## 2024-03-14 VITALS
OXYGEN SATURATION: 98 % | HEIGHT: 70 IN | HEART RATE: 89 BPM | WEIGHT: 173.5 LBS | DIASTOLIC BLOOD PRESSURE: 84 MMHG | SYSTOLIC BLOOD PRESSURE: 132 MMHG | RESPIRATION RATE: 18 BRPM | BODY MASS INDEX: 24.84 KG/M2

## 2024-03-14 DIAGNOSIS — Z86.010 HISTORY OF COLON POLYPS: ICD-10-CM

## 2024-03-14 DIAGNOSIS — Z00.00 PHYSICAL EXAM: Primary | ICD-10-CM

## 2024-03-14 DIAGNOSIS — R53.83 FATIGUE, UNSPECIFIED TYPE: ICD-10-CM

## 2024-03-14 DIAGNOSIS — G43.009 MIGRAINE WITHOUT AURA AND WITHOUT STATUS MIGRAINOSUS, NOT INTRACTABLE: ICD-10-CM

## 2024-03-14 DIAGNOSIS — F43.23 ADJUSTMENT DISORDER WITH MIXED ANXIETY AND DEPRESSED MOOD: ICD-10-CM

## 2024-03-14 LAB
ALBUMIN SERPL BCP-MCNC: 4.5 G/DL (ref 3.5–5.2)
ALP SERPL-CCNC: 77 U/L (ref 55–135)
ALT SERPL W/O P-5'-P-CCNC: 13 U/L (ref 10–44)
ANION GAP SERPL CALC-SCNC: 8 MMOL/L (ref 8–16)
AST SERPL-CCNC: 16 U/L (ref 10–40)
BASOPHILS # BLD AUTO: 0.03 K/UL (ref 0–0.2)
BASOPHILS NFR BLD: 0.6 % (ref 0–1.9)
BILIRUB SERPL-MCNC: 0.4 MG/DL (ref 0.1–1)
BUN SERPL-MCNC: 13 MG/DL (ref 6–20)
CALCIUM SERPL-MCNC: 9.8 MG/DL (ref 8.7–10.5)
CHLORIDE SERPL-SCNC: 104 MMOL/L (ref 95–110)
CHOLEST SERPL-MCNC: 149 MG/DL (ref 120–199)
CHOLEST/HDLC SERPL: 2.7 {RATIO} (ref 2–5)
CO2 SERPL-SCNC: 27 MMOL/L (ref 23–29)
CREAT SERPL-MCNC: 1.1 MG/DL (ref 0.5–1.4)
CRP SERPL-MCNC: <0.3 MG/L (ref 0–8.2)
DIFFERENTIAL METHOD BLD: ABNORMAL
EOSINOPHIL # BLD AUTO: 0.1 K/UL (ref 0–0.5)
EOSINOPHIL NFR BLD: 0.9 % (ref 0–8)
ERYTHROCYTE [DISTWIDTH] IN BLOOD BY AUTOMATED COUNT: 12.1 % (ref 11.5–14.5)
ERYTHROCYTE [SEDIMENTATION RATE] IN BLOOD BY PHOTOMETRIC METHOD: <2 MM/HR (ref 0–23)
EST. GFR  (NO RACE VARIABLE): >60 ML/MIN/1.73 M^2
ESTIMATED AVG GLUCOSE: 100 MG/DL (ref 68–131)
GLUCOSE SERPL-MCNC: 85 MG/DL (ref 70–110)
HBA1C MFR BLD: 5.1 % (ref 4–5.6)
HCT VFR BLD AUTO: 41.2 % (ref 40–54)
HDLC SERPL-MCNC: 55 MG/DL (ref 40–75)
HDLC SERPL: 36.9 % (ref 20–50)
HGB BLD-MCNC: 13.7 G/DL (ref 14–18)
IMM GRANULOCYTES # BLD AUTO: 0.02 K/UL (ref 0–0.04)
IMM GRANULOCYTES NFR BLD AUTO: 0.4 % (ref 0–0.5)
LDLC SERPL CALC-MCNC: 85.2 MG/DL (ref 63–159)
LYMPHOCYTES # BLD AUTO: 1.7 K/UL (ref 1–4.8)
LYMPHOCYTES NFR BLD: 31 % (ref 18–48)
MCH RBC QN AUTO: 27.8 PG (ref 27–31)
MCHC RBC AUTO-ENTMCNC: 33.3 G/DL (ref 32–36)
MCV RBC AUTO: 84 FL (ref 82–98)
MONOCYTES # BLD AUTO: 0.4 K/UL (ref 0.3–1)
MONOCYTES NFR BLD: 7.4 % (ref 4–15)
NEUTROPHILS # BLD AUTO: 3.2 K/UL (ref 1.8–7.7)
NEUTROPHILS NFR BLD: 59.7 % (ref 38–73)
NONHDLC SERPL-MCNC: 94 MG/DL
NRBC BLD-RTO: 0 /100 WBC
PLATELET # BLD AUTO: 321 K/UL (ref 150–450)
PMV BLD AUTO: 9.6 FL (ref 9.2–12.9)
POTASSIUM SERPL-SCNC: 3.7 MMOL/L (ref 3.5–5.1)
PROT SERPL-MCNC: 7.6 G/DL (ref 6–8.4)
RBC # BLD AUTO: 4.92 M/UL (ref 4.6–6.2)
SODIUM SERPL-SCNC: 139 MMOL/L (ref 136–145)
TRIGL SERPL-MCNC: 44 MG/DL (ref 30–150)
TSH SERPL DL<=0.005 MIU/L-ACNC: 0.66 UIU/ML (ref 0.4–4)
WBC # BLD AUTO: 5.39 K/UL (ref 3.9–12.7)

## 2024-03-14 PROCEDURE — 85652 RBC SED RATE AUTOMATED: CPT | Performed by: NURSE PRACTITIONER

## 2024-03-14 PROCEDURE — 99499 UNLISTED E&M SERVICE: CPT | Mod: S$GLB,,, | Performed by: NURSE PRACTITIONER

## 2024-03-14 PROCEDURE — 84443 ASSAY THYROID STIM HORMONE: CPT | Performed by: NURSE PRACTITIONER

## 2024-03-14 PROCEDURE — 36415 COLL VENOUS BLD VENIPUNCTURE: CPT | Performed by: NURSE PRACTITIONER

## 2024-03-14 PROCEDURE — 80061 LIPID PANEL: CPT | Performed by: NURSE PRACTITIONER

## 2024-03-14 PROCEDURE — 80053 COMPREHEN METABOLIC PANEL: CPT | Performed by: NURSE PRACTITIONER

## 2024-03-14 PROCEDURE — 86140 C-REACTIVE PROTEIN: CPT | Performed by: NURSE PRACTITIONER

## 2024-03-14 PROCEDURE — 85025 COMPLETE CBC W/AUTO DIFF WBC: CPT | Performed by: NURSE PRACTITIONER

## 2024-03-14 PROCEDURE — 83036 HEMOGLOBIN GLYCOSYLATED A1C: CPT | Performed by: NURSE PRACTITIONER

## 2024-03-14 NOTE — PROGRESS NOTES
"Subjective:       Patient ID: Harinder Elliott is a 33 y.o. male.    Chief Complaint: Annual Exam    Patient who is new to me presents for physical exam. He reports feeling fatigue and achy for months. He reports he eats plenty of protein for lunch and dinner, sometimes skips breakfast. He reports sleep is 7-8 hours at night does not feel restorative. Goes to sleep at 10 pm - 7 am at 3 pm feels like he needs a nap. Drink two cups of coffee in the morning and sometimes another cup of coffee at noon. He has not been able to engage in as much physical activity due to fatigue, previously was working out 3-4 x a week. Recently had covid and when he was feeling better, he did light strength training and felt he made himself "sick again." Unsure of what make fatigue better or worse. He is treated by psychiatry for ADHD and anxiety. He reports symptoms are well controlled. He has been switched on different ADHD medications due to cost and availability. Today was his first day of vyvanse. He has a history of colon polyps and is due for a colonoscopy.       Review of Systems   Constitutional:  Positive for fatigue. Negative for chills and fever.   HENT:  Negative for congestion, ear pain, sinus pressure and sore throat.    Respiratory:  Negative for shortness of breath and wheezing.    Cardiovascular:  Negative for chest pain and leg swelling.   Gastrointestinal:  Negative for abdominal distention and abdominal pain.   Genitourinary:  Negative for dysuria and flank pain.   Musculoskeletal:  Positive for myalgias.   Skin:  Negative for color change and pallor.   Neurological:  Negative for light-headedness, numbness and headaches.       Objective:      Physical Exam  Vitals and nursing note reviewed.   Constitutional:       Appearance: He is well-developed.   HENT:      Head: Normocephalic and atraumatic.      Right Ear: External ear normal.      Left Ear: External ear normal.   Eyes:      Conjunctiva/sclera: " Conjunctivae normal.      Pupils: Pupils are equal, round, and reactive to light.   Cardiovascular:      Rate and Rhythm: Normal rate and regular rhythm.   Pulmonary:      Effort: Pulmonary effort is normal.      Breath sounds: Normal breath sounds.   Abdominal:      General: Bowel sounds are normal.      Palpations: Abdomen is soft.   Musculoskeletal:         General: Normal range of motion.      Cervical back: Normal range of motion and neck supple.   Skin:     General: Skin is warm and dry.   Neurological:      Mental Status: He is alert and oriented to person, place, and time.         Assessment:       1. Physical exam    2. History of colon polyps    3. Fatigue, unspecified type    4. Migraine without aura and without status migrainosus, not intractable    5. Adjustment disorder with mixed anxiety and depressed mood        Plan:       Harinder was seen today for annual exam.    Diagnoses and all orders for this visit:    Physical exam  -     Case Request Endoscopy: COLONOSCOPY  -     Lipid Panel; Future  -     Comprehensive Metabolic Panel; Future  -     CBC Auto Differential; Future  -     TSH; Future  -     Hemoglobin A1C; Future  -     Sedimentation rate; Future  -     C-REACTIVE PROTEIN; Future    History of colon polyps  -     Case Request Endoscopy: COLONOSCOPY    Fatigue, unspecified type  -     Lipid Panel; Future  -     Comprehensive Metabolic Panel; Future  -     CBC Auto Differential; Future  -     TSH; Future  -     Hemoglobin A1C; Future  -     Sedimentation rate; Future  -     C-REACTIVE PROTEIN; Future    Migraine without aura and without status migrainosus, not intractable  Patient reports only having migraines a few times per year. Stable and chronic.     Adjustment disorder with mixed anxiety and depressed mood  JOSE RAFAEL 7 and PHQ 9 showing mild anxiety and depression. Discussed with patient if he is interested, will refer to behavioral therapist Jeferson.         Will follow up on labs.  Discussed  fatigue may be caused by many different causes.  Establish care with Dr Carrillo in 4 weeks.

## 2024-03-15 ENCOUNTER — PATIENT MESSAGE (OUTPATIENT)
Dept: PRIMARY CARE CLINIC | Facility: CLINIC | Age: 34
End: 2024-03-15
Payer: COMMERCIAL

## 2024-04-12 ENCOUNTER — OFFICE VISIT (OUTPATIENT)
Dept: PRIMARY CARE CLINIC | Facility: CLINIC | Age: 34
End: 2024-04-12
Attending: INTERNAL MEDICINE
Payer: COMMERCIAL

## 2024-04-12 VITALS
DIASTOLIC BLOOD PRESSURE: 76 MMHG | SYSTOLIC BLOOD PRESSURE: 118 MMHG | BODY MASS INDEX: 25.04 KG/M2 | HEIGHT: 70 IN | RESPIRATION RATE: 18 BRPM | OXYGEN SATURATION: 99 % | HEART RATE: 80 BPM | WEIGHT: 174.94 LBS

## 2024-04-12 DIAGNOSIS — R53.82 CHRONIC FATIGUE: ICD-10-CM

## 2024-04-12 DIAGNOSIS — F98.8 ATTENTION DEFICIT DISORDER, UNSPECIFIED HYPERACTIVITY PRESENCE: Primary | ICD-10-CM

## 2024-04-12 DIAGNOSIS — G43.009 MIGRAINE WITHOUT AURA AND WITHOUT STATUS MIGRAINOSUS, NOT INTRACTABLE: ICD-10-CM

## 2024-04-12 DIAGNOSIS — Z86.010 HISTORY OF ADENOMATOUS POLYP OF COLON: ICD-10-CM

## 2024-04-12 DIAGNOSIS — F41.9 ANXIETY: ICD-10-CM

## 2024-04-12 DIAGNOSIS — M25.561 LATERAL KNEE PAIN, RIGHT: ICD-10-CM

## 2024-04-12 PROBLEM — Z86.0101 HISTORY OF ADENOMATOUS POLYP OF COLON: Status: ACTIVE | Noted: 2024-04-12

## 2024-04-12 PROBLEM — R19.4 CHANGE IN BOWEL HABITS: Status: RESOLVED | Noted: 2021-02-03 | Resolved: 2024-04-12

## 2024-04-12 PROBLEM — F43.23 ADJUSTMENT DISORDER WITH MIXED ANXIETY AND DEPRESSED MOOD: Status: RESOLVED | Noted: 2017-01-27 | Resolved: 2024-04-12

## 2024-04-12 PROBLEM — M54.2 CERVICAL PAIN: Status: RESOLVED | Noted: 2023-04-27 | Resolved: 2024-04-12

## 2024-04-12 PROCEDURE — 3078F DIAST BP <80 MM HG: CPT | Mod: CPTII,S$GLB,, | Performed by: INTERNAL MEDICINE

## 2024-04-12 PROCEDURE — 1160F RVW MEDS BY RX/DR IN RCRD: CPT | Mod: CPTII,S$GLB,, | Performed by: INTERNAL MEDICINE

## 2024-04-12 PROCEDURE — 1159F MED LIST DOCD IN RCRD: CPT | Mod: CPTII,S$GLB,, | Performed by: INTERNAL MEDICINE

## 2024-04-12 PROCEDURE — 99204 OFFICE O/P NEW MOD 45 MIN: CPT | Mod: S$GLB,,, | Performed by: INTERNAL MEDICINE

## 2024-04-12 PROCEDURE — 3044F HG A1C LEVEL LT 7.0%: CPT | Mod: CPTII,S$GLB,, | Performed by: INTERNAL MEDICINE

## 2024-04-12 PROCEDURE — 3008F BODY MASS INDEX DOCD: CPT | Mod: CPTII,S$GLB,, | Performed by: INTERNAL MEDICINE

## 2024-04-12 PROCEDURE — 3074F SYST BP LT 130 MM HG: CPT | Mod: CPTII,S$GLB,, | Performed by: INTERNAL MEDICINE

## 2024-04-12 NOTE — ASSESSMENT & PLAN NOTE
**check iron studies and ferritin >> both looking for low numbers that could explain fatigue or high numbers that could indicate hemochromatosis   **check testosterone  **check AM cortisol  **checking IgA for 2 reasons:  1) having increased infections recently and IgA is most common form of immunodeficiency  2) family history of Celiac disease >> when evaluated for Celiac (+FH with his sister having Celiac and he had some nonspecific inflammatory changes on duodenal biopsy 3 years ago) in the past, IgA level was not concomitantly checked when TTG IgA was checked (4% of population has IgA def)

## 2024-04-12 NOTE — PATIENT INSTRUCTIONS
PLEASE REMEMBER TO CALLL US FIRST with any medical questions or concerns. We try very hard to keep you out of the emergency room if we are able to see you and help with your concern. It is one of our many ways to keep our patients healthy.   For now the best way to reach us is by calling [facility]'s direct number:  CALL OUR OFFICE AT: [phone]    You can also schedule with us through the portal.   Dr. Nas Rodriguez and DAYTON Rose are both here and available most days of the week if you need to be seen in person.  I am here every Friday but can also make myself available on other days if necessary as long as I have enough notice.  Please do not ever hesitate to reach to me via the patient portal.      Please let us know if you have any challenges getting scheduled with our behavioral therapist/ or dietitian or if you have any difficulty with any new medication(s) that were prescribed.     Dr. ROPER

## 2024-04-12 NOTE — ASSESSMENT & PLAN NOTE
"Has been on lexapro for the past 3 or 4 years which has been helpful  --recently increased from 10 to 15 mg to help combat some of the "jitteriness" from the psychostimulant for ADD  "

## 2024-04-12 NOTE — PROGRESS NOTES
"CHIEF COMPLAINT     Chief Complaint   Patient presents with    [facility]       HPI     Harinder Nas Elliott is a 33 y.o. male who presents for initial visit.    His biggest concern today is that the past few months he has been feeling more fatigued, with more frequent viral respiratory infections than ever before.  He currently has a post-infectious cough following his most recent URI that appears to be resolving.  He had been working out consistently and watching his diet, but lost a little more weight than he had intended, with his weight going down to 160 lbs.  He normally weighs around 180 lbs, and has been struggling to gain that weight back.  He weighs 174 lbs here today, but was 168 lbs at home.  He has reported a few instances of light headedness (lasting a few seconds) when going from a seated to a standing position which has never happened before.  He even recalls a few instances of needing to put his hand on counter to stabilize his balance because of some dizziness -- never lasts more than 30 seconds or so.  No palpitations or chest pain.     His dad has hyperlipidemia and hypertension.  He has a sister with Celiac disease.  His mom is healthy.     Excerpt from note from Germaine Rose, our [facility] NP from 3/14/24:  "He reports feeling fatigue and achy for months. He reports he eats plenty of protein for lunch and dinner, sometimes skips breakfast. He reports sleep is 7-8 hours at night does not feel restorative. Goes to sleep at 10 pm - 7 am at 3 pm feels like he needs a nap. Drink two cups of coffee in the morning and sometimes another cup of coffee at noon. He has not been able to engage in as much physical activity due to fatigue, previously was working out 3-4 x a week. Recently had covid and when he was feeling better, he did light strength training and felt he made himself "sick again." Unsure of what make fatigue better or worse. He is treated by psychiatry for ADHD and anxiety. He " "reports symptoms are well controlled. He has been switched on different ADHD medications due to cost and availability. Today was his first day of vyvanse. He has a history of colon polyps and is due for a colonoscopy."    She ordered a comprehensive set of labs, all of which were normal, thereby not revealing an obvious explanation for fatigue.      He did undergo colonoscopy in 2/2021 for evaluation of abdominal pain.    Results copied and pasted below:  Findings:       The terminal ileum appeared normal. Biopsies were taken with a cold        forceps for histology. Estimated blood loss was minimal.        A 2 mm polyp was found in the transverse colon. The polyp was        sessile. The polyp was removed with a cold snare. Resection and        retrieval were complete. Estimated blood loss was minimal.        A 4 mm polyp was found in the rectum. The polyp was        semi-pedunculated. The polyp was removed with a cold snare.        Resection and retrieval were complete. Estimated blood loss: none.        A few diverticula were found in the recto-sigmoid colon, sigmoid        colon, descending colon and transverse colon.        Non-bleeding internal hemorrhoids were found during retroflexion.        The hemorrhoids were mild.        The perianal and digital rectal examinations were normal.        Biopsies for histology were taken with a cold forceps from the        entire colon for evaluation of microscopic colitis. Estimated blood        loss was minimal.        A 2 mm polyp was found in the sigmoid colon. The polyp was sessile.        The polyp was removed with a cold snare. Resection and retrieval        were complete. Estimated blood loss was minimal.   Impression:           - The examined portion of the ileum was normal.                         Biopsied.                         - One 2 mm polyp in the transverse colon, removed                         with a cold snare. Resected and retrieved.                    "      - One 4 mm polyp in the rectum, removed with a cold                         snare. Resected and retrieved.                         - Diverticulosis in the recto-sigmoid colon, in the                         sigmoid colon, in the descending colon and in the                         transverse colon.                         - Non-bleeding internal hemorrhoids.                         - One 2 mm polyp in the sigmoid colon, removed with                         a cold snare. Resected and retrieved.                         - Biopsies were taken with a cold forceps from the                         entire colon for evaluation of microscopic colitis.     Pathology:  1.  Duodenum (biopsy):  Duodenal mucosa with normal villous architecture and mild increased  intraepithelial lymphocytes (see comment)  No pathogens identified  Comment: The histologic findings are non-specific and may be seen in a  variety of conditions including H. pylori gastritis, viral gastroenteritis,  drug injury, as well as mild form of gluten sensitive enteropathy. Celiac  serology may be helpful if clinically indicated.  2.  Stomach (biopsy):  Antral oxyntic mucosa with minimal chronic inflammation, non-specific  No active inflammation  No Helicobacter organisms (routine and immunostain)  3.  Colon, transverse 2 mm polyp (polypectomy):  Tubular adenoma  4.  Colon, sigmoid 2 mm polyp (polypectomy):  Minimal hyperplastic surface changes, no dysplasia  Multiple deeper levels examined the  5.  Rectal 4 mm polyp (polypectomy):  Hyperplastic polyp with erosion, no dysplasia  Multiple deeper levels examined  6.  Terminal ileum (biopsy):  Small intestinal mucosa with no significant histopathologic changes  7.  Colon, random (biopsy):  Colonic mucosa with no significant histopathologic changes  No support for microscopic colitis     He had some nonspecific histologic findings on duodenal biopsy for which in combination with very mild anemia, prompted further  "evaluation for celiac disease.  TTG IgA was negative, but I am not finding a concomitant IgA serology to screen for IgA def (which is found in 4% of people).      No AM cortisol level has been found in the chart.     He reports his migraine headaches essentially being a thing of the past.  He used to suffer from an occasional migraine for which he would take imitrex prn.  But he hasn't needed to take imitrex in at least a year and doesn't recall the last time he refilled the Rx.      He was recently diagnosed with ADD and prescribed Vyvanse recently.  He had tried adderall before that but it made him too "antsy and jittery" then changed to Concerta, but it was too expensive.  He even felt like 30 mg of Vyvanse was too much, so he has been dividing the powder from the capsule in half and taking half the powder one day and the other half the next day.     His libido has been a little low at times.  Occasional night sweats.      Lab Results   Component Value Date    WBC 5.39 03/14/2024    HGB 13.7 (L) 03/14/2024    HCT 41.2 03/14/2024    MCV 84 03/14/2024     03/14/2024         Atherosclerotic Cardiovascular Disease (ASCVD) Risk Score  The ASCVD Risk score (Ranjith DK, et al., 2019) failed to calculate for the following reasons:    The 2019 ASCVD risk score is only valid for ages 40 to 79    Past Medical History:  Past Medical History:   Diagnosis Date    Colon polyp     Depression     Migraine     Ureteral stricture        Past Surgical History:  Past Surgical History:   Procedure Laterality Date    COLON SURGERY      colon polyp    COLONOSCOPY N/A 2/3/2021    Procedure: COLONOSCOPY;  Surgeon: Graham Marino MD;  Location: [facility] ([address] [place] [address]);  Service: Endoscopy;  Laterality: N/A;  COVID + on 7/15/20 , per endo protocol no further testing needed at this time -    ESOPHAGOGASTRODUODENOSCOPY N/A 2/3/2021    Procedure: EGD (ESOPHAGOGASTRODUODENOSCOPY);  Surgeon: Graham Marino MD;  Location: [facility] ([facility]);  Service: Endoscopy;  Laterality: N/A;  COVID + on 7/15/20 , per endo protocol no further testing needed at this time -SM    HERNIA REPAIR      KIDNEY SURGERY      torn labrim      VASECTOMY      WISDOM TOOTH EXTRACTION         Allergies:  Review of patient's allergies indicates:  No Known Allergies    Family History:  Family History   Problem Relation Age of Onset    Skin cancer Mother     Hypertension Father     Hyperlipidemia Father     Pulmonary fibrosis Maternal Grandfather     Hyperlipidemia Paternal Grandfather     Heart disease Paternal Grandfather     Celiac disease Sister     Heart disease Paternal Grandmother     Cirrhosis Neg Hx     Colon cancer Neg Hx     Colon polyps Neg Hx     Crohn's disease Neg Hx     Esophageal cancer Neg Hx     Inflammatory bowel disease Neg Hx     Liver cancer Neg Hx     Liver disease Neg Hx     Rectal cancer Neg Hx     Stomach cancer Neg Hx     Ulcerative colitis Neg Hx     Lymphoma Neg Hx     Hemochromatosis Neg Hx     Marquis's disease Neg Hx     Tuberculosis Neg Hx     Scleroderma Neg Hx     Rheum arthritis Neg Hx     Multiple sclerosis Neg Hx     Melanoma Neg Hx     Lupus Neg Hx     Psoriasis Neg Hx        Social History:  Social History     Tobacco Use    Smoking status: Never     Passive exposure: Never    Smokeless tobacco: Never   Substance Use Topics    Alcohol use: Yes     Alcohol/week: 6.0 standard drinks of alcohol     Types: 3 Glasses of wine, 3 Cans of beer per week     Comment: occasional    Drug use: No     Social History     Socioeconomic History    Marital status:    Occupational History    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   Tobacco Use    Smoking status: Never     Passive exposure: Never    Smokeless tobacco: Never   Substance and Sexual Activity    Alcohol use: Yes     Alcohol/week: 6.0 standard drinks of alcohol     Types: 3 Glasses of wine, 3 Cans of beer per week     Comment: occasional    Drug use: No    Sexual activity: Yes     Partners:  Female     Birth control/protection: Partner-Vasectomy     Social Determinants of Health     Financial Resource Strain: Low Risk  (4/12/2024)    Overall Financial Resource Strain (CARDIA)     Difficulty of Paying Living Expenses: Not very hard   Food Insecurity: No Food Insecurity (3/14/2024)    Hunger Vital Sign     Worried About Running Out of Food in the Last Year: Never true     Ran Out of Food in the Last Year: Never true   Transportation Needs: No Transportation Needs (4/12/2024)    PRAPARE - Transportation     Lack of Transportation (Medical): No     Lack of Transportation (Non-Medical): No   Physical Activity: Sufficiently Active (4/12/2024)    Exercise Vital Sign     Days of Exercise per Week: 4 days     Minutes of Exercise per Session: 60 min   Recent Concern: Physical Activity - Insufficiently Active (3/14/2024)    Exercise Vital Sign     Days of Exercise per Week: 2 days     Minutes of Exercise per Session: 50 min   Stress: Stress Concern Present (4/12/2024)    [employer] [place] [employer] - Occupational Stress Questionnaire     Feeling of Stress : To some extent   Social Connections: Moderately Integrated (4/12/2024)    Social Connection and Isolation Panel [NHANES]     Frequency of Communication with Friends and Family: More than three times a week     Frequency of Social Gatherings with Friends and Family: Three times a week     Attends Muslim Services: Never     Active Member of Clubs or Organizations: Yes     Attends Club or Organization Meetings: More than 4 times per year     Marital Status:    Housing Stability: Low Risk  (4/12/2024)    Housing Stability Vital Sign     Unable to Pay for Housing in the Last Year: No     Number of Places Lived in the Last Year: 1     Unstable Housing in the Last Year: No       Review of Systems:  Review of Systems   Constitutional:  Positive for malaise/fatigue and weight loss. Negative for chills and fever.   HENT:  Negative for hearing loss.  "   Eyes:  Negative for blurred vision.   Respiratory:  Negative for shortness of breath.    Cardiovascular:  Negative for chest pain and palpitations.   Gastrointestinal:  Negative for blood in stool, diarrhea, heartburn and melena.   Genitourinary:  Negative for dysuria and hematuria.   Musculoskeletal:  Positive for myalgias.   Skin:  Negative for rash.   Neurological:  Positive for dizziness. Negative for tingling, focal weakness and headaches.   Psychiatric/Behavioral:  The patient is nervous/anxious.          Mental Health Screening  PHQ-9       JOSE RAFAEL-7  Generalized Anxiety Disorder 7-item (JOSE RAFAEL-7) Scale. HOW OFTEN DURING THE PAST 2 WEEKS HAVE YOU FELT BOTHERED BY:  1. Feeling nervous, anxious, or on edge?: Several days  2. Not being able to stop or control worrying?: Several days  3. Worrying too much about different things?: Several days  4. Trouble relaxing?: Several days  5. Being so restless that it is hard to sit still?: Several days  6. Becoming easily annoyed or irritable?: More than half the days  7. Feeling afraid as if something awful might happen?: Several days  JOSE RAFAEL-7 Score: 8  Number answered (out of first 7): 7  Interpretation: Mild Anxiety    PHYSICAL EXAM   Vital Signs  /76 (BP Location: Right arm, Patient Position: Sitting, BP Method: Medium (Manual))   Pulse 80   Resp 18   Ht 5' 10" (1.778 m)   Wt 79.3 kg (174 lb 15 oz)   SpO2 99%   BMI 25.10 kg/m²   Physical Exam  Physical Exam  Vitals and nursing note reviewed.   Constitutional:       Appearance: Normal appearance.   HENT:      Head: Normocephalic.      Right Ear: Tympanic membrane normal.      Left Ear: Tympanic membrane normal.      Mouth/Throat:      Pharynx: No oropharyngeal exudate.   Eyes:      General: No scleral icterus.     Conjunctiva/sclera: Conjunctivae normal.   Cardiovascular:      Rate and Rhythm: Normal rate and regular rhythm.      Heart sounds: Normal heart sounds.   Pulmonary:      Effort: Pulmonary effort is " normal.      Breath sounds: Normal breath sounds.   Abdominal:      Palpations: Abdomen is soft.      Tenderness: There is no abdominal tenderness.   Musculoskeletal:      Cervical back: Normal range of motion and neck supple.      Right lower leg: No edema.      Left lower leg: No edema.   Skin:     General: Skin is warm and dry.   Neurological:      Mental Status: He is alert and oriented to person, place, and time. Mental status is at baseline.   Psychiatric:         Mood and Affect: Mood normal.         Behavior: Behavior normal.         Lab Results   Component Value Date    WBC 5.39 03/14/2024    HGB 13.7 (L) 03/14/2024    HCT 41.2 03/14/2024     03/14/2024    CHOL 149 03/14/2024    TRIG 44 03/14/2024    HDL 55 03/14/2024    ALT 13 03/14/2024    AST 16 03/14/2024     03/14/2024    K 3.7 03/14/2024     03/14/2024    CREATININE 1.1 03/14/2024    BUN 13 03/14/2024    CO2 27 03/14/2024    TSH 0.658 03/14/2024    HGBA1C 5.1 03/14/2024     ASSESSMENT/PLAN     Harinder Elliott is a 33 y.o. male with    1. Attention deficit disorder, unspecified hyperactivity presence  Overview:  Current regimen:  --Vyvanse 30 mg daily      2. Migraine without aura and without status migrainosus, not intractable  Assessment & Plan:  --appears to be in remission currently      3. History of adenomatous polyp of colon  Overview:  Tubular adenoma (2 mm) found on colonoscopy from 2/2021  Also one 2 mm polyp with hyperplastic surface changes  Also one 4 mm hyperplastic polyp  Per guidelines, would be recommended for repeat colonoscopy at ~3 to 5 years    Orders:  -     Ambulatory referral/consult to Endo Procedure ; Future; Expected date: 04/13/2024    4. Anxiety  Overview:  GAD7 score of 8 on 4/12/24    Current regimen:  --Lexapro 15 mg daily    Assessment & Plan:  Has been on lexapro for the past 3 or 4 years which has been helpful  --recently increased from 10 to 15 mg to help combat some of the  ""jitteriness" from the psychostimulant for ADD      5. 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 Chronic fatigue  Assessment & Plan:  **check iron studies and ferritin >> both looking for low numbers that could explain fatigue or high numbers that could indicate hemochromatosis   **check testosterone  **check AM cortisol  **checking IgA for 2 reasons:  1) having increased infections recently and IgA is most common form of immunodeficiency  2) family history of Celiac disease >> when evaluated for Celiac (+FH with his sister having Celiac and he had some nonspecific inflammatory changes on duodenal biopsy 3 years ago) in the past, IgA level was not concomitantly checked when TTG IgA was checked (4% of population has IgA def)    Orders:  -     CORTISOL, 8AM; Future; Expected date: 04/12/2024  -     Iron and TIBC; Future; Expected date: 04/12/2024  -     FERRITIN; Future; Expected date: 04/12/2024  -     Testosterone; Future; Expected date: 04/12/2024  -     IGA; Future; Expected date: 04/12/2024      Follow up in about 1 year (around 4/12/2025).    Mj Carrillo MD/[facility]  Internal Medicine  [facility]     "

## 2024-04-12 NOTE — ASSESSMENT & PLAN NOTE
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

## 2024-04-15 ENCOUNTER — LAB VISIT (OUTPATIENT)
Dept: LAB | Facility: HOSPITAL | Age: 34
End: 2024-04-15
Attending: INTERNAL MEDICINE
Payer: COMMERCIAL

## 2024-04-15 DIAGNOSIS — R53.82 CHRONIC FATIGUE: ICD-10-CM

## 2024-04-15 LAB
CORTIS SERPL-MCNC: 11.2 UG/DL (ref 4.3–22.4)
FERRITIN SERPL-MCNC: 61 NG/ML (ref 20–300)
IGA SERPL-MCNC: 257 MG/DL (ref 40–350)
IRON SERPL-MCNC: 123 UG/DL (ref 45–160)
SATURATED IRON: 30 % (ref 20–50)
TESTOST SERPL-MCNC: 691 NG/DL (ref 304–1227)
TOTAL IRON BINDING CAPACITY: 414 UG/DL (ref 250–450)
TRANSFERRIN SERPL-MCNC: 280 MG/DL (ref 200–375)

## 2024-04-15 PROCEDURE — 82784 ASSAY IGA/IGD/IGG/IGM EACH: CPT | Performed by: INTERNAL MEDICINE

## 2024-04-15 PROCEDURE — 84403 ASSAY OF TOTAL TESTOSTERONE: CPT | Performed by: INTERNAL MEDICINE

## 2024-04-15 PROCEDURE — 36415 COLL VENOUS BLD VENIPUNCTURE: CPT | Performed by: INTERNAL MEDICINE

## 2024-04-15 PROCEDURE — 82728 ASSAY OF FERRITIN: CPT | Performed by: INTERNAL MEDICINE

## 2024-04-15 PROCEDURE — 82533 TOTAL CORTISOL: CPT | Performed by: INTERNAL MEDICINE

## 2024-04-15 PROCEDURE — 83540 ASSAY OF IRON: CPT | Performed by: INTERNAL MEDICINE

## 2024-04-19 DIAGNOSIS — F90.0 ATTENTION DEFICIT HYPERACTIVITY DISORDER (ADHD), PREDOMINANTLY INATTENTIVE TYPE: ICD-10-CM

## 2024-04-19 RX ORDER — LISDEXAMFETAMINE DIMESYLATE 30 MG/1
30 CAPSULE ORAL EVERY MORNING
Qty: 30 CAPSULE | Refills: 0 | Status: SHIPPED | OUTPATIENT
Start: 2024-04-19 | End: 2024-05-28

## 2024-05-07 ENCOUNTER — PATIENT MESSAGE (OUTPATIENT)
Dept: OPTOMETRY | Facility: CLINIC | Age: 34
End: 2024-05-07
Payer: COMMERCIAL

## 2024-05-10 ENCOUNTER — OFFICE VISIT (OUTPATIENT)
Dept: SPORTS MEDICINE | Facility: CLINIC | Age: 34
End: 2024-05-10
Payer: COMMERCIAL

## 2024-05-10 ENCOUNTER — HOSPITAL ENCOUNTER (OUTPATIENT)
Dept: RADIOLOGY | Facility: HOSPITAL | Age: 34
Discharge: HOME | End: 2024-05-10
Attending: ORTHOPAEDIC SURGERY
Payer: COMMERCIAL

## 2024-05-10 DIAGNOSIS — M25.511 RIGHT SHOULDER PAIN, UNSPECIFIED CHRONICITY: ICD-10-CM

## 2024-05-10 DIAGNOSIS — M25.511 ACUTE PAIN OF RIGHT SHOULDER: Primary | ICD-10-CM

## 2024-05-10 PROCEDURE — 99999 PR PBB SHADOW E&M-EST. PATIENT-LVL III: CPT | Mod: PBBFAC,,, | Performed by: ORTHOPAEDIC SURGERY

## 2024-05-10 PROCEDURE — 1160F RVW MEDS BY RX/DR IN RCRD: CPT | Mod: CPTII,S$GLB,, | Performed by: ORTHOPAEDIC SURGERY

## 2024-05-10 PROCEDURE — 73030 X-RAY EXAM OF SHOULDER: CPT | Mod: 26,RT,, | Performed by: RADIOLOGY

## 2024-05-10 PROCEDURE — 99214 OFFICE O/P EST MOD 30 MIN: CPT | Mod: S$GLB,,, | Performed by: ORTHOPAEDIC SURGERY

## 2024-05-10 PROCEDURE — 73030 X-RAY EXAM OF SHOULDER: CPT | Mod: TC,RT

## 2024-05-10 PROCEDURE — 1159F MED LIST DOCD IN RCRD: CPT | Mod: CPTII,S$GLB,, | Performed by: ORTHOPAEDIC SURGERY

## 2024-05-10 PROCEDURE — 3044F HG A1C LEVEL LT 7.0%: CPT | Mod: CPTII,S$GLB,, | Performed by: ORTHOPAEDIC SURGERY
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
## 2024-05-13 ENCOUNTER — TELEPHONE (OUTPATIENT)
Dept: SPORTS MEDICINE | Facility: CLINIC | Age: 34
End: 2024-05-13
Payer: COMMERCIAL

## 2024-05-13 DIAGNOSIS — M25.511 ACUTE PAIN OF RIGHT SHOULDER: Primary | ICD-10-CM

## 2024-05-13 DIAGNOSIS — M25.311 INSTABILITY OF RIGHT SHOULDER JOINT: ICD-10-CM

## 2024-05-13 NOTE — TELEPHONE ENCOUNTER
Spoke with patient regarding his shoulder pain. States his symptoms are unchanged, still in extreme pain with any ROM, localized in anterior joint space. Discussed obtaining MRI, for which patient agreed to.

## 2024-05-14 ENCOUNTER — CLINICAL SUPPORT (OUTPATIENT)
Dept: ENDOSCOPY | Facility: HOSPITAL | Age: 34
End: 2024-05-14
Attending: INTERNAL MEDICINE
Payer: COMMERCIAL

## 2024-05-14 ENCOUNTER — HOSPITAL ENCOUNTER (OUTPATIENT)
Dept: RADIOLOGY | Facility: HOSPITAL | Age: 34
Discharge: HOME | End: 2024-05-14
Attending: ORTHOPAEDIC SURGERY
Payer: COMMERCIAL

## 2024-05-14 ENCOUNTER — TELEPHONE (OUTPATIENT)
Dept: ENDOSCOPY | Facility: HOSPITAL | Age: 34
End: 2024-05-14
Payer: COMMERCIAL

## 2024-05-14 VITALS — BODY MASS INDEX: 25.03 KG/M2 | WEIGHT: 174.81 LBS | HEIGHT: 70 IN

## 2024-05-14 DIAGNOSIS — M25.311 INSTABILITY OF RIGHT SHOULDER JOINT: ICD-10-CM

## 2024-05-14 DIAGNOSIS — Z86.010 HISTORY OF ADENOMATOUS POLYP OF COLON: ICD-10-CM

## 2024-05-14 DIAGNOSIS — M25.511 ACUTE PAIN OF RIGHT SHOULDER: ICD-10-CM

## 2024-05-14 PROCEDURE — 73221 MRI JOINT UPR EXTREM W/O DYE: CPT | Mod: TC,RT

## 2024-05-14 PROCEDURE — 73221 MRI JOINT UPR EXTREM W/O DYE: CPT | Mod: 26,RT,, | Performed by: RADIOLOGY

## 2024-05-14 RX ORDER — POLYETHYLENE GLYCOL 3350, SODIUM SULFATE, POTASSIUM CHLORIDE, MAGNESIUM SULFATE, AND SODIUM CHLORIDE FOR ORAL SOLUTION 178.7-7.3G
1 KIT ORAL ONCE
Qty: 2 EACH | Refills: 0 | Status: SHIPPED | OUTPATIENT
Start: 2024-05-14 | End: 2024-05-17

## 2024-05-14 NOTE — PLAN OF CARE
Spoke to Pt to schedule procedure(s) Colonoscopy       Physician to perform procedure(s) Dr. ELENI Marino  Date of Procedure (s) 7/25/24  Arrival Time 11:30 AM  Time of Procedure(s) 12:30 PM   Location of Procedure(s) [facility] [address]  Type of Rx Prep sent to patient: [facility]  Instructions provided to patient via [facility]    Patient was informed on the following information and verbalized understanding. Screening questionnaire reviewed with patient and complete. If procedure requires anesthesia, a responsible adult needs to be present to accompany the patient home, patient cannot drive after receiving anesthesia. Appointment details are tentative, especially check-in time. Patient will receive a prep-op call 7 days prior to confirm check-in time for procedure. If applicable the patient should contact their pharmacy to verify Rx for procedure prep is ready for pick-up. Patient was advised to call the scheduling department at [phone] if pharmacy states no Rx is available. Patient was advised to call the endoscopy scheduling department if any questions or concerns arise.      [facility] Scheduling Department

## 2024-05-15 DIAGNOSIS — M25.511 ACUTE PAIN OF RIGHT SHOULDER: Primary | ICD-10-CM

## 2024-05-15 DIAGNOSIS — M25.311 INSTABILITY OF RIGHT SHOULDER JOINT: ICD-10-CM

## 2024-05-15 RX ORDER — MELOXICAM 15 MG/1
15 TABLET ORAL DAILY
Qty: 30 TABLET | Refills: 0 | Status: SHIPPED | OUTPATIENT
Start: 2024-05-15

## 2024-05-15 NOTE — PROGRESS NOTES
Surgery planned for 5/28/24      STABILITY TESTING    RIGHT SHOULDER   LEFT SHOULDER       Translation    Anterior  Grade 2     up face    Posterior  up face    up face    Sulcus   < 10mm    < 10 mm    Signs    Apprehension   + for pain    Neg    Relocation   +      no change    Jerk test  neg     Neg        ASSESSMENT:    Right Shoulder Labral Tear.      he would benefit from shoulder arthroscopy, given the above.     MRI personally reviewed and interpreted by me shows:  1. GLAD (glenoid labral articular disruption) lesion anterior mid equator with suspected labral/cartilaginous fragment/body mid equator level  2. Labrocapsular disruption posteriorly with associated posterior humeral head edema.  3. Joint effusion and synovitis with anterior capsular stripping and patulous posterior capsule.      PLAN: We have discussed the surgery and recovery of arthroscopic shoulder surgery. he understands that there may be limited mobility up to several weeks after surgery depending on procedures that are performed at the time of surgery.    The spectrum of treatment options were discussed with the patient, including nonoperative and operative options.  After thorough discussion, the patient has elected to undergo surgical treatment to include:    right   a. Shoulder arthroscopic anterior labral repair   b. Shoulder arthroscopic anterior capsulorrhaphy   c. Shoulder arthroscopic possible posterior labral repair   d. Shoulder arthroscopic possible posterior capsulorrhaphy   e. Shoulder arthroscopic partial synovectomy/debridement   f.  Shoulder arthroscopic possible biceps tenodesis (vs. open subpect tenodesis)   g. Shoulder arthroscopic possible SLAP repair    The details of the surgical procedure were explained, including the location of probable incisions and a description of likely hardware and/or grafts to be used.  The patient understands the likely convalescence after surgery.  Also, we have thoroughly discussed the  risks, benefits and alternatives to surgery, including, but not limited to, the risk of infection, joint stiffness, blood clot (including DVT and/or pulmonary embolus), neurologic and vascular injury.  It was explained that, if tissue has been repaired or reconstructed, there is a chance of failure, which may require further management.  Consent form for surgery is signed and in chart.

## 2024-05-16 DIAGNOSIS — S43.431A GLENOID LABRAL TEAR, RIGHT, INITIAL ENCOUNTER: ICD-10-CM

## 2024-05-16 DIAGNOSIS — M25.311 INSTABILITY OF RIGHT SHOULDER JOINT: Primary | ICD-10-CM

## 2024-05-16 DIAGNOSIS — M75.21 BICEPS TENDONITIS ON RIGHT: ICD-10-CM

## 2024-05-21 ENCOUNTER — PATIENT MESSAGE (OUTPATIENT)
Dept: PREADMISSION TESTING | Facility: HOSPITAL | Age: 34
End: 2024-05-21
Payer: COMMERCIAL

## 2024-05-21 NOTE — ANESTHESIA PAT ROS NOTE
05/21/2024  Harinder Elliott is a 34 y.o., male.      Pre-op Assessment    I have reviewed the Patient Summary Reports.       I have reviewed the Medications.     Review of Systems  Anesthesia Hx:  No problems with previous Anesthesia   History of prior surgery of interest to airway management or planning:  Previous anesthesia: General, MAC, Nerve Block 10/11/2016  Left Shoulder Arthroscopy, Reconstruction Joint with general anesthesia.  Procedure performed at an [facility].     for 10/11/2016  Left Shoulder Arthroscopy, Reconstruction Joint.  Procedure performed at an [facility].  2/3/2021  EGD with MAC.  Procedure performed at an [facility]. Airway issues documented on chart review include mask, easy, GETA, easy direct laryngoscopy , view on direct laryngoscopy Grade I      Denies Personal Hx of Anesthesia complications.                    Social:  Alcohol Use, Non-Smoker 6 standard drinks of alcohol per week,  3 Glasses of wine, 3 Cans of beer      Hematology/Oncology:    Oncology Normal    -- Denies Anemia:               Hematology Comments: fatigue                    EENT/Dental:   H/O [place] Tooth Extraction          Cardiovascular:  Cardiovascular Normal Exercise tolerance: good       Denies CAD.       Denies Angina.       Denies MENEZES.                            Pulmonary:  Pulmonary Normal   Denies COPD.  Denies Asthma.   Denies Shortness of breath.                  Renal/:   Denies Chronic Renal Disease.   H/O Ureteral stricture,   S/P Repair, Vasectomy             Hepatic/GI:      Denies GERD. Denies Liver Disease.  H/O colon polyps, S/P colonoscopy with Polypectomy, Hernia Repair          Musculoskeletal:     Instability of right shoulder joint,  Biceps tendonitis on right,  Glenoid labral tear, right,  H/O torn labrum, S/P Left Shoulder Arthroscopy, Reconstruction of  Joint, Decompression,  H/O Lateral knee pain, right              Neurological:    Denies CVA.   Headaches Denies Seizures.    H/O Migraine headaches, none in over a year                            Endocrine:  Endocrine Normal Denies Diabetes. Denies Hypothyroidism.          Psych:  Psychiatric History anxiety depression ADHD              Past Medical History:   Diagnosis Date    Colon polyp     Depression     Migraine     Ureteral stricture      Past Surgical History:   Procedure Laterality Date    COLON SURGERY      colon polyp    COLONOSCOPY N/A 2/3/2021    Procedure: COLONOSCOPY;  Surgeon: Graham Marino MD;  [facility]  Service: Endoscopy;  Laterality: N/A;  COVID + on 7/15/20 , per endo protocol no further testing needed at this time -[facility]    ESOPHAGOGASTRODUODENOSCOPY N/A 2/3/2021    Procedure: EGD (ESOPHAGOGASTRODUODENOSCOPY);  Surgeon: Graham Marino MD;  [facility]  Service: Endoscopy;  Laterality: N/A;  COVID + on 7/15/20 , per endo protocol no further testing needed at this time -    HERNIA REPAIR      KIDNEY SURGERY      torn labrim      VASECTOMY      WISDOM TOOTH EXTRACTION         Anesthesia Assessment: Preoperative EQUATION    Planned Procedure: Procedure(s) (LRB):  ARTHROSCOPY, SHOULDER, WITH BANKART REPAIR (Right)  CAPSULORRHAPHY (Right)  REPAIR, SLAP LESION, SHOULDER (Right)  FIXATION, TENDON (Right)  DEBRIDEMENT, SHOULDER, ARTHROSCOPIC (Right)  Requested Anesthesia Type:General  Surgeon: Marsha Bowman MD  Service: Orthopedics  Known or anticipated Date of Surgery:5/28/2024    Surgeon notes: reviewed    Electronic QUestionnaire Assessment completed via nurse interview with patient.        Triage considerations:     The patient has no apparent active cardiac condition (No unstable coronary Syndrome such as severe unstable angina or recent [<1 month] myocardial infarction, decompensated CHF, severe valvular   disease or significant arrhythmia)    Previous  anesthesia records:GETA, MAC, Nerve block for post-op pain, Easy airway, Easy intubation, and No problems    Last PCP note: within 3 months , within [facility]   Subspecialty notes: n/a    Other important co-morbidities:  No co-morbidities noted.       Tests already available:  Results have been reviewed.             Instructions given. (See in Nurse's note)  Preop med instructions sent via portal message.     Optimization:  Anesthesia Preop Clinic Assessment Not Indicated    Medical Opinion Indicated: No       Sub-specialist consult indicated:  No      Plan:   Consultation: Medical clearance is not requested.     Patient  has previously scheduled Medical Appointment:4/12/2024    Navigation:  Straight Line to surgery.               No tests, anesthesia preop clinic visit, or consult required.                        Patient is OK to proceed with surgery at [facility].       Ht: 5'10  Wt: 79.3 kg (174 lb)  BMI: 25.08  Vaccinated

## 2024-05-24 ENCOUNTER — TELEPHONE (OUTPATIENT)
Dept: SPORTS MEDICINE | Facility: CLINIC | Age: 34
End: 2024-05-24
Payer: COMMERCIAL

## 2024-05-24 ENCOUNTER — OFFICE VISIT (OUTPATIENT)
Dept: SPORTS MEDICINE | Facility: CLINIC | Age: 34
End: 2024-05-24
Payer: COMMERCIAL

## 2024-05-24 VITALS
WEIGHT: 174.19 LBS | HEIGHT: 70 IN | HEART RATE: 104 BPM | DIASTOLIC BLOOD PRESSURE: 86 MMHG | SYSTOLIC BLOOD PRESSURE: 135 MMHG | BODY MASS INDEX: 24.94 KG/M2

## 2024-05-24 DIAGNOSIS — S43.431A GLENOID LABRAL TEAR, RIGHT, INITIAL ENCOUNTER: ICD-10-CM

## 2024-05-24 DIAGNOSIS — M75.21 BICEPS TENDONITIS ON RIGHT: Primary | ICD-10-CM

## 2024-05-24 DIAGNOSIS — M25.311 INSTABILITY OF RIGHT SHOULDER JOINT: ICD-10-CM

## 2024-05-24 PROCEDURE — 99999 PR PBB SHADOW E&M-EST. PATIENT-LVL IV: CPT | Mod: PBBFAC,,, | Performed by: PHYSICIAN ASSISTANT

## 2024-05-24 PROCEDURE — 99499 UNLISTED E&M SERVICE: CPT | Mod: S$GLB,,, | Performed by: PHYSICIAN ASSISTANT

## 2024-05-24 NOTE — TELEPHONE ENCOUNTER
I spoke with the patient and informed him of the peer to peer outcome. We will place a PT order for him to start ASAP and follow up in 4 weeks to see how he is progressing as that will be 6 weeks of conservative management at that time and potentially resubmit for surgery should he be worse or not improved.     We will also try the appeal process as well.     He verbalized understanding         The patient reached out later stating that he wants to proceed with surgery with the authorized CPT codes and is taking care of the rest on his own. We had a lengthy discussion that should the codes that are denied need to be performed and his insurance company still denies them after the fact that he will likely be responsible financially for those procedures, etc. He verbalized understanding. We will proceed with surgery on 5/28/24

## 2024-05-24 NOTE — TELEPHONE ENCOUNTER
Julian Abebe DO Baum, Payton K.  Cc: Kyung Al  Peer to Peer performed today at 10:15am [facility] He stated he was an orthopedic surgeon. He states that the surgery is denied because the patient has not undergone 3 months of conservative treatment. Myself and Dr. Bowman both spoke with the reviewer who stated he could and would not approve surgery.          Previous Messages       ----- Message -----  From: Jerica Fatima  Sent: 5/23/2024  11:32 AM CDT  To: Kyung Al; Julian Abebe DO  Subject: FW: MRN: [id number] - Harinder Elliott        Peer to peer scheduled for 5/24/24, at 10:15 AM with Dr. Cortez    Please reply all with outcome.    Jerica Fatima  Clinical assistant to Dr. Marsha Bowman  ----- Message -----  From: Jerica Fatima  Sent: 5/22/2024  11:17 AM CDT  To: Jerica Fatima  Subject: FW: MRN: [id number] - Harinder Elliott          ----- Message -----  From: Kyung Al  Sent: 5/22/2024  11:14 AM CDT  To: Marsha Bowman MD; Gracie Larson Staff  Subject: MRN: [id number] - Harinder Elliott            Good Morning, patient Harinder Elliott is scheduled 5/28/24 for an Outpatient Surgery. The authorization with [employer] ([facility] 3rd Party Authorization Team) has been denied for Cpt Codes 93977, 93831, 65176..(Cpt Code 15223, 84974 -do not require authorization).      An Appeal is an option by contacting [employer] @ [phone] using Order/Auth #[id number]. The appeal must be completed on or before Friday May 31, 2024.    Thank you.  Kyung ANAYA  Surgery [employer]

## 2024-05-27 RX ORDER — NAPROXEN SODIUM 220 MG/1
81 TABLET, FILM COATED ORAL DAILY
Qty: 28 TABLET | Refills: 0 | Status: SHIPPED | OUTPATIENT
Start: 2024-05-27 | End: 2025-05-27

## 2024-05-27 RX ORDER — CLINDAMYCIN PHOSPHATE 900 MG/50ML
900 INJECTION, SOLUTION INTRAVENOUS
Status: CANCELLED | OUTPATIENT
Start: 2024-05-27

## 2024-05-27 RX ORDER — DOCUSATE SODIUM 100 MG/1
100 CAPSULE, LIQUID FILLED ORAL 2 TIMES DAILY PRN
Qty: 14 CAPSULE | Refills: 0 | Status: SHIPPED | OUTPATIENT
Start: 2024-05-27

## 2024-05-27 RX ORDER — PROMETHAZINE HYDROCHLORIDE 25 MG/1
25 TABLET ORAL EVERY 6 HOURS PRN
Qty: 8 TABLET | Refills: 0 | Status: SHIPPED | OUTPATIENT
Start: 2024-05-27

## 2024-05-27 RX ORDER — SODIUM CHLORIDE 9 MG/ML
INJECTION, SOLUTION INTRAVENOUS CONTINUOUS
Status: CANCELLED | OUTPATIENT
Start: 2024-05-27

## 2024-05-27 RX ORDER — PREGABALIN 75 MG/1
75 CAPSULE ORAL
Status: CANCELLED | OUTPATIENT
Start: 2024-05-27 | End: 2024-05-27

## 2024-05-27 RX ORDER — TRAMADOL HYDROCHLORIDE 50 MG/1
50-100 TABLET ORAL EVERY 6 HOURS PRN
Qty: 21 TABLET | Refills: 0 | Status: SHIPPED | OUTPATIENT
Start: 2024-05-27 | End: 2024-06-07 | Stop reason: SDUPTHER

## 2024-05-27 RX ORDER — OXYCODONE HCL 10 MG/1
10 TABLET, FILM COATED, EXTENDED RELEASE ORAL
Status: CANCELLED | OUTPATIENT
Start: 2024-05-27 | End: 2024-05-27

## 2024-05-27 RX ORDER — OXYCODONE AND ACETAMINOPHEN 10; 325 MG/1; MG/1
TABLET ORAL
Qty: 21 TABLET | Refills: 0 | Status: SHIPPED | OUTPATIENT
Start: 2024-05-27 | End: 2024-06-07 | Stop reason: SDUPTHER

## 2024-05-27 NOTE — H&P (VIEW-ONLY)
Harinder Elliott  is here for a completion of his perioperative paperwork. he  Is scheduled to undergo     right              a. Shoulder arthroscopic anterior labral repair              b. Shoulder arthroscopic anterior capsulorrhaphy              c. Shoulder arthroscopic possible posterior labral repair              d. Shoulder arthroscopic possible posterior capsulorrhaphy              e. Shoulder arthroscopic partial synovectomy/debridement              f.  Shoulder arthroscopic possible biceps tenodesis (vs. open subpect tenodesis)              g. Shoulder arthroscopic possible SLAP repair on 5/28/24.      He is a healthy individual and does not need clearance for this procedure.     PAST MEDICAL HISTORY:   Past Medical History:   Diagnosis Date    Colon polyp     Depression     Migraine     Ureteral stricture      PAST SURGICAL HISTORY:   Past Surgical History:   Procedure Laterality Date    COLON SURGERY      colon polyp    COLONOSCOPY N/A 2/3/2021    Procedure: COLONOSCOPY;  Surgeon: Graham Marino MD;  [facility]  Service: Endoscopy;  Laterality: N/A;  COVID + on 7/15/20 , per endo protocol no further testing needed at this time -SM    ESOPHAGOGASTRODUODENOSCOPY N/A 2/3/2021    Procedure: EGD (ESOPHAGOGASTRODUODENOSCOPY);  Surgeon: Graham Marino MD;  Location: [facility] ([address] [place] [address]);  Service: Endoscopy;  Laterality: N/A;  COVID + on 7/15/20 , per endo protocol no further testing needed at this time -SM    HERNIA REPAIR      KIDNEY SURGERY      torn labrim      VASECTOMY      WISDOM TOOTH EXTRACTION       FAMILY HISTORY:   Family History   Problem Relation Name Age of Onset    Skin cancer Mother      Hypertension Father Clifford     Hyperlipidemia Father Clifford     Pulmonary fibrosis Maternal Grandfather      Hyperlipidemia Paternal Grandfather Claude     Heart disease Paternal Grandfather Claude     Celiac disease Sister      Heart disease Paternal Grandmother Racheal     Cirrhosis  Neg Hx      Colon cancer Neg Hx      Colon polyps Neg Hx      Crohn's disease Neg Hx      Esophageal cancer Neg Hx      Inflammatory bowel disease Neg Hx      Liver cancer Neg Hx      Liver disease Neg Hx      Rectal cancer Neg Hx      Stomach cancer Neg Hx      Ulcerative colitis Neg Hx      Lymphoma Neg Hx      Hemochromatosis Neg Hx      Marquis's disease Neg Hx      Tuberculosis Neg Hx      Scleroderma Neg Hx      Rheum arthritis Neg Hx      Multiple sclerosis Neg Hx      Melanoma Neg Hx      Lupus Neg Hx      Psoriasis Neg Hx       SOCIAL HISTORY:   Social History     Socioeconomic History    Marital status:    Occupational History    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   Tobacco Use    Smoking status: Never     Passive exposure: Never    Smokeless tobacco: Never   Substance and Sexual Activity    Alcohol use: Yes     Alcohol/week: 6.0 standard drinks of alcohol     Types: 3 Glasses of wine, 3 Cans of beer per week     Comment: occasional    Drug use: No    Sexual activity: Yes     Partners: Female     Birth control/protection: Partner-Vasectomy     Social Determinants of Health     Financial Resource Strain: Low Risk  (4/12/2024)    Overall Financial Resource Strain (CARDIA)     Difficulty of Paying Living Expenses: Not very hard   Food Insecurity: No Food Insecurity (3/14/2024)    Hunger Vital Sign     Worried About Running Out of Food in the Last Year: Never true     Ran Out of Food in the Last Year: Never true   Transportation Needs: No Transportation Needs (4/12/2024)    PRAPARE - Transportation     Lack of Transportation (Medical): No     Lack of Transportation (Non-Medical): No   Physical Activity: Sufficiently Active (4/12/2024)    Exercise Vital Sign     Days of Exercise per Week: 4 days     Minutes of Exercise per Session: 60 min   Recent Concern: Physical Activity - Insufficiently Active (3/14/2024)    Exercise Vital Sign     Days of Exercise per Week: 2 days     Minutes of Exercise per Session:  50 min   Stress: Stress Concern Present (4/12/2024)    [employer] [place] [employer] - Occupational Stress Questionnaire     Feeling of Stress : To some extent   Housing Stability: Low Risk  (4/12/2024)    Housing Stability Vital Sign     Unable to Pay for Housing in the Last Year: No     Number of Places Lived in the Last Year: 1     Unstable Housing in the Last Year: No       MEDICATIONS:   Current Outpatient Medications:     EScitalopram oxalate (LEXAPRO) 10 MG tablet, Take one and half tablet by mouth daily total 15 mg by mouth daily, Disp: 45 tablet, Rfl: 2    meloxicam (MOBIC) 15 MG tablet, Take 1 tablet (15 mg total) by mouth once daily., Disp: 30 tablet, Rfl: 0    ALPRAZolam (XANAX) 0.25 MG tablet, Take 1 tablet (0.25 mg total) by mouth daily as needed for Anxiety., Disp: 10 tablet, Rfl: 0    aspirin 81 MG Chew, Take 1 tablet (81 mg total) by mouth once daily. For 4 weeks., Disp: 28 tablet, Rfl: 0    benzonatate (TESSALON PERLES) 100 MG capsule, Take 1 capsule (100 mg total) by mouth every 6 (six) hours as needed for Cough. (Patient not taking: Reported on 5/24/2024), Disp: 30 capsule, Rfl: 1    dextroamphetamine-amphetamine (ADDERALL XR) 10 MG 24 hr capsule, Take 1 capsule (10 mg total) by mouth every morning., Disp: 30 capsule, Rfl: 0    diazePAM (VALIUM) 5 MG tablet, Take 1 tablet (5 mg total) by mouth every 6 (six) hours as needed for Anxiety., Disp: 2 tablet, Rfl: 0    docusate sodium (COLACE) 100 MG capsule, Take 1 capsule (100 mg total) by mouth 2 (two) times daily as needed for Constipation., Disp: 14 capsule, Rfl: 0    EScitalopram oxalate (LEXAPRO) 10 MG tablet, Take 1 tablet (10 mg total) by mouth once daily., Disp: 90 tablet, Rfl: 0    fluticasone propionate (FLONASE) 50 mcg/actuation nasal spray, 1 spray (50 mcg total) by Each Nostril route once daily. (Patient not taking: Reported on 5/24/2024), Disp: 9.9 mL, Rfl: 0    lisdexamfetamine (VYVANSE) 30 MG capsule, Take 1 capsule (30 mg  "total) by mouth every morning., Disp: 30 capsule, Rfl: 0    methylphenidate HCl 27 MG CR tablet, Take 1 tablet (27 mg total) by mouth every morning., Disp: 30 tablet, Rfl: 0    oxyCODONE-acetaminophen (PERCOCET)  mg per tablet, Take 1 tablet by mouth every 4-6 hours as needed for pain. Take stool softener with this medication., Disp: 21 tablet, Rfl: 0    promethazine (PHENERGAN) 25 MG tablet, Take 1 tablet (25 mg total) by mouth every 6 (six) hours as needed for Nausea., Disp: 8 tablet, Rfl: 0    sumatriptan (IMITREX) 100 MG tablet, Take 1 tablet (100 mg total) by mouth every 2 (two) hours as needed. (Patient not taking: Reported on 5/24/2024), Disp: 9 tablet, Rfl: 0    traMADoL (ULTRAM) 50 mg tablet, Take 1-2 tablets ( mg total) by mouth every 6 (six) hours as needed for Pain., Disp: 21 tablet, Rfl: 0  ALLERGIES: Review of patient's allergies indicates:  No Known Allergies    VITAL SIGNS: /86   Pulse 104   Ht 5' 10" (1.778 m)   Wt 79 kg (174 lb 2.6 oz)   BMI 24.99 kg/m²      Risks, indications and benefits of the surgical procedure were discussed with the patient. All questions with regard to surgery, rehab, expected return to functional activities, activities of daily living and recreational endeavors were answered to his satisfaction.    It was explained to the patient that there may be an increase in surgical risks if the patient has certain co-morbidities such as but not limited to: Obesity, Cardiovascular issues (CHF, CAD, Arrhythmias), chronic pulmonary issues, previous or current neurovascular/neurological issues, previous strokes, diabetes mellitus, previous wound healing issues, previous wound or skin infections, PVD, clotting disorders, if the patient uses chronic steroids, if the patient takes or has immune compromising medications or diseases, or has previously or currently used tobacco products.     The patient verbalized that he/she does not have any additional clotting, " bleeding, or blood disorders, other than what is list in her chart on today's review.     Then a brief history and physical exam were performed.    Review of Systems   Constitution: Negative. Negative for chills, fever and night sweats.   HENT: Negative for congestion and headaches.    Eyes: Negative for blurred vision, left vision loss and right vision loss.   Cardiovascular: Negative for chest pain and syncope.   Respiratory: Negative for cough and shortness of breath.    Endocrine: Negative for polydipsia, polyphagia and polyuria.   Hematologic/Lymphatic: Negative for bleeding problem. Does not bruise/bleed easily.   Skin: Negative for dry skin, itching and rash.   Musculoskeletal: Negative for falls and muscle weakness.   Gastrointestinal: Negative for abdominal pain and bowel incontinence.   Genitourinary: Negative for bladder incontinence and nocturia.   Neurological: Negative for disturbances in coordination, loss of balance and seizures.   Psychiatric/Behavioral: Negative for depression. The patient does not have insomnia.    Allergic/Immunologic: Negative for hives and persistent infections.     PHYSICAL EXAM:  GEN: A&Ox3, WD WN NAD  HEENT: WNL  CHEST: CTAB, no W/R/R  HEART: RRR, no M/R/G  ABD: Soft, NT ND, BS x4 QUADS  MS; See Epic  NEURO: CN II-XII intact       The surgical consent was then reviewed with the patient, who agreed with all the contents of the consent form and it was signed. he was then given the surgery packet to bring with him to surgery center for the anesthesia portion of his perioperative paperwork (if needed)   For all physicians except for Dr. Kidd, we will email and possibly fax the consent forms and booking sheets to [facility].    The patient was given the opportunity to ask questions about the surgical plan and consent form, and once no other questions were asked, I proceeded with the pre-op appointment.    PHYSICAL THERAPY:  He was also instructed regarding  physical therapy and will begin on  3 weeks post-op. He was given a copy of the original prescription to schedule. Another copy of this prescription was also faxed to [facility].    POST OP CARE:instructions were reviewed including care of the wound and dressing after surgery and when he can shower. Patient was told not sleep or lay on there surgical extremity following surgery as this could cause repair damage, tissue damage, or nerve injury.    An extensive amount of time was spent on discussion of the following information based on what type of surgery the patient was having. Patient expressed understanding of the material below:    Shoulder surgery or upper extremity surgery requiring post-op sling:  It was explained to the patient that they should remove their arm from the sling approximately 6 times per day to do full elbow ROM (flexion and extension) and full supination and pronation of the elbow for approximately 5 minutes at a time to help prevent elbow stiffness, nerve pain or problems, or nerve injury. They were told to contact us if they begin having numbness and tingling of there surgical extremity that persists longer then 1 day without relief.     Extremity surgery requiring a splint:   It was explained to patient on how to properly elevated position there extremity to prevent pressure ulcers from occurring. I made sure that the patient understood that that surgical site may be numb following surgery and prevent them from feeling pressure pain that they would normal feel if a pressure injury was occurring. Pressure ulcers and there causes were discussed with the patient today.     Post-operative splint:  It was explain to the patient that they can contact us at anytime if they feel that there is a problem with their splint or under their splint that needs evaluation. If there is concern, questions, or discomfort with the splint then they can present to either our clinic or the [facility] ED  for removal, evaluation, and replacement of the splint.    CRUTCHES OR WALKER: It was explained to the patient that if they are having a lower extremity surgery that they will require either a walker or crutches to ambulate safely with after surgery. It was explained that a cane or other assistive devices are not sufficient to safely ambulate with after surgery. I explained to the patient that I will place an order for them to receive either crutches or a walker after surgery to go home with. It was explained that if they have crutches or a walker at home already, that they are REQUIRED to bring them to the hospital on the day of surgery. It was explained that if they do not have them at the hospital on the day of surgery that they WILL be provided a new pair or crutches or a walker to go home with to ensure ambulation will be safe if the patient needs to stop somewhere on the way home.      If the patient's mobility limitation cannot be sufficiently resolved by the use of crutches then it is necessary for the patient's functional mobility deficit to be sufficiently resolved with the use of a (Rolling Walker or Walker). Patient's mobility limitation significantly impairs their ability to participate in one of more activities of daily living. The use of a (Rolling Walker or Walker) will significantly improve the patient's ability to participate in MRADLS and the patient will use it on regular basis in the home.      PAIN MANAGEMENT: Harinder Elliott was also given a pain management regime, which includes the option of getting a TENS unit given to him by [facility] (if patient chooses) along with the education required for its use. He was also instructed regarding the Polar ice unit or gel ice packs (chosen by patient) that will be in place after surgery and his postoperative pain medications.     Patient understands that Polar Ice machine has to be bought today if they want it. It cannot be bought on day of  surgery at surgery center.     PAIN MEDICATION:  Percocet 10/325mg 1 po q 4-6 hours prn pain  Ultram 50 mg Take 1-2 p.o. q.6 hours p.r.n. breakthrough pain,   Phenergan 25 mg one p.o. q.6 hours p.r.n. nausea and vomiting.    DVT prophylaxis was discussed with the patient today including risk factors for developing DVTs and history of DVTs. The patient was asked if any specific recommendations were given from the doctor/s that did pre-operative surgical clearance. The patient was then given an education sheet about DVTs and PE with warning signs and symptoms of both and steps to take if they suspect either of these.    This along with the Modified Caprini risk assessment model for VTE in general surgical patients was used to determine the patient's DVT risk.     From: Breana THOMPSON, Mike DA, Amanda NINO, et al. Prevention of VTE in nonorthopedic surgical patients: antithrombotic therapy and prevention of thrombosis, 9th ed: American College of Chest Physicians evidence-based clinical practical guidelines. Chest 2012; 141:e227S. Copyright © 2012. Reproduced with permission from the American College of Chest Physicians.    The below listed DVT prophylaxis regimen along with bilateral DREW compression stockings will be used post-op. Length of treatment has been determined to be 10-42 days post-op by the above noted Caprini assessment model.     The patient was instructed to buy and take:  Aspirin 81mg QD x 4 weeks for DVT prophylaxis starting on the morning after surgery.  Patient will also use bilateral TEDs on lower extremities, SCDs during surgery, and early ambulation post-op. If the patient was previously taking 81mg baby aspirin, they were told to not take it starting 5 days prior to surgery and to restart the 81mg aspirin after surgery.       Patient was also told to buy over the counter Prilosec medication if needed and take it once daily for GI protection as long as they are taking NSAIDs or Aspirin.   Patient denies  history of seizures.      The patient was told that narcotic pain medications may make them drowsy and instructions were given to not sign legal documents, drive or operate heavy machinery, cars, or equipment while under the influence of narcotic medications. The patient was told and understands that narcotic pain medications should only be used as needed to control pain and that other options of pain control include TENs unit and ice packs/unit.     As there were no other questions to be asked, he was given my business card along with Marsha Bowman MD business card if he has any questions or concerns prior to surgery or in the postop period.     At the end of our encounter today, the patient was given the option and asked if they would like to see the surgeon regarding questions or concerns that they have about the consent form or the surgical procedure. The patient spoke with Dr. Bowman about surgery previous.

## 2024-05-27 NOTE — H&P
Harinder Elliott  is here for a completion of his perioperative paperwork. he  Is scheduled to undergo     right              a. Shoulder arthroscopic anterior labral repair              b. Shoulder arthroscopic anterior capsulorrhaphy              c. Shoulder arthroscopic possible posterior labral repair              d. Shoulder arthroscopic possible posterior capsulorrhaphy              e. Shoulder arthroscopic partial synovectomy/debridement              f.  Shoulder arthroscopic possible biceps tenodesis (vs. open subpect tenodesis)              g. Shoulder arthroscopic possible SLAP repair on 5/28/24.      He is a healthy individual and does not need clearance for this procedure.     PAST MEDICAL HISTORY:   Past Medical History:   Diagnosis Date    Colon polyp     Depression     Migraine     Ureteral stricture      PAST SURGICAL HISTORY:   Past Surgical History:   Procedure Laterality Date    COLON SURGERY      colon polyp    COLONOSCOPY N/A 2/3/2021    Procedure: COLONOSCOPY;  Surgeon: Graham Marino MD;  Location: [facility] ([facility]);  Service: Endoscopy;  Laterality: N/A;  COVID + on 7/15/20 , per endo protocol no further testing needed at this time -SM    ESOPHAGOGASTRODUODENOSCOPY N/A 2/3/2021    Procedure: EGD (ESOPHAGOGASTRODUODENOSCOPY);  Surgeon: Graham Marino MD;  Location: [facility] ([address] [place] [address]);  Service: Endoscopy;  Laterality: N/A;  COVID + on 7/15/20 , per endo protocol no further testing needed at this time -SM    HERNIA REPAIR      KIDNEY SURGERY      torn labrim      VASECTOMY      WISDOM TOOTH EXTRACTION       FAMILY HISTORY:   Family History   Problem Relation Name Age of Onset    Skin cancer Mother      Hypertension Father Clifford     Hyperlipidemia Father Clifford     Pulmonary fibrosis Maternal Grandfather      Hyperlipidemia Paternal Grandfather Claude     Heart disease Paternal Grandfather Claude     Celiac disease Sister      Heart disease Paternal Grandmother Racheal     Cirrhosis  Neg Hx      Colon cancer Neg Hx      Colon polyps Neg Hx      Crohn's disease Neg Hx      Esophageal cancer Neg Hx      Inflammatory bowel disease Neg Hx      Liver cancer Neg Hx      Liver disease Neg Hx      Rectal cancer Neg Hx      Stomach cancer Neg Hx      Ulcerative colitis Neg Hx      Lymphoma Neg Hx      Hemochromatosis Neg Hx      Marquis's disease Neg Hx      Tuberculosis Neg Hx      Scleroderma Neg Hx      Rheum arthritis Neg Hx      Multiple sclerosis Neg Hx      Melanoma Neg Hx      Lupus Neg Hx      Psoriasis Neg Hx       SOCIAL HISTORY:   Social History     Socioeconomic History    Marital status:    Occupational History    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   Tobacco Use    Smoking status: Never     Passive exposure: Never    Smokeless tobacco: Never   Substance and Sexual Activity    Alcohol use: Yes     Alcohol/week: 6.0 standard drinks of alcohol     Types: 3 Glasses of wine, 3 Cans of beer per week     Comment: occasional    Drug use: No    Sexual activity: Yes     Partners: Female     Birth control/protection: Partner-Vasectomy     Social Determinants of Health     Financial Resource Strain: Low Risk  (4/12/2024)    Overall Financial Resource Strain (CARDIA)     Difficulty of Paying Living Expenses: Not very hard   Food Insecurity: No Food Insecurity (3/14/2024)    Hunger Vital Sign     Worried About Running Out of Food in the Last Year: Never true     Ran Out of Food in the Last Year: Never true   Transportation Needs: No Transportation Needs (4/12/2024)    PRAPARE - Transportation     Lack of Transportation (Medical): No     Lack of Transportation (Non-Medical): No   Physical Activity: Sufficiently Active (4/12/2024)    Exercise Vital Sign     Days of Exercise per Week: 4 days     Minutes of Exercise per Session: 60 min   Recent Concern: Physical Activity - Insufficiently Active (3/14/2024)    Exercise Vital Sign     Days of Exercise per Week: 2 days     Minutes of Exercise per Session:  50 min   Stress: Stress Concern Present (4/12/2024)    [place] [place] [employer] - Occupational Stress Questionnaire     Feeling of Stress : To some extent   Housing Stability: Low Risk  (4/12/2024)    Housing Stability Vital Sign     Unable to Pay for Housing in the Last Year: No     Number of Places Lived in the Last Year: 1     Unstable Housing in the Last Year: No       MEDICATIONS:   Current Outpatient Medications:     EScitalopram oxalate (LEXAPRO) 10 MG tablet, Take one and half tablet by mouth daily total 15 mg by mouth daily, Disp: 45 tablet, Rfl: 2    meloxicam (MOBIC) 15 MG tablet, Take 1 tablet (15 mg total) by mouth once daily., Disp: 30 tablet, Rfl: 0    ALPRAZolam (XANAX) 0.25 MG tablet, Take 1 tablet (0.25 mg total) by mouth daily as needed for Anxiety., Disp: 10 tablet, Rfl: 0    aspirin 81 MG Chew, Take 1 tablet (81 mg total) by mouth once daily. For 4 weeks., Disp: 28 tablet, Rfl: 0    benzonatate (TESSALON PERLES) 100 MG capsule, Take 1 capsule (100 mg total) by mouth every 6 (six) hours as needed for Cough. (Patient not taking: Reported on 5/24/2024), Disp: 30 capsule, Rfl: 1    dextroamphetamine-amphetamine (ADDERALL XR) 10 MG 24 hr capsule, Take 1 capsule (10 mg total) by mouth every morning., Disp: 30 capsule, Rfl: 0    diazePAM (VALIUM) 5 MG tablet, Take 1 tablet (5 mg total) by mouth every 6 (six) hours as needed for Anxiety., Disp: 2 tablet, Rfl: 0    docusate sodium (COLACE) 100 MG capsule, Take 1 capsule (100 mg total) by mouth 2 (two) times daily as needed for Constipation., Disp: 14 capsule, Rfl: 0    EScitalopram oxalate (LEXAPRO) 10 MG tablet, Take 1 tablet (10 mg total) by mouth once daily., Disp: 90 tablet, Rfl: 0    fluticasone propionate (FLONASE) 50 mcg/actuation nasal spray, 1 spray (50 mcg total) by Each Nostril route once daily. (Patient not taking: Reported on 5/24/2024), Disp: 9.9 mL, Rfl: 0    lisdexamfetamine (VYVANSE) 30 MG capsule, Take 1 capsule (30 mg  "total) by mouth every morning., Disp: 30 capsule, Rfl: 0    methylphenidate HCl 27 MG CR tablet, Take 1 tablet (27 mg total) by mouth every morning., Disp: 30 tablet, Rfl: 0    oxyCODONE-acetaminophen (PERCOCET)  mg per tablet, Take 1 tablet by mouth every 4-6 hours as needed for pain. Take stool softener with this medication., Disp: 21 tablet, Rfl: 0    promethazine (PHENERGAN) 25 MG tablet, Take 1 tablet (25 mg total) by mouth every 6 (six) hours as needed for Nausea., Disp: 8 tablet, Rfl: 0    sumatriptan (IMITREX) 100 MG tablet, Take 1 tablet (100 mg total) by mouth every 2 (two) hours as needed. (Patient not taking: Reported on 5/24/2024), Disp: 9 tablet, Rfl: 0    traMADoL (ULTRAM) 50 mg tablet, Take 1-2 tablets ( mg total) by mouth every 6 (six) hours as needed for Pain., Disp: 21 tablet, Rfl: 0  ALLERGIES: Review of patient's allergies indicates:  No Known Allergies    VITAL SIGNS: /86   Pulse 104   Ht 5' 10" (1.778 m)   Wt 79 kg (174 lb 2.6 oz)   BMI 24.99 kg/m²      Risks, indications and benefits of the surgical procedure were discussed with the patient. All questions with regard to surgery, rehab, expected return to functional activities, activities of daily living and recreational endeavors were answered to his satisfaction.    It was explained to the patient that there may be an increase in surgical risks if the patient has certain co-morbidities such as but not limited to: Obesity, Cardiovascular issues (CHF, CAD, Arrhythmias), chronic pulmonary issues, previous or current neurovascular/neurological issues, previous strokes, diabetes mellitus, previous wound healing issues, previous wound or skin infections, PVD, clotting disorders, if the patient uses chronic steroids, if the patient takes or has immune compromising medications or diseases, or has previously or currently used tobacco products.     The patient verbalized that he/she does not have any additional clotting, " bleeding, or blood disorders, other than what is list in her chart on today's review.     Then a brief history and physical exam were performed.    Review of Systems   Constitution: Negative. Negative for chills, fever and night sweats.   HENT: Negative for congestion and headaches.    Eyes: Negative for blurred vision, left vision loss and right vision loss.   Cardiovascular: Negative for chest pain and syncope.   Respiratory: Negative for cough and shortness of breath.    Endocrine: Negative for polydipsia, polyphagia and polyuria.   Hematologic/Lymphatic: Negative for bleeding problem. Does not bruise/bleed easily.   Skin: Negative for dry skin, itching and rash.   Musculoskeletal: Negative for falls and muscle weakness.   Gastrointestinal: Negative for abdominal pain and bowel incontinence.   Genitourinary: Negative for bladder incontinence and nocturia.   Neurological: Negative for disturbances in coordination, loss of balance and seizures.   Psychiatric/Behavioral: Negative for depression. The patient does not have insomnia.    Allergic/Immunologic: Negative for hives and persistent infections.     PHYSICAL EXAM:  GEN: A&Ox3, WD WN NAD  HEENT: WNL  CHEST: CTAB, no W/R/R  HEART: RRR, no M/R/G  ABD: Soft, NT ND, BS x4 QUADS  MS; See Epic  NEURO: CN II-XII intact       The surgical consent was then reviewed with the patient, who agreed with all the contents of the consent form and it was signed. he was then given the surgery packet to bring with him to surgery center for the anesthesia portion of his perioperative paperwork (if needed)   For all physicians except for Dr. Kidd, we will email and possibly fax the consent forms and booking sheets to [facility].    The patient was given the opportunity to ask questions about the surgical plan and consent form, and once no other questions were asked, I proceeded with the pre-op appointment.    PHYSICAL THERAPY:  He was also instructed regarding  physical therapy and will begin on  3 weeks post-op. He was given a copy of the original prescription to schedule. Another copy of this prescription was also faxed to [facility].    POST OP CARE:instructions were reviewed including care of the wound and dressing after surgery and when he can shower. Patient was told not sleep or lay on there surgical extremity following surgery as this could cause repair damage, tissue damage, or nerve injury.    An extensive amount of time was spent on discussion of the following information based on what type of surgery the patient was having. Patient expressed understanding of the material below:    Shoulder surgery or upper extremity surgery requiring post-op sling:  It was explained to the patient that they should remove their arm from the sling approximately 6 times per day to do full elbow ROM (flexion and extension) and full supination and pronation of the elbow for approximately 5 minutes at a time to help prevent elbow stiffness, nerve pain or problems, or nerve injury. They were told to contact us if they begin having numbness and tingling of there surgical extremity that persists longer then 1 day without relief.     Extremity surgery requiring a splint:   It was explained to patient on how to properly elevated position there extremity to prevent pressure ulcers from occurring. I made sure that the patient understood that that surgical site may be numb following surgery and prevent them from feeling pressure pain that they would normal feel if a pressure injury was occurring. Pressure ulcers and there causes were discussed with the patient today.     Post-operative splint:  It was explain to the patient that they can contact us at anytime if they feel that there is a problem with their splint or under their splint that needs evaluation. If there is concern, questions, or discomfort with the splint then they can present to either our clinic or the [facility] ED  for removal, evaluation, and replacement of the splint.    CRUTCHES OR WALKER: It was explained to the patient that if they are having a lower extremity surgery that they will require either a walker or crutches to ambulate safely with after surgery. It was explained that a cane or other assistive devices are not sufficient to safely ambulate with after surgery. I explained to the patient that I will place an order for them to receive either crutches or a walker after surgery to go home with. It was explained that if they have crutches or a walker at home already, that they are REQUIRED to bring them to the hospital on the day of surgery. It was explained that if they do not have them at the hospital on the day of surgery that they WILL be provided a new pair or crutches or a walker to go home with to ensure ambulation will be safe if the patient needs to stop somewhere on the way home.      If the patient's mobility limitation cannot be sufficiently resolved by the use of crutches then it is necessary for the patient's functional mobility deficit to be sufficiently resolved with the use of a (Rolling Walker or Walker). Patient's mobility limitation significantly impairs their ability to participate in one of more activities of daily living. The use of a (Rolling Walker or Walker) will significantly improve the patient's ability to participate in MRADLS and the patient will use it on regular basis in the home.      PAIN MANAGEMENT: Harinder Elliott was also given a pain management regime, which includes the option of getting a TENS unit given to him by [facility] (if patient chooses) along with the education required for its use. He was also instructed regarding the Polar ice unit or gel ice packs (chosen by patient) that will be in place after surgery and his postoperative pain medications.     Patient understands that Polar Ice machine has to be bought today if they want it. It cannot be bought on day of  surgery at surgery center.     PAIN MEDICATION:  Percocet 10/325mg 1 po q 4-6 hours prn pain  Ultram 50 mg Take 1-2 p.o. q.6 hours p.r.n. breakthrough pain,   Phenergan 25 mg one p.o. q.6 hours p.r.n. nausea and vomiting.    DVT prophylaxis was discussed with the patient today including risk factors for developing DVTs and history of DVTs. The patient was asked if any specific recommendations were given from the doctor/s that did pre-operative surgical clearance. The patient was then given an education sheet about DVTs and PE with warning signs and symptoms of both and steps to take if they suspect either of these.    This along with the Modified Caprini risk assessment model for VTE in general surgical patients was used to determine the patient's DVT risk.     From: Breana THOMPSON, Mike DA, Amanda NINO, et al. Prevention of VTE in nonorthopedic surgical patients: antithrombotic therapy and prevention of thrombosis, 9th ed: American College of Chest Physicians evidence-based clinical practical guidelines. Chest 2012; 141:e227S. Copyright © 2012. Reproduced with permission from the American College of Chest Physicians.    The below listed DVT prophylaxis regimen along with bilateral DREW compression stockings will be used post-op. Length of treatment has been determined to be 10-42 days post-op by the above noted Caprini assessment model.     The patient was instructed to buy and take:  Aspirin 81mg QD x 4 weeks for DVT prophylaxis starting on the morning after surgery.  Patient will also use bilateral TEDs on lower extremities, SCDs during surgery, and early ambulation post-op. If the patient was previously taking 81mg baby aspirin, they were told to not take it starting 5 days prior to surgery and to restart the 81mg aspirin after surgery.       Patient was also told to buy over the counter Prilosec medication if needed and take it once daily for GI protection as long as they are taking NSAIDs or Aspirin.   Patient denies  history of seizures.      The patient was told that narcotic pain medications may make them drowsy and instructions were given to not sign legal documents, drive or operate heavy machinery, cars, or equipment while under the influence of narcotic medications. The patient was told and understands that narcotic pain medications should only be used as needed to control pain and that other options of pain control include TENs unit and ice packs/unit.     As there were no other questions to be asked, he was given my business card along with Marsha Bowman MD business card if he has any questions or concerns prior to surgery or in the postop period.     At the end of our encounter today, the patient was given the option and asked if they would like to see the surgeon regarding questions or concerns that they have about the consent form or the surgical procedure. The patient spoke with Dr. Bowman about surgery previous.

## 2024-05-28 ENCOUNTER — ANESTHESIA (OUTPATIENT)
Dept: SURGERY | Facility: HOSPITAL | Age: 34
End: 2024-05-28
Payer: COMMERCIAL

## 2024-05-28 ENCOUNTER — HOSPITAL ENCOUNTER (OUTPATIENT)
Facility: HOSPITAL | Age: 34
Discharge: HOME | End: 2024-05-28
Attending: ORTHOPAEDIC SURGERY | Admitting: ORTHOPAEDIC SURGERY
Payer: COMMERCIAL

## 2024-05-28 ENCOUNTER — ANESTHESIA EVENT (OUTPATIENT)
Dept: SURGERY | Facility: HOSPITAL | Age: 34
End: 2024-05-28
Payer: COMMERCIAL

## 2024-05-28 VITALS
HEIGHT: 70 IN | OXYGEN SATURATION: 97 % | SYSTOLIC BLOOD PRESSURE: 119 MMHG | BODY MASS INDEX: 24.91 KG/M2 | WEIGHT: 174 LBS | TEMPERATURE: 98 F | DIASTOLIC BLOOD PRESSURE: 60 MMHG | HEART RATE: 86 BPM | RESPIRATION RATE: 18 BRPM

## 2024-05-28 DIAGNOSIS — M75.21 BICEPS TENDONITIS ON RIGHT: ICD-10-CM

## 2024-05-28 DIAGNOSIS — S43.431A GLENOID LABRAL TEAR, RIGHT, INITIAL ENCOUNTER: ICD-10-CM

## 2024-05-28 DIAGNOSIS — M25.311 INSTABILITY OF RIGHT SHOULDER JOINT: Primary | ICD-10-CM

## 2024-05-28 PROCEDURE — 25000003 PHARM REV CODE 250: Performed by: NURSE ANESTHETIST, CERTIFIED REGISTERED

## 2024-05-28 PROCEDURE — 37000009 HC ANESTHESIA EA ADD 15 MINS: Performed by: ORTHOPAEDIC SURGERY

## 2024-05-28 PROCEDURE — 25000003 PHARM REV CODE 250: Performed by: STUDENT IN AN ORGANIZED HEALTH CARE EDUCATION/TRAINING PROGRAM

## 2024-05-28 PROCEDURE — 29806 SHO ARTHRS SRG CAPSULORRAPHY: CPT | Mod: 22,RT,, | Performed by: ORTHOPAEDIC SURGERY

## 2024-05-28 PROCEDURE — D9220A PRA ANESTHESIA: Mod: CRNA,,, | Performed by: NURSE ANESTHETIST, CERTIFIED REGISTERED

## 2024-05-28 PROCEDURE — 63600175 PHARM REV CODE 636 W HCPCS: Performed by: NURSE ANESTHETIST, CERTIFIED REGISTERED

## 2024-05-28 PROCEDURE — D9220A PRA ANESTHESIA: Mod: ANES,,, | Performed by: STUDENT IN AN ORGANIZED HEALTH CARE EDUCATION/TRAINING PROGRAM

## 2024-05-28 PROCEDURE — 29999 UNLISTED PX ARTHROSCOPY: CPT | Mod: ,,, | Performed by: ORTHOPAEDIC SURGERY

## 2024-05-28 PROCEDURE — 36000711: Performed by: ORTHOPAEDIC SURGERY

## 2024-05-28 PROCEDURE — C1762 CONN TISS, HUMAN(INC FASCIA): HCPCS | Performed by: ORTHOPAEDIC SURGERY

## 2024-05-28 PROCEDURE — 37000008 HC ANESTHESIA 1ST 15 MINUTES: Performed by: ORTHOPAEDIC SURGERY

## 2024-05-28 PROCEDURE — C1713 ANCHOR/SCREW BN/BN,TIS/BN: HCPCS | Performed by: ORTHOPAEDIC SURGERY

## 2024-05-28 PROCEDURE — 25000003 PHARM REV CODE 250: Performed by: PHYSICIAN ASSISTANT

## 2024-05-28 PROCEDURE — 63600175 PHARM REV CODE 636 W HCPCS: Performed by: ORTHOPAEDIC SURGERY

## 2024-05-28 PROCEDURE — 25000003 PHARM REV CODE 250: Performed by: ORTHOPAEDIC SURGERY

## 2024-05-28 PROCEDURE — 27201423 OPTIME MED/SURG SUP & DEVICES STERILE SUPPLY: Performed by: ORTHOPAEDIC SURGERY

## 2024-05-28 PROCEDURE — 71000033 HC RECOVERY, INTIAL HOUR: Performed by: ORTHOPAEDIC SURGERY

## 2024-05-28 PROCEDURE — 63600175 PHARM REV CODE 636 W HCPCS: Performed by: STUDENT IN AN ORGANIZED HEALTH CARE EDUCATION/TRAINING PROGRAM

## 2024-05-28 PROCEDURE — 63600175 PHARM REV CODE 636 W HCPCS: Mod: JZ,JG | Performed by: PHYSICIAN ASSISTANT

## 2024-05-28 PROCEDURE — 99900035 HC TECH TIME PER 15 MIN (STAT)

## 2024-05-28 PROCEDURE — 71000039 HC RECOVERY, EACH ADD'L HOUR: Performed by: ORTHOPAEDIC SURGERY

## 2024-05-28 PROCEDURE — 71000016 HC POSTOP RECOV ADDL HR: Performed by: ORTHOPAEDIC SURGERY

## 2024-05-28 PROCEDURE — 94761 N-INVAS EAR/PLS OXIMETRY MLT: CPT

## 2024-05-28 PROCEDURE — 71000015 HC POSTOP RECOV 1ST HR: Performed by: ORTHOPAEDIC SURGERY

## 2024-05-28 PROCEDURE — 36000710: Performed by: ORTHOPAEDIC SURGERY

## 2024-05-28 DEVICE — SELF BUNCHING KL 1.8 FIBERTAK, SHOULDER
Type: IMPLANTABLE DEVICE | Site: SHOULDER | Status: FUNCTIONAL
Brand: ARTHREX®

## 2024-05-28 DEVICE — ALLOGRAFT CARTIMAX VIABLE CART: Type: IMPLANTABLE DEVICE | Site: SHOULDER | Status: FUNCTIONAL

## 2024-05-28 RX ORDER — PROPOFOL 10 MG/ML
VIAL (ML) INTRAVENOUS
Status: DISCONTINUED | OUTPATIENT
Start: 2024-05-28 | End: 2024-05-28

## 2024-05-28 RX ORDER — EPHEDRINE SULFATE 50 MG/ML
INJECTION, SOLUTION INTRAVENOUS
Status: DISCONTINUED | OUTPATIENT
Start: 2024-05-28 | End: 2024-05-28

## 2024-05-28 RX ORDER — OXYCODONE HCL 10 MG/1
10 TABLET, FILM COATED, EXTENDED RELEASE ORAL
Status: COMPLETED | OUTPATIENT
Start: 2024-05-28 | End: 2024-05-28

## 2024-05-28 RX ORDER — ONDANSETRON HYDROCHLORIDE 2 MG/ML
INJECTION, SOLUTION INTRAVENOUS
Status: DISCONTINUED | OUTPATIENT
Start: 2024-05-28 | End: 2024-05-28

## 2024-05-28 RX ORDER — KETAMINE HYDROCHLORIDE 100 MG/ML
INJECTION, SOLUTION INTRAMUSCULAR; INTRAVENOUS
Status: DISCONTINUED | OUTPATIENT
Start: 2024-05-28 | End: 2024-05-28 | Stop reason: HOSPADM

## 2024-05-28 RX ORDER — MIDAZOLAM HYDROCHLORIDE 1 MG/ML
INJECTION INTRAMUSCULAR; INTRAVENOUS
Status: DISCONTINUED | OUTPATIENT
Start: 2024-05-28 | End: 2024-05-28

## 2024-05-28 RX ORDER — TRAMADOL HYDROCHLORIDE 50 MG/1
100 TABLET ORAL EVERY 6 HOURS PRN
Status: DISCONTINUED | OUTPATIENT
Start: 2024-05-28 | End: 2024-05-28 | Stop reason: HOSPADM

## 2024-05-28 RX ORDER — EPINEPHRINE 1 MG/ML
INJECTION, SOLUTION, CONCENTRATE INTRAVENOUS
Status: DISCONTINUED | OUTPATIENT
Start: 2024-05-28 | End: 2024-05-28 | Stop reason: HOSPADM

## 2024-05-28 RX ORDER — ROPIVACAINE HYDROCHLORIDE 5 MG/ML
INJECTION, SOLUTION EPIDURAL; INFILTRATION; PERINEURAL
Status: DISCONTINUED | OUTPATIENT
Start: 2024-05-28 | End: 2024-05-28 | Stop reason: HOSPADM

## 2024-05-28 RX ORDER — MORPHINE SULFATE 2 MG/ML
2 INJECTION, SOLUTION INTRAMUSCULAR; INTRAVENOUS EVERY 10 MIN PRN
Status: DISCONTINUED | OUTPATIENT
Start: 2024-05-28 | End: 2024-05-28 | Stop reason: HOSPADM

## 2024-05-28 RX ORDER — FENTANYL CITRATE 50 UG/ML
INJECTION, SOLUTION INTRAMUSCULAR; INTRAVENOUS
Status: DISCONTINUED | OUTPATIENT
Start: 2024-05-28 | End: 2024-05-28

## 2024-05-28 RX ORDER — FENTANYL CITRATE 50 UG/ML
25 INJECTION, SOLUTION INTRAMUSCULAR; INTRAVENOUS EVERY 5 MIN PRN
Status: DISCONTINUED | OUTPATIENT
Start: 2024-05-28 | End: 2024-05-28 | Stop reason: HOSPADM

## 2024-05-28 RX ORDER — PREGABALIN 75 MG/1
75 CAPSULE ORAL
Status: COMPLETED | OUTPATIENT
Start: 2024-05-28 | End: 2024-05-28

## 2024-05-28 RX ORDER — PROMETHAZINE HYDROCHLORIDE 25 MG/1
25 TABLET ORAL EVERY 6 HOURS PRN
Status: DISCONTINUED | OUTPATIENT
Start: 2024-05-28 | End: 2024-05-28 | Stop reason: HOSPADM

## 2024-05-28 RX ORDER — HYDROMORPHONE HYDROCHLORIDE 2 MG/ML
INJECTION, SOLUTION INTRAMUSCULAR; INTRAVENOUS; SUBCUTANEOUS
Status: DISCONTINUED | OUTPATIENT
Start: 2024-05-28 | End: 2024-05-28

## 2024-05-28 RX ORDER — DEXAMETHASONE SODIUM PHOSPHATE 4 MG/ML
INJECTION, SOLUTION INTRA-ARTICULAR; INTRALESIONAL; INTRAMUSCULAR; INTRAVENOUS; SOFT TISSUE
Status: DISCONTINUED | OUTPATIENT
Start: 2024-05-28 | End: 2024-05-28

## 2024-05-28 RX ORDER — CLINDAMYCIN PHOSPHATE 900 MG/50ML
900 INJECTION, SOLUTION INTRAVENOUS
Status: COMPLETED | OUTPATIENT
Start: 2024-05-28 | End: 2024-05-28

## 2024-05-28 RX ORDER — OXYCODONE HYDROCHLORIDE 5 MG/1
5 TABLET ORAL
Status: DISCONTINUED | OUTPATIENT
Start: 2024-05-28 | End: 2024-05-28 | Stop reason: HOSPADM

## 2024-05-28 RX ORDER — LIDOCAINE HYDROCHLORIDE 20 MG/ML
INJECTION INTRAVENOUS
Status: DISCONTINUED | OUTPATIENT
Start: 2024-05-28 | End: 2024-05-28

## 2024-05-28 RX ORDER — ONDANSETRON HYDROCHLORIDE 2 MG/ML
4 INJECTION, SOLUTION INTRAVENOUS EVERY 12 HOURS PRN
Status: DISCONTINUED | OUTPATIENT
Start: 2024-05-28 | End: 2024-05-28 | Stop reason: HOSPADM

## 2024-05-28 RX ORDER — PHENYLEPHRINE HYDROCHLORIDE 10 MG/ML
INJECTION INTRAVENOUS
Status: DISCONTINUED | OUTPATIENT
Start: 2024-05-28 | End: 2024-05-28

## 2024-05-28 RX ORDER — OXYCODONE HYDROCHLORIDE 5 MG/1
10 TABLET ORAL EVERY 4 HOURS PRN
Status: DISCONTINUED | OUTPATIENT
Start: 2024-05-28 | End: 2024-05-28 | Stop reason: HOSPADM

## 2024-05-28 RX ORDER — HALOPERIDOL 5 MG/ML
0.5 INJECTION INTRAMUSCULAR EVERY 10 MIN PRN
Status: DISCONTINUED | OUTPATIENT
Start: 2024-05-28 | End: 2024-05-28 | Stop reason: HOSPADM

## 2024-05-28 RX ORDER — KETAMINE HYDROCHLORIDE 100 MG/ML
INJECTION, SOLUTION INTRAMUSCULAR; INTRAVENOUS
Status: DISCONTINUED | OUTPATIENT
Start: 2024-05-28 | End: 2024-05-28

## 2024-05-28 RX ORDER — SODIUM CHLORIDE 0.9 % (FLUSH) 0.9 %
10 SYRINGE (ML) INJECTION
Status: DISCONTINUED | OUTPATIENT
Start: 2024-05-28 | End: 2024-05-28 | Stop reason: HOSPADM

## 2024-05-28 RX ORDER — KETOROLAC TROMETHAMINE 30 MG/ML
INJECTION, SOLUTION INTRAMUSCULAR; INTRAVENOUS
Status: DISCONTINUED | OUTPATIENT
Start: 2024-05-28 | End: 2024-05-28 | Stop reason: HOSPADM

## 2024-05-28 RX ORDER — ROCURONIUM BROMIDE 10 MG/ML
INJECTION, SOLUTION INTRAVENOUS
Status: DISCONTINUED | OUTPATIENT
Start: 2024-05-28 | End: 2024-05-28

## 2024-05-28 RX ORDER — SODIUM CHLORIDE 9 MG/ML
INJECTION, SOLUTION INTRAVENOUS CONTINUOUS
Status: DISCONTINUED | OUTPATIENT
Start: 2024-05-28 | End: 2024-05-28 | Stop reason: HOSPADM

## 2024-05-28 RX ADMIN — GLYCOPYRROLATE 0.2 MG: 0.2 INJECTION, SOLUTION INTRAMUSCULAR; INTRAVENOUS at 09:05

## 2024-05-28 RX ADMIN — SODIUM CHLORIDE: 9 INJECTION, SOLUTION INTRAVENOUS at 09:05

## 2024-05-28 RX ADMIN — FENTANYL CITRATE 100 MCG: 50 INJECTION, SOLUTION INTRAMUSCULAR; INTRAVENOUS at 08:05

## 2024-05-28 RX ADMIN — LIDOCAINE HYDROCHLORIDE 75 MG: 20 INJECTION INTRAVENOUS at 08:05

## 2024-05-28 RX ADMIN — EPHEDRINE SULFATE 5 MG: 50 INJECTION INTRAVENOUS at 10:05

## 2024-05-28 RX ADMIN — PHENYLEPHRINE HYDROCHLORIDE 100 MCG: 10 INJECTION INTRAVENOUS at 09:05

## 2024-05-28 RX ADMIN — KETAMINE HYDROCHLORIDE 20 MG: 100 INJECTION INTRAMUSCULAR; INTRAVENOUS at 08:05

## 2024-05-28 RX ADMIN — DEXAMETHASONE SODIUM PHOSPHATE 8 MG: 4 INJECTION, SOLUTION INTRAMUSCULAR; INTRAVENOUS at 08:05

## 2024-05-28 RX ADMIN — FENTANYL CITRATE 25 MCG: 50 INJECTION INTRAMUSCULAR; INTRAVENOUS at 12:05

## 2024-05-28 RX ADMIN — PREGABALIN 75 MG: 75 CAPSULE ORAL at 08:05

## 2024-05-28 RX ADMIN — EPHEDRINE SULFATE 5 MG: 50 INJECTION INTRAVENOUS at 09:05

## 2024-05-28 RX ADMIN — ROCURONIUM BROMIDE 20 MG: 10 INJECTION, SOLUTION INTRAVENOUS at 10:05

## 2024-05-28 RX ADMIN — OXYCODONE 5 MG: 5 TABLET ORAL at 12:05

## 2024-05-28 RX ADMIN — CLINDAMYCIN IN 5 PERCENT DEXTROSE 900 MG: 18 INJECTION, SOLUTION INTRAVENOUS at 09:05

## 2024-05-28 RX ADMIN — ONDANSETRON 4 MG: 2 INJECTION INTRAMUSCULAR; INTRAVENOUS at 08:05

## 2024-05-28 RX ADMIN — PROPOFOL 200 MG: 10 INJECTION, EMULSION INTRAVENOUS at 08:05

## 2024-05-28 RX ADMIN — KETAMINE HYDROCHLORIDE 10 MG: 100 INJECTION INTRAMUSCULAR; INTRAVENOUS at 09:05

## 2024-05-28 RX ADMIN — SUGAMMADEX 200 MG: 100 INJECTION, SOLUTION INTRAVENOUS at 11:05

## 2024-05-28 RX ADMIN — HYDROMORPHONE HYDROCHLORIDE 0.4 MG: 2 INJECTION, SOLUTION INTRAMUSCULAR; INTRAVENOUS; SUBCUTANEOUS at 11:05

## 2024-05-28 RX ADMIN — MIDAZOLAM HYDROCHLORIDE 2 MG: 2 INJECTION, SOLUTION INTRAMUSCULAR; INTRAVENOUS at 08:05

## 2024-05-28 RX ADMIN — SODIUM CHLORIDE: 9 INJECTION, SOLUTION INTRAVENOUS at 08:05

## 2024-05-28 RX ADMIN — OXYCODONE HYDROCHLORIDE 10 MG: 10 TABLET, FILM COATED, EXTENDED RELEASE ORAL at 08:05

## 2024-05-28 RX ADMIN — ROCURONIUM BROMIDE 50 MG: 10 INJECTION, SOLUTION INTRAVENOUS at 08:05

## 2024-05-28 RX ADMIN — SODIUM CHLORIDE, SODIUM GLUCONATE, SODIUM ACETATE, POTASSIUM CHLORIDE, MAGNESIUM CHLORIDE, SODIUM PHOSPHATE, DIBASIC, AND POTASSIUM PHOSPHATE: .53; .5; .37; .037; .03; .012; .00082 INJECTION, SOLUTION INTRAVENOUS at 11:05

## 2024-05-28 NOTE — INTERVAL H&P NOTE
The patient has been examined and the H&P has been reviewed:    I concur with the findings and no changes have occurred since H&P was written.    Surgery risks, benefits and alternative options discussed and understood by patient/family.          There are no hospital problems to display for this patient.    
(4) excellent

## 2024-05-28 NOTE — ANESTHESIA POSTPROCEDURE EVALUATION
Anesthesia Post Evaluation    Patient: Harinder Elliott    Procedure(s) Performed: Procedure(s) (LRB):  FIXATION, TENDON (Right)  DEBRIDEMENT, SHOULDER, ARTHROSCOPIC (Right)  ARTHROSCOPY, SHOULDER, WITH BANKART REPAIR (Right)    Final Anesthesia Type: general      Patient location during evaluation: PACU  Patient participation: Yes- Able to Participate  Level of consciousness: awake and alert  Post-procedure vital signs: reviewed and stable  Pain management: adequate  Airway patency: patent    PONV status at discharge: No PONV  Anesthetic complications: no      Cardiovascular status: hemodynamically stable  Hydration status: euvolemic  Follow-up not needed.              Vitals Value Taken Time   /63 05/28/24 1231   Temp 36.3 °C (97.3 °F) 05/28/24 1136   Pulse 83 05/28/24 1240   Resp 15 05/28/24 1240   SpO2 98 % 05/28/24 1240   Vitals shown include unfiled device data.      No case tracking events are documented in the log.      Pain/Derik Score: Pain Rating Prior to Med Admin: 6 (5/28/2024 12:23 PM)  Derik Score: 7 (5/28/2024 12:00 PM)

## 2024-05-28 NOTE — ANESTHESIA PROCEDURE NOTES
Intubation    Date/Time: 5/28/2024 8:44 AM    Performed by: Mirna Partida CRNA  Authorized by: Sony Khan MD    Intubation:     Induction:  Intravenous    Intubated:  Postinduction    Mask Ventilation:  Easy mask    Attempts:  1    Attempted By:  CRNA    Method of Intubation:  Video laryngoscopy    Blade:  Sheldon 3    Laryngeal View Grade: Grade I - full view of cords      Difficult Airway Encountered?: No      Complications:  None    Airway Device:  Oral endotracheal tube    Airway Device Size:  7.5    Style/Cuff Inflation:  Cuffed    Inflation Amount (mL):  8    Tube secured:  22    Secured at:  The lips    Placement Verified By:  Capnometry    Complicating Factors:  None    Findings Post-Intubation:  BS equal bilateral and atraumatic/condition of teeth unchanged

## 2024-05-28 NOTE — TRANSFER OF CARE
"Anesthesia Transfer of Care Note    Patient: Harinder Elliott    Procedure(s) Performed: Procedure(s) (LRB):  FIXATION, TENDON (Right)  DEBRIDEMENT, SHOULDER, ARTHROSCOPIC (Right)  ARTHROSCOPY, SHOULDER, WITH BANKART REPAIR (Right)    Patient location: PACU    Anesthesia Type: general    Transport from OR: Transported from OR on 6-10 L/min O2 by face mask with adequate spontaneous ventilation    Post pain: adequate analgesia    Post assessment: no apparent anesthetic complications and tolerated procedure well    Post vital signs: stable    Level of consciousness: sedated    Nausea/Vomiting: no nausea/vomiting    Complications: none    Transfer of care protocol was followed      Last vitals: Visit Vitals  /78 (BP Location: Left arm, Patient Position: Lying)   Pulse 76   Temp 36.7 °C (98.1 °F) (Oral)   Resp 16   Ht 5' 10" (1.778 m)   Wt 78.9 kg (174 lb)   SpO2 96%   BMI 24.97 kg/m²     "

## 2024-05-28 NOTE — ANESTHESIA PREPROCEDURE EVALUATION
05/28/2024  Harinder Elliott is a 34 y.o., male with PMH of ADD presenting for surgery below    Pre-operative evaluation for Procedure(s) (LRB):  FIXATION, TENDON (Right)  DEBRIDEMENT, SHOULDER, ARTHROSCOPIC (Right)    Harinder Elliott is a 34 y.o. male     Past Medical History:   Diagnosis Date    Colon polyp     Depression     Migraine     Ureteral stricture        Review of patient's allergies indicates:  No Known Allergies    No current facility-administered medications on file prior to encounter.     Current Outpatient Medications on File Prior to Encounter   Medication Sig Dispense Refill    ALPRAZolam (XANAX) 0.25 MG tablet Take 1 tablet (0.25 mg total) by mouth daily as needed for Anxiety. 10 tablet 0    benzonatate (TESSALON PERLES) 100 MG capsule Take 1 capsule (100 mg total) by mouth every 6 (six) hours as needed for Cough. (Patient not taking: Reported on 5/24/2024) 30 capsule 1    dextroamphetamine-amphetamine (ADDERALL XR) 10 MG 24 hr capsule Take 1 capsule (10 mg total) by mouth every morning. 30 capsule 0    diazePAM (VALIUM) 5 MG tablet Take 1 tablet (5 mg total) by mouth every 6 (six) hours as needed for Anxiety. 2 tablet 0    EScitalopram oxalate (LEXAPRO) 10 MG tablet Take one and half tablet by mouth daily total 15 mg by mouth daily 45 tablet 2    fluticasone propionate (FLONASE) 50 mcg/actuation nasal spray 1 spray (50 mcg total) by Each Nostril route once daily. (Patient not taking: Reported on 5/24/2024) 9.9 mL 0    lisdexamfetamine (VYVANSE) 30 MG capsule Take 1 capsule (30 mg total) by mouth every morning. 30 capsule 0    meloxicam (MOBIC) 15 MG tablet Take 1 tablet (15 mg total) by mouth once daily. 30 tablet 0    methylphenidate HCl 27 MG CR tablet Take 1 tablet (27 mg total) by mouth every morning. 30 tablet 0    sumatriptan (IMITREX) 100 MG tablet Take 1 tablet (100  "mg total) by mouth every 2 (two) hours as needed. (Patient not taking: Reported on 2024) 9 tablet 0       Past Surgical History:   Procedure Laterality Date    COLON SURGERY      colon polyp    COLONOSCOPY N/A 2/3/2021    Procedure: COLONOSCOPY;  Surgeon: Graham Marino MD;  Location: [facility] ([address] [place] [address]);  Service: Endoscopy;  Laterality: N/A;  COVID + on 7/15/20 , per endo protocol no further testing needed at this time -    ESOPHAGOGASTRODUODENOSCOPY N/A 2/3/2021    Procedure: EGD (ESOPHAGOGASTRODUODENOSCOPY);  Surgeon: Graham Marino MD;  Location: [facility] ([address] [place] [address]);  Service: Endoscopy;  Laterality: N/A;  COVID + on 7/15/20 , per endo protocol no further testing needed at this time -    HERNIA REPAIR      KIDNEY SURGERY      torn labrim      VASECTOMY      WISDOM TOOTH EXTRACTION         CBC: No results for input(s): "WBC", "HGB", "HCT", "PLT" in the last 72 hours.    CMP: No results for input(s): "NA", "K", "CL", "CO2", "BUN", "CREATININE", "GLU", "MG", "PHOS", "CALCIUM", "ALBUMIN", "PROT", "ALKPHOS", "ALT", "AST", "BILITOT" in the last 72 hours.    COAGS  No results for input(s): "PT", "INR", "PROTIME", "APTT" in the last 72 hours.    BP Readings from Last 3 Encounters:   24 135/86   24 118/76   24 132/84       Diagnostic Studies:      EKD Echo:  No results found for this or any previous visit.      Pre-op Assessment    I have reviewed the Patient Summary Reports.     I have reviewed the Nursing Notes. I have reviewed the NPO Status.   I have reviewed the Medications.     Review of Systems  Anesthesia Hx:               Denies Personal Hx of Anesthesia complications.                        Physical Exam  General: Alert, Cooperative and Oriented    Airway:  Mallampati: II   Mouth Opening: Normal  TM Distance: Normal  Neck ROM: Normal ROM        Anesthesia Plan  Type of Anesthesia, risks & benefits discussed:    Anesthesia Type: Gen ETT  Intra-op Monitoring Plan: " Standard ASA Monitors  Post Op Pain Control Plan: multimodal analgesia  Induction:  IV  Airway Plan: Direct  Informed Consent: Informed consent signed with the Patient and all parties understand the risks and agree with anesthesia plan.  All questions answered.   ASA Score: 1  Day of Surgery Review of History & Physical: H&P Update referred to the surgeon/provider.    Ready For Surgery From Anesthesia Perspective.     .

## 2024-05-28 NOTE — PLAN OF CARE
Discharge instructions reviewed and pt verbalizes understanding. Pain control appropriate. VSS and afebrile. Dressing is clean, dry and intact. Polar care at bedside. Meds delivered to bedside. AVS complete.

## 2024-05-28 NOTE — OP NOTE
DATE OF PROCEDURE: 05/28/2024    SURGEON: Marsha Bowman M.D.    ASSISTANT: KELLY Abebe DO ,PGY 6    PREOPERATIVE DIAGNOSES:   right  1. Shoulder anterior instability  2. Shoulder posterior instability  3. Shoulder synovitis  4. Shoulder loose body    POSTOPERATIVE DIAGNOSES:   right  1. Shoulder anterior instability  2. Shoulder posterior instability  3. Shoulder synovitis  4. Shoulder loose bodies  5. Shoulder SLAP type 8  6. Other articular cartilage disorders, shoulder M24.111-M24.112, M24.119   7.?? Internal derangement, shoulder M24.811-M24.812, M24.819  9. 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 Shoulder arthroscopic anterior capsulorrhaphy (CPT 71602) (complex, -22 modifier)  3. Shoulder arthroscopic anterior labral repair (CPT 58183) (complex, -22 modifier)  4. Shoulder arthroscopic posterior labral repair, capsulorrhaphy, 7:00 position (CPT 80078)  5. Shoulder arthroscopic extensive debridement (anterior, posterior glenohumeral joint) (CPT 00680)  6. Shoulder manipulation under anesthesia (CPT 44211)  7. Shoulder arthroscopic lysis of adhesions (CPT 72069):  8. Shoulder arthroscopic loose body removal (CPT 01891)  9 . Shoulder arthroscopic interpositional arthroplasty (35719)  10. Shoulder arthroscopic SLAP type 8 repair (CPT 22615)    ANESTHESIA:  General with intra-op suprascapular nerve block    BLOOD LOSS: Minimal.     DRAINS: None.     TOURNIQUET TIME: None.     COMPLICATIONS: None. The patient was moved to the recovery room in stable condition with compartments soft and cap refill less than a second in all digits.     COMPLEX PROCEDURE:  Labral repair and capsulorrhaphy and  grafting was complex in nature due to the complexity of the tear, thin capsule and remnant labral tissue lesion tissue and altered anatomy of this case.  There was an altered surgical field with abnormal anatomy. There was an altered surgical field due to the complex labral tear. There was abnormal anatomy. Complexity of the service was much greater than the normative procedure.There was increased time, intensity and technical difficulty of the procedure, severity of the patient's condition and mental effort required.  This was a highly complicated repair and tear pattern that required advanced arthroscopic skill in order to safely and technically perform it. Nevertheless the repair achieved was excellent.    BRIEF INDICATION OF MEDICAL NECESSITY: The patient is a 34 y.o. year-old male who has history and physical examination findings consistent with the above. Nonoperative versus operative options were discussed. The risks and benefits were discussed with the patient. The patient acknowledged understanding and wished to proceed with operative intervention. Informed consent was obtained prior to the procedure. Reasonable expectations and potential complications were discussed and acknowledged, including but not limited to infection, bleeding, blood clots, (DVT and/or PE), nerve injury, re-tear, instability, continued pain and stiffness. They agreed and   understood and wished to proceed.     EXAMINATION UNDER ANESTHESIA OF THE right SHOULDER: Forward elevation 175 degrees, External rotation at 0-60 degrees, External rotation at 90-90 degrees, Internal rotation at 90-60 degrees. Translation testing: anterior grade 3, posterior grade 2, sulcus sign grade 1 corrects to 0 on external rotation.     EXAMINATION UNDER ANESTHESIA OF THE NONOPERATED left SHOULDER: Forward elevation 175 degrees, External rotation at 0-60 degrees, External rotation at 0-90 degrees, Internal rotation at 90-60 degrees. Translation testing:  anterior grade 1, posterior grade 1, sulcus sign grade 1 corrects to 0 on external rotation.     PROCEDURE IN DETAIL:  After the correct operative site was marked by the operating surgeon. The patient was then taken to the operating room and placed supine on the operating room table, where the patient  underwent general anesthesia by the anesthesia team.  The patient was then rolled into the lateral decubitus position with the operative side up.  A well-padded axillary roll, beanbag and pillows were placed.  All pressure points were carefully padded and checked.  The upper extremities and both lower extremities were placed in comfortable positions and were also well-padded.  The operative upper extremity was then prepped and draped in the usual sterile fashion.    Suprascapular nerve block was performed in the standard fashion with 5cc local anesthetic.    The Spider arm positioner was implemented with balanced suspension and appropriate landmarks were noted on the skin. A posterior followed by emily-superior portals were created and systematic examination of the joint revealed the following:     There was no evidence of any significant chondral lesions to the glenoid or humeral head.     There was chondral damage to:  Humeral head ICRS Grade 4 - posterior Size: 2.5 x 20 cm  Glenoid: 10 x 2 mm grade 3 - emily-inferior   Chondroplasty was performed using arthroscopic shaver.    IGHL 11    There was a SLAP type 8 lesion noted to the biceps root upon probing and careful inspection.     Multiple loose bodies measuring 5 x 5 x 7 mm was removed from glenohumeral joint using arthroscopic grasper.     Bankart lesion was visualized anteroinferiorly, which extended posteriorly.  Liberator knife was used to elevate the anteroinferior and posteroinferior tissues. Care was taken on liberation of this tissue. Capsule anteriorly and posteriorly was patulous in nature. Capsule was stretched somewhat. Anteroinferior portal was  created just superior to the subscapularis in the appropriate more vertical angle.     Adhesions were cleared off labrum anteriorly and capsule was able to be mobilized after lysis of adhesions.    Cannulas were placed and a shaver was then used to roughen and debride the surface of the anterior, inferior, and posterior glenoid neck surfaces. Then 1-2 mm of articular surface was debrided for glenoid anchor placement.     COMPLEX PROCEDURE:  Labral repair and capsulorrhaphy and grafting was complex in nature due to the complexity of the tear, thin capsule and remnant labral tissue lesion tissue and altered anatomy of this case.  There was an altered surgical field with abnormal anatomy. There was an altered surgical field due to the complex labral tear. There was abnormal anatomy. Complexity of the service was much greater than the normative procedure.There was increased time, intensity and technical difficulty of the procedure, severity of the patient's condition and mental effort required.  This was a highly complicated repair and tear pattern that required advanced arthroscopic skill in order to safely and technically perform it. Nevertheless the repair achieved was excellent.    There was synovitis in the shoulder anteriorly and posteriorly and was debrided anteriorly and posteriorly in the glenohumeral joint to the areas of concern.    An inferior transcutaneous 3-mm portal was created to place the inferior anchors. Care was taken to avoid any damage to the neurovascular structures, axillary nerve, below.     5 anchors were placed in total, one at the 9:00, 10:00 positions, anteriorly at 5 o'clock, 4 o'clock, and 3 o'clock positions.     These were 3.0 Arthrex Bio-Composite Suture Tack and a simple suture was placed through the labrum and capsule, and labral repair and capsulorrhaphy was performed. Care was taken to avoid the neurovascular structures and axillary nerve bundles below. Suture was then tied using  modified Nate knots with the knots from the tissue side. All knots had good loop and knot security with repair starting posteriorly and then proceeding anteriorly.     These were 1.8 mm knotless Arthrex FiberTak soft anchors and a simple suture was placed through the labrum and capsule, and labral repair and capsulorrhaphy was performed. Care was taken to avoid the neurovascular structures and axillary nerve bundles below. Suture was then tied using modified Nate knots with the knots from the tissue side. All knots had good loop and knot security with repair starting posteriorly and then proceeding anteriorly.     The repair recentered the humeral head nicely on the glenoid. The shoulder was stable on translation testing.    Interpositional arthroplasty was performed and after anchor placement, there was noted to be minimal damaged cartilage exposed, 2 x 10 mm grade 3 of the emily-inferior glenoid.   :  Next, the 1 superior SLAP type 8 anchor was placed, 1 at the 11 o'clock position. This was 1.6 mm Arthrex FiberTak soft anchor and a simple knotless suture was placed with repair posteriorly.  This gave 1 suture holding the SLAP repair and the labrum was secured and was probed and found to be quite stable.     6 anchors were used in total.     This recentered the humeral head nicely on the glenoid. The shoulder was stable to translation testing.     Grafting was then performed to the humeral head lesion utilizing Cartimax product. The Cartimax cartilage fibers were thawed from cryopreservation and sequential rinsing x 3 of the media from the material was performed with normal saline at room temperature. The fibers were then mixed with lyophilized cartilage powder from [facility] and a putty consistency created. The Cartimax mixture was placed into the defect through enlarged posterior portal and smoothed to the surface of the surrounding normal articular cartilage. Pictures were obtained documenting the fill and solid  repair.     Suprascapular nerve block was performed in the standard fashion also with 5cc local anesthetic. Local was placed about portals and incision(s) after irrigation, as described below, was completed. The shoulder was then irrigated and fluid was extravasated using suction. All portals were reapproximated using inverted 4-0 Monocryl suture in the subcutaneous tissue of the portals. Mastisol and Steri-strips were placed with xeroform, 4x4s, abd pad, and Medi-pore tape.  TENS unit pads were placed which were medically necessary for pain relief.  An iceman was secured in the shoulder crenshaw.  A sling with an abduction pillow was secured.  The patient was then moved to supine, extubated and taken to the recovery room where the patient arrived in stable condition with the compartments of the arm and forearm soft and cap refill less than a second in all digits.     POST OPERATIVE PLAN: We will follow the arthroscopic Bankart repair guidelines. We discussed with the patient's family after surgery. The patient will remain in a sling for 6 weeks. We will start PT at the 3 week crista.    Quality of tissue: Good    Quality of the repair: Good

## 2024-05-28 NOTE — BRIEF OP NOTE
[place] - [facility] ([facility])  Brief Operative Note    Surgery Date: 5/28/2024     Surgeons and Role:     * Marsha Bowman MD - Primary    Assisting Surgeon: None    Pre-op Diagnosis:  Biceps tendonitis on right [M75.21]  Glenoid labral tear, right, initial encounter [S43.431A]  Instability of right shoulder joint [M25.311]    Post-op Diagnosis:  Post-Op Diagnosis Codes:     * Biceps tendonitis on right [M75.21]     * Glenoid labral tear, right, initial encounter [S43.431A]     * Instability of right shoulder joint [M25.311]    Procedure(s) (LRB):  FIXATION, TENDON (Right)  DEBRIDEMENT, SHOULDER, ARTHROSCOPIC (Right)    Anesthesia: General    Operative Findings: Anterior Labral Tear, Posterior Labral Tear, Humeral Head Cartilage defect     Estimated Blood Loss: * No values recorded between 5/28/2024  9:27 AM and 5/28/2024 11:16 AM *         Specimens:   Specimen (24h ago, onward)      None              Discharge Note    OUTCOME: Patient tolerated treatment/procedure well without complication and is now ready for discharge.    DISPOSITION: Home or Self Care    FINAL DIAGNOSIS:  <principal problem not specified>    FOLLOWUP: In clinic    DISCHARGE INSTRUCTIONS:  No discharge procedures on file.

## 2024-06-07 DIAGNOSIS — M25.311 INSTABILITY OF RIGHT SHOULDER JOINT: ICD-10-CM

## 2024-06-07 DIAGNOSIS — S43.431A GLENOID LABRAL TEAR, RIGHT, INITIAL ENCOUNTER: ICD-10-CM

## 2024-06-07 DIAGNOSIS — F90.0 ATTENTION DEFICIT HYPERACTIVITY DISORDER (ADHD), PREDOMINANTLY INATTENTIVE TYPE: ICD-10-CM

## 2024-06-07 RX ORDER — TRAMADOL HYDROCHLORIDE 50 MG/1
50-100 TABLET ORAL EVERY 6 HOURS PRN
Qty: 21 TABLET | Refills: 0 | Status: SHIPPED | OUTPATIENT
Start: 2024-06-07

## 2024-06-07 RX ORDER — LISDEXAMFETAMINE DIMESYLATE 30 MG/1
30 CAPSULE ORAL EVERY MORNING
Qty: 30 CAPSULE | Refills: 0 | OUTPATIENT
Start: 2024-06-07 | End: 2024-07-07

## 2024-06-07 RX ORDER — OXYCODONE AND ACETAMINOPHEN 10; 325 MG/1; MG/1
TABLET ORAL
Qty: 21 TABLET | Refills: 0 | Status: SHIPPED | OUTPATIENT
Start: 2024-06-07

## 2024-06-10 ENCOUNTER — OFFICE VISIT (OUTPATIENT)
Dept: SPORTS MEDICINE | Facility: CLINIC | Age: 34
End: 2024-06-10
Payer: COMMERCIAL

## 2024-06-10 VITALS
DIASTOLIC BLOOD PRESSURE: 78 MMHG | WEIGHT: 176.38 LBS | HEART RATE: 90 BPM | SYSTOLIC BLOOD PRESSURE: 138 MMHG | BODY MASS INDEX: 25.31 KG/M2

## 2024-06-10 DIAGNOSIS — Z98.890 S/P SHOULDER SURGERY: Primary | ICD-10-CM

## 2024-06-10 DIAGNOSIS — M19.011 PRIMARY OSTEOARTHRITIS OF RIGHT SHOULDER: ICD-10-CM

## 2024-06-10 PROCEDURE — 99024 POSTOP FOLLOW-UP VISIT: CPT | Mod: S$GLB,,, | Performed by: PHYSICIAN ASSISTANT

## 2024-06-10 PROCEDURE — 99999 PR PBB SHADOW E&M-EST. PATIENT-LVL III: CPT | Mod: PBBFAC,,, | Performed by: PHYSICIAN ASSISTANT

## 2024-06-10 PROCEDURE — 3044F HG A1C LEVEL LT 7.0%: CPT | Mod: CPTII,S$GLB,, | Performed by: PHYSICIAN ASSISTANT

## 2024-06-10 PROCEDURE — 1160F RVW MEDS BY RX/DR IN RCRD: CPT | Mod: CPTII,S$GLB,, | Performed by: PHYSICIAN ASSISTANT

## 2024-06-10 PROCEDURE — 3078F DIAST BP <80 MM HG: CPT | Mod: CPTII,S$GLB,, | Performed by: PHYSICIAN ASSISTANT

## 2024-06-10 PROCEDURE — 1159F MED LIST DOCD IN RCRD: CPT | Mod: CPTII,S$GLB,, | Performed by: PHYSICIAN ASSISTANT

## 2024-06-10 PROCEDURE — 3075F SYST BP GE 130 - 139MM HG: CPT | Mod: CPTII,S$GLB,, | Performed by: PHYSICIAN ASSISTANT

## 2024-06-10 NOTE — PROGRESS NOTES
Chief Complaint: right shoulder pain    HISTORY OF PRESENT ILLNESS:   Pt is here today for post-operative followup of shoulder arthroscopy.  he is doing well.  We have reviewed patient's findings and discussed plan of care and future treatment options.      Tolerating pain well. No longer taking pain medications.   Rates pain at 2/10 on average. A lot of times 0/10.   Reports this shoulder feels way better then his left shoulder surgery by another physician.   Wearing sling which is in place.      DATE OF PROCEDURE: 05/28/2024  SURGEON: Marsha Bowman M.D.  OPERATION:   right  Shoulder osteochondral allograft transplantation, humeral head ICRS Grade 4 - posterior: 2.5 x 20 cm) (CPT 63169 ) (CPT 09692/06579 knee comparison codes) (complex, -22 modifier)  2. Shoulder arthroscopic anterior capsulorrhaphy (CPT 99391) (complex, -22 modifier)  3. Shoulder arthroscopic anterior labral repair (CPT 23236) (complex, -22 modifier)  4. Shoulder arthroscopic posterior labral repair, capsulorrhaphy, 7:00 position (CPT 30724)  5. Shoulder arthroscopic extensive debridement (anterior, posterior glenohumeral joint) (CPT 48492)  6. Shoulder manipulation under anesthesia (CPT 86571)  7. Shoulder arthroscopic lysis of adhesions (CPT 82197):  8. Shoulder arthroscopic loose body removal (CPT 33907)  9 . Shoulder arthroscopic interpositional arthroplasty (72858)  10. Shoulder arthroscopic SLAP type 8 repair (CPT 55527)     There was chondral damage to:  Humeral head ICRS Grade 4 - posterior Size: 2.5 x 20 cm  Glenoid: 10 x 2 mm grade 3 - emily-inferior   Chondroplasty was performed using arthroscopic shaver.                                                                                     PHYSICAL EXAMINATION:     Incision sites healed well  No evidence of any erythema, infection or induration  elbow Range of motion full   Minimal effusion  2+ Radial pulses  No swelling                                                                                ASSESSMENT:                                                                                                                                               1. Status post above, doing well.                                                                                                                               PLAN:                                                                                                                                                     1. PT to start later this week with Deniz Seth; wean narcotics  Instructed to do 100 pendulums in each direction 2x per day to help cartilage reform from cartimax procedure.   2. Emphasized scapular function.  3. I have discussed return to activity in detail.  4. Patient will see us back in 4 weeks.                                      5. Discussed case with PT.    All questions were answered, surgical technique was reviewed and interpreted, and patient should contact us with any questions or concerns in the interim.

## 2024-06-14 ENCOUNTER — CLINICAL SUPPORT (OUTPATIENT)
Dept: REHABILITATION | Facility: HOSPITAL | Age: 34
End: 2024-06-14
Payer: COMMERCIAL

## 2024-06-14 DIAGNOSIS — M25.311 INSTABILITY OF RIGHT SHOULDER JOINT: ICD-10-CM

## 2024-06-14 DIAGNOSIS — M75.21 BICEPS TENDONITIS ON RIGHT: ICD-10-CM

## 2024-06-14 DIAGNOSIS — S43.431A GLENOID LABRAL TEAR, RIGHT, INITIAL ENCOUNTER: ICD-10-CM

## 2024-06-14 PROCEDURE — 97140 MANUAL THERAPY 1/> REGIONS: CPT

## 2024-06-14 PROCEDURE — 97110 THERAPEUTIC EXERCISES: CPT

## 2024-06-14 PROCEDURE — 97161 PT EVAL LOW COMPLEX 20 MIN: CPT

## 2024-06-14 NOTE — PROGRESS NOTES
[facility]  Physical Therapy Initial Evaluation    Name: Harinder Elliott  Clinic Number: [id number]    Therapy Diagnosis:   Encounter Diagnoses   Name Primary?    Biceps tendonitis on right     Glenoid labral tear, right, initial encounter     Instability of right shoulder joint      Physician: Servando Nielson III, *    Physician Orders: PT Eval and Treat   Medical Diagnosis from Referral: Biceps tendonitis on right [M75.21], Glenoid labral tear, right, initial encounter [S43.431A], Instability of right shoulder joint [M25.311]   Evaluation Date: 6/14/2024  Authorization Period Expiration: 5/24/25  Plan of Care Expiration: 2/21/25  Visit # / Visits authorized: 1/ 1    Time In: 9:00 AM  Time Out: 10:00 AM    Total Billable Time: 60 minutes    Precautions: Standard    OPERATION:   right  Shoulder osteochondral allograft transplantation, humeral head ICRS Grade 4 - posterior: 2.5 x 20 cm) (CPT 64209 ) (CPT 22048/84042 knee comparison codes) (complex, -22 modifier)  2. Shoulder arthroscopic anterior capsulorrhaphy (CPT 54511) (complex, -22 modifier)  3. Shoulder arthroscopic anterior labral repair (CPT 26895) (complex, -22 modifier)  4. Shoulder arthroscopic posterior labral repair, capsulorrhaphy, 7:00 position (CPT 34612)  5. Shoulder arthroscopic extensive debridement (anterior, posterior glenohumeral joint) (CPT 02987)  6. Shoulder manipulation under anesthesia (CPT 81504)  7. Shoulder arthroscopic lysis of adhesions (CPT 04120):  8. Shoulder arthroscopic loose body removal (CPT 59190)  9 . Shoulder arthroscopic interpositional arthroplasty (21609)  10. Shoulder arthroscopic SLAP type 8 repair (CPT 95141)       POST OPERATIVE PLAN: We will follow the arthroscopic Bankart repair guidelines. We discussed with the patient's family after surgery. The patient will remain in a sling for 6 weeks. We will start PT at the 3 week crista.   Quality of tissue: Good    Quality of the repair:  Good    Subjective   Date of onset: 5/28/24  History of current condition - Harinder reports: s/p R shoulder stabilization procedure by Dr. Bowman on 5/28/24. Since surgery patient reports pain levels have been well controlled no longer requiring pain medication, compliant with sling wear and icing for pain relief. At this time denies n/t, neurological or constitutional s/s. Initial injury occurred while playing tennis when he felt sudden pain along the posterior aspect of the shoulder in the middle of an overhead serve. States he felt a sublux moment in his shoulder and felt like his arm was dead weight. His symptoms resolved and he was able to continue playing until re-injuring it 1 hour later. Pain was localized along the posterior aspect of the shoulder and radiates down to the elbow.  Prior to surgery any movement of the shoulder exacerbates the pain. He denies feelings of instability.  No prior dislocations.  He has attempted multiple conservative measures that include activity modification, ice & elevation, and oral medications (naproxen), with little relief. Right hand dominant.        Medical History:   Past Medical History:   Diagnosis Date    Colon polyp     Depression     Migraine     Ureteral stricture        Surgical History:   Harinder Elliott  has a past surgical history that includes Kidney surgery; Hernia repair; [place] tooth extraction; Colon surgery; torn labrim; Esophagogastroduodenoscopy (N/A, 2/3/2021); Colonoscopy (N/A, 2/3/2021); Vasectomy; Fixation of tendon (Right, 5/28/2024); Arthroscopic debridement of shoulder (Right, 5/28/2024); and Shoulder arthroscopy w/ Bankhart procedure (Right, 5/28/2024).    Medications:   Harinder has a current medication list which includes the following prescription(s): alprazolam, aspirin, benzonatate, diazepam, docusate sodium, escitalopram oxalate, fluticasone propionate, lisdexamfetamine, meloxicam, methylphenidate hcl, oxycodone-acetaminophen,  promethazine, sumatriptan, and tramadol.    Allergies:   Review of patient's allergies indicates:  No Known Allergies     Imaging, MRI studies: Impression:     1. GLAD (glenoid labral articular disruption) lesion anterior mid equator with suspected labral/cartilaginous fragment/body mid equator level  2. Labrocapsular disruption posteriorly with associated posterior humeral head edema.  3. Joint effusion and synovitis with anterior capsular stripping and patulous posterior capsule.  4. See above for details.    Prior Therapy: Yes post-op L labral procedure  Social History: Lives with family   Occupation: [employer]   Prior Level of Function: Independent  Current Level of Function: limited ADL's, dressing, grooming, occupational duties    Pain:  Current 1/10, worst 4/10, best 0/10   Location: right shoulder   Description: Aching and Dull  Aggravating Factors: Extension, Flexing, Lifting, and Getting out of bed/chair  Easing Factors: ice and rest    Pts goals: Get back to golf and pickle ball    Objective   DOS: 5/28/24: 2 weeks 4 days POD    Observation: Patient enters clinic with ABD sling donned, wounds clean/dry/intact with no signs of infection or excessive drainage    Posture: R shoulder anteriorly tilted, downwardly rotation      Shoulder Active Range of Motion: Not tested 2/2 post-operative status     Shoulder Passive Range of Motion:   Shoulder Right Left   Flexion   90  180   ER at 0   5 85   ER at 90   Not tested 2/2 post-op 105   IR at 90   Not tested 2/2 post-op 60     Strength: not tested 2/2 post-operative status    Special Tests: not tested 2/2 post-operative status    Joint Mobility: hypomobile GH joint mobility all directions    Palpation: TTP along incision sites and joint line as expected post-operatively            CMS Impairment/Limitation/Restriction for FOTO Shoulder Survey    Therapist reviewed FOTO scores for Harinder Elliott on 6/14/2024.   FOTO documents entered  into EPIC - see Media section.    Limitation Score: see FOTO in media section%         TREATMENT   Treatment Time In: 9:30 AM  Treatment Time Out: 10:00 AM  Total Treatment time separate from Evaluation: 30 minutes    Harinder received therapeutic exercises to develop strength, endurance, ROM, flexibility, and posture for 15 minutes including:  Table slides 3x10 5s holds  Bicep curls 30x  Scap squeeze 30x  Shoulder shrug 30x  Shoulder rolls 30x      Harinder received the following manual therapy techniques: Joint mobilizations were applied to the: R shoulder for 15 minutes, including:  Skilled PROM all directions  GH joint centering mobilizations      Home Exercises and Patient Education Provided    Education provided:   - HEP, POC, symptom management, recovery timeline    Written Home Exercises Provided: yes.  Exercises were reviewed and Harinder was able to demonstrate them prior to the end of the session.  Harinder demonstrated good  understanding of the education provided.     See EMR under Patient Instructions for exercises provided 6/14/2024.    Assessment   Harinder is a 34 y.o. male referred to outpatient Physical Therapy with a medical diagnosis of Biceps tendonitis on right [M75.21], Glenoid labral tear, right, initial encounter [S43.431A], Instability of right shoulder joint [M25.311] . Pt presents with with complaints of continued R shoulder pain  limiting ADL's and functional activities 2/2 post-operative status. Upon evaluation patient presents with decreased ROM, joint mobility and flexibility restrictions, decreased strength and motor control contributing to limited functional status at this time. Patient would benefit from appropriate manual therapy, mobility, flexibility, strengthening and NM re-education in order to address the before-mentioned deficits and return to PLOF with ADL's and recreational sporting activities       Pt prognosis is Good.   Pt will benefit from skilled outpatient Physical Therapy to  address the deficits stated above and in the chart below, provide pt/family education, and to maximize pt's level of independence.     Plan of care discussed with patient: Yes  Pt's spiritual, cultural and educational needs considered and patient is agreeable to the plan of care and goals as stated below:     Anticipated Barriers for therapy: none    Medical Necessity is demonstrated by the following  History  Co-morbidities and personal factors that may impact the plan of care [x] LOW: no personal factors / co-morbidities  [] MODERATE: 1-2 personal factors / co-morbidities  [] HIGH: 3+ personal factors / co-morbidities    Moderate / High Support Documentation:   Co-morbidities affecting plan of care: see medical chart    Personal Factors:   no deficits     Examination  Body Structures and Functions, activity limitations and participation restrictions that may impact the plan of care [x] LOW: addressing 1-2 elements  [] MODERATE: 3+ elements  [] HIGH: 4+ elements (please support below)    Moderate / High Support Documentation: see eval     Clinical Presentation [x] LOW: stable  [] MODERATE: Evolving  [] HIGH: Unstable     Decision Making/ Complexity Score: low         Goals:  Short Term Goals (4 Weeks):   1. Pt will be independent with HEP to supplement PT in improving functional use of R UE.  2. Pt will increase pain free R shoulder elevation PROM to >/= 160 deg to improve functional mobility of UE  3. Pt will increase R shoulder ER PROM in 90 deg abduction to >/=30 deg to improve functional mobility of UE  4. Pt will increase R elbow AAROM to WNL deg in 4 weeks to improve functional mobility of UE.  Mid-Term Goals (12-16 Weeks):   1. Pt will improve FOTO score to </=% limited to decrease perceived limitation with carrying, moving, and handling objects.  2. Pt will increase R shoulder PROM to WNL in all planes to improve functional use of R RUE.  3. Pt will increase R shoulder AROM to WFL in all planes to improve  functional use of R RUE.  4. Pt will improve R shoulder MMTs to = 5/5 to promote equal use of B UEs in performing functional tasks.  5. Pt will lift 10 lb objects without pain to promote functional QOL.  6. Pt to report pain </= 0/10  Long Term Goals (24-36 weeks)  - pt will demonstrate at least 95% LSI with HHD for flexion, abduction, ER, and IR for improved stability  - pt will demonstrate at least 95% LSI with UE Y-balance for improved postural control  - pt will pass push-up and pull-up testing to demonstrate normalized endurance  - pt will be able to perform sport-specific activities with appropriate form, without instability or pain        Plan   Plan of care Certification: 6/14/2024 to 2/21/25.    Outpatient Physical Therapy 2 times weekly for 36 weeks to include the following interventions: Electrical Stimulation NMES, Manual Therapy, Moist Heat/ Ice, Neuromuscular Re-ed, Patient Education, Self Care, Therapeutic Activities, Therapeutic Exercise, and Dry Needling .     Deniz Seth, PT, DPT, SCS  Board Certified Sports Clinical Specialist

## 2024-06-14 NOTE — PLAN OF CARE
[facility]  Physical Therapy Initial Evaluation    Name: Harinder Elliott  Clinic Number: [id number]    Therapy Diagnosis:   Encounter Diagnoses   Name Primary?    Biceps tendonitis on right     Glenoid labral tear, right, initial encounter     Instability of right shoulder joint      Physician: Servando Nielson III, *    Physician Orders: PT Eval and Treat   Medical Diagnosis from Referral: Biceps tendonitis on right [M75.21], Glenoid labral tear, right, initial encounter [S43.431A], Instability of right shoulder joint [M25.311]   Evaluation Date: 6/14/2024  Authorization Period Expiration: 5/24/25  Plan of Care Expiration: 2/21/25  Visit # / Visits authorized: 1/ 1    Time In: 9:00 AM  Time Out: 10:00 AM    Total Billable Time: 60 minutes    Precautions: Standard    OPERATION:   right  Shoulder osteochondral allograft transplantation, humeral head ICRS Grade 4 - posterior: 2.5 x 20 cm) (CPT 78363 ) (CPT 13151/36864 knee comparison codes) (complex, -22 modifier)  2. Shoulder arthroscopic anterior capsulorrhaphy (CPT 85866) (complex, -22 modifier)  3. Shoulder arthroscopic anterior labral repair (CPT 97217) (complex, -22 modifier)  4. Shoulder arthroscopic posterior labral repair, capsulorrhaphy, 7:00 position (CPT 20736)  5. Shoulder arthroscopic extensive debridement (anterior, posterior glenohumeral joint) (CPT 84887)  6. Shoulder manipulation under anesthesia (CPT 27131)  7. Shoulder arthroscopic lysis of adhesions (CPT 83379):  8. Shoulder arthroscopic loose body removal (CPT 20576)  9 . Shoulder arthroscopic interpositional arthroplasty (79314)  10. Shoulder arthroscopic SLAP type 8 repair (CPT 35614)       POST OPERATIVE PLAN: We will follow the arthroscopic Bankart repair guidelines. We discussed with the patient's family after surgery. The patient will remain in a sling for 6 weeks. We will start PT at the 3 week crista.   Quality of tissue: Good    Quality of the repair:  Good    Subjective   Date of onset: 5/28/24  History of current condition - Harinder reports: s/p R shoulder stabilization procedure by Dr. Bowman on 5/28/24. Since surgery patient reports pain levels have been well controlled no longer requiring pain medication, compliant with sling wear and icing for pain relief. At this time denies n/t, neurological or constitutional s/s. Initial injury occurred while playing tennis when he felt sudden pain along the posterior aspect of the shoulder in the middle of an overhead serve. States he felt a sublux moment in his shoulder and felt like his arm was dead weight. His symptoms resolved and he was able to continue playing until re-injuring it 1 hour later. Pain was localized along the posterior aspect of the shoulder and radiates down to the elbow.  Prior to surgery any movement of the shoulder exacerbates the pain. He denies feelings of instability.  No prior dislocations.  He has attempted multiple conservative measures that include activity modification, ice & elevation, and oral medications (naproxen), with little relief. Right hand dominant.        Medical History:   Past Medical History:   Diagnosis Date    Colon polyp     Depression     Migraine     Ureteral stricture        Surgical History:   Harinder Elliott  has a past surgical history that includes Kidney surgery; Hernia repair; [place] tooth extraction; Colon surgery; torn labrim; Esophagogastroduodenoscopy (N/A, 2/3/2021); Colonoscopy (N/A, 2/3/2021); Vasectomy; Fixation of tendon (Right, 5/28/2024); Arthroscopic debridement of shoulder (Right, 5/28/2024); and Shoulder arthroscopy w/ Bankhart procedure (Right, 5/28/2024).    Medications:   Harinder has a current medication list which includes the following prescription(s): alprazolam, aspirin, benzonatate, diazepam, docusate sodium, escitalopram oxalate, fluticasone propionate, lisdexamfetamine, meloxicam, methylphenidate hcl, oxycodone-acetaminophen,  promethazine, sumatriptan, and tramadol.    Allergies:   Review of patient's allergies indicates:  No Known Allergies     Imaging, MRI studies: Impression:     1. GLAD (glenoid labral articular disruption) lesion anterior mid equator with suspected labral/cartilaginous fragment/body mid equator level  2. Labrocapsular disruption posteriorly with associated posterior humeral head edema.  3. Joint effusion and synovitis with anterior capsular stripping and patulous posterior capsule.  4. See above for details.    Prior Therapy: Yes post-op L labral procedure  Social History: Lives with family   Occupation: [employer]   Prior Level of Function: Independent  Current Level of Function: limited ADL's, dressing, grooming, occupational duties    Pain:  Current 1/10, worst 4/10, best 0/10   Location: right shoulder   Description: Aching and Dull  Aggravating Factors: Extension, Flexing, Lifting, and Getting out of bed/chair  Easing Factors: ice and rest    Pts goals: Get back to golf and pickle ball    Objective   DOS: 5/28/24: 2 weeks 4 days POD    Observation: Patient enters clinic with ABD sling donned, wounds clean/dry/intact with no signs of infection or excessive drainage    Posture: R shoulder anteriorly tilted, downwardly rotation      Shoulder Active Range of Motion: Not tested 2/2 post-operative status     Shoulder Passive Range of Motion:   Shoulder Right Left   Flexion   90  180   ER at 0   5 85   ER at 90   Not tested 2/2 post-op 105   IR at 90   Not tested 2/2 post-op 60     Strength: not tested 2/2 post-operative status    Special Tests: not tested 2/2 post-operative status    Joint Mobility: hypomobile GH joint mobility all directions    Palpation: TTP along incision sites and joint line as expected post-operatively            CMS Impairment/Limitation/Restriction for FOTO Shoulder Survey    Therapist reviewed FOTO scores for Harinder Elliott on 6/14/2024.   FOTO documents entered  into EPIC - see Media section.    Limitation Score: see FOTO in media section%         TREATMENT   Treatment Time In: 9:30 AM  Treatment Time Out: 10:00 AM  Total Treatment time separate from Evaluation: 30 minutes    Harinder received therapeutic exercises to develop strength, endurance, ROM, flexibility, and posture for 15 minutes including:  Table slides 3x10 5s holds  Bicep curls 30x  Scap squeeze 30x  Shoulder shrug 30x  Shoulder rolls 30x      Harinder received the following manual therapy techniques: Joint mobilizations were applied to the: R shoulder for 15 minutes, including:  Skilled PROM all directions  GH joint centering mobilizations      Home Exercises and Patient Education Provided    Education provided:   - HEP, POC, symptom management, recovery timeline    Written Home Exercises Provided: yes.  Exercises were reviewed and Harinder was able to demonstrate them prior to the end of the session.  Harinder demonstrated good  understanding of the education provided.     See EMR under Patient Instructions for exercises provided 6/14/2024.    Assessment   Harinder is a 34 y.o. male referred to outpatient Physical Therapy with a medical diagnosis of Biceps tendonitis on right [M75.21], Glenoid labral tear, right, initial encounter [S43.431A], Instability of right shoulder joint [M25.311] . Pt presents with with complaints of continued R shoulder pain  limiting ADL's and functional activities 2/2 post-operative status. Upon evaluation patient presents with decreased ROM, joint mobility and flexibility restrictions, decreased strength and motor control contributing to limited functional status at this time. Patient would benefit from appropriate manual therapy, mobility, flexibility, strengthening and NM re-education in order to address the before-mentioned deficits and return to PLOF with ADL's and recreational sporting activities       Pt prognosis is Good.   Pt will benefit from skilled outpatient Physical Therapy to  address the deficits stated above and in the chart below, provide pt/family education, and to maximize pt's level of independence.     Plan of care discussed with patient: Yes  Pt's spiritual, cultural and educational needs considered and patient is agreeable to the plan of care and goals as stated below:     Anticipated Barriers for therapy: none    Medical Necessity is demonstrated by the following  History  Co-morbidities and personal factors that may impact the plan of care [x] LOW: no personal factors / co-morbidities  [] MODERATE: 1-2 personal factors / co-morbidities  [] HIGH: 3+ personal factors / co-morbidities    Moderate / High Support Documentation:   Co-morbidities affecting plan of care: see medical chart    Personal Factors:   no deficits     Examination  Body Structures and Functions, activity limitations and participation restrictions that may impact the plan of care [x] LOW: addressing 1-2 elements  [] MODERATE: 3+ elements  [] HIGH: 4+ elements (please support below)    Moderate / High Support Documentation: see eval     Clinical Presentation [x] LOW: stable  [] MODERATE: Evolving  [] HIGH: Unstable     Decision Making/ Complexity Score: low         Goals:  Short Term Goals (4 Weeks):   1. Pt will be independent with HEP to supplement PT in improving functional use of R UE.  2. Pt will increase pain free R shoulder elevation PROM to >/= 160 deg to improve functional mobility of UE  3. Pt will increase R shoulder ER PROM in 90 deg abduction to >/=30 deg to improve functional mobility of UE  4. Pt will increase R elbow AAROM to WNL deg in 4 weeks to improve functional mobility of UE.  Mid-Term Goals (12-16 Weeks):   1. Pt will improve FOTO score to </=% limited to decrease perceived limitation with carrying, moving, and handling objects.  2. Pt will increase R shoulder PROM to WNL in all planes to improve functional use of R RUE.  3. Pt will increase R shoulder AROM to WFL in all planes to improve  functional use of R RUE.  4. Pt will improve R shoulder MMTs to = 5/5 to promote equal use of B UEs in performing functional tasks.  5. Pt will lift 10 lb objects without pain to promote functional QOL.  6. Pt to report pain </= 0/10  Long Term Goals (24-36 weeks)  - pt will demonstrate at least 95% LSI with HHD for flexion, abduction, ER, and IR for improved stability  - pt will demonstrate at least 95% LSI with UE Y-balance for improved postural control  - pt will pass push-up and pull-up testing to demonstrate normalized endurance  - pt will be able to perform sport-specific activities with appropriate form, without instability or pain        Plan   Plan of care Certification: 6/14/2024 to 2/21/25.    Outpatient Physical Therapy 2 times weekly for 36 weeks to include the following interventions: Electrical Stimulation NMES, Manual Therapy, Moist Heat/ Ice, Neuromuscular Re-ed, Patient Education, Self Care, Therapeutic Activities, Therapeutic Exercise, and Dry Needling.     Deniz Seth, PT, DPT, SCS  Board Certified Sports Clinical Specialist

## 2024-06-17 ENCOUNTER — CLINICAL SUPPORT (OUTPATIENT)
Dept: REHABILITATION | Facility: HOSPITAL | Age: 34
End: 2024-06-17
Payer: COMMERCIAL

## 2024-06-17 DIAGNOSIS — M25.611 DECREASED ROM OF RIGHT SHOULDER: Primary | ICD-10-CM

## 2024-06-17 DIAGNOSIS — R29.898 DECREASED STRENGTH OF UPPER EXTREMITY: ICD-10-CM

## 2024-06-17 PROCEDURE — 97530 THERAPEUTIC ACTIVITIES: CPT

## 2024-06-17 PROCEDURE — 97112 NEUROMUSCULAR REEDUCATION: CPT

## 2024-06-17 PROCEDURE — 97140 MANUAL THERAPY 1/> REGIONS: CPT

## 2024-06-17 NOTE — PROGRESS NOTES
Physical Therapy Daily Treatment Note     Name: Harinder Elliott  [facility] Number: [id number]    Therapy Diagnosis:   Encounter Diagnoses   Name Primary?    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 Shoulder arthroscopic anterior capsulorrhaphy (CPT 52264) (complex, -22 modifier)  3. Shoulder arthroscopic anterior labral repair (CPT 21942) (complex, -22 modifier)  4. Shoulder arthroscopic posterior labral repair, capsulorrhaphy, 7:00 position (CPT 13541)  5. Shoulder arthroscopic extensive debridement (anterior, posterior glenohumeral joint) (CPT 07259)  6. Shoulder manipulation under anesthesia (CPT 44013)  7. Shoulder arthroscopic lysis of adhesions (CPT 87600):  8. Shoulder arthroscopic loose body removal (CPT 72306)  9 . Shoulder arthroscopic interpositional arthroplasty (03598)  10. Shoulder arthroscopic SLAP type 8 repair (CPT 63960)    POST OPERATIVE PLAN: We will follow the arthroscopic Bankart repair guidelines. We discussed with the patient's family after surgery. The patient will remain in a sling for 6 weeks. We will start PT at the 3 week crista.   Quality of tissue: Good        Subjective     Pt reports: no complaints following IE. A  "little sore with the new stuff nothing terrible  He was compliant with home exercise program.  Response to previous treatment: ongoing   Functional change: ongoing    Pain: 1/10  Location: right shoulder      Objective   DOS 5/28/24: 2 weeks 6 days as of 6/17    Shoulder Passive Range of Motion:   Shoulder Right Left   Flexion    90  180   ER at 0    10 85   ER at 90    Not tested 2/2 post-op 105   IR at 90    Not tested 2/2 post-op 60          Harinder received therapeutic exercises to develop strength, endurance, ROM, flexibility, and posture for 00 minutes including:    Not today:  Table slides 3x10 5s holds  Bicep curls 30x  Scap squeeze 30x  Shoulder shrug 30x  Shoulder rolls 30x        Harinder received the following manual therapy techniques: Joint mobilizations were applied to the: R shoulder for 18 minutes, including:  Skilled PROM all directions  GH joint centering mobilizations    Harinder participated in neuromuscular re-education activities to improve: Coordination, Kinesthetic, Sense, Proprioception, and Posture for 25 minutes. The following activities were included:  PT resisted ER/IR RS at 30 deg ABD 3x10 5s holds  Prone scap squeeze 3x10 10" holds  Submax isometrics: flexion/extension/ABD/ER/IR 10sx10    Harinder participated in dynamic functional therapeutic activities to improve functional performance for 10  minutes, including:  UBE 5' forward, 5' backwards to improve tissue extensibility and cartilage health      Home Exercises Provided and Patient Education Provided     Education provided:   - HEP, POC, symptom management, recovery timeline, post-operative precautions    Written Home Exercises Provided: yes.  Exercises were reviewed and Harinder was able to demonstrate them prior to the end of the session.  Harinder demonstrated good  understanding of the education provided.     See EMR under Patient Instructions for exercises provided prior visit.    Assessment     Harinder completed session as noted above. " Assessment of shoulder ROM continues to demo free and easy flexion ROM to 90 deg with relative easy. ER PROM continues to be very restricted past ~10 deg that is not unexpected at this time. Emphasis of session today towards activation of cuff and periscapular musculature to work on dynamic stabilization of GH joint. Intro'd UBE for cyclical loading for cartilage health and reformation. Overall doing well will continue to progress as tolerated        Harinder Is progressing well towards his goals.   Pt prognosis is Excellent.     Pt will continue to benefit from skilled outpatient physical therapy to address the deficits listed in the problem list box on initial evaluation, provide pt/family education and to maximize pt's level of independence in the home and community environment.     Pt's spiritual, cultural and educational needs considered and pt agreeable to plan of care and goals.     Anticipated barriers to physical therapy: none    Goals: Goals:  Short Term Goals (4 Weeks):   1. Pt will be independent with HEP to supplement PT in improving functional use of R UE.  2. Pt will increase pain free R shoulder elevation PROM to >/= 160 deg to improve functional mobility of UE  3. Pt will increase R shoulder ER PROM in 90 deg abduction to >/=30 deg to improve functional mobility of UE  4. Pt will increase R elbow AAROM to WNL deg in 4 weeks to improve functional mobility of UE.  Mid-Term Goals (12-16 Weeks):   1. Pt will improve FOTO score to </=% limited to decrease perceived limitation with carrying, moving, and handling objects.  2. Pt will increase R shoulder PROM to WNL in all planes to improve functional use of R RUE.  3. Pt will increase R shoulder AROM to WFL in all planes to improve functional use of R RUE.  4. Pt will improve R shoulder MMTs to = 5/5 to promote equal use of B UEs in performing functional tasks.  5. Pt will lift 10 lb objects without pain to promote functional QOL.  6. Pt to report pain </=  0/10  Long Term Goals (24-36 weeks)  - pt will demonstrate at least 95% LSI with HHD for flexion, abduction, ER, and IR for improved stability  - pt will demonstrate at least 95% LSI with UE Y-balance for improved postural control  - pt will pass push-up and pull-up testing to demonstrate normalized endurance  - pt will be able to perform sport-specific activities with appropriate form, without instability or pain          Plan     Plan of care Certification: 6/14/2024 to 2/21/25.     Outpatient Physical Therapy 2 times weekly for 36 weeks to include the following interventions: Electrical Stimulation NMES, Manual Therapy, Moist Heat/ Ice, Neuromuscular Re-ed, Patient Education, Self Care, Therapeutic Activities, Therapeutic Exercise, and Dry Needling    Deniz Seth, PT, DPT, SCS

## 2024-06-18 DIAGNOSIS — F90.0 ATTENTION DEFICIT HYPERACTIVITY DISORDER (ADHD), PREDOMINANTLY INATTENTIVE TYPE: ICD-10-CM

## 2024-06-18 RX ORDER — LISDEXAMFETAMINE DIMESYLATE 30 MG/1
30 CAPSULE ORAL EVERY MORNING
Qty: 30 CAPSULE | Refills: 0 | OUTPATIENT
Start: 2024-06-18 | End: 2024-07-18

## 2024-06-18 NOTE — OP NOTE
DATE OF PROCEDURE: 05/28/2024    SURGEON: Marsha Bowman M.D.    ASSISTANT: KELLY Abebe DO ,PGY 6    PREOPERATIVE DIAGNOSES:   right  1. Shoulder anterior instability  2. Shoulder posterior instability  3. Shoulder synovitis  4. Shoulder loose body    POSTOPERATIVE DIAGNOSES:   right  1. Shoulder anterior instability  2. Shoulder posterior instability  3. Shoulder synovitis  4. Shoulder loose bodies  5. Shoulder SLAP type 8  6. Other articular cartilage disorders, shoulder M24.111-M24.112, M24.119   7.?? Internal derangement, shoulder M24.811-M24.812, M24.819  9. 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 Shoulder arthroscopic anterior capsulorrhaphy (CPT 90743) (complex, -22 modifier)  3. Shoulder arthroscopic anterior labral repair (CPT 58934) (complex, -22 modifier)  4. Shoulder arthroscopic posterior labral repair, capsulorrhaphy, 7:00 position (CPT 25653)  5. Shoulder arthroscopic extensive debridement (anterior, posterior glenohumeral joint) (CPT 94868)  6. Shoulder manipulation under anesthesia (CPT 40056)  7. Shoulder arthroscopic lysis of adhesions (CPT 42733):  8. Shoulder arthroscopic loose body removal (CPT 89135)  9 . Shoulder arthroscopic interpositional arthroplasty (51883)  10. Shoulder arthroscopic SLAP type 8 repair (CPT 39455)    ANESTHESIA:  General with intra-op suprascapular nerve block    BLOOD LOSS: Minimal.     DRAINS: None.     TOURNIQUET TIME: None.     COMPLICATIONS: None. The patient was moved to the recovery room in stable condition with compartments soft and cap refill less than a second in all digits.     COMPLEX PROCEDURE:  Labral repair and capsulorrhaphy and  grafting was complex in nature due to the complexity of the tear, thin capsule and remnant labral tissue lesion tissue and altered anatomy of this case.  There was an altered surgical field with abnormal anatomy. There was an altered surgical field due to the complex labral tear. There was abnormal anatomy. Complexity of the service was much greater than the normative procedure.There was increased time, intensity and technical difficulty of the procedure, severity of the patient's condition and mental effort required.  This was a highly complicated repair and tear pattern that required advanced arthroscopic skill in order to safely and technically perform it. Nevertheless the repair achieved was excellent.    BRIEF INDICATION OF MEDICAL NECESSITY: The patient is a 34 y.o. year-old male who has history and physical examination findings consistent with the above. Nonoperative versus operative options were discussed. The risks and benefits were discussed with the patient. The patient acknowledged understanding and wished to proceed with operative intervention. Informed consent was obtained prior to the procedure. Reasonable expectations and potential complications were discussed and acknowledged, including but not limited to infection, bleeding, blood clots, (DVT and/or PE), nerve injury, re-tear, instability, continued pain and stiffness. They agreed and   understood and wished to proceed.     EXAMINATION UNDER ANESTHESIA OF THE right SHOULDER: Forward elevation 175 degrees, External rotation at 0-60 degrees, External rotation at 90-90 degrees, Internal rotation at 90-60 degrees. Translation testing: anterior grade 3, posterior grade 2, sulcus sign grade 1 corrects to 0 on external rotation.     EXAMINATION UNDER ANESTHESIA OF THE NONOPERATED left SHOULDER: Forward elevation 175 degrees, External rotation at 0-60 degrees, External rotation at 0-90 degrees, Internal rotation at 90-60 degrees. Translation testing:  anterior grade 1, posterior grade 1, sulcus sign grade 1 corrects to 0 on external rotation.     PROCEDURE IN DETAIL:  After the correct operative site was marked by the operating surgeon. The patient was then taken to the operating room and placed supine on the operating room table, where the patient  underwent general anesthesia by the anesthesia team.  The patient was then rolled into the lateral decubitus position with the operative side up.  A well-padded axillary roll, beanbag and pillows were placed.  All pressure points were carefully padded and checked.  The upper extremities and both lower extremities were placed in comfortable positions and were also well-padded.  The operative upper extremity was then prepped and draped in the usual sterile fashion.    Suprascapular nerve block was performed in the standard fashion with 5cc local anesthetic.    The Spider arm positioner was implemented with balanced suspension and appropriate landmarks were noted on the skin. A posterior followed by emily-superior portals were created and systematic examination of the joint revealed the following:     There was no evidence of any significant chondral lesions to the glenoid or humeral head.     There was chondral damage to:  Humeral head ICRS Grade 4 - posterior Size: 2.5 x 2.0 cm  Glenoid: 10 x 2 mm grade 3 - emily-inferior   Chondroplasty was performed using arthroscopic shaver.    IGHL 11    There was a SLAP type 8 lesion noted to the biceps root upon probing and careful inspection.     Multiple loose bodies measuring 5 x 5 x 7 mm was removed from glenohumeral joint using arthroscopic grasper.     Bankart lesion was visualized anteroinferiorly, which extended posteriorly.  Liberator knife was used to elevate the anteroinferior and posteroinferior tissues. Care was taken on liberation of this tissue. Capsule anteriorly and posteriorly was patulous in nature. Capsule was stretched somewhat. Anteroinferior portal was  created just superior to the subscapularis in the appropriate more vertical angle.     Adhesions were cleared off labrum anteriorly and capsule was able to be mobilized after lysis of adhesions.    Cannulas were placed and a shaver was then used to roughen and debride the surface of the anterior, inferior, and posterior glenoid neck surfaces. Then 1-2 mm of articular surface was debrided for glenoid anchor placement.     COMPLEX PROCEDURE:  Labral repair and capsulorrhaphy and grafting was complex in nature due to the complexity of the tear, thin capsule and remnant labral tissue lesion tissue and altered anatomy of this case.  There was an altered surgical field with abnormal anatomy. There was an altered surgical field due to the complex labral tear. There was abnormal anatomy. Complexity of the service was much greater than the normative procedure.There was increased time, intensity and technical difficulty of the procedure, severity of the patient's condition and mental effort required.  This was a highly complicated repair and tear pattern that required advanced arthroscopic skill in order to safely and technically perform it. Nevertheless the repair achieved was excellent.    There was synovitis in the shoulder anteriorly and posteriorly and was debrided anteriorly and posteriorly in the glenohumeral joint to the areas of concern.    An inferior transcutaneous 3-mm portal was created to place the inferior anchors. Care was taken to avoid any damage to the neurovascular structures, axillary nerve, below.     5 anchors were placed in total, one at the 9:00, 10:00 positions, anteriorly at 5 o'clock, 4 o'clock, and 3 o'clock positions.     These were 3.0 Arthrex Bio-Composite Suture Tack and a simple suture was placed through the labrum and capsule, and labral repair and capsulorrhaphy was performed. Care was taken to avoid the neurovascular structures and axillary nerve bundles below. Suture was then tied using  modified Nate knots with the knots from the tissue side. All knots had good loop and knot security with repair starting posteriorly and then proceeding anteriorly.     These were 1.8 mm knotless Arthrex FiberTak soft anchors and a simple suture was placed through the labrum and capsule, and labral repair and capsulorrhaphy was performed. Care was taken to avoid the neurovascular structures and axillary nerve bundles below. Suture was then tied using modified Nate knots with the knots from the tissue side. All knots had good loop and knot security with repair starting posteriorly and then proceeding anteriorly.     The repair recentered the humeral head nicely on the glenoid. The shoulder was stable on translation testing.    Interpositional arthroplasty was performed and after anchor placement, there was noted to be minimal damaged cartilage exposed, 2 x 10 mm grade 3 of the emily-inferior glenoid.   :  Next, the 1 superior SLAP type 8 anchor was placed, 1 at the 11 o'clock position. This was 1.6 mm Arthrex FiberTak soft anchor and a simple knotless suture was placed with repair posteriorly.  This gave 1 suture holding the SLAP repair and the labrum was secured and was probed and found to be quite stable.     6 anchors were used in total.     This recentered the humeral head nicely on the glenoid. The shoulder was stable to translation testing.     Grafting was then performed to the humeral head lesion utilizing Cartimax product. The Cartimax cartilage fibers were thawed from cryopreservation and sequential rinsing x 3 of the media from the material was performed with normal saline at room temperature. The fibers were then mixed with lyophilized cartilage powder from [facility] and a putty consistency created. The Cartimax mixture was placed into the defect through enlarged posterior portal and smoothed to the surface of the surrounding normal articular cartilage. Pictures were obtained documenting the fill and solid  repair.     Suprascapular nerve block was performed in the standard fashion also with 5cc local anesthetic. Local was placed about portals and incision(s) after irrigation, as described below, was completed. The shoulder was then irrigated and fluid was extravasated using suction. All portals were reapproximated using inverted 4-0 Monocryl suture in the subcutaneous tissue of the portals. Mastisol and Steri-strips were placed with xeroform, 4x4s, abd pad, and Medi-pore tape.  TENS unit pads were placed which were medically necessary for pain relief.  An iceman was secured in the shoulder crenshaw.  A sling with an abduction pillow was secured.  The patient was then moved to supine, extubated and taken to the recovery room where the patient arrived in stable condition with the compartments of the arm and forearm soft and cap refill less than a second in all digits.     POST OPERATIVE PLAN: We will follow the arthroscopic Bankart repair guidelines. We discussed with the patient's family after surgery. The patient will remain in a sling for 6 weeks. We will start PT at the 3 week crista.    Quality of tissue: Good    Quality of the repair: Good

## 2024-06-21 ENCOUNTER — CLINICAL SUPPORT (OUTPATIENT)
Dept: REHABILITATION | Facility: HOSPITAL | Age: 34
End: 2024-06-21
Payer: COMMERCIAL

## 2024-06-21 DIAGNOSIS — M25.611 DECREASED ROM OF RIGHT SHOULDER: Primary | ICD-10-CM

## 2024-06-21 DIAGNOSIS — R29.898 DECREASED STRENGTH OF UPPER EXTREMITY: ICD-10-CM

## 2024-06-21 PROCEDURE — 97112 NEUROMUSCULAR REEDUCATION: CPT

## 2024-06-21 PROCEDURE — 97530 THERAPEUTIC ACTIVITIES: CPT

## 2024-06-21 PROCEDURE — 97140 MANUAL THERAPY 1/> REGIONS: CPT

## 2024-06-21 PROCEDURE — 97110 THERAPEUTIC EXERCISES: CPT

## 2024-06-21 NOTE — PROGRESS NOTES
Physical Therapy Daily Treatment Note     Name: Harinder Elliott  [facility] Number: [id number]    Therapy Diagnosis:   Encounter Diagnoses   Name Primary?    Decreased ROM of right shoulder Yes    Decreased strength of upper extremity        Physician: Servando Nielson III, *    Visit Date: 6/21/2024    Physician Orders: PT Eval and Treat   Medical Diagnosis from Referral: Biceps tendonitis on right [M75.21], Glenoid labral tear, right, initial encounter [S43.431A], Instability of right shoulder joint [M25.311]   Evaluation Date: 6/14/2024  Authorization Period Expiration: 5/24/25  Plan of Care Expiration: 2/21/25  Visit # / Visits authorized: 2/ 20     Time In: 9:03 AM  Time Out: 10:00 AM     Total Billable Time: 57 minutes  Precautions: Standard    DOS 5/28/24  OPERATION:   right  Shoulder osteochondral allograft transplantation, humeral head ICRS Grade 4 - posterior: 2.5 x 20 cm) (CPT 17022 ) (CPT 54998/59293 knee comparison codes) (complex, -22 modifier)  2. Shoulder arthroscopic anterior capsulorrhaphy (CPT 56931) (complex, -22 modifier)  3. Shoulder arthroscopic anterior labral repair (CPT 97526) (complex, -22 modifier)  4. Shoulder arthroscopic posterior labral repair, capsulorrhaphy, 7:00 position (CPT 60760)  5. Shoulder arthroscopic extensive debridement (anterior, posterior glenohumeral joint) (CPT 33023)  6. Shoulder manipulation under anesthesia (CPT 26750)  7. Shoulder arthroscopic lysis of adhesions (CPT 87152):  8. Shoulder arthroscopic loose body removal (CPT 95708)  9 . Shoulder arthroscopic interpositional arthroplasty (87071)  10. Shoulder arthroscopic SLAP type 8 repair (CPT 18351)    POST OPERATIVE PLAN: We will follow the arthroscopic [employer] repair guidelines. We discussed with the patient's family after surgery. The patient will remain in a sling for 6 weeks. We will start PT at the 3 week crista.   Quality of tissue: Good        Subjective     Pt reports: sore but no significant pain  "to report   He was compliant with home exercise program.  Response to previous treatment: ongoing   Functional change: ongoing    Pain: 1/10  Location: right shoulder      Objective   DOS 5/28/24: 3 weeks 3 days as of 6/21    Shoulder Passive Range of Motion:   Shoulder Right Left   Flexion    90  180   ER at 0    30 85   ER at 90    Not tested 2/2 post-op 105   IR at 90    Not tested 2/2 post-op 60          Harinder received therapeutic exercises to develop strength, endurance, ROM, flexibility, and posture for 10 minutes including:  Supine dowel ER AAROM 3x10 10" holds to 30 deg only    Not today:  Table slides 3x10 5s holds  Bicep curls 30x  Scap squeeze 30x  Shoulder shrug 30x  Shoulder rolls 30x        Harinder received the following manual therapy techniques: Joint mobilizations were applied to the: R shoulder for 25 minutes, including:  Skilled PROM all directions  GH joint centering mobilizations    Harinder participated in neuromuscular re-education activities to improve: Coordination, Kinesthetic, Sense, Proprioception, and Posture for 10 minutes. The following activities were included:  PT resisted ER/IR RS at 30 deg ABD 3x10 5s holds    Not today  Prone scap squeeze 3x10 10" holds  Submax isometrics: flexion/extension/ABD/ER/IR 10sx10    Harinder participated in dynamic functional therapeutic activities to improve functional performance for 12  minutes, including:  UBE 5' forward, 5' backwards to improve tissue extensibility and cartilage health  20# 3D strap rotations 3x10      Home Exercises Provided and Patient Education Provided     Education provided:   - HEP, POC, symptom management, recovery timeline, post-operative precautions    Written Home Exercises Provided: yes.  Exercises were reviewed and Harinder was able to demonstrate them prior to the end of the session.  Harinder demonstrated good  understanding of the education provided.     See EMR under Patient Instructions for exercises provided prior " visit.    Assessment     Harinder completed session as noted above. Assessment of shoulder ROM continues to demo free and easy flexion ROM to 90 deg with relative easy. ER PROM improved from prior visit able to achieve 35 deg following initiation of ER AAROM. Emphasis of session today towards activation of cuff and periscapular musculature to work on dynamic stabilization of GH joint. Intro'd UBE for cyclical loading for cartilage health and reformation. Overall doing well will continue to progress as tolerated        Harinder Is progressing well towards his goals.   Pt prognosis is Excellent.     Pt will continue to benefit from skilled outpatient physical therapy to address the deficits listed in the problem list box on initial evaluation, provide pt/family education and to maximize pt's level of independence in the home and community environment.     Pt's spiritual, cultural and educational needs considered and pt agreeable to plan of care and goals.     Anticipated barriers to physical therapy: none    Goals: Goals:  Short Term Goals (4 Weeks):   1. Pt will be independent with HEP to supplement PT in improving functional use of R UE.  2. Pt will increase pain free R shoulder elevation PROM to >/= 160 deg to improve functional mobility of UE  3. Pt will increase R shoulder ER PROM in 90 deg abduction to >/=30 deg to improve functional mobility of UE  4. Pt will increase R elbow AAROM to WNL deg in 4 weeks to improve functional mobility of UE.  Mid-Term Goals (12-16 Weeks):   1. Pt will improve FOTO score to </=% limited to decrease perceived limitation with carrying, moving, and handling objects.  2. Pt will increase R shoulder PROM to WNL in all planes to improve functional use of R RUE.  3. Pt will increase R shoulder AROM to WFL in all planes to improve functional use of R RUE.  4. Pt will improve R shoulder MMTs to = 5/5 to promote equal use of B UEs in performing functional tasks.  5. Pt will lift 10 lb objects  without pain to promote functional QOL.  6. Pt to report pain </= 0/10  Long Term Goals (24-36 weeks)  - pt will demonstrate at least 95% LSI with HHD for flexion, abduction, ER, and IR for improved stability  - pt will demonstrate at least 95% LSI with UE Y-balance for improved postural control  - pt will pass push-up and pull-up testing to demonstrate normalized endurance  - pt will be able to perform sport-specific activities with appropriate form, without instability or pain          Plan     Plan of care Certification: 6/14/2024 to 2/21/25.     Outpatient Physical Therapy 2 times weekly for 36 weeks to include the following interventions: Electrical Stimulation NMES, Manual Therapy, Moist Heat/ Ice, Neuromuscular Re-ed, Patient Education, Self Care, Therapeutic Activities, Therapeutic Exercise, and Dry Needling    Deniz Seth, PT, DPT, SCS

## 2024-06-24 ENCOUNTER — CLINICAL SUPPORT (OUTPATIENT)
Dept: REHABILITATION | Facility: HOSPITAL | Age: 34
End: 2024-06-24
Payer: COMMERCIAL

## 2024-06-24 DIAGNOSIS — M25.611 DECREASED ROM OF RIGHT SHOULDER: Primary | ICD-10-CM

## 2024-06-24 DIAGNOSIS — R29.898 DECREASED STRENGTH OF UPPER EXTREMITY: ICD-10-CM

## 2024-06-24 PROCEDURE — 97140 MANUAL THERAPY 1/> REGIONS: CPT

## 2024-06-24 PROCEDURE — 97112 NEUROMUSCULAR REEDUCATION: CPT

## 2024-06-24 PROCEDURE — 97530 THERAPEUTIC ACTIVITIES: CPT

## 2024-06-24 NOTE — PROGRESS NOTES
Physical Therapy Daily Treatment Note     Name: Harinder Elliott  [facility] Number: [id number]    Therapy Diagnosis:   Encounter Diagnoses   Name Primary?    Decreased ROM of right shoulder Yes    Decreased strength of upper extremity        Physician: Servando Nielson III, *    Visit Date: 6/24/2024    Physician Orders: PT Eval and Treat   Medical Diagnosis from Referral: Biceps tendonitis on right [M75.21], Glenoid labral tear, right, initial encounter [S43.431A], Instability of right shoulder joint [M25.311]   Evaluation Date: 6/14/2024  Authorization Period Expiration: 5/24/25  Plan of Care Expiration: 2/21/25  Visit # / Visits authorized: 3/ 20     Time In: 9:20 AM  Time Out: 10:00 AM     Total Billable Time: 40 minutes  Precautions: Standard    DOS 5/28/24  OPERATION:   right  Shoulder osteochondral allograft transplantation, humeral head ICRS Grade 4 - posterior: 2.5 x 20 cm) (CPT 80598 ) (CPT 92926/41538 knee comparison codes) (complex, -22 modifier)  2. Shoulder arthroscopic anterior capsulorrhaphy (CPT 75797) (complex, -22 modifier)  3. Shoulder arthroscopic anterior labral repair (CPT 64664) (complex, -22 modifier)  4. Shoulder arthroscopic posterior labral repair, capsulorrhaphy, 7:00 position (CPT 27197)  5. Shoulder arthroscopic extensive debridement (anterior, posterior glenohumeral joint) (CPT 09433)  6. Shoulder manipulation under anesthesia (CPT 51328)  7. Shoulder arthroscopic lysis of adhesions (CPT 38035):  8. Shoulder arthroscopic loose body removal (CPT 51951)  9 . Shoulder arthroscopic interpositional arthroplasty (08757)  10. Shoulder arthroscopic SLAP type 8 repair (CPT 52304)    POST OPERATIVE PLAN: We will follow the arthroscopic [employer] repair guidelines. We discussed with the patient's family after surgery. The patient will remain in a sling for 6 weeks. We will start PT at the 3 week crista.   Quality of tissue: Good        Subjective     Pt reports: shoulder is feeling really  "good  He was compliant with home exercise program.  Response to previous treatment: ongoing   Functional change: ongoing    Pain: 1/10  Location: right shoulder      Objective   DOS 5/28/24: 4 weeks 0 days as of 6/21    Shoulder Passive Range of Motion:   Shoulder Right Left   Flexion    90  180   ER at 0    35 85   ER at 90    Not tested 2/2 post-op 105   IR at 90    Not tested 2/2 post-op 60          Harinder received therapeutic exercises to develop strength, endurance, ROM, flexibility, and posture for 00 minutes including:    Not today:  Supine dowel ER AAROM 3x10 10" holds to 30 deg only  Table slides 3x10 5s holds  Bicep curls 30x  Scap squeeze 30x  Shoulder shrug 30x  Shoulder rolls 30x        Harinder received the following manual therapy techniques: Joint mobilizations were applied to the: R shoulder for 10 minutes, including:  Skilled PROM all directions  GH joint centering mobilizations    Harinder participated in neuromuscular re-education activities to improve: Coordination, Kinesthetic, Sense, Proprioception, and Posture for 20 minutes. The following activities were included:  PT resisted ER/IR RS at 30 deg ABD 3x10 5s holds  ER/IR PT resisted RS through available range 3x10  YTB ER walkouts 3x6  OTB IR walkouts 3x6        Not today  Prone scap squeeze 3x10 10" holds  Submax isometrics: flexion/extension/ABD/ER/IR 10sx10    Harinder participated in dynamic functional therapeutic activities to improve functional performance for 10  minutes, including:  UBE 5' forward, 5' backwards to improve tissue extensibility and cartilage health      Not today:  20# 3D strap rotations 3x10      Home Exercises Provided and Patient Education Provided     Education provided:   - HEP, POC, symptom management, recovery timeline, post-operative precautions    Written Home Exercises Provided: yes.  Exercises were reviewed and Harinder was able to demonstrate them prior to the end of the session.  Harinder demonstrated good  " understanding of the education provided.     See EMR under Patient Instructions for exercises provided prior visit.    Assessment     Harinder completed session as noted above. Assessment of shoulder ROM continues to demo free and easy flexion ROM to 90 deg with relative easy. ER PROM improved from prior visit able to achieve 35 deg following initiation of ER AAROM. Emphasis of session today towards activation of cuff and periscapular musculature to work on dynamic stabilization of GH joint. Intro'd cuff isometric walkouts with no complaints of pain. Overall doing well will continue to progress as tolerated        Harinder Is progressing well towards his goals.   Pt prognosis is Excellent.     Pt will continue to benefit from skilled outpatient physical therapy to address the deficits listed in the problem list box on initial evaluation, provide pt/family education and to maximize pt's level of independence in the home and community environment.     Pt's spiritual, cultural and educational needs considered and pt agreeable to plan of care and goals.     Anticipated barriers to physical therapy: none    Goals: Goals:  Short Term Goals (4 Weeks):   1. Pt will be independent with HEP to supplement PT in improving functional use of R UE.  2. Pt will increase pain free R shoulder elevation PROM to >/= 160 deg to improve functional mobility of UE  3. Pt will increase R shoulder ER PROM in 90 deg abduction to >/=30 deg to improve functional mobility of UE  4. Pt will increase R elbow AAROM to WNL deg in 4 weeks to improve functional mobility of UE.  Mid-Term Goals (12-16 Weeks):   1. Pt will improve FOTO score to </=% limited to decrease perceived limitation with carrying, moving, and handling objects.  2. Pt will increase R shoulder PROM to WNL in all planes to improve functional use of R RUE.  3. Pt will increase R shoulder AROM to WFL in all planes to improve functional use of R RUE.  4. Pt will improve R shoulder MMTs to =  5/5 to promote equal use of B UEs in performing functional tasks.  5. Pt will lift 10 lb objects without pain to promote functional QOL.  6. Pt to report pain </= 0/10  Long Term Goals (24-36 weeks)  - pt will demonstrate at least 95% LSI with HHD for flexion, abduction, ER, and IR for improved stability  - pt will demonstrate at least 95% LSI with UE Y-balance for improved postural control  - pt will pass push-up and pull-up testing to demonstrate normalized endurance  - pt will be able to perform sport-specific activities with appropriate form, without instability or pain          Plan     Plan of care Certification: 6/14/2024 to 2/21/25.     Outpatient Physical Therapy 2 times weekly for 36 weeks to include the following interventions: Electrical Stimulation NMES, Manual Therapy, Moist Heat/ Ice, Neuromuscular Re-ed, Patient Education, Self Care, Therapeutic Activities, Therapeutic Exercise, and Dry Needling    Deniz Seth, PT, DPT, SCS

## 2024-06-27 ENCOUNTER — CLINICAL SUPPORT (OUTPATIENT)
Dept: REHABILITATION | Facility: HOSPITAL | Age: 34
End: 2024-06-27
Payer: COMMERCIAL

## 2024-06-27 DIAGNOSIS — M25.611 DECREASED ROM OF RIGHT SHOULDER: Primary | ICD-10-CM

## 2024-06-27 DIAGNOSIS — R29.898 DECREASED STRENGTH OF UPPER EXTREMITY: ICD-10-CM

## 2024-06-27 PROCEDURE — 97112 NEUROMUSCULAR REEDUCATION: CPT

## 2024-06-27 PROCEDURE — 97530 THERAPEUTIC ACTIVITIES: CPT

## 2024-06-27 PROCEDURE — 97140 MANUAL THERAPY 1/> REGIONS: CPT

## 2024-06-27 PROCEDURE — 97110 THERAPEUTIC EXERCISES: CPT

## 2024-06-27 NOTE — PROGRESS NOTES
Physical Therapy Daily Treatment Note     Name: Harinder Elliott  [facility] Number: [id number]    Therapy Diagnosis:   Encounter Diagnoses   Name Primary?    Decreased ROM of right shoulder Yes    Decreased strength of upper extremity        Physician: Servando Nielson III, *    Visit Date: 6/27/2024    Physician Orders: PT Eval and Treat   Medical Diagnosis from Referral: Biceps tendonitis on right [M75.21], Glenoid labral tear, right, initial encounter [S43.431A], Instability of right shoulder joint [M25.311]   Evaluation Date: 6/14/2024  Authorization Period Expiration: 5/24/25  Plan of Care Expiration: 2/21/25  Visit # / Visits authorized: 4/ 20     Time In: 8:00 AM  Time Out: 9:00 AM     Total Billable Time: 60 minutes  Precautions: Standard    DOS 5/28/24  OPERATION:   right  Shoulder osteochondral allograft transplantation, humeral head ICRS Grade 4 - posterior: 2.5 x 20 cm) (CPT 94095 ) (CPT 48875/91285 knee comparison codes) (complex, -22 modifier)  2. Shoulder arthroscopic anterior capsulorrhaphy (CPT 27937) (complex, -22 modifier)  3. Shoulder arthroscopic anterior labral repair (CPT 12990) (complex, -22 modifier)  4. Shoulder arthroscopic posterior labral repair, capsulorrhaphy, 7:00 position (CPT 06643)  5. Shoulder arthroscopic extensive debridement (anterior, posterior glenohumeral joint) (CPT 30979)  6. Shoulder manipulation under anesthesia (CPT 85367)  7. Shoulder arthroscopic lysis of adhesions (CPT 47691):  8. Shoulder arthroscopic loose body removal (CPT 30643)  9 . Shoulder arthroscopic interpositional arthroplasty (63676)  10. Shoulder arthroscopic SLAP type 8 repair (CPT 81648)    POST OPERATIVE PLAN: We will follow the arthroscopic [employer] repair guidelines. We discussed with the patient's family after surgery. The patient will remain in a sling for 6 weeks. We will start PT at the 3 week crista.   Quality of tissue: Good        Subjective     Pt reports: shoulder is feeling good.  "Little sore with banded walkouts. Leave for trip to [place] for a week  He was compliant with home exercise program.  Response to previous treatment: ongoing   Functional change: ongoing    Pain: 1/10  Location: right shoulder      Objective   DOS 5/28/24: 4 weeks 2 days as of 6/27    Shoulder Passive Range of Motion:   Shoulder Right Left   Flexion    90  180   ER at 0    35 85   ER at 90    Not tested 2/2 post-op 105   IR at 90    Not tested 2/2 post-op 60          Harinder received therapeutic exercises to develop strength, endurance, ROM, flexibility, and posture for 8 minutes including:  Supine dowel ER AAROM 3x10 10" holds to 30 deg only  Supine dowel press 5x10        Not today:  Table slides 3x10 5s holds  Bicep curls 30x  Scap squeeze 30x  Shoulder shrug 30x  Shoulder rolls 30x        Harinder received the following manual therapy techniques: Joint mobilizations were applied to the: R shoulder for 10 minutes, including:  Skilled PROM all directions  GH joint centering mobilizations    Harinder participated in neuromuscular re-education activities to improve: Coordination, Kinesthetic, Sense, Proprioception, and Posture for 30 minutes. The following activities were included:  PT resisted ER/IR RS at 30 deg ABD 3x10 5s holds  ER/IR PT resisted RS through available range 3x10  YTB ER walkouts 3x6  OTB IR walkouts 3x6  OTB extension walkouts 3x10  OTB row 3x10        Not today  Prone scap squeeze 3x10 10" holds  Submax isometrics: flexion/extension/ABD/ER/IR 10sx10    Harinder participated in dynamic functional therapeutic activities to improve functional performance for 12  minutes, including:  UBE 6' forward, 6' backwards to improve tissue extensibility and cartilage health      Not today:  20# 3D strap rotations 3x10      Home Exercises Provided and Patient Education Provided     Education provided:   - HEP, POC, symptom management, recovery timeline, post-operative precautions    Written Home Exercises Provided: " yes.  Exercises were reviewed and Harinder was able to demonstrate them prior to the end of the session.  Harinder demonstrated good  understanding of the education provided.     See EMR under Patient Instructions for exercises provided prior visit.    Assessment     Harinder completed session as noted above. Assessment of shoulder ROM continues to demo free and easy flexion ROM to 90 deg with relative easy. ER PROM improved from prior visit able to achieve 35 deg following initiation of ER AAROM. Emphasis of session today towards activation of cuff and periscapular musculature to work on dynamic stabilization of GH joint. Intro'd cuff isometric walkouts with no complaints of pain. Overall doing well will continue to progress as tolerated        Harinder Is progressing well towards his goals.   Pt prognosis is Excellent.     Pt will continue to benefit from skilled outpatient physical therapy to address the deficits listed in the problem list box on initial evaluation, provide pt/family education and to maximize pt's level of independence in the home and community environment.     Pt's spiritual, cultural and educational needs considered and pt agreeable to plan of care and goals.     Anticipated barriers to physical therapy: none    Goals: Goals:  Short Term Goals (4 Weeks):   1. Pt will be independent with HEP to supplement PT in improving functional use of R UE.  2. Pt will increase pain free R shoulder elevation PROM to >/= 160 deg to improve functional mobility of UE  3. Pt will increase R shoulder ER PROM in 90 deg abduction to >/=30 deg to improve functional mobility of UE  4. Pt will increase R elbow AAROM to WNL deg in 4 weeks to improve functional mobility of UE.  Mid-Term Goals (12-16 Weeks):   1. Pt will improve FOTO score to </=% limited to decrease perceived limitation with carrying, moving, and handling objects.  2. Pt will increase R shoulder PROM to WNL in all planes to improve functional use of R RUE.  3.  Pt will increase R shoulder AROM to WFL in all planes to improve functional use of R RUE.  4. Pt will improve R shoulder MMTs to = 5/5 to promote equal use of B UEs in performing functional tasks.  5. Pt will lift 10 lb objects without pain to promote functional QOL.  6. Pt to report pain </= 0/10  Long Term Goals (24-36 weeks)  - pt will demonstrate at least 95% LSI with HHD for flexion, abduction, ER, and IR for improved stability  - pt will demonstrate at least 95% LSI with UE Y-balance for improved postural control  - pt will pass push-up and pull-up testing to demonstrate normalized endurance  - pt will be able to perform sport-specific activities with appropriate form, without instability or pain          Plan     Plan of care Certification: 6/14/2024 to 2/21/25.     Outpatient Physical Therapy 2 times weekly for 36 weeks to include the following interventions: Electrical Stimulation NMES, Manual Therapy, Moist Heat/ Ice, Neuromuscular Re-ed, Patient Education, Self Care, Therapeutic Activities, Therapeutic Exercise, and Dry Needling    Deniz Seth, PT, DPT, SCS

## 2024-07-08 ENCOUNTER — PATIENT MESSAGE (OUTPATIENT)
Dept: PRIMARY CARE CLINIC | Facility: CLINIC | Age: 34
End: 2024-07-08
Payer: COMMERCIAL

## 2024-07-08 ENCOUNTER — OFFICE VISIT (OUTPATIENT)
Dept: SPORTS MEDICINE | Facility: CLINIC | Age: 34
End: 2024-07-08
Payer: COMMERCIAL

## 2024-07-08 ENCOUNTER — CLINICAL SUPPORT (OUTPATIENT)
Dept: REHABILITATION | Facility: HOSPITAL | Age: 34
End: 2024-07-08
Payer: COMMERCIAL

## 2024-07-08 VITALS
HEART RATE: 74 BPM | DIASTOLIC BLOOD PRESSURE: 80 MMHG | SYSTOLIC BLOOD PRESSURE: 116 MMHG | BODY MASS INDEX: 24.99 KG/M2 | WEIGHT: 174.19 LBS

## 2024-07-08 DIAGNOSIS — Z98.890 S/P SHOULDER SURGERY: Primary | ICD-10-CM

## 2024-07-08 DIAGNOSIS — R29.898 DECREASED STRENGTH OF UPPER EXTREMITY: ICD-10-CM

## 2024-07-08 DIAGNOSIS — F98.8 ATTENTION DEFICIT DISORDER, UNSPECIFIED HYPERACTIVITY PRESENCE: Primary | ICD-10-CM

## 2024-07-08 DIAGNOSIS — M25.611 DECREASED ROM OF RIGHT SHOULDER: Primary | ICD-10-CM

## 2024-07-08 PROCEDURE — 3079F DIAST BP 80-89 MM HG: CPT | Mod: CPTII,S$GLB,, | Performed by: ORTHOPAEDIC SURGERY

## 2024-07-08 PROCEDURE — 99024 POSTOP FOLLOW-UP VISIT: CPT | Mod: S$GLB,,, | Performed by: ORTHOPAEDIC SURGERY

## 2024-07-08 PROCEDURE — 97530 THERAPEUTIC ACTIVITIES: CPT

## 2024-07-08 PROCEDURE — 97110 THERAPEUTIC EXERCISES: CPT

## 2024-07-08 PROCEDURE — 3044F HG A1C LEVEL LT 7.0%: CPT | Mod: CPTII,S$GLB,, | Performed by: ORTHOPAEDIC SURGERY

## 2024-07-08 PROCEDURE — 99999 PR PBB SHADOW E&M-EST. PATIENT-LVL II: CPT | Mod: PBBFAC,,, | Performed by: ORTHOPAEDIC SURGERY

## 2024-07-08 PROCEDURE — 97112 NEUROMUSCULAR REEDUCATION: CPT

## 2024-07-08 PROCEDURE — 3074F SYST BP LT 130 MM HG: CPT | Mod: CPTII,S$GLB,, | Performed by: ORTHOPAEDIC SURGERY

## 2024-07-08 PROCEDURE — 97140 MANUAL THERAPY 1/> REGIONS: CPT

## 2024-07-08 RX ORDER — LISDEXAMFETAMINE DIMESYLATE CAPSULES 20 MG/1
20 CAPSULE ORAL EVERY MORNING
Qty: 30 CAPSULE | Refills: 0 | Status: SHIPPED | OUTPATIENT
Start: 2024-07-08

## 2024-07-08 RX ORDER — ESCITALOPRAM OXALATE 10 MG/1
TABLET ORAL
Qty: 45 TABLET | Refills: 2 | Status: SHIPPED | OUTPATIENT
Start: 2024-07-08

## 2024-07-08 NOTE — PROGRESS NOTES
Physical Therapy Daily Treatment Note     Name: Harinder Elliott  [facility] Number: [id number]    Therapy Diagnosis:   Encounter Diagnoses   Name Primary?    Decreased ROM of right shoulder Yes    Decreased strength of upper extremity        Physician: Servando Nielson III, *    Visit Date: 7/8/2024    Physician Orders: PT Eval and Treat   Medical Diagnosis from Referral: Biceps tendonitis on right [M75.21], Glenoid labral tear, right, initial encounter [S43.431A], Instability of right shoulder joint [M25.311]   Evaluation Date: 6/14/2024  Authorization Period Expiration: 5/24/25  Plan of Care Expiration: 2/21/25  Visit # / Visits authorized: 5/ 20     Time In: 9:00 AM  Time Out: 10:00 AM     Total Billable Time: 60 minutes  Precautions: Standard    DOS 5/28/24  OPERATION:   right  Shoulder osteochondral allograft transplantation, humeral head ICRS Grade 4 - posterior: 2.5 x 20 cm) (CPT 63234 ) (CPT 59714/58200 knee comparison codes) (complex, -22 modifier)  2. Shoulder arthroscopic anterior capsulorrhaphy (CPT 61417) (complex, -22 modifier)  3. Shoulder arthroscopic anterior labral repair (CPT 82888) (complex, -22 modifier)  4. Shoulder arthroscopic posterior labral repair, capsulorrhaphy, 7:00 position (CPT 27651)  5. Shoulder arthroscopic extensive debridement (anterior, posterior glenohumeral joint) (CPT 63890)  6. Shoulder manipulation under anesthesia (CPT 88768)  7. Shoulder arthroscopic lysis of adhesions (CPT 21759):  8. Shoulder arthroscopic loose body removal (CPT 38573)  9 . Shoulder arthroscopic interpositional arthroplasty (96424)  10. Shoulder arthroscopic SLAP type 8 repair (CPT 38852)    POST OPERATIVE PLAN: We will follow the arthroscopic [employer] repair guidelines. We discussed with the patient's family after surgery. The patient will remain in a sling for 6 weeks. We will start PT at the 3 week crista.   Quality of tissue: Good      Subjective     Pt reports: shoulder felt good on trip. Gets  "sore after using it more but nothing significant  He was compliant with home exercise program.  Response to previous treatment: ongoing   Functional change: ongoing    Pain: 1/10  Location: right shoulder      Objective   DOS 5/28/24: 5 weeks 6 days as of 7/8    Shoulder Passive Range of Motion:   Shoulder Right Left   Flexion    105 180   ER at 0    35 85   ER at 90    Not tested 2/2 post-op 105   IR at 90    Not tested 2/2 post-op 60      Shoulder Active Range of Motion:   Shoulder Right Left   Flexion    90  180   ER at 0    35 85   HEBER    Posterior occiput N/t   FIR    Posterior hip N/t        Harinder received therapeutic exercises to develop strength, endurance, ROM, flexibility, and posture for 8 minutes including:  Supine dowel ER AAROM 3x10 10" holds to 30 deg only  Supine dowel press -> flexion AAROM 3x10 5s holds  Pulleys flexion/scaption x 5'    Not today:  Table slides 3x10 5s holds  Bicep curls 30x  Scap squeeze 30x  Shoulder shrug 30x  Shoulder rolls 30x        Harinder received the following manual therapy techniques: Joint mobilizations were applied to the: R shoulder for 10 minutes, including:  Skilled PROM all directions  GH joint centering mobilizations  Grade I/II a/p, inferior GH joint mobs    Harinder participated in neuromuscular re-education activities to improve: Coordination, Kinesthetic, Sense, Proprioception, and Posture for 26 minutes. The following activities were included:    Side-lying flexion AAROM supported on dowel 3x10   Serratus wall slide 3x10        Not today  YTB ER walkouts 3x6  OTB IR walkouts 3x6  OTB extension walkouts 3x10  OTB row 3x10  PT resisted ER/IR RS at 30 deg ABD 3x10 5s holds  ER/IR PT resisted RS through available range 3x10  Prone scap squeeze 3x10 10" holds  Submax isometrics: flexion/extension/ABD/ER/IR 10sx10    Harinder participated in dynamic functional therapeutic activities to improve functional performance for 16  minutes, including:  UBE 5' forward, 5' " backwards to improve tissue extensibility and cartilage health  [employer] press-> walkup 3x15    Not today:  20# 3D strap rotations 3x10      Home Exercises Provided and Patient Education Provided     Education provided:   - HEP, POC, symptom management, recovery timeline, post-operative precautions    Written Home Exercises Provided: yes.  Exercises were reviewed and Harinder was able to demonstrate them prior to the end of the session.  Harinder demonstrated good  understanding of the education provided.     See EMR under Patient Instructions for exercises provided prior visit.    Assessment     Harinder completed session as noted above. Assessment of shoulder ROM revealed passive flexion ROM to 110 deg with relative ease and AROM to ~ prior to compensatory UT and shrug. ER PROM improved from prior visit able to achieve 35 deg following initiation of ER AAROM. Emphasis of session today towards activation of cuff and periscapular musculature to work on dynamic stabilization of GH joint. Progressed AAROM during session today and updated HEP. Advised he can d/c sling per protocol. Overall doing well will continue to progress as tolerated        Harinder Is progressing well towards his goals.   Pt prognosis is Excellent.     Pt will continue to benefit from skilled outpatient physical therapy to address the deficits listed in the problem list box on initial evaluation, provide pt/family education and to maximize pt's level of independence in the home and community environment.     Pt's spiritual, cultural and educational needs considered and pt agreeable to plan of care and goals.     Anticipated barriers to physical therapy: none    Goals: Goals:  Short Term Goals (4 Weeks):   1. Pt will be independent with HEP to supplement PT in improving functional use of R UE.  2. Pt will increase pain free R shoulder elevation PROM to >/= 160 deg to improve functional mobility of UE  3. Pt will increase R shoulder ER PROM in 90 deg  abduction to >/=30 deg to improve functional mobility of UE  4. Pt will increase R elbow AAROM to WNL deg in 4 weeks to improve functional mobility of UE.  Mid-Term Goals (12-16 Weeks):   1. Pt will improve FOTO score to </=% limited to decrease perceived limitation with carrying, moving, and handling objects.  2. Pt will increase R shoulder PROM to WNL in all planes to improve functional use of R RUE.  3. Pt will increase R shoulder AROM to WFL in all planes to improve functional use of R RUE.  4. Pt will improve R shoulder MMTs to = 5/5 to promote equal use of B UEs in performing functional tasks.  5. Pt will lift 10 lb objects without pain to promote functional QOL.  6. Pt to report pain </= 0/10  Long Term Goals (24-36 weeks)  - pt will demonstrate at least 95% LSI with HHD for flexion, abduction, ER, and IR for improved stability  - pt will demonstrate at least 95% LSI with UE Y-balance for improved postural control  - pt will pass push-up and pull-up testing to demonstrate normalized endurance  - pt will be able to perform sport-specific activities with appropriate form, without instability or pain          Plan     Plan of care Certification: 6/14/2024 to 2/21/25.     Outpatient Physical Therapy 2 times weekly for 36 weeks to include the following interventions: Electrical Stimulation NMES, Manual Therapy, Moist Heat/ Ice, Neuromuscular Re-ed, Patient Education, Self Care, Therapeutic Activities, Therapeutic Exercise, and Dry Needling    Deniz Seth, PT, DPT, SCS

## 2024-07-08 NOTE — PROGRESS NOTES
Chief Complaint: right shoulder pain    HISTORY OF PRESENT ILLNESS:   Pt is here today for post-operative followup of shoulder arthroscopy.  he is doing well.  We have reviewed patient's findings and discussed plan of care and future treatment options.      Patient has been attending physical therapy at the [facility] location, working with Deniz WILDE. Patient notes performing his HEP daily     No issues reported     Pain today 0/10      DATE OF PROCEDURE: 05/28/2024  SURGEON: Marsha Bowman M.D.  OPERATION:   right  Shoulder osteochondral allograft transplantation, humeral head ICRS Grade 4 - posterior: 2.5 x 20 cm) (CPT 24981 ) (CPT 17272/64894 knee comparison codes) (complex, -22 modifier)  2. Shoulder arthroscopic anterior capsulorrhaphy (CPT 68021) (complex, -22 modifier)  3. Shoulder arthroscopic anterior labral repair (CPT 77883) (complex, -22 modifier)  4. Shoulder arthroscopic posterior labral repair, capsulorrhaphy, 7:00 position (CPT 12477)  5. Shoulder arthroscopic extensive debridement (anterior, posterior glenohumeral joint) (CPT 90562)  6. Shoulder manipulation under anesthesia (CPT 17598)  7. Shoulder arthroscopic lysis of adhesions (CPT 51309):  8. Shoulder arthroscopic loose body removal (CPT 73885)  9 . Shoulder arthroscopic interpositional arthroplasty (92626)  10. Shoulder arthroscopic SLAP type 8 repair (CPT 13869)     6 anchors were used in total.     There was chondral damage to:  Humeral head ICRS Grade 4 - posterior Size: 2.5 x 20 cm  Glenoid: 10 x 2 mm grade 3 - emily-inferior   Chondroplasty was performed using arthroscopic shaver.                                                                     PHYSICAL EXAMINATION:     Incision sites healed well  No evidence of any erythema, infection or induration  elbow Range of motion full   No effusion  2+ Radial pulses  No swelling            ROM:      Forward Elevation: 105  ER: 15  IR: L5    Strength  Scaption at 0 and 30: 5/5  ER:  5/5                                                                         ASSESSMENT:                                                                                                                                               1. Status post above, doing well.                                                                                                                               PLAN:                                                                                                                                                     1. Continue PT, case discussed with therapist. Patient to take Tylenol and Advil for his pain. Ok to discontinue sling   2. Emphasized scapular function.  3. I have discussed return to activity in detail.  4. Patient will see us back in 6 weeks.                                      5. All questions were answered, surgical technique was reviewed and interpreted, and patient should contact us with any questions or concerns in the interim.

## 2024-07-11 ENCOUNTER — CLINICAL SUPPORT (OUTPATIENT)
Dept: REHABILITATION | Facility: HOSPITAL | Age: 34
End: 2024-07-11
Payer: COMMERCIAL

## 2024-07-11 DIAGNOSIS — R29.898 DECREASED STRENGTH OF UPPER EXTREMITY: ICD-10-CM

## 2024-07-11 DIAGNOSIS — M25.611 DECREASED ROM OF RIGHT SHOULDER: Primary | ICD-10-CM

## 2024-07-11 PROCEDURE — 97112 NEUROMUSCULAR REEDUCATION: CPT

## 2024-07-11 PROCEDURE — 97140 MANUAL THERAPY 1/> REGIONS: CPT

## 2024-07-11 PROCEDURE — 97110 THERAPEUTIC EXERCISES: CPT

## 2024-07-11 PROCEDURE — 97530 THERAPEUTIC ACTIVITIES: CPT

## 2024-07-11 NOTE — PROGRESS NOTES
Physical Therapy Daily Treatment Note     Name: Harinder Elliott  [facility] Number: [id number]    Therapy Diagnosis:   Encounter Diagnoses   Name Primary?    Decreased ROM of right shoulder Yes    Decreased strength of upper extremity        Physician: Servando Nielson III, *    Visit Date: 7/11/2024    Physician Orders: PT Eval and Treat   Medical Diagnosis from Referral: Biceps tendonitis on right [M75.21], Glenoid labral tear, right, initial encounter [S43.431A], Instability of right shoulder joint [M25.311]   Evaluation Date: 6/14/2024  Authorization Period Expiration: 5/24/25  Plan of Care Expiration: 2/21/25  Visit # / Visits authorized: 6/ 20     Time In: 9:00 AM  Time Out: 10:00 AM     Total Billable Time: 60 minutes  Precautions: Standard    DOS 5/28/24  OPERATION:   right  Shoulder osteochondral allograft transplantation, humeral head ICRS Grade 4 - posterior: 2.5 x 20 cm) (CPT 05286 ) (CPT 35038/16230 knee comparison codes) (complex, -22 modifier)  2. Shoulder arthroscopic anterior capsulorrhaphy (CPT 96689) (complex, -22 modifier)  3. Shoulder arthroscopic anterior labral repair (CPT 09397) (complex, -22 modifier)  4. Shoulder arthroscopic posterior labral repair, capsulorrhaphy, 7:00 position (CPT 32582)  5. Shoulder arthroscopic extensive debridement (anterior, posterior glenohumeral joint) (CPT 67672)  6. Shoulder manipulation under anesthesia (CPT 13770)  7. Shoulder arthroscopic lysis of adhesions (CPT 61724):  8. Shoulder arthroscopic loose body removal (CPT 95417)  9 . Shoulder arthroscopic interpositional arthroplasty (61796)  10. Shoulder arthroscopic SLAP type 8 repair (CPT 84719)    POST OPERATIVE PLAN: We will follow the arthroscopic [employer] repair guidelines. We discussed with the patient's family after surgery. The patient will remain in a sling for 6 weeks. We will start PT at the 3 week crista.   Quality of tissue: Good      Subjective     Pt reports: definitely more sore just using  "my arm more throughout the day  He was compliant with home exercise program.  Response to previous treatment: ongoing   Functional change: ongoing    Pain: 1/10  Location: right shoulder      Objective   DOS 5/28/24: 6 weeks 2 days as of 7/11    Shoulder Passive Range of Motion:   Shoulder Right Left   Flexion    105 180   ER at 0    35 85   ER at 90    Not tested 2/2 post-op 105   IR at 90    Not tested 2/2 post-op 60      Shoulder Active Range of Motion:   Shoulder Right Left   Flexion    90  180   ER at 0    35 85   HEBER    Posterior occiput N/t   FIR    Posterior hip N/t        Harinder received therapeutic exercises to develop strength, endurance, ROM, flexibility, and posture for 8 minutes including:  Supine dowel ER AAROM 3x10 10" holds to 30 deg only  Supine dowel press -> flexion AAROM 3x10 5s holds        Not today  Pulleys flexion/scaption x 5'  Table slides 3x10 5s holds  Bicep curls 30x  Scap squeeze 30x  Shoulder shrug 30x  Shoulder rolls 30x        Harinder received the following manual therapy techniques: Joint mobilizations were applied to the: R shoulder for 10 minutes, including:  Skilled PROM all directions  GH joint centering mobilizations  Grade II/III a/p, inferior GH joint mobs  Manual lat contract/relax  ER MWM with GH joint centering rachel    Harinder participated in neuromuscular re-education activities to improve: Coordination, Kinesthetic, Sense, Proprioception, and Posture for 32 minutes. The following activities were included:  Supine handcuffs at 90 deg YTB 3x10  Side-lying ER AROM 3x15 through available range  3# Prone row 3x10  2# Prone extension 3x10  Wall ball RS: up/down, side/side, CW, CCW 3x10 each  Serratus wall slide 3x10  Next visit  Body blade at 0 deg 3s x3 each        Not today:  Side-lying flexion AAROM supported on dowel 3x10   YTB ER walkouts 3x6  OTB IR walkouts 3x6  OTB extension walkouts 3x10  OTB row 3x10  PT resisted ER/IR RS at 30 deg ABD 3x10 5s holds  ER/IR PT " "resisted RS through available range 3x10  Prone scap squeeze 3x10 10" holds  Submax isometrics: flexion/extension/ABD/ER/IR 10sx10    Harinder participated in dynamic functional therapeutic activities to improve functional performance for 10  minutes, including:  UBE 5' forward, 5' backwards to improve tissue extensibility and cartilage health    Not today:  20# 3D strap rotations 3x10  Dowel landmine press-> walkup 3x15    Home Exercises Provided and Patient Education Provided     Education provided:   - HEP, POC, symptom management, recovery timeline, post-operative precautions    Written Home Exercises Provided: yes.  Exercises were reviewed and Harinder was able to demonstrate them prior to the end of the session.  Harinder demonstrated good  understanding of the education provided.     See EMR under Patient Instructions for exercises provided prior visit.    Assessment     Harinder completed session as noted above. Assessment of shoulder ROM revealed passive flexion ROM to 110 deg with relative ease and AROM to ~ prior to compensatory UT and shrug. Repots some mild discomfort with palpation of anterior portal sight likely due to some scar tissue buildup under incision. ER PROM improves with manual GH joint centering glide with MWM into ER.  Initiated ER AROM to faciliate improved carry-over of ER AROM. Able to perform prone scap and ER AROM with min complaints of pain. Focused on dynamic stability of cuff and periscap.       Harinder Is progressing well towards his goals.   Pt prognosis is Excellent.     Pt will continue to benefit from skilled outpatient physical therapy to address the deficits listed in the problem list box on initial evaluation, provide pt/family education and to maximize pt's level of independence in the home and community environment.     Pt's spiritual, cultural and educational needs considered and pt agreeable to plan of care and goals.     Anticipated barriers to physical therapy: none    Goals: " Goals:  Short Term Goals (4 Weeks):   1. Pt will be independent with HEP to supplement PT in improving functional use of R UE.  2. Pt will increase pain free R shoulder elevation PROM to >/= 160 deg to improve functional mobility of UE  3. Pt will increase R shoulder ER PROM in 90 deg abduction to >/=30 deg to improve functional mobility of UE  4. Pt will increase R elbow AAROM to WNL deg in 4 weeks to improve functional mobility of UE.  Mid-Term Goals (12-16 Weeks):   1. Pt will improve FOTO score to </=% limited to decrease perceived limitation with carrying, moving, and handling objects.  2. Pt will increase R shoulder PROM to WNL in all planes to improve functional use of R RUE.  3. Pt will increase R shoulder AROM to WFL in all planes to improve functional use of R RUE.  4. Pt will improve R shoulder MMTs to = 5/5 to promote equal use of B UEs in performing functional tasks.  5. Pt will lift 10 lb objects without pain to promote functional QOL.  6. Pt to report pain </= 0/10  Long Term Goals (24-36 weeks)  - pt will demonstrate at least 95% LSI with HHD for flexion, abduction, ER, and IR for improved stability  - pt will demonstrate at least 95% LSI with UE Y-balance for improved postural control  - pt will pass push-up and pull-up testing to demonstrate normalized endurance  - pt will be able to perform sport-specific activities with appropriate form, without instability or pain          Plan     Plan of care Certification: 6/14/2024 to 2/21/25.     Outpatient Physical Therapy 2 times weekly for 36 weeks to include the following interventions: Electrical Stimulation NMES, Manual Therapy, Moist Heat/ Ice, Neuromuscular Re-ed, Patient Education, Self Care, Therapeutic Activities, Therapeutic Exercise, and Dry Needling    Deniz Seth, PT, DPT, SCS

## 2024-07-16 ENCOUNTER — CLINICAL SUPPORT (OUTPATIENT)
Dept: REHABILITATION | Facility: HOSPITAL | Age: 34
End: 2024-07-16
Payer: COMMERCIAL

## 2024-07-16 DIAGNOSIS — M25.611 DECREASED ROM OF RIGHT SHOULDER: Primary | ICD-10-CM

## 2024-07-16 DIAGNOSIS — R29.898 DECREASED STRENGTH OF UPPER EXTREMITY: ICD-10-CM

## 2024-07-16 PROCEDURE — 97112 NEUROMUSCULAR REEDUCATION: CPT

## 2024-07-16 PROCEDURE — 97140 MANUAL THERAPY 1/> REGIONS: CPT

## 2024-07-16 PROCEDURE — 97110 THERAPEUTIC EXERCISES: CPT

## 2024-07-16 PROCEDURE — 97530 THERAPEUTIC ACTIVITIES: CPT

## 2024-07-16 NOTE — PROGRESS NOTES
Physical Therapy Daily Treatment Note     Name: Harinder Elliott  [facility] Number: [id number]    Therapy Diagnosis:   Encounter Diagnoses   Name Primary?    Decreased ROM of right shoulder Yes    Decreased strength of upper extremity          Physician: Servando Nielson III, *    Visit Date: 7/16/2024    Physician Orders: PT Eval and Treat   Medical Diagnosis from Referral: Biceps tendonitis on right [M75.21], Glenoid labral tear, right, initial encounter [S43.431A], Instability of right shoulder joint [M25.311]   Evaluation Date: 6/14/2024  Authorization Period Expiration: 5/24/25  Plan of Care Expiration: 2/21/25  Visit # / Visits authorized: 6/ 20     Time In: 9:00 AM  Time Out: 10:00 AM     Total Billable Time: 60 minutes  Precautions: Standard    DOS 5/28/24  OPERATION:   right  Shoulder osteochondral allograft transplantation, humeral head ICRS Grade 4 - posterior: 2.5 x 20 cm) (CPT 42006 ) (CPT 61676/38126 knee comparison codes) (complex, -22 modifier)  2. Shoulder arthroscopic anterior capsulorrhaphy (CPT 61475) (complex, -22 modifier)  3. Shoulder arthroscopic anterior labral repair (CPT 17804) (complex, -22 modifier)  4. Shoulder arthroscopic posterior labral repair, capsulorrhaphy, 7:00 position (CPT 96830)  5. Shoulder arthroscopic extensive debridement (anterior, posterior glenohumeral joint) (CPT 75953)  6. Shoulder manipulation under anesthesia (CPT 66877)  7. Shoulder arthroscopic lysis of adhesions (CPT 92609):  8. Shoulder arthroscopic loose body removal (CPT 68585)  9 . Shoulder arthroscopic interpositional arthroplasty (87131)  10. Shoulder arthroscopic SLAP type 8 repair (CPT 10491)    POST OPERATIVE PLAN: We will follow the arthroscopic [employer] repair guidelines. We discussed with the patient's family after surgery. The patient will remain in a sling for 6 weeks. We will start PT at the 3 week crista.   Quality of tissue: Good      Subjective     Pt reports: definitely more sore just  "using my arm more throughout the day  He was compliant with home exercise program.  Response to previous treatment: ongoing   Functional change: ongoing    Pain: 1/10  Location: right shoulder      Objective   DOS 5/28/24: 6 weeks 2 days as of 7/11    Shoulder Passive Range of Motion:   Shoulder Right Left   Flexion    105 180   ER at 0    45 85   ER at 90    Not tested 2/2 post-op 105   IR at 90    Not tested 2/2 post-op 60      Shoulder Active Range of Motion:   Shoulder Right Left   Flexion    90  180   ER at 0    35 85   HEBER    Posterior occiput N/t   FIR    Posterior hip N/t        Harinder received therapeutic exercises to develop strength, endurance, ROM, flexibility, and posture for 8 minutes including:  Supine dowel ER AAROM 3x10 10" holds to 30 deg only  Supine dowel press -> flexion AAROM 3x10 5s holds        Not today  Pulleys flexion/scaption x 5'  Table slides 3x10 5s holds  Bicep curls 30x  Scap squeeze 30x  Shoulder shrug 30x  Shoulder rolls 30x        Harinder received the following manual therapy techniques: Joint mobilizations were applied to the: R shoulder for 10 minutes, including:  Skilled PROM all directions  GH joint centering mobilizations  Grade II/III a/p, inferior GH joint mobs  Manual lat contract/relax  ER MWM with GH joint centering rachel    Harinder participated in neuromuscular re-education activities to improve: Coordination, Kinesthetic, Sense, Proprioception, and Posture for 32 minutes. The following activities were included:  Supine handcuffs at 90 deg GTB 3x10  Side-lying ER AROM 3x15 through available range  Single arm wall slide 3x10  Wall ball RS: up/down, side/side, CW, CCW 3x10 each    Body blade at 0 deg 3s x3 each        Not today:  Serratus wall slide 3x10  3# Prone row 3x10  2# Prone extension 3x10  Side-lying flexion AAROM supported on dowel 3x10   YTB ER walkouts 3x6  OTB IR walkouts 3x6  OTB extension walkouts 3x10  OTB row 3x10  PT resisted ER/IR RS at 30 deg ABD 3x10 5s " "holds  ER/IR PT resisted RS through available range 3x10  Prone scap squeeze 3x10 10" holds  Submax isometrics: flexion/extension/ABD/ER/IR 10sx10    Harinder participated in dynamic functional therapeutic activities to improve functional performance for 10  minutes, including:  UBE 5' forward, 5' backwards to improve tissue extensibility and cartilage health    Not today:  20# 3D strap rotations 3x10  Dowel landmine press-> walkup 3x15    Home Exercises Provided and Patient Education Provided     Education provided:   - HEP, POC, symptom management, recovery timeline, post-operative precautions    Written Home Exercises Provided: yes.  Exercises were reviewed and Harinder was able to demonstrate them prior to the end of the session.  Harinder demonstrated good  understanding of the education provided.     See EMR under Patient Instructions for exercises provided prior visit.    Assessment     Harinder completed session as noted above. Assessment of shoulder ROM revealed passive flexion ROM to 110 deg with relative ease and AROM to ~ prior to compensatory UT and shrug. Repots some mild discomfort with palpation of anterior portal sight likely due to some scar tissue buildup under incision. ER PROM improves with manual GH joint centering glide with MWM into ER.  Initiated ER AROM to faciliate improved carry-over of ER AROM. Able to perform prone scap and ER AROM with min complaints of pain. Focused on dynamic stability of cuff and periscap.       Harinder Is progressing well towards his goals.   Pt prognosis is Excellent.     Pt will continue to benefit from skilled outpatient physical therapy to address the deficits listed in the problem list box on initial evaluation, provide pt/family education and to maximize pt's level of independence in the home and community environment.     Pt's spiritual, cultural and educational needs considered and pt agreeable to plan of care and goals.     Anticipated barriers to physical therapy: " none    Goals: Goals:  Short Term Goals (4 Weeks):   1. Pt will be independent with HEP to supplement PT in improving functional use of R UE.  2. Pt will increase pain free R shoulder elevation PROM to >/= 160 deg to improve functional mobility of UE  3. Pt will increase R shoulder ER PROM in 90 deg abduction to >/=30 deg to improve functional mobility of UE  4. Pt will increase R elbow AAROM to WNL deg in 4 weeks to improve functional mobility of UE.  Mid-Term Goals (12-16 Weeks):   1. Pt will improve FOTO score to </=% limited to decrease perceived limitation with carrying, moving, and handling objects.  2. Pt will increase R shoulder PROM to WNL in all planes to improve functional use of R RUE.  3. Pt will increase R shoulder AROM to WFL in all planes to improve functional use of R RUE.  4. Pt will improve R shoulder MMTs to = 5/5 to promote equal use of B UEs in performing functional tasks.  5. Pt will lift 10 lb objects without pain to promote functional QOL.  6. Pt to report pain </= 0/10  Long Term Goals (24-36 weeks)  - pt will demonstrate at least 95% LSI with HHD for flexion, abduction, ER, and IR for improved stability  - pt will demonstrate at least 95% LSI with UE Y-balance for improved postural control  - pt will pass push-up and pull-up testing to demonstrate normalized endurance  - pt will be able to perform sport-specific activities with appropriate form, without instability or pain          Plan     Plan of care Certification: 6/14/2024 to 2/21/25.     Outpatient Physical Therapy 2 times weekly for 36 weeks to include the following interventions: Electrical Stimulation NMES, Manual Therapy, Moist Heat/ Ice, Neuromuscular Re-ed, Patient Education, Self Care, Therapeutic Activities, Therapeutic Exercise, and Dry Needling    Deniz Seth, PT, DPT, SCS

## 2024-07-19 ENCOUNTER — CLINICAL SUPPORT (OUTPATIENT)
Dept: REHABILITATION | Facility: HOSPITAL | Age: 34
End: 2024-07-19
Payer: COMMERCIAL

## 2024-07-19 DIAGNOSIS — M25.611 DECREASED ROM OF RIGHT SHOULDER: Primary | ICD-10-CM

## 2024-07-19 DIAGNOSIS — R29.898 DECREASED STRENGTH OF UPPER EXTREMITY: ICD-10-CM

## 2024-07-19 PROCEDURE — 97140 MANUAL THERAPY 1/> REGIONS: CPT

## 2024-07-19 PROCEDURE — 97110 THERAPEUTIC EXERCISES: CPT

## 2024-07-19 PROCEDURE — 97530 THERAPEUTIC ACTIVITIES: CPT

## 2024-07-19 PROCEDURE — 97112 NEUROMUSCULAR REEDUCATION: CPT

## 2024-07-19 NOTE — PROGRESS NOTES
Physical Therapy Daily Treatment Note     Name: Harinder Elliott  [facility] Number: [id number]    Therapy Diagnosis:   Encounter Diagnoses   Name Primary?    Decreased ROM of right shoulder Yes    Decreased strength of upper extremity        Physician: Servando Nielson III, *    Visit Date: 7/19/2024    Physician Orders: PT Eval and Treat   Medical Diagnosis from Referral: Biceps tendonitis on right [M75.21], Glenoid labral tear, right, initial encounter [S43.431A], Instability of right shoulder joint [M25.311]   Evaluation Date: 6/14/2024  Authorization Period Expiration: 5/24/25  Plan of Care Expiration: 2/21/25  Visit # / Visits authorized: 8/ 20     Time In: 9:00 AM  Time Out: 10:00 AM     Total Billable Time: 60 minutes  Precautions: Standard    DOS 5/28/24  OPERATION:   right  Shoulder osteochondral allograft transplantation, humeral head ICRS Grade 4 - posterior: 2.5 x 20 cm) (CPT 31680 ) (CPT 84038/53020 knee comparison codes) (complex, -22 modifier)  2. Shoulder arthroscopic anterior capsulorrhaphy (CPT 36624) (complex, -22 modifier)  3. Shoulder arthroscopic anterior labral repair (CPT 88856) (complex, -22 modifier)  4. Shoulder arthroscopic posterior labral repair, capsulorrhaphy, 7:00 position (CPT 39266)  5. Shoulder arthroscopic extensive debridement (anterior, posterior glenohumeral joint) (CPT 64488)  6. Shoulder manipulation under anesthesia (CPT 38943)  7. Shoulder arthroscopic lysis of adhesions (CPT 27985):  8. Shoulder arthroscopic loose body removal (CPT 29296)  9 . Shoulder arthroscopic interpositional arthroplasty (24113)  10. Shoulder arthroscopic SLAP type 8 repair (CPT 66367)    POST OPERATIVE PLAN: We will follow the arthroscopic [employer] repair guidelines. We discussed with the patient's family after surgery. The patient will remain in a sling for 6 weeks. We will start PT at the 3 week crista.   Quality of tissue: Good      Subjective     Pt reports: no issues  He was compliant  with home exercise program.  Response to previous treatment: ongoing   Functional change: ongoing    Pain: 1/10  Location: right shoulder      Objective   DOS 5/28/24: 7 weeks 3 days as of 7/19    Shoulder Passive Range of Motion:   Shoulder Right Left   Flexion    105 180   ER at 0    45 85   ER at 90    Not tested 2/2 post-op 105   IR at 90    Not tested 2/2 post-op 60      Shoulder Active Range of Motion:   Shoulder Right Left   Flexion    90  180   ER at 0    35 85   HEBER    Posterior occiput N/t   FIR    Posterior hip N/t        Harinder received therapeutic exercises to develop strength, endurance, ROM, flexibility, and posture for 25 minutes including:  Modified Hexago open books 25xB  Supine dowel ER AAROM 3x10 5s holds  Supine dowel flexion AAROM 3x10 5s holds  Standing dowel ER at 90 deg flexion 3x10 5s holds    Not today  Pulleys flexion/scaption x 5'            Harinder received the following manual therapy techniques: Joint mobilizations were applied to the: R shoulder for 15 minutes, including:  Skilled PROM all directions  GH joint centering mobilizations  Grade II/III a/p, inferior GH joint mobs  Manual lat contract/relax  ER MWM with GH joint centering glide  Sidelying scapular mobs    Harinder participated in neuromuscular re-education activities to improve: Coordination, Kinesthetic, Sense, Proprioception, and Posture for 10 minutes. The following activities were included:  OTB ER isotonics 3x10  GTB IR isotonics 3x10  Single arm wall slide 3x10          Not today:  Wall ball RS: up/down, side/side, CW, CCW 3x10 each  Body blade at 0 deg 3s x3 each  Supine handcuffs at 90 deg GTB 3x10  Side-lying ER AROM 3x15 through available range  Serratus wall slide 3x10  3# Prone row 3x10  2# Prone extension 3x10  Side-lying flexion AAROM supported on dowel 3x10   YTB ER walkouts 3x6  OTB IR walkouts 3x6    Harinder participated in dynamic functional therapeutic activities to improve functional performance for 10   minutes, including:  UBE 5' forward, 5' backwards to improve tissue extensibility and cartilage health        Not today:  20# 3D strap rotations 3x10  Dowel landmine press-> walkup 3x15    Home Exercises Provided and Patient Education Provided     Education provided:   - HEP, POC, symptom management, recovery timeline, post-operative precautions    Written Home Exercises Provided: yes.  Exercises were reviewed and Harinder was able to demonstrate them prior to the end of the session.  Harinder demonstrated good  understanding of the education provided.     See EMR under Patient Instructions for exercises provided prior visit.    Assessment     Harinder completed session as noted above. Assessment of shoulder ROM revealed passive flexion ROM to 110 deg with relative ease and AROM to ~ prior to compensatory UT and shrug. Sig improvement noted in ER PROM upon arrival to session with decreased tightness or proximal bicep tendon discomfort. Progressed cuff into isotonics at 0 deg to assist with carry-over of ER/IR below shoulder height. Overall progressing as expected post-operatively, continues to have stiffness in all directions however that is not unexpected at this stage of rehab and is improving week;y      Harinder Is progressing well towards his goals.   Pt prognosis is Excellent.     Pt will continue to benefit from skilled outpatient physical therapy to address the deficits listed in the problem list box on initial evaluation, provide pt/family education and to maximize pt's level of independence in the home and community environment.     Pt's spiritual, cultural and educational needs considered and pt agreeable to plan of care and goals.     Anticipated barriers to physical therapy: none    Goals: Goals:  Short Term Goals (4 Weeks):   1. Pt will be independent with HEP to supplement PT in improving functional use of R UE.  2. Pt will increase pain free R shoulder elevation PROM to >/= 160 deg to improve functional  mobility of UE  3. Pt will increase R shoulder ER PROM in 90 deg abduction to >/=30 deg to improve functional mobility of UE  4. Pt will increase R elbow AAROM to WNL deg in 4 weeks to improve functional mobility of UE.  Mid-Term Goals (12-16 Weeks):   1. Pt will improve FOTO score to </=% limited to decrease perceived limitation with carrying, moving, and handling objects.  2. Pt will increase R shoulder PROM to WNL in all planes to improve functional use of R RUE.  3. Pt will increase R shoulder AROM to WFL in all planes to improve functional use of R RUE.  4. Pt will improve R shoulder MMTs to = 5/5 to promote equal use of B UEs in performing functional tasks.  5. Pt will lift 10 lb objects without pain to promote functional QOL.  6. Pt to report pain </= 0/10  Long Term Goals (24-36 weeks)  - pt will demonstrate at least 95% LSI with HHD for flexion, abduction, ER, and IR for improved stability  - pt will demonstrate at least 95% LSI with UE Y-balance for improved postural control  - pt will pass push-up and pull-up testing to demonstrate normalized endurance  - pt will be able to perform sport-specific activities with appropriate form, without instability or pain          Plan     Plan of care Certification: 6/14/2024 to 2/21/25.     Outpatient Physical Therapy 2 times weekly for 36 weeks to include the following interventions: Electrical Stimulation NMES, Manual Therapy, Moist Heat/ Ice, Neuromuscular Re-ed, Patient Education, Self Care, Therapeutic Activities, Therapeutic Exercise, and Dry Needling    Deniz Seth, PT, DPT, SCS

## 2024-07-22 ENCOUNTER — CLINICAL SUPPORT (OUTPATIENT)
Dept: REHABILITATION | Facility: HOSPITAL | Age: 34
End: 2024-07-22
Payer: COMMERCIAL

## 2024-07-22 DIAGNOSIS — M25.611 DECREASED ROM OF RIGHT SHOULDER: Primary | ICD-10-CM

## 2024-07-22 DIAGNOSIS — R29.898 DECREASED STRENGTH OF UPPER EXTREMITY: ICD-10-CM

## 2024-07-22 PROCEDURE — 97110 THERAPEUTIC EXERCISES: CPT

## 2024-07-22 PROCEDURE — 97530 THERAPEUTIC ACTIVITIES: CPT

## 2024-07-22 PROCEDURE — 97140 MANUAL THERAPY 1/> REGIONS: CPT

## 2024-07-22 PROCEDURE — 97112 NEUROMUSCULAR REEDUCATION: CPT

## 2024-07-22 NOTE — PROGRESS NOTES
Physical Therapy Daily Treatment Note     Name: Harinder Elliott  [facility] Number: [id number]    Therapy Diagnosis:   Encounter Diagnoses   Name Primary?    Decreased ROM of right shoulder Yes    Decreased strength of upper extremity        Physician: Servando Nielson III, *    Visit Date: 7/22/2024    Physician Orders: PT Eval and Treat   Medical Diagnosis from Referral: Biceps tendonitis on right [M75.21], Glenoid labral tear, right, initial encounter [S43.431A], Instability of right shoulder joint [M25.311]   Evaluation Date: 6/14/2024  Authorization Period Expiration: 5/24/25  Plan of Care Expiration: 2/21/25  Visit # / Visits authorized: 9/ 20     Time In: 9:00 AM  Time Out: 10:00 AM     Total Billable Time: 60 minutes  Precautions: Standard    DOS 5/28/24  OPERATION:   right  Shoulder osteochondral allograft transplantation, humeral head ICRS Grade 4 - posterior: 2.5 x 20 cm) (CPT 23368 ) (CPT 78477/19460 knee comparison codes) (complex, -22 modifier)  2. Shoulder arthroscopic anterior capsulorrhaphy (CPT 62358) (complex, -22 modifier)  3. Shoulder arthroscopic anterior labral repair (CPT 66882) (complex, -22 modifier)  4. Shoulder arthroscopic posterior labral repair, capsulorrhaphy, 7:00 position (CPT 09086)  5. Shoulder arthroscopic extensive debridement (anterior, posterior glenohumeral joint) (CPT 51195)  6. Shoulder manipulation under anesthesia (CPT 58994)  7. Shoulder arthroscopic lysis of adhesions (CPT 61985):  8. Shoulder arthroscopic loose body removal (CPT 55776)  9 . Shoulder arthroscopic interpositional arthroplasty (89163)  10. Shoulder arthroscopic SLAP type 8 repair (CPT 98191)    POST OPERATIVE PLAN: We will follow the arthroscopic [employer] repair guidelines. We discussed with the patient's family after surgery. The patient will remain in a sling for 6 weeks. We will start PT at the 3 week crista.   Quality of tissue: Good      Subjective     Pt reports: shoulder felt really good over  "the weekend. Yesterday was probably the first day it felt like I had nothing wrong with my shoulder  He was compliant with home exercise program.  Response to previous treatment: ongoing   Functional change: ongoing    Pain: 1/10  Location: right shoulder      Objective   DOS 5/28/24: 7 weeks 6 days as of 7/22    Shoulder Passive Range of Motion:   Shoulder Right Left   Flexion    125 180   ER at 0    45 85   ER at 90    Not tested 2/2 post-op 105   IR at 90    Not tested 2/2 post-op 60      Shoulder Active Range of Motion:   Shoulder Right Left   Flexion    90  180   ER at 0    35 85   HEBER    Posterior occiput N/t   FIR    Posterior hip N/t        Harinder received therapeutic exercises to develop strength, endurance, ROM, flexibility, and posture for 15 minutes including:  Modified SL open books 25xB  Supine dowel ER AAROM 3x10 5s holds  Supine dowel flexion AAROM 3x10 5s holds  Standing dowel ER at 90 deg flexion 3x10 5s holds    Not today  Pulleys flexion/scaption x 5'            Harinder received the following manual therapy techniques: Joint mobilizations were applied to the: R shoulder for 10 minutes, including:  Skilled PROM all directions  GH joint centering mobilizations  Grade II/III a/p, inferior GH joint mobs  Manual lat contract/relax  ER MWM with GH joint centering glide  Sidelying scapular mobs    Harinder participated in neuromuscular re-education activities to improve: Coordination, Kinesthetic, Sense, Proprioception, and Posture for 25 minutes. The following activities were included:  RTB supine ER at 45 3x10 5" holds  OTB horizontal ABD 3x10  OTB ER walkouts at 90 deg flexion 3x10  GTB IR walkouts at 90 deg flexion 3x10          Not today:  Single arm wall slide 3x10  Wall ball RS: up/down, side/side, CW, CCW 3x10 each  Body blade at 0 deg 3s x3 each  Supine handcuffs at 90 deg GTB 3x10  Side-lying ER AROM 3x15 through available range  Serratus wall slide 3x10  3# Prone row 3x10  2# Prone extension " 3x10  Side-lying flexion AAROM supported on dowel 3x10   YTB ER walkouts 3x6  OTB IR walkouts 3x6    Harinder participated in dynamic functional therapeutic activities to improve functional performance for 10  minutes, including:  UBE 5' forward, 5' backwards to improve tissue extensibility and cartilage health        Not today:  20# 3D strap rotations 3x10  Dowel landmine press-> walkup 3x15    Home Exercises Provided and Patient Education Provided     Education provided:   - HEP, POC, symptom management, recovery timeline, post-operative precautions    Written Home Exercises Provided: yes.  Exercises were reviewed and Harinder was able to demonstrate them prior to the end of the session.  Harinder demonstrated good  understanding of the education provided.     See EMR under Patient Instructions for exercises provided prior visit.    Assessment     Harinder completed session as noted above. Assessment of shoulder ROM revealed passive flexion ROM to 110 deg with relative ease and AROM to ~ prior to compensatory UT and shrug. Sig improvement noted in ER PROM upon arrival to session with decreased tightness or proximal bicep tendon discomfort. Progressed cuff isometrics to  90 deg to assist with carry-over of ER/IR below shoulder height. Overall progressing as expected post-operatively, continues to have stiffness in all directions however that is not unexpected at this stage of rehab and is improving week;y      Harinder Is progressing well towards his goals.   Pt prognosis is Excellent.     Pt will continue to benefit from skilled outpatient physical therapy to address the deficits listed in the problem list box on initial evaluation, provide pt/family education and to maximize pt's level of independence in the home and community environment.     Pt's spiritual, cultural and educational needs considered and pt agreeable to plan of care and goals.     Anticipated barriers to physical therapy: none    Goals: Goals:  Short Term  Goals (4 Weeks):   1. Pt will be independent with HEP to supplement PT in improving functional use of R UE.  2. Pt will increase pain free R shoulder elevation PROM to >/= 160 deg to improve functional mobility of UE  3. Pt will increase R shoulder ER PROM in 90 deg abduction to >/=30 deg to improve functional mobility of UE  4. Pt will increase R elbow AAROM to WNL deg in 4 weeks to improve functional mobility of UE.  Mid-Term Goals (12-16 Weeks):   1. Pt will improve FOTO score to </=% limited to decrease perceived limitation with carrying, moving, and handling objects.  2. Pt will increase R shoulder PROM to WNL in all planes to improve functional use of R RUE.  3. Pt will increase R shoulder AROM to WFL in all planes to improve functional use of R RUE.  4. Pt will improve R shoulder MMTs to = 5/5 to promote equal use of B UEs in performing functional tasks.  5. Pt will lift 10 lb objects without pain to promote functional QOL.  6. Pt to report pain </= 0/10  Long Term Goals (24-36 weeks)  - pt will demonstrate at least 95% LSI with HHD for flexion, abduction, ER, and IR for improved stability  - pt will demonstrate at least 95% LSI with UE Y-balance for improved postural control  - pt will pass push-up and pull-up testing to demonstrate normalized endurance  - pt will be able to perform sport-specific activities with appropriate form, without instability or pain          Plan     Plan of care Certification: 6/14/2024 to 2/21/25.     Outpatient Physical Therapy 2 times weekly for 36 weeks to include the following interventions: Electrical Stimulation NMES, Manual Therapy, Moist Heat/ Ice, Neuromuscular Re-ed, Patient Education, Self Care, Therapeutic Activities, Therapeutic Exercise, and Dry Needling    Deniz Seth, PT, DPT, SCS

## 2024-07-25 ENCOUNTER — ANESTHESIA EVENT (OUTPATIENT)
Dept: ENDOSCOPY | Facility: HOSPITAL | Age: 34
End: 2024-07-25
Payer: COMMERCIAL

## 2024-07-25 ENCOUNTER — HOSPITAL ENCOUNTER (OUTPATIENT)
Facility: HOSPITAL | Age: 34
Discharge: HOME | End: 2024-07-25
Attending: INTERNAL MEDICINE | Admitting: INTERNAL MEDICINE
Payer: COMMERCIAL

## 2024-07-25 ENCOUNTER — ANESTHESIA (OUTPATIENT)
Dept: ENDOSCOPY | Facility: HOSPITAL | Age: 34
End: 2024-07-25
Payer: COMMERCIAL

## 2024-07-25 VITALS
BODY MASS INDEX: 24.5 KG/M2 | DIASTOLIC BLOOD PRESSURE: 73 MMHG | TEMPERATURE: 98 F | WEIGHT: 175 LBS | HEART RATE: 74 BPM | OXYGEN SATURATION: 100 % | HEIGHT: 71 IN | RESPIRATION RATE: 16 BRPM | SYSTOLIC BLOOD PRESSURE: 121 MMHG

## 2024-07-25 DIAGNOSIS — D12.6 COLON ADENOMA: ICD-10-CM

## 2024-07-25 PROCEDURE — 27201089 HC SNARE, DISP (ANY): Performed by: INTERNAL MEDICINE

## 2024-07-25 PROCEDURE — 88305 TISSUE EXAM BY PATHOLOGIST: CPT | Performed by: PATHOLOGY

## 2024-07-25 PROCEDURE — 37000009 HC ANESTHESIA EA ADD 15 MINS: Performed by: INTERNAL MEDICINE

## 2024-07-25 PROCEDURE — 25000003 PHARM REV CODE 250: Performed by: NURSE ANESTHETIST, CERTIFIED REGISTERED

## 2024-07-25 PROCEDURE — 63600175 PHARM REV CODE 636 W HCPCS: Performed by: NURSE ANESTHETIST, CERTIFIED REGISTERED

## 2024-07-25 PROCEDURE — 37000008 HC ANESTHESIA 1ST 15 MINUTES: Performed by: INTERNAL MEDICINE

## 2024-07-25 PROCEDURE — 45385 COLONOSCOPY W/LESION REMOVAL: CPT | Mod: 33,,, | Performed by: INTERNAL MEDICINE

## 2024-07-25 PROCEDURE — 45385 COLONOSCOPY W/LESION REMOVAL: CPT | Mod: PT | Performed by: INTERNAL MEDICINE

## 2024-07-25 RX ORDER — PROPOFOL 10 MG/ML
VIAL (ML) INTRAVENOUS
Status: DISCONTINUED | OUTPATIENT
Start: 2024-07-25 | End: 2024-07-25

## 2024-07-25 RX ORDER — LIDOCAINE HYDROCHLORIDE 20 MG/ML
INJECTION INTRAVENOUS
Status: DISCONTINUED | OUTPATIENT
Start: 2024-07-25 | End: 2024-07-25

## 2024-07-25 RX ORDER — SODIUM CHLORIDE 9 MG/ML
INJECTION, SOLUTION INTRAVENOUS CONTINUOUS
Status: DISCONTINUED | OUTPATIENT
Start: 2024-07-25 | End: 2024-07-25 | Stop reason: HOSPADM

## 2024-07-25 RX ADMIN — LIDOCAINE HYDROCHLORIDE 50 MG: 20 INJECTION INTRAVENOUS at 12:07

## 2024-07-25 RX ADMIN — SODIUM CHLORIDE: 0.9 INJECTION, SOLUTION INTRAVENOUS at 12:07

## 2024-07-25 RX ADMIN — PROPOFOL 100 MG: 10 INJECTION, EMULSION INTRAVENOUS at 12:07

## 2024-07-25 NOTE — ANESTHESIA POSTPROCEDURE EVALUATION
Anesthesia Post Evaluation    Patient: Harinder Elliott    Procedure(s) Performed: Procedure(s) (LRB):  COLONOSCOPY (N/A)    Final Anesthesia Type: general      Patient location during evaluation: PACU  Patient participation: Yes- Able to Participate  Level of consciousness: awake and alert  Post-procedure vital signs: reviewed and stable  Pain management: adequate  Airway patency: patent    PONV status: None or treated.  Anesthetic complications: no      Cardiovascular status: hemodynamically stable  Respiratory status: spontaneous ventilation  Hydration status: euvolemic  Follow-up not needed.          Vitals Value Taken Time   /73 07/25/24 1307   Temp 36.7 °C (98.1 °F) 07/25/24 1248   Pulse 74 07/25/24 1307   Resp 16 07/25/24 1307   SpO2 100 % 07/25/24 1307         Event Time   Out of Recovery 13:16:01         Pain/Derik Score: Derik Score: 10 (7/25/2024  1:08 PM)

## 2024-07-25 NOTE — PROVATION PATIENT INSTRUCTIONS
Discharge Summary/Instructions after an Endoscopic Procedure  Patient Name: Harinder Elliott  Patient MRN: [id number]  Patient YOB: 1990 Thursday, July 25, 2024  Graham Marino MD  Dear patient,  As a result of recent federal legislation (The Federal Cures Act), you may   receive lab or pathology results from your procedure in your [facility]   account before your physician is able to contact you. Your physician or   their representative will relay the results to you with their   recommendations at their soonest availability.  Thank you,  RESTRICTIONS:  During your procedure today, you received medications for sedation.  These   medications may affect your judgment, balance and coordination.  Therefore,   for 24 hours, you have the following restrictions:   - DO NOT drive a car, operate machinery, make legal/financial decisions,   sign important papers or drink alcohol.    ACTIVITY:  Today: no heavy lifting, straining or running due to procedural   sedation/anesthesia.  The following day: return to full activity including work.  DIET:  Eat and drink normally unless instructed otherwise.     TREATMENT FOR COMMON SIDE EFFECTS:  - Mild abdominal pain, nausea, belching, bloating or excessive gas:  rest,   eat lightly and use a heating pad.  - Sore Throat: treat with throat lozenges and/or gargle with warm salt   water.  - Because air was used during the procedure, expelling large amounts of air   from your rectum or belching is normal.  - If a bowel prep was taken, you may not have a bowel movement for 1-3 days.    This is normal.  SYMPTOMS TO WATCH FOR AND REPORT TO YOUR PHYSICIAN:  1. Abdominal pain or bloating, other than gas cramps.  2. Chest pain.  3. Back pain.  4. Signs of infection such as: chills or fever occurring within 24 hours   after the procedure.  5. Rectal bleeding, which would show as bright red, maroon, or black stools.   (A tablespoon of blood from the rectum is not serious, especially if    hemorrhoids are present.)  6. Vomiting.  7. Weakness or dizziness.  GO DIRECTLY TO THE NEAREST EMERGENCY ROOM IF YOU HAVE ANY OF THE FOLLOWING:      Difficulty breathing              Chills and/or fever over 101 F   Persistent vomiting and/or vomiting blood   Severe abdominal pain   Severe chest pain   Black, tarry stools   Bleeding- more than one tablespoon   Any other symptom or condition that you feel may need urgent attention  Your doctor recommends these additional instructions:  If any biopsies were taken, your doctors clinic will contact you in 1 to 2   weeks with any results.  - Discharge patient to home.   - Await pathology results.   - Telephone endoscopist for pathology results in 3 weeks.   - Repeat colonoscopy in 3 - 5 years for surveillance based on pathology   results.   - Return to GI clinic at the next available appointment.   - Return to primary care physician.   - The findings and recommendations were discussed with the patient.  For questions, problems or results please call your physician - Graham Marino MD at Work:  [phone].  [facility] [place], EMERGENCY ROOM PHONE NUMBER: [phone]  IF A COMPLICATION OR EMERGENCY SITUATION ARISES AND YOU ARE UNABLE TO REACH   YOUR PHYSICIAN - GO DIRECTLY TO THE EMERGENCY ROOM.  Graham Marino MD  7/25/2024 12:48:14 PM  This report has been verified and signed electronically.  Dear patient,  As a result of recent federal legislation (The Federal Cures Act), you may   receive lab or pathology results from your procedure in your [facility]   account before your physician is able to contact you. Your physician or   their representative will relay the results to you with their   recommendations at their soonest availability.  Thank you,  PROVATION

## 2024-07-25 NOTE — H&P
[facility]  History & Physical    Subjective:      Chief Complaint/Reason for Admission:     Colonoscopy surveillance for history of colon adenoma    Harinder Elliott is a 34 y.o. male.    Past Medical History:   Diagnosis Date    Colon polyp     Depression     Migraine     Ureteral stricture      Past Surgical History:   Procedure Laterality Date    ARTHROSCOPIC DEBRIDEMENT OF SHOULDER Right 5/28/2024    Procedure: DEBRIDEMENT, SHOULDER, ARTHROSCOPIC;  Surgeon: Marsha Bowman MD;  Location: [facility] OR;  Service: Orthopedics;  Laterality: Right;    COLON SURGERY      colon polyp    COLONOSCOPY N/A 2/3/2021    Procedure: COLONOSCOPY;  Surgeon: Graham Marino MD;  [facility]  Service: Endoscopy;  Laterality: N/A;  COVID + on 7/15/20 , per endo protocol no further testing needed at this time -    ESOPHAGOGASTRODUODENOSCOPY N/A 2/3/2021    Procedure: EGD (ESOPHAGOGASTRODUODENOSCOPY);  Surgeon: Graham Marino MD;  Location: [facility] ENDO ([address] [place] [address]);  Service: Endoscopy;  Laterality: N/A;  COVID + on 7/15/20 , per endo protocol no further testing needed at this time -    FIXATION OF TENDON Right 5/28/2024    Procedure: FIXATION, TENDON;  Surgeon: Marsha Bowman MD;  Location: [facility] OR;  Service: Orthopedics;  Laterality: Right;    HERNIA REPAIR      KIDNEY SURGERY      SHOULDER ARTHROSCOPY W/ BANKHART PROCEDURE Right 5/28/2024    Procedure: ARTHROSCOPY, SHOULDER, WITH BANKART REPAIR;  Surgeon: Marsha Bowman MD;  Location: [facility] OR;  Service: Orthopedics;  Laterality: Right;    torn labrim      VASECTOMY      WISDOM TOOTH EXTRACTION       Social History     Tobacco Use    Smoking status: Never     Passive exposure: Never    Smokeless tobacco: Never   Substance Use Topics    Alcohol use: Yes     Alcohol/week: 6.0 standard drinks of alcohol     Types: 3 Glasses of wine, 3 Cans of beer per week     Comment: occasional    Drug use: No       PTA Medications   Medication Sig     ALPRAZolam (XANAX) 0.25 MG tablet Take 1 tablet (0.25 mg total) by mouth daily as needed for Anxiety.    aspirin 81 MG Chew Chew and swallow 1 tablet (81 mg total) by mouth once daily. For 4 weeks. (Patient not taking: Reported on 7/8/2024)    diazePAM (VALIUM) 5 MG tablet Take 1 tablet (5 mg total) by mouth every 6 (six) hours as needed for Anxiety.    EScitalopram oxalate (LEXAPRO) 10 MG tablet Take one and half tablet by mouth daily total 15 mg by mouth daily    fluticasone propionate (FLONASE) 50 mcg/actuation nasal spray 1 spray (50 mcg total) by Each Nostril route once daily. (Patient not taking: Reported on 5/24/2024)    lisdexamfetamine (VYVANSE) 20 MG capsule Take 1 capsule (20 mg total) by mouth every morning.    oxyCODONE-acetaminophen (PERCOCET)  mg per tablet Take 1 tablet by mouth every 4-6 hours as needed for pain. Take stool softener with this medication.    sumatriptan (IMITREX) 100 MG tablet Take 1 tablet (100 mg total) by mouth every 2 (two) hours as needed.     Review of patient's allergies indicates:  No Known Allergies     Review of Systems   Constitutional:  Negative for fever.       Objective:      Vital Signs (Most Recent)       Vital Signs Range (Last 24H):       Physical Exam  Neurological:      Mental Status: He is alert and oriented to person, place, and time.           Assessment:    Colonoscopy surveillance for history of colon adenoma      Plan:    Colonoscopy surveillance for history of colon adenoma

## 2024-07-25 NOTE — TRANSFER OF CARE
"Anesthesia Transfer of Care Note    Patient: Harinder Elliott    Procedure(s) Performed: Procedure(s) (LRB):  COLONOSCOPY (N/A)    Patient location: GI    Anesthesia Type: general    Transport from OR: Transported from OR on room air with adequate spontaneous ventilation    Post pain: adequate analgesia    Post assessment: no apparent anesthetic complications    Post vital signs: stable    Level of consciousness: awake    Nausea/Vomiting: no nausea/vomiting    Complications: none    Transfer of care protocol was followed      Last vitals: Visit Vitals  /63 (BP Location: Left arm, Patient Position: Lying)   Pulse 72   Temp 36.7 °C (98.1 °F)   Resp 16   Ht 5' 11" (1.803 m)   Wt 79.4 kg (175 lb)   SpO2 98%   BMI 24.41 kg/m²     "

## 2024-07-25 NOTE — ANESTHESIA PREPROCEDURE EVALUATION
07/25/2024  Harinder Elliott is a 34 y.o., male.    Patient Active Problem List   Diagnosis    Migraine without aura, without mention of intractable migraine without mention of status migrainosus    Lateral knee pain, right    ADD (attention deficit disorder)    History of adenomatous polyp of colon    Anxiety    Chronic fatigue    Decreased ROM of right shoulder    Decreased strength of upper extremity     Past Surgical History:   Procedure Laterality Date    ARTHROSCOPIC DEBRIDEMENT OF SHOULDER Right 5/28/2024    Procedure: DEBRIDEMENT, SHOULDER, ARTHROSCOPIC;  Surgeon: Marsha Bowman MD;  Location: [facility] OR;  Service: Orthopedics;  Laterality: Right;    COLON SURGERY      colon polyp    COLONOSCOPY N/A 2/3/2021    Procedure: COLONOSCOPY;  Surgeon: Graham Marino MD;  Location: [facility] (4TH [facility]);  Service: Endoscopy;  Laterality: N/A;  COVID + on 7/15/20 , per endo protocol no further testing needed at this time -    ESOPHAGOGASTRODUODENOSCOPY N/A 2/3/2021    Procedure: EGD (ESOPHAGOGASTRODUODENOSCOPY);  Surgeon: Graham Marino MD;  [facility]  Service: Endoscopy;  Laterality: N/A;  COVID + on 7/15/20 , per endo protocol no further testing needed at this time -    FIXATION OF TENDON Right 5/28/2024    Procedure: FIXATION, TENDON;  Surgeon: Marsha Bowman MD;  Location: [facility] OR;  Service: Orthopedics;  Laterality: Right;    HERNIA REPAIR      KIDNEY SURGERY      SHOULDER ARTHROSCOPY W/ BANKHART PROCEDURE Right 5/28/2024    Procedure: ARTHROSCOPY, SHOULDER, WITH BANKART REPAIR;  Surgeon: Marsha Bowman MD;  Location: [facility] OR;  Service: Orthopedics;  Laterality: Right;    torn labrim      VASECTOMY      WISDOM TOOTH EXTRACTION       Past Medical History:   Diagnosis Date    Colon polyp     Depression     Migraine     Ureteral stricture          Pre-op Assessment    I have  reviewed the Patient Summary Reports.       I have reviewed the Medications.     Review of Systems      Physical Exam  General: Well nourished, Alert and Oriented    Airway:  Mallampati: II / I  Mouth Opening: Normal  TM Distance: Normal  Tongue: Normal  Neck ROM: Normal ROM    Dental:  Intact        Anesthesia Plan  Type of Anesthesia, risks & benefits discussed:    Anesthesia Type: Gen Natural Airway  Intra-op Monitoring Plan: Standard ASA Monitors  Induction:  IV  Airway Plan: , Post-Induction  Informed Consent: Informed consent signed with the Patient and all parties understand the risks and agree with anesthesia plan.  All questions answered.   ASA Score: 1  Day of Surgery Review of History & Physical: I have interviewed and examined the patient. I have reviewed the patient's H&P dated: There are no significant changes.     Ready For Surgery From Anesthesia Perspective.     .

## 2024-07-26 ENCOUNTER — CLINICAL SUPPORT (OUTPATIENT)
Dept: REHABILITATION | Facility: HOSPITAL | Age: 34
End: 2024-07-26
Payer: COMMERCIAL

## 2024-07-26 DIAGNOSIS — R29.898 DECREASED STRENGTH OF UPPER EXTREMITY: ICD-10-CM

## 2024-07-26 DIAGNOSIS — M25.611 DECREASED ROM OF RIGHT SHOULDER: Primary | ICD-10-CM

## 2024-07-26 PROCEDURE — 97530 THERAPEUTIC ACTIVITIES: CPT

## 2024-07-26 PROCEDURE — 97112 NEUROMUSCULAR REEDUCATION: CPT

## 2024-07-26 PROCEDURE — 97110 THERAPEUTIC EXERCISES: CPT

## 2024-07-26 PROCEDURE — 97140 MANUAL THERAPY 1/> REGIONS: CPT

## 2024-07-26 NOTE — PROGRESS NOTES
Physical Therapy Daily Treatment Note     Name: Harinder Elliott  [facility] Number: [id number]    Therapy Diagnosis:   Encounter Diagnoses   Name Primary?    Decreased ROM of right shoulder Yes    Decreased strength of upper extremity      Physician: Servando Nielson III, *    Visit Date: 7/26/2024    Physician Orders: PT Eval and Treat   Medical Diagnosis from Referral: Biceps tendonitis on right [M75.21], Glenoid labral tear, right, initial encounter [S43.431A], Instability of right shoulder joint [M25.311]   Evaluation Date: 6/14/2024  Authorization Period Expiration: 5/24/25  Plan of Care Expiration: 2/21/25  Visit # / Visits authorized: 10/ 20     Time In: 9:00 AM  Time Out: 10:00 AM     Total Billable Time: 60 minutes  Precautions: Standard    DOS 5/28/24  OPERATION:   right  Shoulder osteochondral allograft transplantation, humeral head ICRS Grade 4 - posterior: 2.5 x 20 cm) (CPT 09257 ) (CPT 05191/93675 knee comparison codes) (complex, -22 modifier)  2. Shoulder arthroscopic anterior capsulorrhaphy (CPT 21879) (complex, -22 modifier)  3. Shoulder arthroscopic anterior labral repair (CPT 97196) (complex, -22 modifier)  4. Shoulder arthroscopic posterior labral repair, capsulorrhaphy, 7:00 position (CPT 08424)  5. Shoulder arthroscopic extensive debridement (anterior, posterior glenohumeral joint) (CPT 74293)  6. Shoulder manipulation under anesthesia (CPT 57474)  7. Shoulder arthroscopic lysis of adhesions (CPT 05947):  8. Shoulder arthroscopic loose body removal (CPT 82246)  9 . Shoulder arthroscopic interpositional arthroplasty (05023)  10. Shoulder arthroscopic SLAP type 8 repair (CPT 38558)    POST OPERATIVE PLAN: We will follow the arthroscopic Bankart repair guidelines. We discussed with the patient's family after surgery. The patient will remain in a sling for 6 weeks. We will start PT at the 3 week crista.   Quality of tissue: Good      Subjective     Pt reports: feel good  He was compliant with  "home exercise program.  Response to previous treatment: ongoing   Functional change: ongoing    Pain: 1/10  Location: right shoulder      Objective   DOS 5/28/24: 8 weeks 3 days as of 7/26    Shoulder Passive Range of Motion:   Shoulder Right Left   Flexion    125 180   ER at 0    45 85   ER at 90    Not tested 2/2 post-op 105   IR at 90    Not tested 2/2 post-op 60      Shoulder Active Range of Motion:   Shoulder Right Left   Flexion    90  180   ER at 0    35 85   HEBER    Posterior occiput N/t   FIR    Posterior hip N/t        Harinder received therapeutic exercises to develop strength, endurance, ROM, flexibility, and posture for 15 minutes including:  Modified InToTally open books 25xB  Supine dowel ER AAROM 3x10 5s holds  Supine dowel flexion AAROM 3x10 5s holds    Not today  Pulleys flexion/scaption x 5'  Standing dowel ER at 90 deg flexion 3x10 5s holds          Harinder received the following manual therapy techniques: Joint mobilizations were applied to the: R shoulder for 10 minutes, including:  Skilled PROM all directions  GH joint centering mobilizations  Grade II/III a/p, inferior GH joint mobs  Manual lat contract/relax  ER MWM with GH joint centering glide  Sidelying scapular mobs    Harinder participated in neuromuscular re-education activities to improve: Coordination, Kinesthetic, Sense, Proprioception, and Posture for 25 minutes. The following activities were included:  Side-lying ER 3x10  Side-lying ABD AROM 3x10  Seated ER at 90 deg flexion 3x10  Wall slides 3x10  OTB horizontal ABD 3x10  Scapular clocks 2x6          Not today:  OTB ER walkouts at 90 deg flexion 3x10  GTB IR walkouts at 90 deg flexion 3x10  RTB supine ER at 45 3x10 5" holds  Single arm wall slide 3x10  Wall ball RS: up/down, side/side, CW, CCW 3x10 each  Body blade at 0 deg 3s x3 each  Supine handcuffs at 90 deg GTB 3x10  Side-lying ER AROM 3x15 through available range  Serratus wall slide 3x10  3# Prone row 3x10  2# Prone extension " 3x10  Side-lying flexion AAROM supported on dowel 3x10   YTB ER walkouts 3x6  OTB IR walkouts 3x6    Harinder participated in dynamic functional therapeutic activities to improve functional performance for 10  minutes, including:  UBE 5' forward, 5' backwards to improve tissue extensibility and cartilage health        Not today:  20# 3D strap rotations 3x10  Dowel landmine press-> walkup 3x15    Home Exercises Provided and Patient Education Provided     Education provided:   - HEP, POC, symptom management, recovery timeline, post-operative precautions    Written Home Exercises Provided: yes.  Exercises were reviewed and Harinder was able to demonstrate them prior to the end of the session.  Harinder demonstrated good  understanding of the education provided.     See EMR under Patient Instructions for exercises provided prior visit.    Assessment     Harinder completed session as noted above. Assessment of shoulder ROM revealed passive flexion ROM to 110 deg with relative ease and AROM to ~ prior to compensatory UT and shrug. Sig improvement noted in ER PROM upon arrival to session with decreased tightness or proximal bicep tendon discomfort. Progressed cuff isometrics to  90 deg to assist with carry-over of ER/IR below shoulder height. Overall progressing as expected post-operatively, continues to have stiffness in all directions however that is not unexpected at this stage of rehab and is improving week;y      Harinder Is progressing well towards his goals.   Pt prognosis is Excellent.     Pt will continue to benefit from skilled outpatient physical therapy to address the deficits listed in the problem list box on initial evaluation, provide pt/family education and to maximize pt's level of independence in the home and community environment.     Pt's spiritual, cultural and educational needs considered and pt agreeable to plan of care and goals.     Anticipated barriers to physical therapy: none    Goals: Goals:  Short Term  Goals (4 Weeks):   1. Pt will be independent with HEP to supplement PT in improving functional use of R UE.  2. Pt will increase pain free R shoulder elevation PROM to >/= 160 deg to improve functional mobility of UE  3. Pt will increase R shoulder ER PROM in 90 deg abduction to >/=30 deg to improve functional mobility of UE  4. Pt will increase R elbow AAROM to WNL deg in 4 weeks to improve functional mobility of UE.  Mid-Term Goals (12-16 Weeks):   1. Pt will improve FOTO score to </=% limited to decrease perceived limitation with carrying, moving, and handling objects.  2. Pt will increase R shoulder PROM to WNL in all planes to improve functional use of R RUE.  3. Pt will increase R shoulder AROM to WFL in all planes to improve functional use of R RUE.  4. Pt will improve R shoulder MMTs to = 5/5 to promote equal use of B UEs in performing functional tasks.  5. Pt will lift 10 lb objects without pain to promote functional QOL.  6. Pt to report pain </= 0/10  Long Term Goals (24-36 weeks)  - pt will demonstrate at least 95% LSI with HHD for flexion, abduction, ER, and IR for improved stability  - pt will demonstrate at least 95% LSI with UE Y-balance for improved postural control  - pt will pass push-up and pull-up testing to demonstrate normalized endurance  - pt will be able to perform sport-specific activities with appropriate form, without instability or pain          Plan     Plan of care Certification: 6/14/2024 to 2/21/25.     Outpatient Physical Therapy 2 times weekly for 36 weeks to include the following interventions: Electrical Stimulation NMES, Manual Therapy, Moist Heat/ Ice, Neuromuscular Re-ed, Patient Education, Self Care, Therapeutic Activities, Therapeutic Exercise, and Dry Needling    Deniz Seth, PT, DPT, SCS

## 2024-08-02 ENCOUNTER — CLINICAL SUPPORT (OUTPATIENT)
Dept: REHABILITATION | Facility: HOSPITAL | Age: 34
End: 2024-08-02
Payer: COMMERCIAL

## 2024-08-02 DIAGNOSIS — M25.611 DECREASED ROM OF RIGHT SHOULDER: Primary | ICD-10-CM

## 2024-08-02 DIAGNOSIS — Z86.010 HISTORY OF ADENOMATOUS POLYP OF COLON: Primary | ICD-10-CM

## 2024-08-02 DIAGNOSIS — R29.898 DECREASED STRENGTH OF UPPER EXTREMITY: ICD-10-CM

## 2024-08-02 LAB
FINAL PATHOLOGIC DIAGNOSIS: NORMAL
GROSS: NORMAL
Lab: NORMAL

## 2024-08-02 PROCEDURE — 97140 MANUAL THERAPY 1/> REGIONS: CPT

## 2024-08-02 PROCEDURE — 97112 NEUROMUSCULAR REEDUCATION: CPT

## 2024-08-02 PROCEDURE — 97530 THERAPEUTIC ACTIVITIES: CPT

## 2024-08-02 PROCEDURE — 97110 THERAPEUTIC EXERCISES: CPT

## 2024-08-02 NOTE — ASSESSMENT & PLAN NOTE
Colonoscopy 7/25/24:  Impression:            - The examined portion of the ileum was normal.                          - One 1 mm polyp in the descending colon, removed with a cold snare. Resected and retrieved.                          - Diverticulosis in the recto-sigmoid colon, in                          the sigmoid colon, in the descending colon, in the transverse colon and in the ascending colon.                          - Non-bleeding internal hemorrhoids.     Pathology  DESCENDING COLON POLYP, BIOPSIES:   -  Mucosal prolapse polyp.    -  No evidence of dysplasia or malignancy.

## 2024-08-02 NOTE — PROGRESS NOTES
Physical Therapy Daily Treatment Note     Name: Harinder Elliott  Clinic Number: [id number]    Therapy Diagnosis:   Encounter Diagnoses   Name Primary?    Decreased ROM of right shoulder Yes    Decreased strength of upper extremity        Physician: Servando Nielson III, *    Visit Date: 8/2/2024    Physician Orders: PT Eval and Treat   Medical Diagnosis from Referral: Biceps tendonitis on right [M75.21], Glenoid labral tear, right, initial encounter [S43.431A], Instability of right shoulder joint [M25.311]   Evaluation Date: 6/14/2024  Authorization Period Expiration: 5/24/25  Plan of Care Expiration: 2/21/25  Visit # / Visits authorized: 10/ 20  FOTO 2/2 administered 8/2     Time In: 9:00 AM  Time Out: 10:15 AM     Total Billable Time: 75 minutes  Precautions: Standard    DOS 5/28/24  OPERATION:   right  Shoulder osteochondral allograft transplantation, humeral head ICRS Grade 4 - posterior: 2.5 x 20 cm) (CPT 97298 ) (CPT 43889/44113 knee comparison codes) (complex, -22 modifier)  2. Shoulder arthroscopic anterior capsulorrhaphy (CPT 84977) (complex, -22 modifier)  3. Shoulder arthroscopic anterior labral repair (CPT 39616) (complex, -22 modifier)  4. Shoulder arthroscopic posterior labral repair, capsulorrhaphy, 7:00 position (CPT 69986)  5. Shoulder arthroscopic extensive debridement (anterior, posterior glenohumeral joint) (CPT 78388)  6. Shoulder manipulation under anesthesia (CPT 01612)  7. Shoulder arthroscopic lysis of adhesions (CPT 48145):  8. Shoulder arthroscopic loose body removal (CPT 58902)  9 . Shoulder arthroscopic interpositional arthroplasty (58169)  10. Shoulder arthroscopic SLAP type 8 repair (CPT 09749)    POST OPERATIVE PLAN: We will follow the arthroscopic [employer] repair guidelines. We discussed with the patient's family after surgery. The patient will remain in a sling for 6 weeks. We will start PT at the 3 week crista.   Quality of tissue: Good      Subjective     Pt reports: been    Shoulder Pain with Uncertain Cause  Shoulder pain can have many causes. Pain often comes from the structures that surround the shoulder joint. These are the joint capsule, ligaments, tendons, muscles, and bursa. Pain can also come from cartilage in the joint. Cartilage can become worn out or injured. It’s important to know what’s causing your pain so the health care provider can use the correct treatment. But sometimes it’s difficult to find the exact cause of shoulder pain. You may need to see a specialist (orthopedist). You may also need special tests such as a CT scan or MRI. The provider may need to use special tools to look inside the joint (arthroscopy).  Shoulder pain can be treated with a sling or a device that keeps your shoulder from moving. You can take an anti-inflammatory medicine such as ibuprofen to ease pain. You may need to do special shoulder exercises. Follow-up with a specialist if the pain is severe or doesn’t go away after a few weeks.  Home care  Follow these tips when caring for yourself at home:  · If a sling was given to you, leave it in place for the time advised by your health care provider. If you aren’t sure how long to wear it, ask for advice. If the sling becomes loose, adjust it so that your forearm is level with the ground. Your shoulder should feel well supported.  · Put an ice pack on the injured area for 20 minutes every 1 to 2 hours the first day. You can make your own ice pack by putting ice cubes in a plastic bag. Wrap the bag in a thin towel. Continue with ice packs 3 to 4 times a day for the next 2 days. Then use the pack as needed to ease pain and swelling.  · You may use acetaminophen or ibuprofen to control pain, unless another pain medicine was prescribed. If you have chronic liver or kidney disease, talk with your health care provider before using these medicines. Also talk with your provider if you’ve had a stomach ulcer or GI bleeding.  · Shoulder pain may seem  "feeling good  He was compliant with home exercise program.  Response to previous treatment: ongoing   Functional change: ongoing    Pain: 1/10  Location: right shoulder      Objective   DOS 5/28/24: 9 weeks 3 days as of 8/2    Shoulder Passive Range of Motion:   Shoulder Right Left   Flexion    125 180   ER at 0    45 85   ER at 90    Not tested 2/2 post-op 105   IR at 90    Not tested 2/2 post-op 60      Shoulder Active Range of Motion:   Shoulder Right Left   Flexion    125 180   ER at 0    45 80   HEBER    C7 T4   FIR    L5 T12        Harinder received therapeutic exercises to develop strength, endurance, ROM, flexibility, and posture for 15 minutes including:  SL open books 25xB  Supine dowel ER AAROM at 45 and 90 3x10 5s holds  Supine dowel flexion AAROM 3x10 5s holds        Not today  Pulleys flexion/scaption x 5'  Standing dowel ER at 90 deg flexion 3x10 5s holds          Harinder received the following manual therapy techniques: Joint mobilizations were applied to the: R shoulder for 15 minutes, including:  Skilled PROM all directions  GH joint centering mobilizations  Grade II/III a/p, inferior GH joint mobs  Manual lat contract/relax  ER MWM with GH joint centering glide  Sidelying scapular mobs    Harinder participated in neuromuscular re-education activities to improve: Coordination, Kinesthetic, Sense, Proprioception, and Posture for 35 minutes. The following activities were included:  Quadruped rock backs 3x10  RTB resisted supine flexion 3x10  2# Side-lying horizontal ABD 3x10 (neutral palm)  2# SL ABD 3x10  Body blade at 0 deg, 90 deg flexion, 90 deg ABD 30s x3 each            Not today:  Side-lying ER 3x10  Side-lying ABD AROM 3x10  OTB ER walkouts at 90 deg flexion 3x10  GTB IR walkouts at 90 deg flexion 3x10  YTB ER walkouts 3x6  OTB IR walkouts 3x6  RTB supine ER at 45 3x10 5" holds  Single arm wall slide 3x10  Wall ball RS: up/down, side/side, CW, CCW 3x10 each  Seated ER at 90 deg flexion 3x10  OTB " worse at night, when there is less to distract you from the pain. If you sleep on your side, try to keep weight off your painful shoulder. Propping pillows behind you may stop you from rolling over onto that shoulder during sleep.   · Shoulder joints can become stiff if left in a sling for too long. You should start range of motion exercises about 10 days after the injury. Talk with your provider to find out what type of exercises to do and how soon to start.  · You can take the sling off to shower or bathe.  Follow-up care  Follow up with your health care provider if you don’t start to get better in the next 5 days.  When to seek medical advice  Call your health care provider right away if any of these occur:  · Pain or swelling gets worse or continues for more than a few days  · Your hand or fingers become cold, blue, numb, or tingly  · Large amount of bruising on your shoulder or upper arm  · Difficulty moving your hand or fingers  · Weakness in your hand or fingers  · Your shoulder becomes stiff  · It feels like your shoulder is popping out  · You are less able to do your daily activities     © 0988-2961 [employer]. [address] [place] [address], [place] [address]. All rights reserved. This information is not intended as a substitute for professional medical care. Always follow your healthcare professional's instructions.         horizontal ABD 3x10  Scapular clocks 2x6  Supine handcuffs at 90 deg GTB 3x10  Serratus wall slide 3x10  3# Prone row 3x10  2# Prone extension 3x10  Side-lying flexion AAROM supported on dowel 3x10       Harinder participated in dynamic functional therapeutic activities to improve functional performance for 10  minutes, including:  UBE 5' forward, 5' backwards to improve tissue extensibility and cartilage health        Not today:  20# 3D strap rotations 3x10    Home Exercises Provided and Patient Education Provided     Education provided:   - HEP, POC, symptom management, recovery timeline, post-operative precautions    Written Home Exercises Provided: yes.  Exercises were reviewed and Harinder was able to demonstrate them prior to the end of the session.  Harinder demonstrated good  understanding of the education provided.     See EMR under Patient Instructions for exercises provided prior visit.    Assessment     Harinder completed session as noted above. Assessment of shoulder ROM revealed active flexion ROM to 125 deg prior to compensatory UT and shrug. Progressed periscap strengthening and active ROM in gravity reduced positions. Continues to have restrictions into all planes that are improving weekly. Patient eager to progress to higher level functional activities. Progressed dynamic cuff and periscapular stability in session today. Good response to addition of quad rock backs          Harinder Is progressing well towards his goals.   Pt prognosis is Excellent.     Pt will continue to benefit from skilled outpatient physical therapy to address the deficits listed in the problem list box on initial evaluation, provide pt/family education and to maximize pt's level of independence in the home and community environment.     Pt's spiritual, cultural and educational needs considered and pt agreeable to plan of care and goals.     Anticipated barriers to physical therapy: none    Goals: Goals:  Short Term Goals (4 Weeks):    1. Pt will be independent with HEP to supplement PT in improving functional use of R UE.  2. Pt will increase pain free R shoulder elevation PROM to >/= 160 deg to improve functional mobility of UE  3. Pt will increase R shoulder ER PROM in 90 deg abduction to >/=30 deg to improve functional mobility of UE  4. Pt will increase R elbow AAROM to WNL deg in 4 weeks to improve functional mobility of UE.  Mid-Term Goals (12-16 Weeks):   1. Pt will improve FOTO score to </=% limited to decrease perceived limitation with carrying, moving, and handling objects.  2. Pt will increase R shoulder PROM to WNL in all planes to improve functional use of R RUE.  3. Pt will increase R shoulder AROM to WFL in all planes to improve functional use of R RUE.  4. Pt will improve R shoulder MMTs to = 5/5 to promote equal use of B UEs in performing functional tasks.  5. Pt will lift 10 lb objects without pain to promote functional QOL.  6. Pt to report pain </= 0/10  Long Term Goals (24-36 weeks)  - pt will demonstrate at least 95% LSI with HHD for flexion, abduction, ER, and IR for improved stability  - pt will demonstrate at least 95% LSI with UE Y-balance for improved postural control  - pt will pass push-up and pull-up testing to demonstrate normalized endurance  - pt will be able to perform sport-specific activities with appropriate form, without instability or pain          Plan     Plan of care Certification: 6/14/2024 to 2/21/25.     Outpatient Physical Therapy 2 times weekly for 36 weeks to include the following interventions: Electrical Stimulation NMES, Manual Therapy, Moist Heat/ Ice, Neuromuscular Re-ed, Patient Education, Self Care, Therapeutic Activities, Therapeutic Exercise, and Dry Needling    Deniz Seth, PT, DPT, SCS

## 2024-08-08 ENCOUNTER — CLINICAL SUPPORT (OUTPATIENT)
Dept: REHABILITATION | Facility: HOSPITAL | Age: 34
End: 2024-08-08
Payer: COMMERCIAL

## 2024-08-08 DIAGNOSIS — M25.611 DECREASED ROM OF RIGHT SHOULDER: Primary | ICD-10-CM

## 2024-08-08 DIAGNOSIS — R29.898 DECREASED STRENGTH OF UPPER EXTREMITY: ICD-10-CM

## 2024-08-08 PROCEDURE — 97110 THERAPEUTIC EXERCISES: CPT

## 2024-08-08 PROCEDURE — 97112 NEUROMUSCULAR REEDUCATION: CPT

## 2024-08-08 PROCEDURE — 97140 MANUAL THERAPY 1/> REGIONS: CPT

## 2024-08-08 NOTE — PROGRESS NOTES
Physical Therapy Daily Treatment Note     Name: Harinder Elliott  [facility] Number: [id number]    Therapy Diagnosis:   Encounter Diagnoses   Name Primary?    Decreased ROM of right shoulder Yes    Decreased strength of upper extremity          Physician: Servando Nielson III, *    Visit Date: 8/8/2024    Physician Orders: PT Eval and Treat   Medical Diagnosis from Referral: Biceps tendonitis on right [M75.21], Glenoid labral tear, right, initial encounter [S43.431A], Instability of right shoulder joint [M25.311]   Evaluation Date: 6/14/2024  Authorization Period Expiration: 5/24/25  Plan of Care Expiration: 2/21/25  Visit # / Visits authorized: 12/ 20  FOTO 2/2 administered 8/2     Time In: 9:00 AM  Time Out: 10:00 AM     Total Billable Time: 60 minutes  Precautions: Standard    DOS 5/28/24  OPERATION:   right  Shoulder osteochondral allograft transplantation, humeral head ICRS Grade 4 - posterior: 2.5 x 20 cm) (CPT 39381 ) (CPT 62454/76056 knee comparison codes) (complex, -22 modifier)  2. Shoulder arthroscopic anterior capsulorrhaphy (CPT 34527) (complex, -22 modifier)  3. Shoulder arthroscopic anterior labral repair (CPT 48830) (complex, -22 modifier)  4. Shoulder arthroscopic posterior labral repair, capsulorrhaphy, 7:00 position (CPT 97744)  5. Shoulder arthroscopic extensive debridement (anterior, posterior glenohumeral joint) (CPT 67284)  6. Shoulder manipulation under anesthesia (CPT 90601)  7. Shoulder arthroscopic lysis of adhesions (CPT 42520):  8. Shoulder arthroscopic loose body removal (CPT 99795)  9 . Shoulder arthroscopic interpositional arthroplasty (20730)  10. Shoulder arthroscopic SLAP type 8 repair (CPT 23527)    POST OPERATIVE PLAN: We will follow the arthroscopic [employer] repair guidelines. We discussed with the patient's family after surgery. The patient will remain in a sling for 6 weeks. We will start PT at the 3 week crista.   Quality of tissue: Good      Subjective     Pt reports:  "feeling great  He was compliant with home exercise program.  Response to previous treatment: ongoing   Functional change: ongoing    Pain: 1/10  Location: right shoulder      Objective   DOS 5/28/24: 10 weeks 2 days as of 8/8    Shoulder Passive Range of Motion:   Shoulder Right Left   Flexion    125 180   ER at 0    45 85   ER at 90    Not tested 2/2 post-op 105   IR at 90    Not tested 2/2 post-op 60      Shoulder Active Range of Motion:   Shoulder Right Left   Flexion    125 180   ER at 0    45 80   HEBER    C7 T4   FIR    L5 T12        Harinder received therapeutic exercises to develop strength, endurance, ROM, flexibility, and posture for 15 minutes including:  SL open books 25xB  Supine dowel ER AAROM at 45 and 90 3x10 5s holds  Supine dowel flexion AAROM 3x10 5s holds        Not today  Pulleys flexion/scaption x 5'  Standing dowel ER at 90 deg flexion 3x10 5s holds          Harinder received the following manual therapy techniques: Joint mobilizations were applied to the: R shoulder for 15 minutes, including:  Skilled PROM all directions  GH joint centering mobilizations  Grade II/III a/p, inferior GH joint mobs  Manual lat contract/relax  ER MWM with GH joint centering glide  Sidelying scapular mobs    Harinder participated in neuromuscular re-education activities to improve: Coordination, Kinesthetic, Sense, Proprioception, and Posture for 35 minutes. The following activities were included:  2# Side-lying horizontal ABD 3x10-15  2# SL ABD 3x10  OTB ER isotonics at 90 deg flexion 3x10  GTB IR isotonics at 90 deg flexion 3x10            Not today:  Body blade at 0 deg, 90 deg flexion, 90 deg ABD 30s x3 each  Side-lying ER 3x10  Side-lying ABD AROM 3x10  RTB resisted supine flexion 3x10  YTB ER walkouts 3x6  OTB IR walkouts 3x6  RTB supine ER at 45 3x10 5" holds  Single arm wall slide 3x10  Wall ball RS: up/down, side/side, CW, CCW 3x10 each  Seated ER at 90 deg flexion 3x10  OTB horizontal ABD 3x10  Scapular clocks " 2x6  Supine handcuffs at 90 deg GTB 3x10  Serratus wall slide 3x10  3# Prone row 3x10  2# Prone extension 3x10  Side-lying flexion AAROM supported on dowel 3x10       Harinder participated in dynamic functional therapeutic activities to improve functional performance for 10  minutes, including:  UBE 5' forward, 5' backwards to improve tissue extensibility and cartilage health  35# BB landmine press 3x10  35# BB bent over row 3x10  2# scaption raise with contralat iso holds 10/10/10 x 3 rounds  2# ABD raise with contralat iso holds 10/10/10 x 3 rounds    Not today:  20# 3D strap rotations 3x10    Home Exercises Provided and Patient Education Provided     Education provided:   - HEP, POC, symptom management, recovery timeline, post-operative precautions    Written Home Exercises Provided: yes.  Exercises were reviewed and Harinder was able to demonstrate them prior to the end of the session.  Harinder demonstrated good  understanding of the education provided.     See EMR under Patient Instructions for exercises provided prior visit.    Assessment     Harinder completed session as noted above. Assessment of shoulder ROM revealed active flexion ROM to 125 deg prior to compensatory UT and shrug. Progressed periscap strengthening and active ROM in gravity reduced positions. Continues to have restrictions into all planes that are improving weekly. Patient eager to progress to higher level functional activities. Progressed dynamic cuff and periscapular stability in session today. Good response to addition of quad rock backs          Harinder Is progressing well towards his goals.   Pt prognosis is Excellent.     Pt will continue to benefit from skilled outpatient physical therapy to address the deficits listed in the problem list box on initial evaluation, provide pt/family education and to maximize pt's level of independence in the home and community environment.     Pt's spiritual, cultural and educational needs considered and pt  agreeable to plan of care and goals.     Anticipated barriers to physical therapy: none    Goals: Goals:  Short Term Goals (4 Weeks):   1. Pt will be independent with HEP to supplement PT in improving functional use of R UE.  2. Pt will increase pain free R shoulder elevation PROM to >/= 160 deg to improve functional mobility of UE  3. Pt will increase R shoulder ER PROM in 90 deg abduction to >/=30 deg to improve functional mobility of UE  4. Pt will increase R elbow AAROM to WNL deg in 4 weeks to improve functional mobility of UE.  Mid-Term Goals (12-16 Weeks):   1. Pt will improve FOTO score to </=% limited to decrease perceived limitation with carrying, moving, and handling objects.  2. Pt will increase R shoulder PROM to WNL in all planes to improve functional use of R RUE.  3. Pt will increase R shoulder AROM to WFL in all planes to improve functional use of R RUE.  4. Pt will improve R shoulder MMTs to = 5/5 to promote equal use of B UEs in performing functional tasks.  5. Pt will lift 10 lb objects without pain to promote functional QOL.  6. Pt to report pain </= 0/10  Long Term Goals (24-36 weeks)  - pt will demonstrate at least 95% LSI with HHD for flexion, abduction, ER, and IR for improved stability  - pt will demonstrate at least 95% LSI with UE Y-balance for improved postural control  - pt will pass push-up and pull-up testing to demonstrate normalized endurance  - pt will be able to perform sport-specific activities with appropriate form, without instability or pain          Plan     Plan of care Certification: 6/14/2024 to 2/21/25.     Outpatient Physical Therapy 2 times weekly for 36 weeks to include the following interventions: Electrical Stimulation NMES, Manual Therapy, Moist Heat/ Ice, Neuromuscular Re-ed, Patient Education, Self Care, Therapeutic Activities, Therapeutic Exercise, and Dry Needling    Deniz Seth, PT, DPT, SCS

## 2024-08-12 ENCOUNTER — CLINICAL SUPPORT (OUTPATIENT)
Dept: REHABILITATION | Facility: HOSPITAL | Age: 34
End: 2024-08-12
Payer: COMMERCIAL

## 2024-08-12 DIAGNOSIS — R29.898 DECREASED STRENGTH OF UPPER EXTREMITY: ICD-10-CM

## 2024-08-12 DIAGNOSIS — M25.611 DECREASED ROM OF RIGHT SHOULDER: Primary | ICD-10-CM

## 2024-08-12 PROCEDURE — 97140 MANUAL THERAPY 1/> REGIONS: CPT

## 2024-08-12 PROCEDURE — 97530 THERAPEUTIC ACTIVITIES: CPT

## 2024-08-12 PROCEDURE — 97112 NEUROMUSCULAR REEDUCATION: CPT

## 2024-08-12 NOTE — PROGRESS NOTES
Physical Therapy Daily Treatment Note     Name: Harinder Elliott  [facility] Number: [id number]    Therapy Diagnosis:   Encounter Diagnoses   Name Primary?    Decreased ROM of right shoulder Yes    Decreased strength of upper extremity        Physician: Servando Nielson III, *    Visit Date: 8/12/2024    Physician Orders: PT Eval and Treat   Medical Diagnosis from Referral: Biceps tendonitis on right [M75.21], Glenoid labral tear, right, initial encounter [S43.431A], Instability of right shoulder joint [M25.311]   Evaluation Date: 6/14/2024  Authorization Period Expiration: 5/24/25  Plan of Care Expiration: 2/21/25  Visit # / Visits authorized: 13/ 20  FOTO 2/2 administered 8/2     Time In: 9:00 AM  Time Out: 10:00 AM     Total Billable Time: 60 minutes  Precautions: Standard    DOS 5/28/24  OPERATION:   right  Shoulder osteochondral allograft transplantation, humeral head ICRS Grade 4 - posterior: 2.5 x 20 cm) (CPT 10619 ) (CPT 91887/37408 knee comparison codes) (complex, -22 modifier)  2. Shoulder arthroscopic anterior capsulorrhaphy (CPT 55045) (complex, -22 modifier)  3. Shoulder arthroscopic anterior labral repair (CPT 88630) (complex, -22 modifier)  4. Shoulder arthroscopic posterior labral repair, capsulorrhaphy, 7:00 position (CPT 54334)  5. Shoulder arthroscopic extensive debridement (anterior, posterior glenohumeral joint) (CPT 78805)  6. Shoulder manipulation under anesthesia (CPT 15691)  7. Shoulder arthroscopic lysis of adhesions (CPT 87707):  8. Shoulder arthroscopic loose body removal (CPT 60689)  9 . Shoulder arthroscopic interpositional arthroplasty (46710)  10. Shoulder arthroscopic SLAP type 8 repair (CPT 45415)    POST OPERATIVE PLAN: We will follow the arthroscopic Bank[employer] repair guidelines. We discussed with the patient's family after surgery. The patient will remain in a sling for 6 weeks. We will start PT at the 3 week crista.   Quality of tissue: Good      Subjective     Pt reports:  "feeling great  He was compliant with home exercise program.  Response to previous treatment: ongoing   Functional change: ongoing    Pain: 1/10  Location: right shoulder      Objective   DOS 5/28/24: 10 weeks 2 days as of 8/8    Shoulder Passive Range of Motion:   Shoulder Right Left   Flexion    125 180   ER at 0    45 85   ER at 90    Not tested 2/2 post-op 105   IR at 90    Not tested 2/2 post-op 60      Shoulder Active Range of Motion:   Shoulder Right Left   Flexion    125 180   ER at 0    45 80   HEBER    C7 T4   FIR    L5 T12        Harinder received therapeutic exercises to develop strength, endurance, ROM, flexibility, and posture for 0 minutes including:  SL open books 25xB  Supine dowel ER AAROM at 45 and 90 3x10 5s holds  Supine dowel flexion AAROM 3x10 5s holds        Not today  Pulleys flexion/scaption x 5'  Standing dowel ER at 90 deg flexion 3x10 5s holds          Harinder received the following manual therapy techniques: Joint mobilizations were applied to the: R shoulder for 15 minutes, including:  Skilled PROM all directions  GH joint centering mobilizations  Grade II/III a/p, inferior GH joint mobs  Manual lat contract/relax  ER MWM with GH joint centering glide  Sidelying scapular mobs    Harinder participated in neuromuscular re-education activities to improve: Coordination, Kinesthetic, Sense, Proprioception, and Posture for 15 minutes. The following activities were included:  Quadruped rock backs 3x10  2# Side-lying horizontal ABD 3x10-15  2# SL ABD 3x10  OTB ER isotonics at 90 deg flexion 3x10  GTB IR isotonics at 90 deg flexion 3x10            Not today:  Body blade at 0 deg, 90 deg flexion, 90 deg ABD 30s x3 each  Side-lying ER 3x10  Side-lying ABD AROM 3x10  RTB resisted supine flexion 3x10  YTB ER walkouts 3x6  OTB IR walkouts 3x6  RTB supine ER at 45 3x10 5" holds  Single arm wall slide 3x10  Wall ball RS: up/down, side/side, CW, CCW 3x10 each  Seated ER at 90 deg flexion 3x10  OTB horizontal " ABD 3x10  Scapular clocks 2x6  Supine handcuffs at 90 deg GTB 3x10  Serratus wall slide 3x10  3# Prone row 3x10  2# Prone extension 3x10  Side-lying flexion AAROM supported on dowel 3x10       Harindre participated in dynamic functional therapeutic activities to improve functional performance for 25  minutes, including:  UBE 5' forward, 5' backwards to improve tissue extensibility and cartilage health  2# scaption raise with contralat iso holds 10/10/10 x 3 rounds  35# BB bent over row 4x8  35# BB landmine press 4x8    Not today:  20# 3D strap rotations 3x10    Home Exercises Provided and Patient Education Provided     Education provided:   - HEP, POC, symptom management, recovery timeline, post-operative precautions    Written Home Exercises Provided: yes.  Exercises were reviewed and Harinder was able to demonstrate them prior to the end of the session.  Harinder demonstrated good  understanding of the education provided.     See EMR under Patient Instructions for exercises provided prior visit.    Assessment     Harinder completed session as noted above. Assessment of shoulder ROM revealed active flexion ROM to 125 deg prior to compensatory UT and shrug. Progressed periscap strengthening and active ROM in gravity reduced positions. Continues to have restrictions into all planes that are improving weekly. Patient eager to progress to higher level functional activities. Progressed dynamic cuff and periscapular stability in session today. Good response to addition of quad rock backs          Harinder Is progressing well towards his goals.   Pt prognosis is Excellent.     Pt will continue to benefit from skilled outpatient physical therapy to address the deficits listed in the problem list box on initial evaluation, provide pt/family education and to maximize pt's level of independence in the home and community environment.     Pt's spiritual, cultural and educational needs considered and pt agreeable to plan of care and  goals.     Anticipated barriers to physical therapy: none    Goals: Goals:  Short Term Goals (4 Weeks):   1. Pt will be independent with HEP to supplement PT in improving functional use of R UE.  2. Pt will increase pain free R shoulder elevation PROM to >/= 160 deg to improve functional mobility of UE  3. Pt will increase R shoulder ER PROM in 90 deg abduction to >/=30 deg to improve functional mobility of UE  4. Pt will increase R elbow AAROM to WNL deg in 4 weeks to improve functional mobility of UE.  Mid-Term Goals (12-16 Weeks):   1. Pt will improve FOTO score to </=% limited to decrease perceived limitation with carrying, moving, and handling objects.  2. Pt will increase R shoulder PROM to WNL in all planes to improve functional use of R RUE.  3. Pt will increase R shoulder AROM to WFL in all planes to improve functional use of R RUE.  4. Pt will improve R shoulder MMTs to = 5/5 to promote equal use of B UEs in performing functional tasks.  5. Pt will lift 10 lb objects without pain to promote functional QOL.  6. Pt to report pain </= 0/10  Long Term Goals (24-36 weeks)  - pt will demonstrate at least 95% LSI with HHD for flexion, abduction, ER, and IR for improved stability  - pt will demonstrate at least 95% LSI with UE Y-balance for improved postural control  - pt will pass push-up and pull-up testing to demonstrate normalized endurance  - pt will be able to perform sport-specific activities with appropriate form, without instability or pain          Plan     Plan of care Certification: 6/14/2024 to 2/21/25.     Outpatient Physical Therapy 2 times weekly for 36 weeks to include the following interventions: Electrical Stimulation NMES, Manual Therapy, Moist Heat/ Ice, Neuromuscular Re-ed, Patient Education, Self Care, Therapeutic Activities, Therapeutic Exercise, and Dry Needling    Deniz Seth, PT, DPT, SCS

## 2024-08-16 ENCOUNTER — CLINICAL SUPPORT (OUTPATIENT)
Dept: REHABILITATION | Facility: HOSPITAL | Age: 34
End: 2024-08-16
Payer: COMMERCIAL

## 2024-08-16 DIAGNOSIS — R29.898 DECREASED STRENGTH OF UPPER EXTREMITY: ICD-10-CM

## 2024-08-16 DIAGNOSIS — M25.611 DECREASED ROM OF RIGHT SHOULDER: Primary | ICD-10-CM

## 2024-08-16 PROCEDURE — 97530 THERAPEUTIC ACTIVITIES: CPT

## 2024-08-16 PROCEDURE — 97110 THERAPEUTIC EXERCISES: CPT

## 2024-08-16 PROCEDURE — 97112 NEUROMUSCULAR REEDUCATION: CPT

## 2024-08-16 PROCEDURE — 97140 MANUAL THERAPY 1/> REGIONS: CPT

## 2024-08-16 NOTE — PROGRESS NOTES
Physical Therapy Daily Treatment Note     Name: Harinder Elliott  [facility] Number: [id number]    Therapy Diagnosis:   Encounter Diagnoses   Name Primary?    Decreased ROM of right shoulder Yes    Decreased strength of upper extremity          Physician: Servando Nielson III, *    Visit Date: 8/16/2024    Physician Orders: PT Eval and Treat   Medical Diagnosis from Referral: Biceps tendonitis on right [M75.21], Glenoid labral tear, right, initial encounter [S43.431A], Instability of right shoulder joint [M25.311]   Evaluation Date: 6/14/2024  Authorization Period Expiration: 5/24/25  Plan of Care Expiration: 2/21/25  Visit # / Visits authorized: 14/ 20  FOTO 2/2 administered 8/2     Time In: 9:00 AM  Time Out: 10:00 AM     Total Billable Time: 60 minutes  Precautions: Standard    DOS 5/28/24  OPERATION:   right  Shoulder osteochondral allograft transplantation, humeral head ICRS Grade 4 - posterior: 2.5 x 20 cm) (CPT 57837 ) (CPT 80049/75434 knee comparison codes) (complex, -22 modifier)  2. Shoulder arthroscopic anterior capsulorrhaphy (CPT 36950) (complex, -22 modifier)  3. Shoulder arthroscopic anterior labral repair (CPT 23318) (complex, -22 modifier)  4. Shoulder arthroscopic posterior labral repair, capsulorrhaphy, 7:00 position (CPT 38181)  5. Shoulder arthroscopic extensive debridement (anterior, posterior glenohumeral joint) (CPT 16055)  6. Shoulder manipulation under anesthesia (CPT 97834)  7. Shoulder arthroscopic lysis of adhesions (CPT 47962):  8. Shoulder arthroscopic loose body removal (CPT 31962)  9 . Shoulder arthroscopic interpositional arthroplasty (93253)  10. Shoulder arthroscopic SLAP type 8 repair (CPT 18592)    POST OPERATIVE PLAN: We will follow the arthroscopic [employer] repair guidelines. We discussed with the patient's family after surgery. The patient will remain in a sling for 6 weeks. We will start PT at the 3 week crista.   Quality of tissue: Good      Subjective     Pt reports:  "feeling great  He was compliant with home exercise program.  Response to previous treatment: ongoing   Functional change: ongoing    Pain: 1/10  Location: right shoulder      Objective   DOS 5/28/24: 11 weeks 3 days as of 8/16    Shoulder Passive Range of Motion:   Shoulder Right Left   Flexion    125 180   ER at 0    60 85   ER at 90    60 105   IR at 90    30 60      Shoulder Active Range of Motion:   Shoulder Right Left   Flexion    125 180   ER at 0    45 80   HEBER    C7 T4   FIR    L5 T12        Harinder received therapeutic exercises to develop strength, endurance, ROM, flexibility, and posture for 10 minutes including:  SL open books 25xB  Supine dowel ER AAROM at 45 and 90 3x10 5s holds  Supine dowel flexion AAROM 3x10 5s holds        Not today  Pulleys flexion/scaption x 5'  Standing dowel ER at 90 deg flexion 3x10 5s holds          Harinder received the following manual therapy techniques: Joint mobilizations were applied to the: R shoulder for 15 minutes, including:  Skilled PROM all directions  GH joint centering mobilizations  Grade II/III a/p, inferior GH joint mobs  Manual lat contract/relax  ER MWM with GH joint centering glide  Sidelying scapular mobs    Harinder participated in neuromuscular re-education activities to improve: Coordination, Kinesthetic, Sense, Proprioception, and Posture for 15 minutes. The following activities were included:  Prone T 3x10  Quadruped shoulder taps 3x10  OTB ER-> serratus press 3x10        Not today:  Quadruped rock backs 3x10  2# Side-lying horizontal ABD 3x10-15  2# SL ABD 3x10  OTB ER isotonics at 90 deg flexion 3x10  GTB IR isotonics at 90 deg flexion 3x10  Body blade at 0 deg, 90 deg flexion, 90 deg ABD 30s x3 each  Side-lying ER 3x10  Side-lying ABD AROM 3x10  RTB resisted supine flexion 3x10  YTB ER walkouts 3x6  OTB IR walkouts 3x6  RTB supine ER at 45 3x10 5" holds  Single arm wall slide 3x10  Wall ball RS: up/down, side/side, CW, CCW 3x10 each  Seated ER at 90 " deg flexion 3x10  OTB horizontal ABD 3x10  Scapular clocks 2x6  Supine handcuffs at 90 deg GTB 3x10  Serratus wall slide 3x10  3# Prone row 3x10  2# Prone extension 3x10  Side-lying flexion AAROM supported on dowel 3x10       Harinder participated in dynamic functional therapeutic activities to improve functional performance for 25  minutes, including:  UBE 5' forward, 5' backwards to improve tissue extensibility and cartilage health  2# scaption raise with contralat iso holds 10/10/10 x 3 rounds  27# 1/2 kneeling cable column high/low row 3x10      Not today:  20# 3D strap rotations 3x10  35# BB bent over row 4x8  35# BB landmine press 4x8    Home Exercises Provided and Patient Education Provided     Education provided:   - HEP, POC, symptom management, recovery timeline, post-operative precautions    Written Home Exercises Provided: yes.  Exercises were reviewed and Harinder was able to demonstrate them prior to the end of the session.  Harinder demonstrated good  understanding of the education provided.     See EMR under Patient Instructions for exercises provided prior visit.    Assessment     Harinder completed session as noted above. Assessment of shoulder ROM revealed active flexion ROM to 125 deg prior to compensatory UT and shrug. Progressed periscap strengthening and active ROM in gravity reduced positions. Continues to have restrictions into all planes that are improving weekly. Patient eager to progress to higher level functional activities. Progressed dynamic cuff and periscapular stability in session today. Good response to addition of quad rock backs          Harinder Is progressing well towards his goals.   Pt prognosis is Excellent.     Pt will continue to benefit from skilled outpatient physical therapy to address the deficits listed in the problem list box on initial evaluation, provide pt/family education and to maximize pt's level of independence in the home and community environment.     Pt's spiritual,  cultural and educational needs considered and pt agreeable to plan of care and goals.     Anticipated barriers to physical therapy: none    Goals: Goals:  Short Term Goals (4 Weeks):   1. Pt will be independent with HEP to supplement PT in improving functional use of R UE.  2. Pt will increase pain free R shoulder elevation PROM to >/= 160 deg to improve functional mobility of UE  3. Pt will increase R shoulder ER PROM in 90 deg abduction to >/=30 deg to improve functional mobility of UE  4. Pt will increase R elbow AAROM to WNL deg in 4 weeks to improve functional mobility of UE.  Mid-Term Goals (12-16 Weeks):   1. Pt will improve FOTO score to </=% limited to decrease perceived limitation with carrying, moving, and handling objects.  2. Pt will increase R shoulder PROM to WNL in all planes to improve functional use of R RUE.  3. Pt will increase R shoulder AROM to WFL in all planes to improve functional use of R RUE.  4. Pt will improve R shoulder MMTs to = 5/5 to promote equal use of B UEs in performing functional tasks.  5. Pt will lift 10 lb objects without pain to promote functional QOL.  6. Pt to report pain </= 0/10  Long Term Goals (24-36 weeks)  - pt will demonstrate at least 95% LSI with HHD for flexion, abduction, ER, and IR for improved stability  - pt will demonstrate at least 95% LSI with UE Y-balance for improved postural control  - pt will pass push-up and pull-up testing to demonstrate normalized endurance  - pt will be able to perform sport-specific activities with appropriate form, without instability or pain          Plan     Plan of care Certification: 6/14/2024 to 2/21/25.     Outpatient Physical Therapy 2 times weekly for 36 weeks to include the following interventions: Electrical Stimulation NMES, Manual Therapy, Moist Heat/ Ice, Neuromuscular Re-ed, Patient Education, Self Care, Therapeutic Activities, Therapeutic Exercise, and Dry Needling    Deniz Seth, PT, DPT, SCS

## 2024-08-19 ENCOUNTER — CLINICAL SUPPORT (OUTPATIENT)
Dept: REHABILITATION | Facility: HOSPITAL | Age: 34
End: 2024-08-19
Payer: COMMERCIAL

## 2024-08-19 ENCOUNTER — OFFICE VISIT (OUTPATIENT)
Dept: SPORTS MEDICINE | Facility: CLINIC | Age: 34
End: 2024-08-19
Payer: COMMERCIAL

## 2024-08-19 DIAGNOSIS — M25.611 DECREASED ROM OF RIGHT SHOULDER: Primary | ICD-10-CM

## 2024-08-19 DIAGNOSIS — R29.898 DECREASED STRENGTH OF UPPER EXTREMITY: ICD-10-CM

## 2024-08-19 DIAGNOSIS — Z98.890 S/P SHOULDER SURGERY: Primary | ICD-10-CM

## 2024-08-19 PROCEDURE — 99024 POSTOP FOLLOW-UP VISIT: CPT | Mod: S$GLB,,, | Performed by: ORTHOPAEDIC SURGERY

## 2024-08-19 PROCEDURE — 97140 MANUAL THERAPY 1/> REGIONS: CPT

## 2024-08-19 PROCEDURE — 97110 THERAPEUTIC EXERCISES: CPT

## 2024-08-19 PROCEDURE — 97112 NEUROMUSCULAR REEDUCATION: CPT

## 2024-08-19 PROCEDURE — 3044F HG A1C LEVEL LT 7.0%: CPT | Mod: CPTII,S$GLB,, | Performed by: ORTHOPAEDIC SURGERY

## 2024-08-19 PROCEDURE — 97530 THERAPEUTIC ACTIVITIES: CPT

## 2024-08-19 NOTE — PROGRESS NOTES
Chief Complaint: right shoulder pain    HISTORY OF PRESENT ILLNESS:   Pt is here today for post-operative followup of shoulder arthroscopy.  he is doing well.  We have reviewed patient's findings and discussed plan of care and future treatment options.      Patient has been attending physical therapy at the [facility] location, working with Deniz WILDE. Patient notes performing his HEP daily     No issues reported, feels great,    Pain today 0/10      DATE OF PROCEDURE: 05/28/2024  SURGEON: Marsha Bowman M.D.  OPERATION:   right  Shoulder osteochondral allograft transplantation, humeral head ICRS Grade 4 - posterior: 2.5 x 20 cm) (CPT 65516 ) (CPT 32045/53574 knee comparison codes) (complex, -22 modifier)  2. Shoulder arthroscopic anterior capsulorrhaphy (CPT 95721) (complex, -22 modifier)  3. Shoulder arthroscopic anterior labral repair (CPT 73224) (complex, -22 modifier)  4. Shoulder arthroscopic posterior labral repair, capsulorrhaphy, 7:00 position (CPT 30359)  5. Shoulder arthroscopic extensive debridement (anterior, posterior glenohumeral joint) (CPT 18994)  6. Shoulder manipulation under anesthesia (CPT 20480)  7. Shoulder arthroscopic lysis of adhesions (CPT 80863):  8. Shoulder arthroscopic loose body removal (CPT 73292)  9 . Shoulder arthroscopic interpositional arthroplasty (74854)  10. Shoulder arthroscopic SLAP type 8 repair (CPT 97111)     6 anchors were used in total.     There was chondral damage to:  Humeral head ICRS Grade 4 - posterior Size: 2.5 x 20 cm  Glenoid: 10 x 2 mm grade 3 - emily-inferior   Chondroplasty was performed using arthroscopic shaver.                                                                     PHYSICAL EXAMINATION:     Incision sites healed well  No evidence of any erythema, infection or induration  elbow Range of motion full   No effusion  2+ Radial pulses  No swelling            ROM:      Forward Elevation: 170  ER: 35  IR: T12    Strength  Scaption at 0 and 30:  5/5  ER: 5/5                                                                         ASSESSMENT:                                                                                                                                               1. Status post above, doing well.                                                                                                                               PLAN:                                                                                                                                                     1. Continue PT, case discussed with therapist.  2. Emphasized scapular function.  3. I have discussed return to activity in detail.  4. Patient will see us back in 12 weeks.                                      5. All questions were answered, surgical technique was reviewed and interpreted, and patient should contact us with any questions or concerns in the interim.

## 2024-08-19 NOTE — PROGRESS NOTES
Physical Therapy Daily Treatment Note     Name: Harinder Elliott  [facility] Number: [id number]    Therapy Diagnosis:   Encounter Diagnoses   Name Primary?    Decreased ROM of right shoulder Yes    Decreased strength of upper extremity        Physician: Servando Nielson III, *    Visit Date: 8/19/2024    Physician Orders: PT Eval and Treat   Medical Diagnosis from Referral: Biceps tendonitis on right [M75.21], Glenoid labral tear, right, initial encounter [S43.431A], Instability of right shoulder joint [M25.311]   Evaluation Date: 6/14/2024  Authorization Period Expiration: 5/24/25  Plan of Care Expiration: 2/21/25  Visit # / Visits authorized: 15/ 20  FOTO 2/2 administered 8/2     Time In: 7:09 AM  Time Out: 8:10 AM     Total Billable Time: 61 minutes  Precautions: Standard    DOS 5/28/24  OPERATION:   right  Shoulder osteochondral allograft transplantation, humeral head ICRS Grade 4 - posterior: 2.5 x 20 cm) (CPT 79805 ) (CPT 48285/81778 knee comparison codes) (complex, -22 modifier)  2. Shoulder arthroscopic anterior capsulorrhaphy (CPT 21351) (complex, -22 modifier)  3. Shoulder arthroscopic anterior labral repair (CPT 54568) (complex, -22 modifier)  4. Shoulder arthroscopic posterior labral repair, capsulorrhaphy, 7:00 position (CPT 57788)  5. Shoulder arthroscopic extensive debridement (anterior, posterior glenohumeral joint) (CPT 06829)  6. Shoulder manipulation under anesthesia (CPT 85293)  7. Shoulder arthroscopic lysis of adhesions (CPT 46533):  8. Shoulder arthroscopic loose body removal (CPT 44265)  9 . Shoulder arthroscopic interpositional arthroplasty (54402)  10. Shoulder arthroscopic SLAP type 8 repair (CPT 74741)    POST OPERATIVE PLAN: We will follow the arthroscopic [employer] repair guidelines. We discussed with the patient's family after surgery. The patient will remain in a sling for 6 weeks. We will start PT at the 3 week crista.   Quality of tissue: Good      Subjective     Pt reports: sore  after the progressions last week  He was compliant with home exercise program.  Response to previous treatment: ongoing   Functional change: ongoing    Pain: 1/10  Location: right shoulder      Objective   DOS 5/28/24: 11 weeks 6 days as of 8/19    Shoulder Passive Range of Motion:   Shoulder Right Left   Flexion    125 180   ER at 0    60 85   ER at 90    60 105   IR at 90    30 60      Shoulder Active Range of Motion:   Shoulder Right Left   Flexion    125 180   ER at 0    45 80   HEBER    C7 T4   FIR    L5 T12        Harinder received therapeutic exercises to develop strength, endurance, ROM, flexibility, and posture for 10 minutes including:  SL open books 25xB  Supine dowel ER AAROM at 45 and 90 3x10 5s holds  Supine dowel flexion AAROM 3x10 5s holds        Not today  Pulleys flexion/scaption x 5'  Standing dowel ER at 90 deg flexion 3x10 5s holds          Harinder received the following manual therapy techniques: Joint mobilizations were applied to the: R shoulder for 15 minutes, including:  Skilled PROM all directions  GH joint centering mobilizations  Grade II/III a/p, inferior GH joint mobs  Manual lat contract/relax  ER MWM with GH joint centering glide  Sidelying scapular mobs    Harinder participated in neuromuscular re-education activities to improve: Coordination, Kinesthetic, Sense, Proprioception, and Posture for 28 minutes. The following activities were included:  Supine ER at 90/90 RTB 3x10  Prone T 3x10  OTB ER-> press-> 90/90 body turn 3x10  GTB high rows 3x10  Body blade: at 0, at 90 flexion, at 90 deg ABD 30s x3 each          Not today:  Quadruped shoulder taps 3x10  OTB ER-> serratus press 3x10  Quadruped rock backs 3x10  2# Side-lying horizontal ABD 3x10-15  2# SL ABD 3x10  OTB ER isotonics at 90 deg flexion 3x10  GTB IR isotonics at 90 deg flexion 3x10  Side-lying ER 3x10  Side-lying ABD AROM 3x10  RTB resisted supine flexion 3x10  YTB ER walkouts 3x6  OTB IR walkouts 3x6  RTB supine ER at 45 3x10  "5" holds  Single arm wall slide 3x10  Wall ball RS: up/down, side/side, CW, CCW 3x10 each  Seated ER at 90 deg flexion 3x10  OTB horizontal ABD 3x10  Scapular clocks 2x6  Supine handcuffs at 90 deg GTB 3x10  Serratus wall slide 3x10  3# Prone row 3x10  2# Prone extension 3x10  Side-lying flexion AAROM supported on dowel 3x10       Harinder participated in dynamic functional therapeutic activities to improve functional performance for 8  minutes, including:  UBE 5' forward, 5' backwards to improve tissue extensibility and cartilage health      Not today:  2# scaption raise with contralat iso holds 10/10/10 x 3 rounds  27# 1/2 kneeling cable column high/low row 3x10  20# 3D strap rotations 3x10  35# BB bent over row 4x8  35# BB landmine press 4x8    Home Exercises Provided and Patient Education Provided     Education provided:   - HEP, POC, symptom management, recovery timeline, post-operative precautions    Written Home Exercises Provided: yes.  Exercises were reviewed and Harinder was able to demonstrate them prior to the end of the session.  Harinder demonstrated good  understanding of the education provided.     See EMR under Patient Instructions for exercises provided prior visit.    Assessment     Harinder completed session as noted above. Assessment of shoulder ROM revealed active flexion ROM to 155 deg prior to compensatory UT and shrug. Progressed periscap strengthening and active ROM in gravity reduced positions. Continues to have restrictions into all planes that are improving weekly. Patient eager to progress to higher level functional activities. Progressed dynamic cuff and periscapular stability in session today. Good response to addition of quad rock backs          Harinder Is progressing well towards his goals.   Pt prognosis is Excellent.     Pt will continue to benefit from skilled outpatient physical therapy to address the deficits listed in the problem list box on initial evaluation, provide pt/family " education and to maximize pt's level of independence in the home and community environment.     Pt's spiritual, cultural and educational needs considered and pt agreeable to plan of care and goals.     Anticipated barriers to physical therapy: none    Goals: Goals:  Short Term Goals (4 Weeks):   1. Pt will be independent with HEP to supplement PT in improving functional use of R UE.  2. Pt will increase pain free R shoulder elevation PROM to >/= 160 deg to improve functional mobility of UE  3. Pt will increase R shoulder ER PROM in 90 deg abduction to >/=30 deg to improve functional mobility of UE  4. Pt will increase R elbow AAROM to WNL deg in 4 weeks to improve functional mobility of UE.  Mid-Term Goals (12-16 Weeks):   1. Pt will improve FOTO score to </=% limited to decrease perceived limitation with carrying, moving, and handling objects.  2. Pt will increase R shoulder PROM to WNL in all planes to improve functional use of R RUE.  3. Pt will increase R shoulder AROM to WFL in all planes to improve functional use of R RUE.  4. Pt will improve R shoulder MMTs to = 5/5 to promote equal use of B UEs in performing functional tasks.  5. Pt will lift 10 lb objects without pain to promote functional QOL.  6. Pt to report pain </= 0/10  Long Term Goals (24-36 weeks)  - pt will demonstrate at least 95% LSI with HHD for flexion, abduction, ER, and IR for improved stability  - pt will demonstrate at least 95% LSI with UE Y-balance for improved postural control  - pt will pass push-up and pull-up testing to demonstrate normalized endurance  - pt will be able to perform sport-specific activities with appropriate form, without instability or pain          Plan     Plan of care Certification: 6/14/2024 to 2/21/25.     Outpatient Physical Therapy 2 times weekly for 36 weeks to include the following interventions: Electrical Stimulation NMES, Manual Therapy, Moist Heat/ Ice, Neuromuscular Re-ed, Patient Education, Self Care,  Therapeutic Activities, Therapeutic Exercise, and Dry Needling    Deniz Seth, PT, DPT, SCS

## 2024-08-22 DIAGNOSIS — F98.8 ATTENTION DEFICIT DISORDER, UNSPECIFIED HYPERACTIVITY PRESENCE: ICD-10-CM

## 2024-08-22 RX ORDER — LISDEXAMFETAMINE DIMESYLATE 20 MG/1
20 CAPSULE ORAL EVERY MORNING
Qty: 30 CAPSULE | Refills: 0 | Status: SHIPPED | OUTPATIENT
Start: 2024-08-22

## 2024-08-26 ENCOUNTER — CLINICAL SUPPORT (OUTPATIENT)
Dept: REHABILITATION | Facility: HOSPITAL | Age: 34
End: 2024-08-26
Payer: COMMERCIAL

## 2024-08-26 DIAGNOSIS — M25.611 DECREASED ROM OF RIGHT SHOULDER: Primary | ICD-10-CM

## 2024-08-26 DIAGNOSIS — R29.898 DECREASED STRENGTH OF UPPER EXTREMITY: ICD-10-CM

## 2024-08-26 PROCEDURE — 97140 MANUAL THERAPY 1/> REGIONS: CPT

## 2024-08-26 PROCEDURE — 97530 THERAPEUTIC ACTIVITIES: CPT

## 2024-08-26 PROCEDURE — 97110 THERAPEUTIC EXERCISES: CPT

## 2024-08-28 NOTE — PROGRESS NOTES
Physical Therapy Daily Treatment Note     Name: Harinder Elliott  [facility] Number: [id number]    Therapy Diagnosis:   Encounter Diagnoses   Name Primary?    Decreased ROM of right shoulder Yes    Decreased strength of upper extremity        Physician: Servando Nielson III, *    Visit Date: 8/26/2024    Physician Orders: PT Eval and Treat   Medical Diagnosis from Referral: Biceps tendonitis on right [M75.21], Glenoid labral tear, right, initial encounter [S43.431A], Instability of right shoulder joint [M25.311]   Evaluation Date: 6/14/2024  Authorization Period Expiration: 5/24/25  Plan of Care Expiration: 2/21/25  Visit # / Visits authorized: 15/ 20  FOTO 2/2 administered 8/2     Time In: 8:07 AM  Time Out: 9:00 AM     Total Billable Time: 53 minutes  Precautions: Standard    DOS 5/28/24  OPERATION:   right  Shoulder osteochondral allograft transplantation, humeral head ICRS Grade 4 - posterior: 2.5 x 20 cm) (CPT 88854 ) (CPT 53032/72896 knee comparison codes) (complex, -22 modifier)  2. Shoulder arthroscopic anterior capsulorrhaphy (CPT 91972) (complex, -22 modifier)  3. Shoulder arthroscopic anterior labral repair (CPT 55913) (complex, -22 modifier)  4. Shoulder arthroscopic posterior labral repair, capsulorrhaphy, 7:00 position (CPT 37680)  5. Shoulder arthroscopic extensive debridement (anterior, posterior glenohumeral joint) (CPT 89186)  6. Shoulder manipulation under anesthesia (CPT 49659)  7. Shoulder arthroscopic lysis of adhesions (CPT 55449):  8. Shoulder arthroscopic loose body removal (CPT 56486)  9 . Shoulder arthroscopic interpositional arthroplasty (98576)  10. Shoulder arthroscopic SLAP type 8 repair (CPT 20181)    POST OPERATIVE PLAN: We will follow the arthroscopic [employer] repair guidelines. We discussed with the patient's family after surgery. The patient will remain in a sling for 6 weeks. We will start PT at the 3 week crista.   Quality of tissue: Good      Subjective     Pt reports:  increased bicep discomfort over the weekend  He was compliant with home exercise program.  Response to previous treatment: ongoing   Functional change: ongoing    Pain: 1/10  Location: right shoulder      Objective   DOS 5/28/24: 11 weeks 6 days as of 8/19    Shoulder Passive Range of Motion:   Shoulder Right Left   Flexion    125 180   ER at 0    60 85   ER at 90    60 105   IR at 90    30 60      Shoulder Active Range of Motion:   Shoulder Right Left   Flexion    125 180   ER at 0    45 80   HEBER    C7 T4   FIR    L5 T12        Harinder received therapeutic exercises to develop strength, endurance, ROM, flexibility, and posture for 19 minutes including:  SL open books 25xB  Supine dowel ER AAROM at 45 and 90 3x10 5s holds  Supine dowel flexion AAROM 3x10 5s holds        Not today  Pulleys flexion/scaption x 5'  Standing dowel ER at 90 deg flexion 3x10 5s holds          Harinder received the following manual therapy techniques: Joint mobilizations were applied to the: R shoulder for 30 minutes, including:  Patient provided written and verbal consent to receive therapeutic dry needling at today's visit (see consent form scanned into chart). TDN performed to R bicep and anterior deltoid musculature in the supine position using Seirin J type needle sized .30x 40mm+estim.  TDN performed to reduce pain and muscle tension, promote blood flow, and improve ROM and function x 20 minutes. Pt tolerated tx well without adverse effects. The patient was educated on what to expect following the procedure and he verbalized understanding.  Skilled PROM all directions  GH joint centering mobilizations  Grade II/III a/p, inferior GH joint mobs  Manual lat contract/relax  ER MWM with GH joint centering glide  Sidelying scapular mobs    Harinder participated in neuromuscular re-education activities to improve: Coordination, Kinesthetic, Sense, Proprioception, and Posture for 0 minutes. The following activities were included:        Not  "today  Supine ER at 90/90 RTB 3x10  Prone T 3x10  OTB ER-> press-> 90/90 body turn 3x10  GTB high rows 3x10  Body blade: at 0, at 90 flexion, at 90 deg ABD 30s x3 each          Not today:  Quadruped shoulder taps 3x10  OTB ER-> serratus press 3x10  Quadruped rock backs 3x10  2# Side-lying horizontal ABD 3x10-15  2# SL ABD 3x10  OTB ER isotonics at 90 deg flexion 3x10  GTB IR isotonics at 90 deg flexion 3x10  Side-lying ER 3x10  Side-lying ABD AROM 3x10  RTB resisted supine flexion 3x10  YTB ER walkouts 3x6  OTB IR walkouts 3x6  RTB supine ER at 45 3x10 5" holds  Single arm wall slide 3x10  Wall ball RS: up/down, side/side, CW, CCW 3x10 each  Seated ER at 90 deg flexion 3x10  OTB horizontal ABD 3x10  Scapular clocks 2x6  Supine handcuffs at 90 deg GTB 3x10  Serratus wall slide 3x10  3# Prone row 3x10  2# Prone extension 3x10  Side-lying flexion AAROM supported on dowel 3x10       Harinder participated in dynamic functional therapeutic activities to improve functional performance for 8  minutes, including:  UBE 5' forward, 5' backwards to improve tissue extensibility and cartilage health      Not today:  2# scaption raise with contralat iso holds 10/10/10 x 3 rounds  27# 1/2 kneeling cable column high/low row 3x10  20# 3D strap rotations 3x10  35# BB bent over row 4x8  35# BB landmine press 4x8    Home Exercises Provided and Patient Education Provided     Education provided:   - HEP, POC, symptom management, recovery timeline, post-operative precautions    Written Home Exercises Provided: yes.  Exercises were reviewed and Harinder was able to demonstrate them prior to the end of the session.  Harinder demonstrated good  understanding of the education provided.     See EMR under Patient Instructions for exercises provided prior visit.    Assessment     Harinder completed session as noted above. Slight increase in irritability levels upon arrival to treatment session today with primary complaints of discomfort localized to " anterior shoulder with radiation into bicep. Sig TTP and palpable trigger points noted in bicep tendon so performed TDN to decrease tissue extensibility with no AE reported. Sx could be due to radial nerve adverse neural tension however unable to assume nerve testing position so not able to confirm with 100% certainty.         Harinder Is progressing well towards his goals.   Pt prognosis is Excellent.     Pt will continue to benefit from skilled outpatient physical therapy to address the deficits listed in the problem list box on initial evaluation, provide pt/family education and to maximize pt's level of independence in the home and community environment.     Pt's spiritual, cultural and educational needs considered and pt agreeable to plan of care and goals.     Anticipated barriers to physical therapy: none    Goals: Goals:  Short Term Goals (4 Weeks):   1. Pt will be independent with HEP to supplement PT in improving functional use of R UE.  2. Pt will increase pain free R shoulder elevation PROM to >/= 160 deg to improve functional mobility of UE  3. Pt will increase R shoulder ER PROM in 90 deg abduction to >/=30 deg to improve functional mobility of UE  4. Pt will increase R elbow AAROM to WNL deg in 4 weeks to improve functional mobility of UE.  Mid-Term Goals (12-16 Weeks):   1. Pt will improve FOTO score to </=% limited to decrease perceived limitation with carrying, moving, and handling objects.  2. Pt will increase R shoulder PROM to WNL in all planes to improve functional use of R RUE.  3. Pt will increase R shoulder AROM to WFL in all planes to improve functional use of R RUE.  4. Pt will improve R shoulder MMTs to = 5/5 to promote equal use of B UEs in performing functional tasks.  5. Pt will lift 10 lb objects without pain to promote functional QOL.  6. Pt to report pain </= 0/10  Long Term Goals (24-36 weeks)  - pt will demonstrate at least 95% LSI with HHD for flexion, abduction, ER, and IR for  improved stability  - pt will demonstrate at least 95% LSI with UE Y-balance for improved postural control  - pt will pass push-up and pull-up testing to demonstrate normalized endurance  - pt will be able to perform sport-specific activities with appropriate form, without instability or pain          Plan     Plan of care Certification: 6/14/2024 to 2/21/25.     Outpatient Physical Therapy 2 times weekly for 36 weeks to include the following interventions: Electrical Stimulation NMES, Manual Therapy, Moist Heat/ Ice, Neuromuscular Re-ed, Patient Education, Self Care, Therapeutic Activities, Therapeutic Exercise, and Dry Needling    Deniz Seth, PT, DPT, SCS

## 2024-08-30 ENCOUNTER — CLINICAL SUPPORT (OUTPATIENT)
Dept: REHABILITATION | Facility: HOSPITAL | Age: 34
End: 2024-08-30
Payer: COMMERCIAL

## 2024-08-30 DIAGNOSIS — M25.611 DECREASED ROM OF RIGHT SHOULDER: Primary | ICD-10-CM

## 2024-08-30 DIAGNOSIS — R29.898 DECREASED STRENGTH OF UPPER EXTREMITY: ICD-10-CM

## 2024-08-30 PROCEDURE — 97112 NEUROMUSCULAR REEDUCATION: CPT

## 2024-08-30 PROCEDURE — 97530 THERAPEUTIC ACTIVITIES: CPT

## 2024-08-30 PROCEDURE — 97140 MANUAL THERAPY 1/> REGIONS: CPT

## 2024-08-30 NOTE — PROGRESS NOTES
Physical Therapy Daily Treatment Note     Name: Harinder Elliott  [facility] Number: [id number]    Therapy Diagnosis:   Encounter Diagnoses   Name Primary?    Decreased ROM of right shoulder Yes    Decreased strength of upper extremity          Physician: Servando Nielson III, *    Visit Date: 8/30/2024    Physician Orders: PT Eval and Treat   Medical Diagnosis from Referral: Biceps tendonitis on right [M75.21], Glenoid labral tear, right, initial encounter [S43.431A], Instability of right shoulder joint [M25.311]   Evaluation Date: 6/14/2024  Authorization Period Expiration: 5/24/25  Plan of Care Expiration: 2/21/25  Visit # / Visits authorized: 17/ 20  FOTO 2/2 administered 8/2     Time In: 9:02 AM  Time Out: 10:12 AM     Total Billable Time: 70 minutes  Precautions: Standard    DOS 5/28/24  OPERATION:   right  Shoulder osteochondral allograft transplantation, humeral head ICRS Grade 4 - posterior: 2.5 x 20 cm) (CPT 17505 ) (CPT 58240/61604 knee comparison codes) (complex, -22 modifier)  2. Shoulder arthroscopic anterior capsulorrhaphy (CPT 99099) (complex, -22 modifier)  3. Shoulder arthroscopic anterior labral repair (CPT 84423) (complex, -22 modifier)  4. Shoulder arthroscopic posterior labral repair, capsulorrhaphy, 7:00 position (CPT 36811)  5. Shoulder arthroscopic extensive debridement (anterior, posterior glenohumeral joint) (CPT 81897)  6. Shoulder manipulation under anesthesia (CPT 45594)  7. Shoulder arthroscopic lysis of adhesions (CPT 04189):  8. Shoulder arthroscopic loose body removal (CPT 77519)  9 . Shoulder arthroscopic interpositional arthroplasty (16184)  10. Shoulder arthroscopic SLAP type 8 repair (CPT 76929)    POST OPERATIVE PLAN: We will follow the arthroscopic Bank[employer] repair guidelines. We discussed with the patient's family after surgery. The patient will remain in a sling for 6 weeks. We will start PT at the 3 week crista.   Quality of tissue: Good      Subjective     Pt reports:  much better following TDN Monday. Ready to get after it  He was compliant with home exercise program.  Response to previous treatment: ongoing   Functional change: ongoing    Pain: 1/10  Location: right shoulder      Objective   DOS 5/28/24: 13 weeks 3 days as of 8/30    Shoulder Passive Range of Motion:   Shoulder Right Left   Flexion    150 180   ER at 0    60 85   ER at 90    60 105   IR at 90    30 60      Shoulder Active Range of Motion:   Shoulder Right Left   Flexion    140 180   ER at 0    45 80   HEBER    T2 T4   FIR    L3 T12        Harinder received therapeutic exercises to develop strength, endurance, ROM, flexibility, and posture for 0 minutes including:        Not today  Pulleys flexion/scaption x 5'  Standing dowel ER at 90 deg flexion 3x10 5s holds  SL open books 25xB  Supine dowel ER AAROM at 45 and 90 3x10 5s holds  Supine dowel flexion AAROM 3x10 5s holds        Harinder received the following manual therapy techniques: Joint mobilizations were applied to the: R shoulder for 10 minutes, including:  Skilled PROM all directions  GH joint centering mobilizations  Grade II/III a/p, inferior GH joint mobs  Manual lat contract/relax  ER MWM with GH joint centering glide  Sidelying scapular mobs    Not today  Patient provided written and verbal consent to receive therapeutic dry needling at today's visit (see consent form scanned into chart). TDN performed to R bicep and anterior deltoid musculature in the supine position using Seirin J type needle sized .30x 40mm+estim.  TDN performed to reduce pain and muscle tension, promote blood flow, and improve ROM and function x 20 minutes. Pt tolerated tx well without adverse effects. The patient was educated on what to expect following the procedure and he verbalized understanding.    Harinder participated in neuromuscular re-education activities to improve: Coordination, Kinesthetic, Sense, Proprioception, and Posture for 30 minutes. The following activities were  "included:  2# SL ER 4x15  Prone T on SB with contralat iso holds 10/10/10 x 3 rounds  OTB high row-> 90/90 ER 3x10      Pushup + on 24" box 4x8      Not today  Supine ER at 90/90 RTB 3x10  Prone T 3x10  OTB ER-> press-> 90/90 body turn 3x10  GTB high rows 3x10  Body blade: at 0, at 90 flexion, at 90 deg ABD 30s x3 each  Quadruped shoulder taps 3x10  OTB ER-> serratus press 3x10  Quadruped rock backs 3x10  2# Side-lying horizontal ABD 3x10-15  2# SL ABD 3x10  OTB ER isotonics at 90 deg flexion 3x10  GTB IR isotonics at 90 deg flexion 3x10  Side-lying ER 3x10  Side-lying ABD AROM 3x10  RTB resisted supine flexion 3x10  YTB ER walkouts 3x6  OTB IR walkouts 3x6  RTB supine ER at 45 3x10 5" holds  Single arm wall slide 3x10  Wall ball RS: up/down, side/side, CW, CCW 3x10 each  Seated ER at 90 deg flexion 3x10  OTB horizontal ABD 3x10  Scapular clocks 2x6  Supine handcuffs at 90 deg GTB 3x10  Serratus wall slide 3x10  3# Prone row 3x10  2# Prone extension 3x10  Side-lying flexion AAROM supported on dowel 3x10       Harinder participated in dynamic functional therapeutic activities to improve functional performance for 30  minutes, including:  UBE 5' forward, 5' backwards to improve tissue extensibility and cartilage health  UE MB 6# plyo series: 30x 3  -chest pass  -diagonals  -side toss  -8# MB rotational MB wall slam 3x8    Not today:  2# scaption raise with contralat iso holds 10/10/10 x 3 rounds  27# 1/2 kneeling cable column high/low row 3x10  20# 3D strap rotations 3x10  35# BB bent over row 4x8  35# BB landmine press 4x8    Home Exercises Provided and Patient Education Provided     Education provided:   - HEP, POC, symptom management, recovery timeline, post-operative precautions    Written Home Exercises Provided: yes.  Exercises were reviewed and Harinder was able to demonstrate them prior to the end of the session.  Harinder demonstrated good  understanding of the education provided.     See EMR under Patient " Instructions for exercises provided prior visit.    Assessment     Harinder completed session as noted above. Continues to demo significant improvement in AROM. Slight shrug noted at end range flexion AROM however HEBER and FIR are nearing symmetrical. Conitnues to demo restrictions into passive ER/IR at 30 and 90 deg. Intro'd double arm UE plyometrics during session today to assess tolerance of progressions into higher level functional activities. Able to complete UE plyo series with no complaints of pain nor instability. Provided patient with copy of interval golf program to initiate outside of PT. Will assess response to initiate of interval golf program at next visit      Harinder Is progressing well towards his goals.   Pt prognosis is Excellent.     Pt will continue to benefit from skilled outpatient physical therapy to address the deficits listed in the problem list box on initial evaluation, provide pt/family education and to maximize pt's level of independence in the home and community environment.     Pt's spiritual, cultural and educational needs considered and pt agreeable to plan of care and goals.     Anticipated barriers to physical therapy: none    Goals: Goals:  Short Term Goals (4 Weeks):   1. Pt will be independent with HEP to supplement PT in improving functional use of R UE.  2. Pt will increase pain free R shoulder elevation PROM to >/= 160 deg to improve functional mobility of UE  3. Pt will increase R shoulder ER PROM in 90 deg abduction to >/=30 deg to improve functional mobility of UE  4. Pt will increase R elbow AAROM to WNL deg in 4 weeks to improve functional mobility of UE.  Mid-Term Goals (12-16 Weeks):   1. Pt will improve FOTO score to </=% limited to decrease perceived limitation with carrying, moving, and handling objects.  2. Pt will increase R shoulder PROM to WNL in all planes to improve functional use of R RUE.  3. Pt will increase R shoulder AROM to WFL in all planes to improve  functional use of R RUE.  4. Pt will improve R shoulder MMTs to = 5/5 to promote equal use of B UEs in performing functional tasks.  5. Pt will lift 10 lb objects without pain to promote functional QOL.  6. Pt to report pain </= 0/10  Long Term Goals (24-36 weeks)  - pt will demonstrate at least 95% LSI with HHD for flexion, abduction, ER, and IR for improved stability  - pt will demonstrate at least 95% LSI with UE Y-balance for improved postural control  - pt will pass push-up and pull-up testing to demonstrate normalized endurance  - pt will be able to perform sport-specific activities with appropriate form, without instability or pain          Plan     Plan of care Certification: 6/14/2024 to 2/21/25.     Outpatient Physical Therapy 2 times weekly for 36 weeks to include the following interventions: Electrical Stimulation NMES, Manual Therapy, Moist Heat/ Ice, Neuromuscular Re-ed, Patient Education, Self Care, Therapeutic Activities, Therapeutic Exercise, and Dry Needling    Deniz Seth, PT, DPT, SCS

## 2024-08-31 ENCOUNTER — PATIENT MESSAGE (OUTPATIENT)
Dept: GASTROENTEROLOGY | Facility: CLINIC | Age: 34
End: 2024-08-31
Payer: COMMERCIAL

## 2024-09-01 DIAGNOSIS — Z86.010 HISTORY OF ADENOMATOUS POLYP OF COLON: Primary | ICD-10-CM

## 2024-09-04 ENCOUNTER — CLINICAL SUPPORT (OUTPATIENT)
Dept: REHABILITATION | Facility: HOSPITAL | Age: 34
End: 2024-09-04
Payer: COMMERCIAL

## 2024-09-04 DIAGNOSIS — M25.611 DECREASED ROM OF RIGHT SHOULDER: Primary | ICD-10-CM

## 2024-09-04 DIAGNOSIS — R29.898 DECREASED STRENGTH OF UPPER EXTREMITY: ICD-10-CM

## 2024-09-04 PROCEDURE — 97530 THERAPEUTIC ACTIVITIES: CPT

## 2024-09-04 PROCEDURE — 97112 NEUROMUSCULAR REEDUCATION: CPT

## 2024-09-04 PROCEDURE — 97140 MANUAL THERAPY 1/> REGIONS: CPT

## 2024-09-04 NOTE — PROGRESS NOTES
Physical Therapy Daily Treatment Note     Name: Harinder Elliott  [facility] Number: [id number]    Therapy Diagnosis:   Encounter Diagnoses   Name Primary?    Decreased ROM of right shoulder Yes    Decreased strength of upper extremity        Physician: Servando Nielson III, *    Visit Date: 9/4/2024    Physician Orders: PT Eval and Treat   Medical Diagnosis from Referral: Biceps tendonitis on right [M75.21], Glenoid labral tear, right, initial encounter [S43.431A], Instability of right shoulder joint [M25.311]   Evaluation Date: 6/14/2024  Authorization Period Expiration: 5/24/25  Plan of Care Expiration: 2/21/25  Visit # / Visits authorized: 18/ 20  FOTO 2/2 administered 8/2     Time In: 9:02 AM  Time Out: 10:00 AM     Total Billable Time: 58 minutes  Precautions: Standard    DOS 5/28/24  OPERATION:   right  Shoulder osteochondral allograft transplantation, humeral head ICRS Grade 4 - posterior: 2.5 x 20 cm) (CPT 86592 ) (CPT 12888/28365 knee comparison codes) (complex, -22 modifier)  2. Shoulder arthroscopic anterior capsulorrhaphy (CPT 58985) (complex, -22 modifier)  3. Shoulder arthroscopic anterior labral repair (CPT 41594) (complex, -22 modifier)  4. Shoulder arthroscopic posterior labral repair, capsulorrhaphy, 7:00 position (CPT 11230)  5. Shoulder arthroscopic extensive debridement (anterior, posterior glenohumeral joint) (CPT 76578)  6. Shoulder manipulation under anesthesia (CPT 58786)  7. Shoulder arthroscopic lysis of adhesions (CPT 23091):  8. Shoulder arthroscopic loose body removal (CPT 61664)  9 . Shoulder arthroscopic interpositional arthroplasty (42687)  10. Shoulder arthroscopic SLAP type 8 repair (CPT 10928)    POST OPERATIVE PLAN: We will follow the arthroscopic [employer] repair guidelines. We discussed with the patient's family after surgery. The patient will remain in a sling for 6 weeks. We will start PT at the 3 week crista.   Quality of tissue: Good      Subjective     Pt reports: felt  good after eric's previous visit. Miguel had the chance to take any golf swings though  He was compliant with home exercise program.  Response to previous treatment: ongoing   Functional change: ongoing    Pain: 1/10  Location: right shoulder      Objective   DOS 5/28/24: 13 weeks 3 days as of 8/30    Shoulder Passive Range of Motion:   Shoulder Right Left   Flexion    150 180   ER at 0    60 85   ER at 90    60 105   IR at 90    30 60      Shoulder Active Range of Motion:   Shoulder Right Left   Flexion    140 180   ER at 0    45 80   HEBER    T2 T4   FIR    L3 T12        Harinder received therapeutic exercises to develop strength, endurance, ROM, flexibility, and posture for 5 minutes including:  Supine dowel ER AAROM at 45 and 90 3x10 5s holds    Not today  Pulleys flexion/scaption x 5'  Standing dowel ER at 90 deg flexion 3x10 5s holds  SL open books 25xB  Supine dowel flexion AAROM 3x10 5s holds        Harinder received the following manual therapy techniques: Joint mobilizations were applied to the: R shoulder for 10 minutes, including:  Skilled PROM all directions  GH joint centering mobilizations  Grade II/III a/p, inferior GH joint mobs  Manual lat contract/relax  ER MWM with GH joint centering glide  Sidelying scapular mobs    Not today  Patient provided written and verbal consent to receive therapeutic dry needling at today's visit (see consent form scanned into chart). TDN performed to R bicep and anterior deltoid musculature in the supine position using Seirin J type needle sized .30x 40mm+estim.  TDN performed to reduce pain and muscle tension, promote blood flow, and improve ROM and function x 20 minutes. Pt tolerated tx well without adverse effects. The patient was educated on what to expect following the procedure and he verbalized understanding.    Harinder participated in neuromuscular re-education activities to improve: Coordination, Kinesthetic, Sense, Proprioception, and Posture for 25 minutes. The  "following activities were included:  Supine ER at 90/90 RTB 3x10  Prone W at end range 3x10 3" holds  Prone 90/90 over yoga block 3x10 3" holds      Next  Pushup + on 24" box 4x8      Not today  2# SL ER 4x15  Prone T on SB with contralat iso holds 10/10/10 x 3 rounds  OTB high row-> 90/90 ER 3x10  Supine ER at 90/90 RTB 3x10  Prone T 3x10  OTB ER-> press-> 90/90 body turn 3x10  GTB high rows 3x10  Body blade: at 0, at 90 flexion, at 90 deg ABD 30s x3 each  Quadruped shoulder taps 3x10  OTB ER-> serratus press 3x10  Quadruped rock backs 3x10  2# Side-lying horizontal ABD 3x10-15  2# SL ABD 3x10  OTB ER isotonics at 90 deg flexion 3x10  GTB IR isotonics at 90 deg flexion 3x10  Side-lying ER 3x10  Side-lying ABD AROM 3x10  RTB resisted supine flexion 3x10  YTB ER walkouts 3x6  OTB IR walkouts 3x6  RTB supine ER at 45 3x10 5" holds  Single arm wall slide 3x10  Wall ball RS: up/down, side/side, CW, CCW 3x10 each  Seated ER at 90 deg flexion 3x10  OTB horizontal ABD 3x10  Scapular clocks 2x6  Supine handcuffs at 90 deg GTB 3x10  Serratus wall slide 3x10  3# Prone row 3x10  2# Prone extension 3x10  Side-lying flexion AAROM supported on dowel 3x10       Harinder participated in dynamic functional therapeutic activities to improve functional performance for 20  minutes, including:  UBE 5' forward, 5' backwards to improve tissue extensibility and cartilage health  UE MB 6# plyo series: 30x 3  -chest pass  -diagonals  -side toss  -8# MB rotational MB wall slam 3x8    Not today:  2# scaption raise with contralat iso holds 10/10/10 x 3 rounds  27# 1/2 kneeling cable column high/low row 3x10  20# 3D strap rotations 3x10  35# BB bent over row 4x8  35# BB landmine press 4x8    Home Exercises Provided and Patient Education Provided     Education provided:   - HEP, POC, symptom management, recovery timeline, post-operative precautions    Written Home Exercises Provided: yes.  Exercises were reviewed and Harinder was able to demonstrate " them prior to the end of the session.  Harinder demonstrated good  understanding of the education provided.     See EMR under Patient Instructions for exercises provided prior visit.    Assessment     Harinder completed session as noted above. Continues to demo significant improvement in AROM. Slight shrug noted at end range flexion AROM however HEBER and FIR are nearing symmetrical. Worked on cuff activation at end range to facilitate increased carry-over. Continues to demo restrictions into passive ER/IR at 30 and 90 deg. Intro'd double arm UE plyometrics during session today to assess tolerance of progressions into higher level functional activities. Able to complete UE plyo series with no complaints of pain nor instability.       Harinder Is progressing well towards his goals.   Pt prognosis is Excellent.     Pt will continue to benefit from skilled outpatient physical therapy to address the deficits listed in the problem list box on initial evaluation, provide pt/family education and to maximize pt's level of independence in the home and community environment.     Pt's spiritual, cultural and educational needs considered and pt agreeable to plan of care and goals.     Anticipated barriers to physical therapy: none    Goals: Goals:  Short Term Goals (4 Weeks):   1. Pt will be independent with HEP to supplement PT in improving functional use of R UE.  2. Pt will increase pain free R shoulder elevation PROM to >/= 160 deg to improve functional mobility of UE  3. Pt will increase R shoulder ER PROM in 90 deg abduction to >/=30 deg to improve functional mobility of UE  4. Pt will increase R elbow AAROM to WNL deg in 4 weeks to improve functional mobility of UE.  Mid-Term Goals (12-16 Weeks):   1. Pt will improve FOTO score to </=% limited to decrease perceived limitation with carrying, moving, and handling objects.  2. Pt will increase R shoulder PROM to WNL in all planes to improve functional use of R RUE.  3. Pt will  increase R shoulder AROM to WFL in all planes to improve functional use of R RUE.  4. Pt will improve R shoulder MMTs to = 5/5 to promote equal use of B UEs in performing functional tasks.  5. Pt will lift 10 lb objects without pain to promote functional QOL.  6. Pt to report pain </= 0/10  Long Term Goals (24-36 weeks)  - pt will demonstrate at least 95% LSI with HHD for flexion, abduction, ER, and IR for improved stability  - pt will demonstrate at least 95% LSI with UE Y-balance for improved postural control  - pt will pass push-up and pull-up testing to demonstrate normalized endurance  - pt will be able to perform sport-specific activities with appropriate form, without instability or pain          Plan     Plan of care Certification: 6/14/2024 to 2/21/25.     Outpatient Physical Therapy 2 times weekly for 36 weeks to include the following interventions: Electrical Stimulation NMES, Manual Therapy, Moist Heat/ Ice, Neuromuscular Re-ed, Patient Education, Self Care, Therapeutic Activities, Therapeutic Exercise, and Dry Needling    Deniz Seth, PT, DPT, SCS

## 2024-09-09 ENCOUNTER — CLINICAL SUPPORT (OUTPATIENT)
Dept: REHABILITATION | Facility: HOSPITAL | Age: 34
End: 2024-09-09
Payer: COMMERCIAL

## 2024-09-09 DIAGNOSIS — M25.611 DECREASED ROM OF RIGHT SHOULDER: Primary | ICD-10-CM

## 2024-09-09 DIAGNOSIS — R29.898 DECREASED STRENGTH OF UPPER EXTREMITY: ICD-10-CM

## 2024-09-09 PROCEDURE — 97530 THERAPEUTIC ACTIVITIES: CPT

## 2024-09-09 PROCEDURE — 97112 NEUROMUSCULAR REEDUCATION: CPT

## 2024-09-09 PROCEDURE — 97140 MANUAL THERAPY 1/> REGIONS: CPT

## 2024-09-09 NOTE — PROGRESS NOTES
Physical Therapy Daily Treatment Note     Name: Harinder Elliott  [facility] Number: [id number]    Therapy Diagnosis:   Encounter Diagnoses   Name Primary?    Decreased ROM of right shoulder Yes    Decreased strength of upper extremity        Physician: Servando Nielson III, *    Visit Date: 9/9/2024    Physician Orders: PT Eval and Treat   Medical Diagnosis from Referral: Biceps tendonitis on right [M75.21], Glenoid labral tear, right, initial encounter [S43.431A], Instability of right shoulder joint [M25.311]   Evaluation Date: 6/14/2024  Authorization Period Expiration: 5/24/25  Plan of Care Expiration: 2/21/25  Visit # / Visits authorized: 19/ 40  FOTO 2/2 administered 8/2     Time In: 9:00 AM  Time Out: 10:00 AM     Total Billable Time: 60 minutes  Precautions: Standard    DOS 5/28/24  OPERATION:   right  Shoulder osteochondral allograft transplantation, humeral head ICRS Grade 4 - posterior: 2.5 x 20 cm) (CPT 69879 ) (CPT 56847/20271 knee comparison codes) (complex, -22 modifier)  2. Shoulder arthroscopic anterior capsulorrhaphy (CPT 32792) (complex, -22 modifier)  3. Shoulder arthroscopic anterior labral repair (CPT 48031) (complex, -22 modifier)  4. Shoulder arthroscopic posterior labral repair, capsulorrhaphy, 7:00 position (CPT 35230)  5. Shoulder arthroscopic extensive debridement (anterior, posterior glenohumeral joint) (CPT 54903)  6. Shoulder manipulation under anesthesia (CPT 98793)  7. Shoulder arthroscopic lysis of adhesions (CPT 50166):  8. Shoulder arthroscopic loose body removal (CPT 70301)  9 . Shoulder arthroscopic interpositional arthroplasty (17001)  10. Shoulder arthroscopic SLAP type 8 repair (CPT 01019)    POST OPERATIVE PLAN: We will follow the arthroscopic [employer] repair guidelines. We discussed with the patient's family after surgery. The patient will remain in a sling for 6 weeks. We will start PT at the 3 week crista.   Quality of tissue: Good      Subjective     Pt reports: set  up net and hit chips into the net with no issues at all. Shoulder actually felt really good and felt like I couldve gone more  He was compliant with home exercise program.  Response to previous treatment: ongoing   Functional change: ongoing    Pain: 1/10  Location: right shoulder      Objective   DOS 5/28/24: 14 weeks 6 days as of 9/9    Shoulder Passive Range of Motion:   Shoulder Right Left   Flexion    150 180   ER at 0    60 85   ER at 90    60 105   IR at 90    30 60      Shoulder Active Range of Motion:   Shoulder Right Left   Flexion    140 180   ER at 0    45 80   HEBER    T2 T4   FIR    L3 T12        Harinder received therapeutic exercises to develop strength, endurance, ROM, flexibility, and posture for 0 minutes including:  Supine dowel ER AAROM at 45 and 90 3x10 5s holds    Not today  Pulleys flexion/scaption x 5'  Standing dowel ER at 90 deg flexion 3x10 5s holds  SL open books 25xB  Supine dowel flexion AAROM 3x10 5s holds        Harinder received the following manual therapy techniques: Joint mobilizations were applied to the: R shoulder for 10 minutes, including:  Skilled PROM all directions  GH joint centering mobilizations  Grade II/III a/p, inferior GH joint mobs  Manual lat contract/relax  ER MWM with GH joint centering glide  Sidelying scapular mobs    Not today  Patient provided written and verbal consent to receive therapeutic dry needling at today's visit (see consent form scanned into chart). TDN performed to R bicep and anterior deltoid musculature in the supine position using Seirin J type needle sized .30x 40mm+estim.  TDN performed to reduce pain and muscle tension, promote blood flow, and improve ROM and function x 20 minutes. Pt tolerated tx well without adverse effects. The patient was educated on what to expect following the procedure and he verbalized understanding.    Harinder participated in neuromuscular re-education activities to improve: Coordination, Kinesthetic, Sense,  "Proprioception, and Posture for 25 minutes. The following activities were included:  Side-plank + ER plyo drops 3x30  Side-plank + horizontal ABD plyo drops 3x30  Bent over prone T plyo drops 3x30  Standing 90/90 plyo ball flips 3x30  Pushup + on 24" box 4x8          Not today  Supine ER at 90/90 RTB 3x10  Prone W at end range 3x10 3" holds  Prone 90/90 over yoga block 3x10 3" holds  2# SL ER 4x15  Prone T on SB with contralat iso holds 10/10/10 x 3 rounds  OTB high row-> 90/90 ER 3x10  Supine ER at 90/90 RTB 3x10  Prone T 3x10  OTB ER-> press-> 90/90 body turn 3x10  GTB high rows 3x10  Body blade: at 0, at 90 flexion, at 90 deg ABD 30s x3 each  Quadruped shoulder taps 3x10  OTB ER-> serratus press 3x10  Quadruped rock backs 3x10  2# Side-lying horizontal ABD 3x10-15  2# SL ABD 3x10  OTB ER isotonics at 90 deg flexion 3x10  GTB IR isotonics at 90 deg flexion 3x10  Side-lying ER 3x10  Side-lying ABD AROM 3x10  RTB resisted supine flexion 3x10  YTB ER walkouts 3x6  OTB IR walkouts 3x6  RTB supine ER at 45 3x10 5" holds  Single arm wall slide 3x10  Wall ball RS: up/down, side/side, CW, CCW 3x10 each  Seated ER at 90 deg flexion 3x10  OTB horizontal ABD 3x10  Scapular clocks 2x6  Supine handcuffs at 90 deg GTB 3x10  Serratus wall slide 3x10  3# Prone row 3x10  2# Prone extension 3x10  Side-lying flexion AAROM supported on dowel 3x10       Harinder participated in dynamic functional therapeutic activities to improve functional performance for 25  minutes, including:  UBE 5' forward, 5' backwards to improve tissue extensibility and cartilage health  Floor press   -15# 2x15  -20# 2x12  -25# x8-10    23# cable column row-> press 3xB        Not today:  UE MB 6# plyo series: 30x 3  -chest pass  -diagonals  -side toss  -8# MB rotational MB wall slam 3x8  2# scaption raise with contralat iso holds 10/10/10 x 3 rounds  27# 1/2 kneeling cable column high/low row 3x10  20# 3D strap rotations 3x10  35# BB bent over row 4x8  35# BB " [employer] press 4x8    Home Exercises Provided and Patient Education Provided     Education provided:   - HEP, POC, symptom management, recovery timeline, post-operative precautions    Written Home Exercises Provided: yes.  Exercises were reviewed and Harinder was able to demonstrate them prior to the end of the session.  Harinder demonstrated good  understanding of the education provided.     See EMR under Patient Instructions for exercises provided prior visit.    Assessment     Harinder completed session as noted above. Continues to demo significant improvement in AROM. Slight shrug noted at end range flexion AROM however HEBER and FIR are nearing symmetrical. Worked on cuff activation at end range to facilitate increased carry-over. Continues to demo restrictions into passive ER/IR at 30 and 90 deg. Intro'd single arm UE plyometrics during session today as well as functional strengthening progressions with no complaints of pain and good response to tx session noted    Harinder Is progressing well towards his goals.   Pt prognosis is Excellent.     Pt will continue to benefit from skilled outpatient physical therapy to address the deficits listed in the problem list box on initial evaluation, provide pt/family education and to maximize pt's level of independence in the home and community environment.     Pt's spiritual, cultural and educational needs considered and pt agreeable to plan of care and goals.     Anticipated barriers to physical therapy: none    Goals: Goals:  Short Term Goals (4 Weeks):   1. Pt will be independent with HEP to supplement PT in improving functional use of R UE.  2. Pt will increase pain free R shoulder elevation PROM to >/= 160 deg to improve functional mobility of UE  3. Pt will increase R shoulder ER PROM in 90 deg abduction to >/=30 deg to improve functional mobility of UE  4. Pt will increase R elbow AAROM to WNL deg in 4 weeks to improve functional mobility of UE.  Mid-Term Goals (12-16  Weeks):   1. Pt will improve FOTO score to </=% limited to decrease perceived limitation with carrying, moving, and handling objects.  2. Pt will increase R shoulder PROM to WNL in all planes to improve functional use of R RUE.  3. Pt will increase R shoulder AROM to WFL in all planes to improve functional use of R RUE.  4. Pt will improve R shoulder MMTs to = 5/5 to promote equal use of B UEs in performing functional tasks.  5. Pt will lift 10 lb objects without pain to promote functional QOL.  6. Pt to report pain </= 0/10  Long Term Goals (24-36 weeks)  - pt will demonstrate at least 95% LSI with HHD for flexion, abduction, ER, and IR for improved stability  - pt will demonstrate at least 95% LSI with UE Y-balance for improved postural control  - pt will pass push-up and pull-up testing to demonstrate normalized endurance  - pt will be able to perform sport-specific activities with appropriate form, without instability or pain          Plan     Plan of care Certification: 6/14/2024 to 2/21/25.     Outpatient Physical Therapy 2 times weekly for 36 weeks to include the following interventions: Electrical Stimulation NMES, Manual Therapy, Moist Heat/ Ice, Neuromuscular Re-ed, Patient Education, Self Care, Therapeutic Activities, Therapeutic Exercise, and Dry Needling    Deniz Seth, PT, DPT, SCS

## 2024-09-12 ENCOUNTER — CLINICAL SUPPORT (OUTPATIENT)
Dept: REHABILITATION | Facility: HOSPITAL | Age: 34
End: 2024-09-12
Payer: COMMERCIAL

## 2024-09-12 DIAGNOSIS — M25.611 DECREASED ROM OF RIGHT SHOULDER: Primary | ICD-10-CM

## 2024-09-12 DIAGNOSIS — R29.898 DECREASED STRENGTH OF UPPER EXTREMITY: ICD-10-CM

## 2024-09-12 PROCEDURE — 97112 NEUROMUSCULAR REEDUCATION: CPT

## 2024-09-12 PROCEDURE — 97140 MANUAL THERAPY 1/> REGIONS: CPT

## 2024-09-12 PROCEDURE — 97530 THERAPEUTIC ACTIVITIES: CPT

## 2024-09-12 NOTE — PROGRESS NOTES
Physical Therapy Daily Treatment Note     Name: Harinder Elliott  [facility] Number: [id number]    Therapy Diagnosis:   Encounter Diagnoses   Name Primary?    Decreased ROM of right shoulder Yes    Decreased strength of upper extremity          Physician: Servando Nielson III, *    Visit Date: 9/12/2024    Physician Orders: PT Eval and Treat   Medical Diagnosis from Referral: Biceps tendonitis on right [M75.21], Glenoid labral tear, right, initial encounter [S43.431A], Instability of right shoulder joint [M25.311]   Evaluation Date: 6/14/2024  Authorization Period Expiration: 5/24/25  Plan of Care Expiration: 2/21/25  Visit # / Visits authorized: 20/ 40  FOTO 2/2 administered 8/2     Time In: 9:00 AM  Time Out: 10:00 AM     Total Billable Time: 60 minutes  Precautions: Standard    DOS 5/28/24  OPERATION:   right  Shoulder osteochondral allograft transplantation, humeral head ICRS Grade 4 - posterior: 2.5 x 20 cm) (CPT 65761 ) (CPT 42879/73890 knee comparison codes) (complex, -22 modifier)  2. Shoulder arthroscopic anterior capsulorrhaphy (CPT 74802) (complex, -22 modifier)  3. Shoulder arthroscopic anterior labral repair (CPT 32026) (complex, -22 modifier)  4. Shoulder arthroscopic posterior labral repair, capsulorrhaphy, 7:00 position (CPT 93012)  5. Shoulder arthroscopic extensive debridement (anterior, posterior glenohumeral joint) (CPT 02459)  6. Shoulder manipulation under anesthesia (CPT 22033)  7. Shoulder arthroscopic lysis of adhesions (CPT 70144):  8. Shoulder arthroscopic loose body removal (CPT 32330)  9 . Shoulder arthroscopic interpositional arthroplasty (68842)  10. Shoulder arthroscopic SLAP type 8 repair (CPT 56928)    POST OPERATIVE PLAN: We will follow the arthroscopic [employer] repair guidelines. We discussed with the patient's family after surgery. The patient will remain in a sling for 6 weeks. We will start PT at the 3 week crista.   Quality of tissue: Good      Subjective     Pt reports:  soreness after last visit that has resolved since. Muscle soreness that felt good  He was compliant with home exercise program.  Response to previous treatment: ongoing   Functional change: ongoing    Pain: 1/10  Location: right shoulder      Objective   DOS 5/28/24: 15 weeks 2 days as of 9/12    Shoulder Passive Range of Motion:   Shoulder Right Left   Flexion    150 180   ER at 0    60 85   ER at 90    60 105   IR at 90    30 60      Shoulder Active Range of Motion:   Shoulder Right Left   Flexion    150 180   ER at 0    45 80   HEBER    T2 T4   FIR    L3 T12        Harinder received therapeutic exercises to develop strength, endurance, ROM, flexibility, and posture for 5 minutes including:  Supine dowel ER AAROM at 45 and 90 3x10 5s holds  Supine dowel flexion AAROM 3x10 5s holds    Not today  Pulleys flexion/scaption x 5'  Standing dowel ER at 90 deg flexion 3x10 5s holds  SL open books 25xB  Supine dowel flexion AAROM 3x10 5s holds        Harinder received the following manual therapy techniques: Joint mobilizations were applied to the: R shoulder for 10 minutes, including:  Skilled PROM all directions  GH joint centering mobilizations  Grade II/III a/p, inferior GH joint mobs  Manual lat contract/relax  ER MWM with GH joint centering glide  Sidelying scapular mobs    Not today  Patient provided written and verbal consent to receive therapeutic dry needling at today's visit (see consent form scanned into chart). TDN performed to R bicep and anterior deltoid musculature in the supine position using Seirin J type needle sized .30x 40mm+estim.  TDN performed to reduce pain and muscle tension, promote blood flow, and improve ROM and function x 20 minutes. Pt tolerated tx well without adverse effects. The patient was educated on what to expect following the procedure and he verbalized understanding.    Harinder participated in neuromuscular re-education activities to improve: Coordination, Kinesthetic, Sense,  "Proprioception, and Posture for 25 minutes. The following activities were included:  Side-plank + ER plyo drops 3x30  Side-plank + horizontal ABD plyo drops 3x30  Prone T plyo drops 3x30  Quadruped wall ball RS: up/down, side/side, CW, CCW 2x10B  High plank shoulder taps 3x10B          Not today  Standing 90/90 plyo ball flips 3x30  Supine ER at 90/90 RTB 3x10  Prone W at end range 3x10 3" holds  Prone 90/90 over yoga block 3x10 3" holds  2# SL ER 4x15  Prone T on SB with contralat iso holds 10/10/10 x 3 rounds  OTB high row-> 90/90 ER 3x10  Supine ER at 90/90 RTB 3x10  Prone T 3x10  OTB ER-> press-> 90/90 body turn 3x10  GTB high rows 3x10  Body blade: at 0, at 90 flexion, at 90 deg ABD 30s x3 each  Quadruped shoulder taps 3x10  OTB ER-> serratus press 3x10  Quadruped rock backs 3x10  2# Side-lying horizontal ABD 3x10-15  2# SL ABD 3x10  OTB ER isotonics at 90 deg flexion 3x10  GTB IR isotonics at 90 deg flexion 3x10  Side-lying ER 3x10  Side-lying ABD AROM 3x10  RTB resisted supine flexion 3x10  YTB ER walkouts 3x6  OTB IR walkouts 3x6  RTB supine ER at 45 3x10 5" holds  Single arm wall slide 3x10  Wall ball RS: up/down, side/side, CW, CCW 3x10 each  Seated ER at 90 deg flexion 3x10  OTB horizontal ABD 3x10  Scapular clocks 2x6  Supine handcuffs at 90 deg GTB 3x10  Serratus wall slide 3x10  3# Prone row 3x10  2# Prone extension 3x10  Side-lying flexion AAROM supported on dowel 3x10       Harinder participated in dynamic functional therapeutic activities to improve functional performance for 20  minutes, including:  UBE 5' forward, 5' backwards to improve tissue extensibility and cartilage health  Golf  rotational swing drill 3x10B  8# MB chest press-> jammer 3x6        Not today:  23# cable column row-> press 3xB    Floor press   -15# 2x15  -20# 2x12  -25# x8-10  UE MB 6# plyo series: 30x 3  -chest pass  -diagonals  -side toss  -8# MB rotational MB wall slam 3x8  2# scaption raise with contralat iso holds " 10/10/10 x 3 rounds  27# 1/2 kneeling cable column high/low row 3x10  20# 3D strap rotations 3x10  35# BB bent over row 4x8  35# BB landmine press 4x8    Home Exercises Provided and Patient Education Provided     Education provided:   - HEP, POC, symptom management, recovery timeline, post-operative precautions    Written Home Exercises Provided: yes.  Exercises were reviewed and Harinder was able to demonstrate them prior to the end of the session.  Harinder demonstrated good  understanding of the education provided.     See EMR under Patient Instructions for exercises provided prior visit.    Assessment     Harinder completed session as noted above. Continues to demo significant improvement in AROM. Slight shrug noted at end range flexion AROM however HEBER and FIR are nearing symmetrical. Worked on cuff activation at end range to facilitate increased carry-over. Continues to demo restrictions into passive ER/IR at 30 and 90 deg. Intro'd single arm UE plyometrics during session today as well as functional strengthening progressions with no complaints of pain and good response to tx session noted    Harinder Is progressing well towards his goals.   Pt prognosis is Excellent.     Pt will continue to benefit from skilled outpatient physical therapy to address the deficits listed in the problem list box on initial evaluation, provide pt/family education and to maximize pt's level of independence in the home and community environment.     Pt's spiritual, cultural and educational needs considered and pt agreeable to plan of care and goals.     Anticipated barriers to physical therapy: none    Goals: Goals:  Short Term Goals (4 Weeks):   1. Pt will be independent with HEP to supplement PT in improving functional use of R UE.  2. Pt will increase pain free R shoulder elevation PROM to >/= 160 deg to improve functional mobility of UE  3. Pt will increase R shoulder ER PROM in 90 deg abduction to >/=30 deg to improve functional  mobility of UE  4. Pt will increase R elbow AAROM to WNL deg in 4 weeks to improve functional mobility of UE.  Mid-Term Goals (12-16 Weeks):   1. Pt will improve FOTO score to </=% limited to decrease perceived limitation with carrying, moving, and handling objects.  2. Pt will increase R shoulder PROM to WNL in all planes to improve functional use of R RUE.  3. Pt will increase R shoulder AROM to WFL in all planes to improve functional use of R RUE.  4. Pt will improve R shoulder MMTs to = 5/5 to promote equal use of B UEs in performing functional tasks.  5. Pt will lift 10 lb objects without pain to promote functional QOL.  6. Pt to report pain </= 0/10  Long Term Goals (24-36 weeks)  - pt will demonstrate at least 95% LSI with HHD for flexion, abduction, ER, and IR for improved stability  - pt will demonstrate at least 95% LSI with UE Y-balance for improved postural control  - pt will pass push-up and pull-up testing to demonstrate normalized endurance  - pt will be able to perform sport-specific activities with appropriate form, without instability or pain          Plan     Plan of care Certification: 6/14/2024 to 2/21/25.     Outpatient Physical Therapy 2 times weekly for 36 weeks to include the following interventions: Electrical Stimulation NMES, Manual Therapy, Moist Heat/ Ice, Neuromuscular Re-ed, Patient Education, Self Care, Therapeutic Activities, Therapeutic Exercise, and Dry Needling    Deniz Seth, PT, DPT, SCS

## 2024-09-19 ENCOUNTER — CLINICAL SUPPORT (OUTPATIENT)
Dept: REHABILITATION | Facility: HOSPITAL | Age: 34
End: 2024-09-19
Payer: COMMERCIAL

## 2024-09-19 DIAGNOSIS — R29.898 DECREASED STRENGTH OF UPPER EXTREMITY: ICD-10-CM

## 2024-09-19 DIAGNOSIS — M25.611 DECREASED ROM OF RIGHT SHOULDER: Primary | ICD-10-CM

## 2024-09-19 PROCEDURE — 97530 THERAPEUTIC ACTIVITIES: CPT

## 2024-09-19 PROCEDURE — 97140 MANUAL THERAPY 1/> REGIONS: CPT

## 2024-09-19 PROCEDURE — 97112 NEUROMUSCULAR REEDUCATION: CPT

## 2024-09-19 NOTE — PROGRESS NOTES
Physical Therapy Daily Treatment Note     Name: Harinder Elliott  [facility] Number: [id number]    Therapy Diagnosis:   Encounter Diagnoses   Name Primary?    Decreased ROM of right shoulder Yes    Decreased strength of upper extremity            Physician: Servando Nielson III, *    Visit Date: 9/19/2024    Physician Orders: PT Eval and Treat   Medical Diagnosis from Referral: Biceps tendonitis on right [M75.21], Glenoid labral tear, right, initial encounter [S43.431A], Instability of right shoulder joint [M25.311]   Evaluation Date: 6/14/2024  Authorization Period Expiration: 5/24/25  Plan of Care Expiration: 2/21/25  Visit # / Visits authorized: 21/ 40  FOTO 2/2 administered 8/2     Time In: 8:05 AM  Time Out: 9:00 AM     Total Billable Time: 55 minutes  Precautions: Standard    DOS 5/28/24  OPERATION:   right  Shoulder osteochondral allograft transplantation, humeral head ICRS Grade 4 - posterior: 2.5 x 20 cm) (CPT 47834 ) (CPT 01798/56693 knee comparison codes) (complex, -22 modifier)  2. Shoulder arthroscopic anterior capsulorrhaphy (CPT 59835) (complex, -22 modifier)  3. Shoulder arthroscopic anterior labral repair (CPT 85404) (complex, -22 modifier)  4. Shoulder arthroscopic posterior labral repair, capsulorrhaphy, 7:00 position (CPT 26182)  5. Shoulder arthroscopic extensive debridement (anterior, posterior glenohumeral joint) (CPT 30939)  6. Shoulder manipulation under anesthesia (CPT 21851)  7. Shoulder arthroscopic lysis of adhesions (CPT 35755):  8. Shoulder arthroscopic loose body removal (CPT 55449)  9 . Shoulder arthroscopic interpositional arthroplasty (48014)  10. Shoulder arthroscopic SLAP type 8 repair (CPT 44158)    POST OPERATIVE PLAN: We will follow the arthroscopic Bank[employer] repair guidelines. We discussed with the patient's family after surgery. The patient will remain in a sling for 6 weeks. We will start PT at the 3 week crista.   Quality of tissue: Good      Subjective     Pt reports:  able to swing golf clubs yesterday went up to 3/4 swing. Reports mild soreness following that resolved within a few hours  He was compliant with home exercise program.  Response to previous treatment: ongoing   Functional change: ongoing    Pain: 1/10  Location: right shoulder      Objective   DOS 5/28/24: 16 weeks 2 days as of 9/19    Shoulder Passive Range of Motion:   Shoulder Right Left   Flexion    150 180   ER at 0    60 85   ER at 90    60 105   IR at 90    30 60      Shoulder Active Range of Motion:   Shoulder Right Left   Flexion    150 180   ER at 0    45 80   HEBER    T2 T4   FIR    L3 T12        Harinder received therapeutic exercises to develop strength, endurance, ROM, flexibility, and posture for 0 minutes including:    Not today  Pulleys flexion/scaption x 5'  Standing dowel ER at 90 deg flexion 3x10 5s holds  SL open books 25xB  Supine dowel flexion AAROM 3x10 5s holds  Supine dowel ER AAROM at 45 and 90 3x10 5s holds  Supine dowel flexion AAROM 3x10 5s holds     Harinder received the following manual therapy techniques: Joint mobilizations were applied to the: R shoulder for 10 minutes, including:  Skilled PROM all directions  GH joint centering mobilizations  Grade II/III a/p, inferior GH joint mobs  Manual lat contract/relax  ER MWM with GH joint centering glide  Sidelying scapular mobs    Not today  Patient provided written and verbal consent to receive therapeutic dry needling at today's visit (see consent form scanned into chart). TDN performed to R bicep and anterior deltoid musculature in the supine position using Seirin J type needle sized .30x 40mm+estim.  TDN performed to reduce pain and muscle tension, promote blood flow, and improve ROM and function x 20 minutes. Pt tolerated tx well without adverse effects. The patient was educated on what to expect following the procedure and he verbalized understanding.    Harinder participated in neuromuscular re-education activities to improve:  "Coordination, Kinesthetic, Sense, Proprioception, and Posture for 40 minutes. The following activities were included:  15# quadruped acore CC rotational row 3x10B  2# quadruped prone T 3x10B  15# side-plank +CC row 3x10B  10# CC hip hinge-> lat pull 3x10  20# CC chop handle lifts 3x10B        Side-plank + ER plyo drops 3x30  Side-plank + horizontal ABD plyo drops 3x30  Prone T plyo drops 3x30  Quadruped wall ball RS: up/down, side/side, CW, CCW 2x10B  High plank shoulder taps 3x10B          Not today  Standing 90/90 plyo ball flips 3x30  Supine ER at 90/90 RTB 3x10  Prone W at end range 3x10 3" holds  Prone 90/90 over yoga block 3x10 3" holds  2# SL ER 4x15  Prone T on SB with contralat iso holds 10/10/10 x 3 rounds  OTB high row-> 90/90 ER 3x10  Supine ER at 90/90 RTB 3x10  Prone T 3x10  OTB ER-> press-> 90/90 body turn 3x10  GTB high rows 3x10  Body blade: at 0, at 90 flexion, at 90 deg ABD 30s x3 each  Quadruped shoulder taps 3x10  OTB ER-> serratus press 3x10  Quadruped rock backs 3x10  2# Side-lying horizontal ABD 3x10-15  2# SL ABD 3x10  OTB ER isotonics at 90 deg flexion 3x10  GTB IR isotonics at 90 deg flexion 3x10  Side-lying ER 3x10  Side-lying ABD AROM 3x10  RTB resisted supine flexion 3x10  YTB ER walkouts 3x6  OTB IR walkouts 3x6  RTB supine ER at 45 3x10 5" holds  Single arm wall slide 3x10  Wall ball RS: up/down, side/side, CW, CCW 3x10 each  Seated ER at 90 deg flexion 3x10  OTB horizontal ABD 3x10  Scapular clocks 2x6  Supine handcuffs at 90 deg GTB 3x10  Serratus wall slide 3x10  3# Prone row 3x10  2# Prone extension 3x10  Side-lying flexion AAROM supported on dowel 3x10       Harinder participated in dynamic functional therapeutic activities to improve functional performance for 10  minutes, including:  UBE 5' forward, 5' backwards to improve tissue extensibility and cartilage health        Not today:  23# cable column row-> press 3xB  Golf  rotational swing drill 3x10B  8# MB chest press-> " jammer 3x6  Floor press   -15# 2x15  -20# 2x12  -25# x8-10  UE MB 6# plyo series: 30x 3  -chest pass  -diagonals  -side toss  -8# MB rotational MB wall slam 3x8  2# scaption raise with contralat iso holds 10/10/10 x 3 rounds  27# 1/2 kneeling cable column high/low row 3x10  20# 3D strap rotations 3x10  35# BB bent over row 4x8  35# BB landmine press 4x8    Home Exercises Provided and Patient Education Provided     Education provided:   - HEP, POC, symptom management, recovery timeline, post-operative precautions    Written Home Exercises Provided: yes.  Exercises were reviewed and Harinder was able to demonstrate them prior to the end of the session.  Harinder demonstrated good  understanding of the education provided.     See EMR under Patient Instructions for exercises provided prior visit.    Assessment     Harinder completed session as noted above. Continues to demo significant improvement in AROM. Slight shrug noted at end range flexion AROM however HEBER and FIR are nearing symmetrical. Worked on cuff activation at end range to facilitate increased carry-over. Continues to demo restrictions into passive ER/IR at 30 and 90 deg. Intro'd single arm UE plyometrics during session today as well as functional strengthening progressions with no complaints of pain and good response to tx session noted    Harinder Is progressing well towards his goals.   Pt prognosis is Excellent.     Pt will continue to benefit from skilled outpatient physical therapy to address the deficits listed in the problem list box on initial evaluation, provide pt/family education and to maximize pt's level of independence in the home and community environment.     Pt's spiritual, cultural and educational needs considered and pt agreeable to plan of care and goals.     Anticipated barriers to physical therapy: none    Goals: Goals:  Short Term Goals (4 Weeks):   1. Pt will be independent with HEP to supplement PT in improving functional use of R UE.  2.  Pt will increase pain free R shoulder elevation PROM to >/= 160 deg to improve functional mobility of UE  3. Pt will increase R shoulder ER PROM in 90 deg abduction to >/=30 deg to improve functional mobility of UE  4. Pt will increase R elbow AAROM to WNL deg in 4 weeks to improve functional mobility of UE.  Mid-Term Goals (12-16 Weeks):   1. Pt will improve FOTO score to </=% limited to decrease perceived limitation with carrying, moving, and handling objects.  2. Pt will increase R shoulder PROM to WNL in all planes to improve functional use of R RUE.  3. Pt will increase R shoulder AROM to WFL in all planes to improve functional use of R RUE.  4. Pt will improve R shoulder MMTs to = 5/5 to promote equal use of B UEs in performing functional tasks.  5. Pt will lift 10 lb objects without pain to promote functional QOL.  6. Pt to report pain </= 0/10  Long Term Goals (24-36 weeks)  - pt will demonstrate at least 95% LSI with HHD for flexion, abduction, ER, and IR for improved stability  - pt will demonstrate at least 95% LSI with UE Y-balance for improved postural control  - pt will pass push-up and pull-up testing to demonstrate normalized endurance  - pt will be able to perform sport-specific activities with appropriate form, without instability or pain          Plan     Plan of care Certification: 6/14/2024 to 2/21/25.     Outpatient Physical Therapy 2 times weekly for 36 weeks to include the following interventions: Electrical Stimulation NMES, Manual Therapy, Moist Heat/ Ice, Neuromuscular Re-ed, Patient Education, Self Care, Therapeutic Activities, Therapeutic Exercise, and Dry Needling    Deniz eSth, PT, DPT, SCS

## 2024-09-23 ENCOUNTER — CLINICAL SUPPORT (OUTPATIENT)
Dept: REHABILITATION | Facility: HOSPITAL | Age: 34
End: 2024-09-23
Payer: COMMERCIAL

## 2024-09-23 DIAGNOSIS — R29.898 DECREASED STRENGTH OF UPPER EXTREMITY: ICD-10-CM

## 2024-09-23 DIAGNOSIS — M25.611 DECREASED ROM OF RIGHT SHOULDER: Primary | ICD-10-CM

## 2024-09-23 PROCEDURE — 97140 MANUAL THERAPY 1/> REGIONS: CPT

## 2024-09-23 PROCEDURE — 97530 THERAPEUTIC ACTIVITIES: CPT

## 2024-09-23 PROCEDURE — 97112 NEUROMUSCULAR REEDUCATION: CPT

## 2024-09-23 PROCEDURE — 97110 THERAPEUTIC EXERCISES: CPT

## 2024-09-23 NOTE — PROGRESS NOTES
Physical Therapy Daily Treatment Note     Name: Harinder Elliott  [facility] Number: [id number]    Therapy Diagnosis:   Encounter Diagnoses   Name Primary?    Decreased ROM of right shoulder Yes    Decreased strength of upper extremity        Physician: Servando Nielson III, *    Visit Date: 9/23/2024    Physician Orders: PT Eval and Treat   Medical Diagnosis from Referral: Biceps tendonitis on right [M75.21], Glenoid labral tear, right, initial encounter [S43.431A], Instability of right shoulder joint [M25.311]   Evaluation Date: 6/14/2024  Authorization Period Expiration: 5/24/25  Plan of Care Expiration: 2/21/25  Visit # / Visits authorized: 22/ 40  FOTO 3/3 administered 9/23     Time In: 8:05 AM  Time Out: 9:00 AM     Total Billable Time: 55 minutes  Precautions: Standard    DOS 5/28/24  OPERATION:   right  Shoulder osteochondral allograft transplantation, humeral head ICRS Grade 4 - posterior: 2.5 x 20 cm) (CPT 92225 ) (CPT 64134/45708 knee comparison codes) (complex, -22 modifier)  2. Shoulder arthroscopic anterior capsulorrhaphy (CPT 94954) (complex, -22 modifier)  3. Shoulder arthroscopic anterior labral repair (CPT 08849) (complex, -22 modifier)  4. Shoulder arthroscopic posterior labral repair, capsulorrhaphy, 7:00 position (CPT 60439)  5. Shoulder arthroscopic extensive debridement (anterior, posterior glenohumeral joint) (CPT 14663)  6. Shoulder manipulation under anesthesia (CPT 92276)  7. Shoulder arthroscopic lysis of adhesions (CPT 06699):  8. Shoulder arthroscopic loose body removal (CPT 28342)  9 . Shoulder arthroscopic interpositional arthroplasty (50851)  10. Shoulder arthroscopic SLAP type 8 repair (CPT 75410)    POST OPERATIVE PLAN: We will follow the arthroscopic [employer] repair guidelines. We discussed with the patient's family after surgery. The patient will remain in a sling for 6 weeks. We will start PT at the 3 week crista.   Quality of tissue: Good      Subjective     Pt reports:  feeling good  He was compliant with home exercise program.  Response to previous treatment: ongoing   Functional change: ongoing    Pain: 1/10  Location: right shoulder      Objective   DOS 5/28/24: 17 weeks 2 days as of 9/23    Shoulder Passive Range of Motion:   Shoulder Right Left   Flexion    150 180   ER at 0    60 85   ER at 90    60 105   IR at 90    30 60      Shoulder Active Range of Motion:   Shoulder Right Left   Flexion    150 180   ER at 0    45 80   HEBER    T2 T4   FIR    L3 T12        Harinder received therapeutic exercises to develop strength, endurance, ROM, flexibility, and posture for 8 minutes including:  Supine dowel ER AAROM at 45 and 90 3x10 5s holds    Not today  Pulleys flexion/scaption x 5'  Standing dowel ER at 90 deg flexion 3x10 5s holds  SL open books 25xB  Supine dowel flexion AAROM 3x10 5s holds  Supine dowel flexion AAROM 3x10 5s holds     Harinder received the following manual therapy techniques: Joint mobilizations were applied to the: R shoulder for 10 minutes, including:  Skilled PROM all directions  GH joint centering mobilizations  Grade II/III a/p, inferior GH joint mobs  Manual lat contract/relax  ER MWM with GH joint centering glide  Sidelying scapular mobs    Not today  Patient provided written and verbal consent to receive therapeutic dry needling at today's visit (see consent form scanned into chart). TDN performed to R bicep and anterior deltoid musculature in the supine position using Seirin J type needle sized .30x 40mm+estim.  TDN performed to reduce pain and muscle tension, promote blood flow, and improve ROM and function x 20 minutes. Pt tolerated tx well without adverse effects. The patient was educated on what to expect following the procedure and he verbalized understanding.    Harinder participated in neuromuscular re-education activities to improve: Coordination, Kinesthetic, Sense, Proprioception, and Posture for 12 minutes. The following activities were  "included:  3# Cable column D2 PNF 3x10B  10#  walk with 25# contralat farmer carry x 3 laps          Side-plank + ER plyo drops 3x30  Side-plank + horizontal ABD plyo drops 3x30  Prone T plyo drops 3x30  Quadruped wall ball RS: up/down, side/side, CW, CCW 2x10B  High plank shoulder taps 3x10B          Not today  15# quadruped acore CC rotational row 3x10B  2# quadruped prone T 3x10B  15# side-plank +CC row 3x10B  10# CC hip hinge-> lat pull 3x10  20# CC chop handle lifts 3x10B  Standing 90/90 plyo ball flips 3x30  Supine ER at 90/90 RTB 3x10  Prone W at end range 3x10 3" holds  Prone 90/90 over yoga block 3x10 3" holds  2# SL ER 4x15  Prone T on SB with contralat iso holds 10/10/10 x 3 rounds  OTB high row-> 90/90 ER 3x10  Supine ER at 90/90 RTB 3x10  Prone T 3x10  OTB ER-> press-> 90/90 body turn 3x10  GTB high rows 3x10  Body blade: at 0, at 90 flexion, at 90 deg ABD 30s x3 each  Quadruped shoulder taps 3x10  OTB ER-> serratus press 3x10  Quadruped rock backs 3x10  2# Side-lying horizontal ABD 3x10-15  2# SL ABD 3x10  OTB ER isotonics at 90 deg flexion 3x10  GTB IR isotonics at 90 deg flexion 3x10  Side-lying ER 3x10  Side-lying ABD AROM 3x10  RTB resisted supine flexion 3x10  YTB ER walkouts 3x6  OTB IR walkouts 3x6  RTB supine ER at 45 3x10 5" holds  Single arm wall slide 3x10  Wall ball RS: up/down, side/side, CW, CCW 3x10 each  Seated ER at 90 deg flexion 3x10  OTB horizontal ABD 3x10  Scapular clocks 2x6  Supine handcuffs at 90 deg GTB 3x10  Serratus wall slide 3x10  3# Prone row 3x10  2# Prone extension 3x10  Side-lying flexion AAROM supported on dowel 3x10       Harinder participated in dynamic functional therapeutic activities to improve functional performance for 25  minutes, including:  UBE 5' forward, 5' backwards to improve tissue extensibility and cartilage health  15# renegade row 4x8B  55# landmine row-> rotational press 4x8B        Not today:  23# cable column row-> press 3xB  Golf  " rotational swing drill 3x10B  8# MB chest press-> jammer 3x6  Floor press   -15# 2x15  -20# 2x12  -25# x8-10  UE MB 6# plyo series: 30x 3  -chest pass  -diagonals  -side toss  -8# MB rotational MB wall slam 3x8  2# scaption raise with contralat iso holds 10/10/10 x 3 rounds  27# 1/2 kneeling cable column high/low row 3x10  20# 3D strap rotations 3x10  35# BB bent over row 4x8  35# BB landmine press 4x8    Home Exercises Provided and Patient Education Provided     Education provided:   - HEP, POC, symptom management, recovery timeline, post-operative precautions    Written Home Exercises Provided: yes.  Exercises were reviewed and Harinder was able to demonstrate them prior to the end of the session.  Harinder demonstrated good  understanding of the education provided.     See EMR under Patient Instructions for exercises provided prior visit.    Assessment     Harinder completed session as noted above. Continues to demo significant improvement in AROM. Slight shrug noted at end range flexion AROM however HEBER and FIR are nearing symmetrical. Worked on cuff activation at end range to facilitate increased carry-over. Continues to demo restrictions into passive ER/IR at 30 and 90 deg. Intro'd single arm UE plyometrics during session today as well as functional strengthening progressions with no complaints of pain and good response to tx session noted    Harinder Is progressing well towards his goals.   Pt prognosis is Excellent.     Pt will continue to benefit from skilled outpatient physical therapy to address the deficits listed in the problem list box on initial evaluation, provide pt/family education and to maximize pt's level of independence in the home and community environment.     Pt's spiritual, cultural and educational needs considered and pt agreeable to plan of care and goals.     Anticipated barriers to physical therapy: none    Goals: Goals:  Short Term Goals (4 Weeks):   1. Pt will be independent with HEP to  supplement PT in improving functional use of R UE.  2. Pt will increase pain free R shoulder elevation PROM to >/= 160 deg to improve functional mobility of UE  3. Pt will increase R shoulder ER PROM in 90 deg abduction to >/=30 deg to improve functional mobility of UE  4. Pt will increase R elbow AAROM to WNL deg in 4 weeks to improve functional mobility of UE.  Mid-Term Goals (12-16 Weeks):   1. Pt will improve FOTO score to </=% limited to decrease perceived limitation with carrying, moving, and handling objects.  2. Pt will increase R shoulder PROM to WNL in all planes to improve functional use of R RUE.  3. Pt will increase R shoulder AROM to WFL in all planes to improve functional use of R RUE.  4. Pt will improve R shoulder MMTs to = 5/5 to promote equal use of B UEs in performing functional tasks.  5. Pt will lift 10 lb objects without pain to promote functional QOL.  6. Pt to report pain </= 0/10  Long Term Goals (24-36 weeks)  - pt will demonstrate at least 95% LSI with HHD for flexion, abduction, ER, and IR for improved stability  - pt will demonstrate at least 95% LSI with UE Y-balance for improved postural control  - pt will pass push-up and pull-up testing to demonstrate normalized endurance  - pt will be able to perform sport-specific activities with appropriate form, without instability or pain          Plan     Plan of care Certification: 6/14/2024 to 2/21/25.     Outpatient Physical Therapy 2 times weekly for 36 weeks to include the following interventions: Electrical Stimulation NMES, Manual Therapy, Moist Heat/ Ice, Neuromuscular Re-ed, Patient Education, Self Care, Therapeutic Activities, Therapeutic Exercise, and Dry Needling    Deniz Seth, PT, DPT, SCS

## 2024-09-27 ENCOUNTER — CLINICAL SUPPORT (OUTPATIENT)
Dept: REHABILITATION | Facility: HOSPITAL | Age: 34
End: 2024-09-27
Payer: COMMERCIAL

## 2024-09-27 DIAGNOSIS — R29.898 DECREASED STRENGTH OF UPPER EXTREMITY: ICD-10-CM

## 2024-09-27 DIAGNOSIS — M25.611 DECREASED ROM OF RIGHT SHOULDER: Primary | ICD-10-CM

## 2024-09-27 PROCEDURE — 97112 NEUROMUSCULAR REEDUCATION: CPT

## 2024-09-27 PROCEDURE — 97530 THERAPEUTIC ACTIVITIES: CPT

## 2024-09-27 PROCEDURE — 97140 MANUAL THERAPY 1/> REGIONS: CPT

## 2024-09-27 NOTE — PROGRESS NOTES
Physical Therapy Daily Treatment Note     Name: Harinder Elliott  [facility] Number: [id number]    Therapy Diagnosis:   Encounter Diagnoses   Name Primary?    Decreased ROM of right shoulder Yes    Decreased strength of upper extremity          Physician: Servando Nielson III, *    Visit Date: 9/27/2024    Physician Orders: PT Eval and Treat   Medical Diagnosis from Referral: Biceps tendonitis on right [M75.21], Glenoid labral tear, right, initial encounter [S43.431A], Instability of right shoulder joint [M25.311]   Evaluation Date: 6/14/2024  Authorization Period Expiration: 5/24/25  Plan of Care Expiration: 2/21/25  Visit # / Visits authorized: 23/ 40  FOTO 3/3 administered 9/23     Time In: 8:05 AM  Time Out: 9:00 AM     Total Billable Time: 55 minutes  Precautions: Standard    DOS 5/28/24  OPERATION:   right  Shoulder osteochondral allograft transplantation, humeral head ICRS Grade 4 - posterior: 2.5 x 20 cm) (CPT 52997 ) (CPT 02340/78991 knee comparison codes) (complex, -22 modifier)  2. Shoulder arthroscopic anterior capsulorrhaphy (CPT 48882) (complex, -22 modifier)  3. Shoulder arthroscopic anterior labral repair (CPT 83646) (complex, -22 modifier)  4. Shoulder arthroscopic posterior labral repair, capsulorrhaphy, 7:00 position (CPT 79365)  5. Shoulder arthroscopic extensive debridement (anterior, posterior glenohumeral joint) (CPT 74618)  6. Shoulder manipulation under anesthesia (CPT 18488)  7. Shoulder arthroscopic lysis of adhesions (CPT 56838):  8. Shoulder arthroscopic loose body removal (CPT 01288)  9 . Shoulder arthroscopic interpositional arthroplasty (74894)  10. Shoulder arthroscopic SLAP type 8 repair (CPT 77729)    POST OPERATIVE PLAN: We will follow the arthroscopic Bank[employer] repair guidelines. We discussed with the patient's family after surgery. The patient will remain in a sling for 6 weeks. We will start PT at the 3 week crista.   Quality of tissue: Good      Subjective     Pt reports:  feeling good  He was compliant with home exercise program.  Response to previous treatment: ongoing   Functional change: ongoing    Pain: 1/10  Location: right shoulder      Objective   DOS 5/28/24: 17 weeks 2 days as of 9/23    Shoulder Passive Range of Motion:   Shoulder Right Left   Flexion    150 180   ER at 0    60 85   ER at 90    60 105   IR at 90    30 60      Shoulder Active Range of Motion:   Shoulder Right Left   Flexion    150 180   ER at 0    45 80   HEBER    T2 T4   FIR    L3 T12        Harinder received therapeutic exercises to develop strength, endurance, ROM, flexibility, and posture for 0 minutes including:    Not today  Supine dowel ER AAROM at 45 and 90 3x10 5s holds  Pulleys flexion/scaption x 5'  Standing dowel ER at 90 deg flexion 3x10 5s holds  SL open books 25xB  Supine dowel flexion AAROM 3x10 5s holds  Supine dowel flexion AAROM 3x10 5s holds     Harinder received the following manual therapy techniques: Joint mobilizations were applied to the: R shoulder for 10 minutes, including:  Skilled PROM all directions  GH joint centering mobilizations  Grade II/III a/p, inferior GH joint mobs  Manual lat contract/relax  ER MWM with GH joint centering glide  Sidelying scapular mobs    Not today  Patient provided written and verbal consent to receive therapeutic dry needling at today's visit (see consent form scanned into chart). TDN performed to R bicep and anterior deltoid musculature in the supine position using Seirin J type needle sized .30x 40mm+estim.  TDN performed to reduce pain and muscle tension, promote blood flow, and improve ROM and function x 20 minutes. Pt tolerated tx well without adverse effects. The patient was educated on what to expect following the procedure and he verbalized understanding.    Harinder participated in neuromuscular re-education activities to improve: Coordination, Kinesthetic, Sense, Proprioception, and Posture for 20 minutes. The following activities were  "included:  3# Cable column D2 PNF 3x10B  10#  walk with 25# contralat farmer carry x 3 laps          Side-plank + ER plyo drops 3x30  Side-plank + horizontal ABD plyo drops 3x30  Prone T plyo drops 3x30  Quadruped wall ball RS: up/down, side/side, CW, CCW 2x10B  High plank shoulder taps 3x10B          Not today  15# quadruped acore CC rotational row 3x10B  2# quadruped prone T 3x10B  15# side-plank +CC row 3x10B  10# CC hip hinge-> lat pull 3x10  20# CC chop handle lifts 3x10B  Standing 90/90 plyo ball flips 3x30  Supine ER at 90/90 RTB 3x10  Prone W at end range 3x10 3" holds  Prone 90/90 over yoga block 3x10 3" holds  2# SL ER 4x15  Prone T on SB with contralat iso holds 10/10/10 x 3 rounds  OTB high row-> 90/90 ER 3x10  Supine ER at 90/90 RTB 3x10  Prone T 3x10  OTB ER-> press-> 90/90 body turn 3x10  GTB high rows 3x10  Body blade: at 0, at 90 flexion, at 90 deg ABD 30s x3 each  Quadruped shoulder taps 3x10  OTB ER-> serratus press 3x10  Quadruped rock backs 3x10  2# Side-lying horizontal ABD 3x10-15  2# SL ABD 3x10  OTB ER isotonics at 90 deg flexion 3x10  GTB IR isotonics at 90 deg flexion 3x10  Side-lying ER 3x10  Side-lying ABD AROM 3x10  RTB resisted supine flexion 3x10  YTB ER walkouts 3x6  OTB IR walkouts 3x6  RTB supine ER at 45 3x10 5" holds  Single arm wall slide 3x10  Wall ball RS: up/down, side/side, CW, CCW 3x10 each  Seated ER at 90 deg flexion 3x10  OTB horizontal ABD 3x10  Scapular clocks 2x6  Supine handcuffs at 90 deg GTB 3x10  Serratus wall slide 3x10  3# Prone row 3x10  2# Prone extension 3x10  Side-lying flexion AAROM supported on dowel 3x10       Harinder participated in dynamic functional therapeutic activities to improve functional performance for 25  minutes, including:  UBE 5' forward, 5' backwards to improve tissue extensibility and cartilage health  15# renegade row 4x8B  55# landmine row-> rotational press 4x8B        Not today:  23# cable column row-> press 3xB  Golf  " rotational swing drill 3x10B  8# MB chest press-> jammer 3x6  Floor press   -15# 2x15  -20# 2x12  -25# x8-10  UE MB 6# plyo series: 30x 3  -chest pass  -diagonals  -side toss  -8# MB rotational MB wall slam 3x8  2# scaption raise with contralat iso holds 10/10/10 x 3 rounds  27# 1/2 kneeling cable column high/low row 3x10  20# 3D strap rotations 3x10  35# BB bent over row 4x8  35# BB landmine press 4x8    Home Exercises Provided and Patient Education Provided     Education provided:   - HEP, POC, symptom management, recovery timeline, post-operative precautions    Written Home Exercises Provided: yes.  Exercises were reviewed and Harinder was able to demonstrate them prior to the end of the session.  Harinder demonstrated good  understanding of the education provided.     See EMR under Patient Instructions for exercises provided prior visit.    Assessment     Harinder completed session as noted above. Continues to demo significant improvement in AROM. Slight shrug noted at end range flexion AROM however HEBER and FIR are nearing symmetrical. Worked on cuff activation at end range to facilitate increased carry-over. Continues to demo restrictions into passive ER/IR at 30 and 90 deg. Intro'd single arm UE plyometrics during session today as well as functional strengthening progressions with no complaints of pain and good response to tx session noted    Harinder Is progressing well towards his goals.   Pt prognosis is Excellent.     Pt will continue to benefit from skilled outpatient physical therapy to address the deficits listed in the problem list box on initial evaluation, provide pt/family education and to maximize pt's level of independence in the home and community environment.     Pt's spiritual, cultural and educational needs considered and pt agreeable to plan of care and goals.     Anticipated barriers to physical therapy: none    Goals: Goals:  Short Term Goals (4 Weeks):   1. Pt will be independent with HEP to  supplement PT in improving functional use of R UE.  2. Pt will increase pain free R shoulder elevation PROM to >/= 160 deg to improve functional mobility of UE  3. Pt will increase R shoulder ER PROM in 90 deg abduction to >/=30 deg to improve functional mobility of UE  4. Pt will increase R elbow AAROM to WNL deg in 4 weeks to improve functional mobility of UE.  Mid-Term Goals (12-16 Weeks):   1. Pt will improve FOTO score to </=% limited to decrease perceived limitation with carrying, moving, and handling objects.  2. Pt will increase R shoulder PROM to WNL in all planes to improve functional use of R RUE.  3. Pt will increase R shoulder AROM to WFL in all planes to improve functional use of R RUE.  4. Pt will improve R shoulder MMTs to = 5/5 to promote equal use of B UEs in performing functional tasks.  5. Pt will lift 10 lb objects without pain to promote functional QOL.  6. Pt to report pain </= 0/10  Long Term Goals (24-36 weeks)  - pt will demonstrate at least 95% LSI with HHD for flexion, abduction, ER, and IR for improved stability  - pt will demonstrate at least 95% LSI with UE Y-balance for improved postural control  - pt will pass push-up and pull-up testing to demonstrate normalized endurance  - pt will be able to perform sport-specific activities with appropriate form, without instability or pain          Plan     Plan of care Certification: 6/14/2024 to 2/21/25.     Outpatient Physical Therapy 2 times weekly for 36 weeks to include the following interventions: Electrical Stimulation NMES, Manual Therapy, Moist Heat/ Ice, Neuromuscular Re-ed, Patient Education, Self Care, Therapeutic Activities, Therapeutic Exercise, and Dry Needling    Deniz Seth, PT, DPT, SCS

## 2024-09-30 ENCOUNTER — CLINICAL SUPPORT (OUTPATIENT)
Dept: REHABILITATION | Facility: HOSPITAL | Age: 34
End: 2024-09-30
Payer: COMMERCIAL

## 2024-09-30 DIAGNOSIS — R29.898 DECREASED STRENGTH OF UPPER EXTREMITY: ICD-10-CM

## 2024-09-30 DIAGNOSIS — M25.611 DECREASED ROM OF RIGHT SHOULDER: Primary | ICD-10-CM

## 2024-09-30 PROCEDURE — 97530 THERAPEUTIC ACTIVITIES: CPT

## 2024-09-30 PROCEDURE — 97140 MANUAL THERAPY 1/> REGIONS: CPT

## 2024-09-30 PROCEDURE — 97112 NEUROMUSCULAR REEDUCATION: CPT

## 2024-09-30 PROCEDURE — 97110 THERAPEUTIC EXERCISES: CPT

## 2024-09-30 NOTE — PROGRESS NOTES
Physical Therapy Daily Treatment Note     Name: Harinder Elliott  [facility] Number: [id number]    Therapy Diagnosis:   Encounter Diagnoses   Name Primary?    Decreased ROM of right shoulder Yes    Decreased strength of upper extremity        Physician: Servando Nielson III, *    Visit Date: 9/30/2024    Physician Orders: PT Eval and Treat   Medical Diagnosis from Referral: Biceps tendonitis on right [M75.21], Glenoid labral tear, right, initial encounter [S43.431A], Instability of right shoulder joint [M25.311]   Evaluation Date: 6/14/2024  Authorization Period Expiration: 5/24/25  Plan of Care Expiration: 2/21/25  Visit # / Visits authorized: 24/ 40  FOTO 3/3 administered 9/23     Time In: 9:00 AM  Time Out: 9:58 AM     Total Billable Time: 58 minutes  Precautions: Standard    DOS 5/28/24  OPERATION:   right  Shoulder osteochondral allograft transplantation, humeral head ICRS Grade 4 - posterior: 2.5 x 20 cm) (CPT 76431 ) (CPT 24093/28225 knee comparison codes) (complex, -22 modifier)  2. Shoulder arthroscopic anterior capsulorrhaphy (CPT 64007) (complex, -22 modifier)  3. Shoulder arthroscopic anterior labral repair (CPT 55273) (complex, -22 modifier)  4. Shoulder arthroscopic posterior labral repair, capsulorrhaphy, 7:00 position (CPT 52525)  5. Shoulder arthroscopic extensive debridement (anterior, posterior glenohumeral joint) (CPT 00491)  6. Shoulder manipulation under anesthesia (CPT 53947)  7. Shoulder arthroscopic lysis of adhesions (CPT 47699):  8. Shoulder arthroscopic loose body removal (CPT 96909)  9 . Shoulder arthroscopic interpositional arthroplasty (48513)  10. Shoulder arthroscopic SLAP type 8 repair (CPT 40814)    POST OPERATIVE PLAN: We will follow the arthroscopic [employer] repair guidelines. We discussed with the patient's family after surgery. The patient will remain in a sling for 6 weeks. We will start PT at the 3 week crista.   Quality of tissue: Good      Subjective     Pt reports:  slight bicep pain since increasing activity levels  He was compliant with home exercise program.  Response to previous treatment: ongoing   Functional change: ongoing    Pain: 1/10  Location: right shoulder      Objective   DOS 5/28/24: 18 weeks 2 days as of 9/30    Shoulder Passive Range of Motion:   Shoulder Right Left   Flexion    150 180   ER at 0    60 85   ER at 90    60 105   IR at 90    30 60      Shoulder Active Range of Motion:   Shoulder Right Left   Flexion    150 180   ER at 0    45 80   HEBER    T2 T4   FIR    L3 T12        Harinder received therapeutic exercises to develop strength, endurance, ROM, flexibility, and posture for 12 minutes including:  Supine dowel ER AAROM at 45 and 90 3x10 5s holds    Not today  Pulleys flexion/scaption x 5'  Standing dowel ER at 90 deg flexion 3x10 5s holds  SL open books 25xB  Supine dowel flexion AAROM 3x10 5s holds  Supine dowel flexion AAROM 3x10 5s holds     Harinder received the following manual therapy techniques: Joint mobilizations were applied to the: R shoulder for 30 minutes, including:  Skilled PROM all directions  GH joint centering mobilizations  Grade II/III a/p, inferior GH joint mobs  Manual lat contract/relax  ER MWM with GH joint centering glide  Patient provided written and verbal consent to receive therapeutic dry needling at today's visit (see consent form scanned into chart). TDN performed to R bicep and anterior deltoid musculature in the supine position using Seirin J type needle sized .30x 40mm+estim.  TDN performed to reduce pain and muscle tension, promote blood flow, and improve ROM and function x 20 minutes. Pt tolerated tx well without adverse effects. The patient was educated on what to expect following the procedure and he verbalized understanding.    Harinder participated in neuromuscular re-education activities to improve: Coordination, Kinesthetic, Sense, Proprioception, and Posture for 8 minutes. The following activities were  "included:  GTB high row-ER 3x10                Not today  Side-plank + ER plyo drops 3x30  Side-plank + horizontal ABD plyo drops 3x30  Prone T plyo drops 3x30  Quadruped wall ball RS: up/down, side/side, CW, CCW 2x10B  High plank shoulder taps 3x10B  3# Cable column D2 PNF 3x10B  10#  walk with 25# contralat farmer carry x 3 laps  15# quadruped acore CC rotational row 3x10B  2# quadruped prone T 3x10B  15# side-plank +CC row 3x10B  10# CC hip hinge-> lat pull 3x10  20# CC chop handle lifts 3x10B  Standing 90/90 plyo ball flips 3x30  Supine ER at 90/90 RTB 3x10  Prone W at end range 3x10 3" holds  Prone 90/90 over yoga block 3x10 3" holds  2# SL ER 4x15  Prone T on SB with contralat iso holds 10/10/10 x 3 rounds  OTB high row-> 90/90 ER 3x10  Supine ER at 90/90 RTB 3x10  Prone T 3x10  OTB ER-> press-> 90/90 body turn 3x10  GTB high rows 3x10  Body blade: at 0, at 90 flexion, at 90 deg ABD 30s x3 each  Quadruped shoulder taps 3x10  OTB ER-> serratus press 3x10  Quadruped rock backs 3x10  2# Side-lying horizontal ABD 3x10-15  2# SL ABD 3x10  OTB ER isotonics at 90 deg flexion 3x10  GTB IR isotonics at 90 deg flexion 3x10  Side-lying ER 3x10  Side-lying ABD AROM 3x10  RTB resisted supine flexion 3x10  YTB ER walkouts 3x6  OTB IR walkouts 3x6  RTB supine ER at 45 3x10 5" holds  Single arm wall slide 3x10  Wall ball RS: up/down, side/side, CW, CCW 3x10 each  Seated ER at 90 deg flexion 3x10  OTB horizontal ABD 3x10  Scapular clocks 2x6  Supine handcuffs at 90 deg GTB 3x10  Serratus wall slide 3x10  3# Prone row 3x10  2# Prone extension 3x10  Side-lying flexion AAROM supported on dowel 3x10       Harinder participated in dynamic functional therapeutic activities to improve functional performance for 8  minutes, including:  UBE 4' forward, 4' backwards to improve tissue extensibility and cartilage health        Not today:  15# renegade row 4x8B  55# landmine row-> rotational press 4x8B  23# cable column row-> press " 3xB  Golf  rotational swing drill 3x10B  8# MB chest press-> jammer 3x6  Floor press   -15# 2x15  -20# 2x12  -25# x8-10  UE MB 6# plyo series: 30x 3  -chest pass  -diagonals  -side toss  -8# MB rotational MB wall slam 3x8  2# scaption raise with contralat iso holds 10/10/10 x 3 rounds  27# 1/2 kneeling cable column high/low row 3x10  20# 3D strap rotations 3x10  35# BB bent over row 4x8  35# BB landmine press 4x8    Home Exercises Provided and Patient Education Provided     Education provided:   - HEP, POC, symptom management, recovery timeline, post-operative precautions    Written Home Exercises Provided: yes.  Exercises were reviewed and Harinder was able to demonstrate them prior to the end of the session.  Harinder demonstrated good  understanding of the education provided.     See EMR under Patient Instructions for exercises provided prior visit.    Assessment     Harinder completed session as noted above. Slight increased bicep irritation upon arrival to so performed TDN to assist with decreasing tissue irritability and improving blood flow. Remainder of session focused on mobility and easy cuff activation. Will re-assess and progress as tolerated at next visit    Harinder Is progressing well towards his goals.   Pt prognosis is Excellent.     Pt will continue to benefit from skilled outpatient physical therapy to address the deficits listed in the problem list box on initial evaluation, provide pt/family education and to maximize pt's level of independence in the home and community environment.     Pt's spiritual, cultural and educational needs considered and pt agreeable to plan of care and goals.     Anticipated barriers to physical therapy: none    Goals: Goals:  Short Term Goals (4 Weeks):   1. Pt will be independent with HEP to supplement PT in improving functional use of R UE.  2. Pt will increase pain free R shoulder elevation PROM to >/= 160 deg to improve functional mobility of UE  3. Pt will  increase R shoulder ER PROM in 90 deg abduction to >/=30 deg to improve functional mobility of UE  4. Pt will increase R elbow AAROM to WNL deg in 4 weeks to improve functional mobility of UE.  Mid-Term Goals (12-16 Weeks):   1. Pt will improve FOTO score to </=% limited to decrease perceived limitation with carrying, moving, and handling objects.  2. Pt will increase R shoulder PROM to WNL in all planes to improve functional use of R RUE.  3. Pt will increase R shoulder AROM to WFL in all planes to improve functional use of R RUE.  4. Pt will improve R shoulder MMTs to = 5/5 to promote equal use of B UEs in performing functional tasks.  5. Pt will lift 10 lb objects without pain to promote functional QOL.  6. Pt to report pain </= 0/10  Long Term Goals (24-36 weeks)  - pt will demonstrate at least 95% LSI with HHD for flexion, abduction, ER, and IR for improved stability  - pt will demonstrate at least 95% LSI with UE Y-balance for improved postural control  - pt will pass push-up and pull-up testing to demonstrate normalized endurance  - pt will be able to perform sport-specific activities with appropriate form, without instability or pain          Plan     Plan of care Certification: 6/14/2024 to 2/21/25.     Outpatient Physical Therapy 2 times weekly for 36 weeks to include the following interventions: Electrical Stimulation NMES, Manual Therapy, Moist Heat/ Ice, Neuromuscular Re-ed, Patient Education, Self Care, Therapeutic Activities, Therapeutic Exercise, and Dry Needling    Deniz Seth, PT, DPT, SCS

## 2024-10-04 ENCOUNTER — CLINICAL SUPPORT (OUTPATIENT)
Dept: REHABILITATION | Facility: HOSPITAL | Age: 34
End: 2024-10-04
Payer: COMMERCIAL

## 2024-10-04 DIAGNOSIS — R29.898 DECREASED STRENGTH OF UPPER EXTREMITY: ICD-10-CM

## 2024-10-04 DIAGNOSIS — M25.611 DECREASED ROM OF RIGHT SHOULDER: Primary | ICD-10-CM

## 2024-10-04 PROCEDURE — 97110 THERAPEUTIC EXERCISES: CPT

## 2024-10-04 PROCEDURE — 97140 MANUAL THERAPY 1/> REGIONS: CPT

## 2024-10-04 PROCEDURE — 97112 NEUROMUSCULAR REEDUCATION: CPT

## 2024-10-04 PROCEDURE — 97530 THERAPEUTIC ACTIVITIES: CPT

## 2024-10-04 NOTE — PROGRESS NOTES
Physical Therapy Daily Treatment Note     Name: Harinder Elliott  [facility] Number: [id number]    Therapy Diagnosis:   Encounter Diagnoses   Name Primary?    Decreased ROM of right shoulder Yes    Decreased strength of upper extremity          Physician: Servando Nielson III, *    Visit Date: 10/4/2024    Physician Orders: PT Eval and Treat   Medical Diagnosis from Referral: Biceps tendonitis on right [M75.21], Glenoid labral tear, right, initial encounter [S43.431A], Instability of right shoulder joint [M25.311]   Evaluation Date: 6/14/2024  Authorization Period Expiration: 5/24/25  Plan of Care Expiration: 2/21/25  Visit # / Visits authorized: 24/ 40  FOTO 3/3 administered 9/23     Time In: 9:10 AM  Time Out: 10:06 AM     Total Billable Time: 56 minutes  Precautions: Standard    DOS 5/28/24  OPERATION:   right  Shoulder osteochondral allograft transplantation, humeral head ICRS Grade 4 - posterior: 2.5 x 20 cm) (CPT 28073 ) (CPT 27735/37968 knee comparison codes) (complex, -22 modifier)  2. Shoulder arthroscopic anterior capsulorrhaphy (CPT 98444) (complex, -22 modifier)  3. Shoulder arthroscopic anterior labral repair (CPT 37124) (complex, -22 modifier)  4. Shoulder arthroscopic posterior labral repair, capsulorrhaphy, 7:00 position (CPT 43180)  5. Shoulder arthroscopic extensive debridement (anterior, posterior glenohumeral joint) (CPT 30051)  6. Shoulder manipulation under anesthesia (CPT 64040)  7. Shoulder arthroscopic lysis of adhesions (CPT 97428):  8. Shoulder arthroscopic loose body removal (CPT 90644)  9 . Shoulder arthroscopic interpositional arthroplasty (09949)  10. Shoulder arthroscopic SLAP type 8 repair (CPT 91351)    POST OPERATIVE PLAN: We will follow the arthroscopic Bankart repair guidelines. We discussed with the patient's family after surgery. The patient will remain in a sling for 6 weeks. We will start PT at the 3 week crista.   Quality of tissue: Good      Subjective     Pt reports:  no pain following golf. Shoulder felt good  He was compliant with home exercise program.  Response to previous treatment: ongoing   Functional change: ongoing    Pain: 1/10  Location: right shoulder      Objective   DOS 5/28/24: 18 weeks 2 days as of 9/30    Shoulder Passive Range of Motion:   Shoulder Right Left   Flexion    150 180   ER at 0    60 85   ER at 90    60 105   IR at 90    30 60      Shoulder Active Range of Motion:   Shoulder Right Left   Flexion    150 180   ER at 0    45 80   HEBER    T2 T4   FIR    L3 T12        Harinder received therapeutic exercises to develop strength, endurance, ROM, flexibility, and posture for 10 minutes including:  Supine dowel ER AAROM at 45 and 90 3x10 5s holds    Not today  Pulleys flexion/scaption x 5'  Standing dowel ER at 90 deg flexion 3x10 5s holds  SL open books 25xB  Supine dowel flexion AAROM 3x10 5s holds  Supine dowel flexion AAROM 3x10 5s holds     Harinder received the following manual therapy techniques: Joint mobilizations were applied to the: R shoulder for 10 minutes, including:  Skilled PROM all directions  GH joint centering mobilizations  Grade II/III a/p, inferior GH joint mobs  Manual lat contract/relax  ER MWM with GH joint centering glide      Not today  Patient provided written and verbal consent to receive therapeutic dry needling at today's visit (see consent form scanned into chart). TDN performed to R bicep and anterior deltoid musculature in the supine position using Seirin J type needle sized .30x 40mm+estim.  TDN performed to reduce pain and muscle tension, promote blood flow, and improve ROM and function x 20 minutes. Pt tolerated tx well without adverse effects. The patient was educated on what to expect following the procedure and he verbalized understanding.    Harinder participated in neuromuscular re-education activities to improve: Coordination, Kinesthetic, Sense, Proprioception, and Posture for 8 minutes. The following activities were  "included:  Pikes 3x10      Not today  Side-plank + ER plyo drops 3x30  Side-plank + horizontal ABD plyo drops 3x30  Prone T plyo drops 3x30  Quadruped wall ball RS: up/down, side/side, CW, CCW 2x10B  High plank shoulder taps 3x10B  3# Cable column D2 PNF 3x10B  10#  walk with 25# contralat farmer carry x 3 laps  15# quadruped acore CC rotational row 3x10B  2# quadruped prone T 3x10B  15# side-plank +CC row 3x10B  10# CC hip hinge-> lat pull 3x10  20# CC chop handle lifts 3x10B  Standing 90/90 plyo ball flips 3x30  Supine ER at 90/90 RTB 3x10  Prone W at end range 3x10 3" holds  Prone 90/90 over yoga block 3x10 3" holds  2# SL ER 4x15  Prone T on SB with contralat iso holds 10/10/10 x 3 rounds  OTB high row-> 90/90 ER 3x10  Supine ER at 90/90 RTB 3x10  Prone T 3x10  OTB ER-> press-> 90/90 body turn 3x10  GTB high rows 3x10  Body blade: at 0, at 90 flexion, at 90 deg ABD 30s x3 each  Quadruped shoulder taps 3x10  OTB ER-> serratus press 3x10  Quadruped rock backs 3x10  2# Side-lying horizontal ABD 3x10-15  2# SL ABD 3x10  OTB ER isotonics at 90 deg flexion 3x10  GTB IR isotonics at 90 deg flexion 3x10  Side-lying ER 3x10  Side-lying ABD AROM 3x10  RTB resisted supine flexion 3x10  YTB ER walkouts 3x6  OTB IR walkouts 3x6  RTB supine ER at 45 3x10 5" holds  Single arm wall slide 3x10  Wall ball RS: up/down, side/side, CW, CCW 3x10 each  Seated ER at 90 deg flexion 3x10  OTB horizontal ABD 3x10  Scapular clocks 2x6  Supine handcuffs at 90 deg GTB 3x10  Serratus wall slide 3x10  3# Prone row 3x10  2# Prone extension 3x10  Side-lying flexion AAROM supported on dowel 3x10       Harinder participated in dynamic functional therapeutic activities to improve functional performance for 28  minutes, including:  UBE 4' forward, 4' backwards to improve tissue extensibility and cartilage health  70# landmine bent over row 4x8B  70# landmine landmine press hold to reverse lunge 3x8  12# MB callim-> jam 3x8      Not today:  15# " renegade row 4x8B  55# landmine row-> rotational press 4x8B  23# cable column row-> press 3xB  Golf  rotational swing drill 3x10B  8# MB chest press-> jammer 3x6  Floor press   -15# 2x15  -20# 2x12  -25# x8-10  UE MB 6# plyo series: 30x 3  -chest pass  -diagonals  -side toss  -8# MB rotational MB wall slam 3x8  2# scaption raise with contralat iso holds 10/10/10 x 3 rounds  27# 1/2 kneeling cable column high/low row 3x10  20# 3D strap rotations 3x10  35# BB bent over row 4x8  35# BB landmine press 4x8    Home Exercises Provided and Patient Education Provided     Education provided:   - HEP, POC, symptom management, recovery timeline, post-operative precautions    Written Home Exercises Provided: yes.  Exercises were reviewed and Harinder was able to demonstrate them prior to the end of the session.  Harinder demonstrated good  understanding of the education provided.     See EMR under Patient Instructions for exercises provided prior visit.    Assessment     Harinder completed session as noted above. Continued to work on normalizing end range ROM deficits. Continued lat and posterior cuff tightness as well as GH joint mobility restrictions. Continued with functional strength progressions as well as plyometrics. No pain with [place] med ball slams. Will transition to 1x/week and assess how he progresses at that time  Harinder Is progressing well towards his goals.   Pt prognosis is Excellent.     Pt will continue to benefit from skilled outpatient physical therapy to address the deficits listed in the problem list box on initial evaluation, provide pt/family education and to maximize pt's level of independence in the home and community environment.     Pt's spiritual, cultural and educational needs considered and pt agreeable to plan of care and goals.     Anticipated barriers to physical therapy: none    Goals: Goals:  Short Term Goals (4 Weeks):   1. Pt will be independent with HEP to supplement PT in improving  functional use of R UE.  2. Pt will increase pain free R shoulder elevation PROM to >/= 160 deg to improve functional mobility of UE  3. Pt will increase R shoulder ER PROM in 90 deg abduction to >/=30 deg to improve functional mobility of UE  4. Pt will increase R elbow AAROM to WNL deg in 4 weeks to improve functional mobility of UE.  Mid-Term Goals (12-16 Weeks):   1. Pt will improve FOTO score to </=% limited to decrease perceived limitation with carrying, moving, and handling objects.  2. Pt will increase R shoulder PROM to WNL in all planes to improve functional use of R RUE.  3. Pt will increase R shoulder AROM to WFL in all planes to improve functional use of R RUE.  4. Pt will improve R shoulder MMTs to = 5/5 to promote equal use of B UEs in performing functional tasks.  5. Pt will lift 10 lb objects without pain to promote functional QOL.  6. Pt to report pain </= 0/10  Long Term Goals (24-36 weeks)  - pt will demonstrate at least 95% LSI with HHD for flexion, abduction, ER, and IR for improved stability  - pt will demonstrate at least 95% LSI with UE Y-balance for improved postural control  - pt will pass push-up and pull-up testing to demonstrate normalized endurance  - pt will be able to perform sport-specific activities with appropriate form, without instability or pain          Plan     Plan of care Certification: 6/14/2024 to 2/21/25.     Outpatient Physical Therapy 2 times weekly for 36 weeks to include the following interventions: Electrical Stimulation NMES, Manual Therapy, Moist Heat/ Ice, Neuromuscular Re-ed, Patient Education, Self Care, Therapeutic Activities, Therapeutic Exercise, and Dry Needling    Deniz Seth, PT, DPT, SCS

## 2024-10-07 ENCOUNTER — OFFICE VISIT (OUTPATIENT)
Dept: OTOLARYNGOLOGY | Facility: CLINIC | Age: 34
End: 2024-10-07
Payer: COMMERCIAL

## 2024-10-07 VITALS
WEIGHT: 180.13 LBS | HEART RATE: 89 BPM | SYSTOLIC BLOOD PRESSURE: 118 MMHG | DIASTOLIC BLOOD PRESSURE: 82 MMHG | BODY MASS INDEX: 25.12 KG/M2

## 2024-10-07 DIAGNOSIS — J30.9 ALLERGIC RHINITIS, UNSPECIFIED SEASONALITY, UNSPECIFIED TRIGGER: Primary | ICD-10-CM

## 2024-10-07 DIAGNOSIS — F98.8 ATTENTION DEFICIT DISORDER, UNSPECIFIED HYPERACTIVITY PRESENCE: ICD-10-CM

## 2024-10-07 DIAGNOSIS — J34.829 NASAL VALVE COLLAPSE: ICD-10-CM

## 2024-10-07 PROCEDURE — 3044F HG A1C LEVEL LT 7.0%: CPT | Mod: CPTII,S$GLB,, | Performed by: OTOLARYNGOLOGY

## 2024-10-07 PROCEDURE — 99204 OFFICE O/P NEW MOD 45 MIN: CPT | Mod: S$GLB,,, | Performed by: OTOLARYNGOLOGY

## 2024-10-07 PROCEDURE — 3079F DIAST BP 80-89 MM HG: CPT | Mod: CPTII,S$GLB,, | Performed by: OTOLARYNGOLOGY

## 2024-10-07 PROCEDURE — 1160F RVW MEDS BY RX/DR IN RCRD: CPT | Mod: CPTII,S$GLB,, | Performed by: OTOLARYNGOLOGY

## 2024-10-07 PROCEDURE — 1159F MED LIST DOCD IN RCRD: CPT | Mod: CPTII,S$GLB,, | Performed by: OTOLARYNGOLOGY

## 2024-10-07 PROCEDURE — 99999 PR PBB SHADOW E&M-EST. PATIENT-LVL III: CPT | Mod: PBBFAC,,, | Performed by: OTOLARYNGOLOGY

## 2024-10-07 PROCEDURE — 3008F BODY MASS INDEX DOCD: CPT | Mod: CPTII,S$GLB,, | Performed by: OTOLARYNGOLOGY

## 2024-10-07 PROCEDURE — 3074F SYST BP LT 130 MM HG: CPT | Mod: CPTII,S$GLB,, | Performed by: OTOLARYNGOLOGY

## 2024-10-07 RX ORDER — LISDEXAMFETAMINE DIMESYLATE 20 MG/1
20 CAPSULE ORAL EVERY MORNING
Qty: 30 CAPSULE | Refills: 0 | Status: SHIPPED | OUTPATIENT
Start: 2024-10-07

## 2024-10-07 NOTE — PROGRESS NOTES
Mr. Elliott     Vitals:    10/07/24 1316   BP: 118/82   Pulse: 89       Chief Complaint:  Potential deviated septum       HPI:   is a 34-year-old white male who presents with complaints of nasal airway obstruction.  He states that he often feels that his nasal passage ways to narrow.  He does state that he is often a mouth breather both day and night.  He states that he was told that he had a partial septal deviation many years ago.  He denies any history of nasal trauma or nasal allergies.  No history of chronic recurrent sinus infections.  He has not used any regular saline sinus rinses or nasal steroid sprays.    SNOT22- 29 NOSE- 70    Review of Systems:  Constitutional:   weight loss or weight gain: Negative  Allergy/Immunologic:   Negative  Nasal Congestion/Obstruction:   Positive as above  Nosebleeds:   Negative  Sinus infections:   Negative  Headache/Facial Pain:   Positive history of migraine headaches  Snoring/BOGDAN:   Negative  Throat: Infections/Pain:   Negative  Hoarseness/Speech Disturbance:   Negative  Trauma Hx:  Negative    Cardiovascular:  M/I Angina: Negative  Hypertension: Negative  Endocrine:    DM/Steroids: Negative  GI:   Dysphagia/Reflux: Negative  :   GYN Pregnancy: Negative  Renal:   Dialysis: Negative  Lymphatic:   Neck Mass/Lymphadenopathy: Negative  Muscoloskeletal:   Negative  Hematologic:   Bleeding Disorders/Anemia: Negative  Neurologic:    Cranial/Neuralgia: Negative  Pulmonary:   Asthma/SOB/Cough: Negative  Skin Disorders: Negative    Past Medical/Surgical/Family/Social History:    ENT Surgery: Negative  Occupational Exposure: Negative   Problems: Negative  Cancer: Negative    Past Family History:   Family history of Cancer: Negative    Past Social History:   Tobacco:  Positive for social vaping smoker   Alcohol:  2-3 per week Social Drinker      Allergies and medications: Reviewed per med card.    Physical Examination:  Ears:   External auditory canals:   Clear   Hearing: Grossly intact   Tympanic Membranes: Clear  Nose:   External:  Narrow nasal dorsum and external valves with positive Jono and modified Iredell maneuver.   Intranasal:  Moderate anterior septal deviation to the left with 2 to 3+ turbinates which do decongest well.  Mouth:   Intraorally: Lips, teeth, and gums: Normal   Oropharynx: Normal   Mucosa: Normal   Tongue: Normal  Throat:      Palate: Normal palate with elevation, Mallampati 1   Tonsils:  1 to 2+ bilaterally   Posterior Pharynx: Normal  Fiberoptic exam: Not performed  Head/Face:     Inspection: Normal and atraumatic   Palpation/Percussion: Non tender   Facial strength: Normal and symmetric   Salivary glands: Normal  Neck: Supple  Thyroid: No masses  Lymphatics: No nodes  Respiratory:   Effort: Normal  Eyes:   Ocular Mobility: Normal   Vision: Grossly intact  Neuro/Psych:   Cranial Nerves: Grossly Intact   Orientation: Normal   Mood/Affect: Normal      Assessment/Plan:  I have discussed my findings with him in detail as well as my recommendations for treatment.  I have given him literature on saline sinus rinses including issues with distilled water only and described how this is to be used with him on a daily basis.  I have also recommended a trial of nasal steroid sprays such as Nasacort or Flonase and I have described how this is to be administered as well on a daily basis.  I have suggested a trial of breathe right strips as well.  He will try this for a month and let us know how he is doing.  If his rinses and sprays do not help him but his breathe right strips do we have discussed that he could benefit from nasal reconstruction with auricular cartilage Hieu grafts.  He will consider all this information and let us know of his decision.

## 2024-10-11 ENCOUNTER — CLINICAL SUPPORT (OUTPATIENT)
Dept: REHABILITATION | Facility: HOSPITAL | Age: 34
End: 2024-10-11
Payer: COMMERCIAL

## 2024-10-11 DIAGNOSIS — M25.611 DECREASED ROM OF RIGHT SHOULDER: Primary | ICD-10-CM

## 2024-10-11 DIAGNOSIS — R29.898 DECREASED STRENGTH OF UPPER EXTREMITY: ICD-10-CM

## 2024-10-11 PROCEDURE — 97112 NEUROMUSCULAR REEDUCATION: CPT

## 2024-10-11 PROCEDURE — 97140 MANUAL THERAPY 1/> REGIONS: CPT

## 2024-10-11 PROCEDURE — 97110 THERAPEUTIC EXERCISES: CPT

## 2024-10-11 PROCEDURE — 97530 THERAPEUTIC ACTIVITIES: CPT

## 2024-10-11 NOTE — PROGRESS NOTES
Physical Therapy Daily Treatment Note     Name: Harinder Elliott  [facility] Number: [id number]    Therapy Diagnosis:   Encounter Diagnoses   Name Primary?    Decreased ROM of right shoulder Yes    Decreased strength of upper extremity        Physician: Servando Nielson III, *    Visit Date: 10/11/2024    Physician Orders: PT Eval and Treat   Medical Diagnosis from Referral: Biceps tendonitis on right [M75.21], Glenoid labral tear, right, initial encounter [S43.431A], Instability of right shoulder joint [M25.311]   Evaluation Date: 6/14/2024  Authorization Period Expiration: 5/24/25  Plan of Care Expiration: 2/21/25  Visit # / Visits authorized: 26/ 40  FOTO 3/3 administered 9/23     Time In: 9:00 AM  Time Out: 10:00 AM     Total Billable Time: 60 minutes  Precautions: Standard    DOS 5/28/24  OPERATION:   right  Shoulder osteochondral allograft transplantation, humeral head ICRS Grade 4 - posterior: 2.5 x 20 cm) (CPT 19865 ) (CPT 64211/72772 knee comparison codes) (complex, -22 modifier)  2. Shoulder arthroscopic anterior capsulorrhaphy (CPT 06666) (complex, -22 modifier)  3. Shoulder arthroscopic anterior labral repair (CPT 55584) (complex, -22 modifier)  4. Shoulder arthroscopic posterior labral repair, capsulorrhaphy, 7:00 position (CPT 89555)  5. Shoulder arthroscopic extensive debridement (anterior, posterior glenohumeral joint) (CPT 36754)  6. Shoulder manipulation under anesthesia (CPT 62787)  7. Shoulder arthroscopic lysis of adhesions (CPT 95565):  8. Shoulder arthroscopic loose body removal (CPT 32960)  9 . Shoulder arthroscopic interpositional arthroplasty (42122)  10. Shoulder arthroscopic SLAP type 8 repair (CPT 89068)    POST OPERATIVE PLAN: We will follow the arthroscopic Bankart repair guidelines. We discussed with the patient's family after surgery. The patient will remain in a sling for 6 weeks. We will start PT at the 3 week crista.   Quality of tissue: Good      Subjective     Pt reports:  played golf again yesterday no issues  He was compliant with home exercise program.  Response to previous treatment: ongoing   Functional change: ongoing    Pain: 1/10  Location: right shoulder      Objective   DOS 24: 19 weeks 2 days as of 10/11    Shoulder Passive Range of Motion:   Shoulder Right Left   Flexion    170 180   ER at 0    60 85   ER at 90    60 105   IR at 90    30 60      Shoulder Active Range of Motion:   Shoulder Right Left   Flexion    165 180   ER at 0    45 80   HEBER    T2 T4   FIR    L3 T12        Harinder received therapeutic exercises to develop strength, endurance, ROM, flexibility, and posture for 10 minutes includin/2 kneeling BTB t-spine rotations 2x15B    Not today  Supine dowel ER AAROM at 45 and 90 3x10 5s holds  Pulleys flexion/scaption x 5'  Standing dowel ER at 90 deg flexion 3x10 5s holds  SL open books 25xB  Supine dowel flexion AAROM 3x10 5s holds  Supine dowel flexion AAROM 3x10 5s holds     Harinder received the following manual therapy techniques: Joint mobilizations were applied to the: R shoulder for 10 minutes, including:  Skilled PROM all directions  GH joint centering mobilizations  Grade II/III a/p, inferior GH joint mobs  Manual lat contract/relax  ER MWM with GH joint centering glide      Not today  Patient provided written and verbal consent to receive therapeutic dry needling at today's visit (see consent form scanned into chart). TDN performed to R bicep and anterior deltoid musculature in the supine position using Seirin J type needle sized .30x 40mm+estim.  TDN performed to reduce pain and muscle tension, promote blood flow, and improve ROM and function x 20 minutes. Pt tolerated tx well without adverse effects. The patient was educated on what to expect following the procedure and he verbalized understanding.    Harinder participated in neuromuscular re-education activities to improve: Coordination, Kinesthetic, Sense, Proprioception, and Posture for 8  "minutes. The following activities were included:  Pikes 3x10      Not today  Side-plank + ER plyo drops 3x30  Side-plank + horizontal ABD plyo drops 3x30  Prone T plyo drops 3x30  Quadruped wall ball RS: up/down, side/side, CW, CCW 2x10B  High plank shoulder taps 3x10B  3# Cable column D2 PNF 3x10B  10#  walk with 25# contralat farmer carry x 3 laps  15# quadruped acore CC rotational row 3x10B  2# quadruped prone T 3x10B  15# side-plank +CC row 3x10B  10# CC hip hinge-> lat pull 3x10  20# CC chop handle lifts 3x10B  Standing 90/90 plyo ball flips 3x30  Supine ER at 90/90 RTB 3x10  Prone W at end range 3x10 3" holds  Prone 90/90 over yoga block 3x10 3" holds  2# SL ER 4x15  Prone T on SB with contralat iso holds 10/10/10 x 3 rounds  OTB high row-> 90/90 ER 3x10  Supine ER at 90/90 RTB 3x10  Prone T 3x10  OTB ER-> press-> 90/90 body turn 3x10  GTB high rows 3x10  Body blade: at 0, at 90 flexion, at 90 deg ABD 30s x3 each  Quadruped shoulder taps 3x10  OTB ER-> serratus press 3x10  Quadruped rock backs 3x10  2# Side-lying horizontal ABD 3x10-15  2# SL ABD 3x10  OTB ER isotonics at 90 deg flexion 3x10  GTB IR isotonics at 90 deg flexion 3x10  Side-lying ER 3x10  Side-lying ABD AROM 3x10  RTB resisted supine flexion 3x10  YTB ER walkouts 3x6  OTB IR walkouts 3x6  RTB supine ER at 45 3x10 5" holds  Single arm wall slide 3x10  Wall ball RS: up/down, side/side, CW, CCW 3x10 each  Seated ER at 90 deg flexion 3x10  OTB horizontal ABD 3x10  Scapular clocks 2x6  Supine handcuffs at 90 deg GTB 3x10  Serratus wall slide 3x10  3# Prone row 3x10  2# Prone extension 3x10  Side-lying flexion AAROM supported on dowel 3x10       Harinder participated in dynamic functional therapeutic activities to improve functional performance for 32  minutes, including:  UBE 4' forward, 4' backwards to improve tissue extensibility and cartilage health  95# landmine thrusters 4x8  3# CC D2 PNF 3x10  [place] rope slams: low/high 30s x2 " each        Not today:  70# landmine bent over row 4x8B  70# landmine landmine press hold to reverse lunge 3x8  12# MB slam-> jam 3x8  15# renegade row 4x8B  55# landmine row-> rotational press 4x8B  23# cable column row-> press 3xB  Golf  rotational swing drill 3x10B  8# MB chest press-> jammer 3x6  Floor press   -15# 2x15  -20# 2x12  -25# x8-10  UE MB 6# plyo series: 30x 3  -chest pass  -diagonals  -side toss  -8# MB rotational MB wall slam 3x8  2# scaption raise with contralat iso holds 10/10/10 x 3 rounds  27# 1/2 kneeling cable column high/low row 3x10  20# 3D strap rotations 3x10  35# BB bent over row 4x8  35# BB landmine press 4x8    Home Exercises Provided and Patient Education Provided     Education provided:   - HEP, POC, symptom management, recovery timeline, post-operative precautions    Written Home Exercises Provided: yes.  Exercises were reviewed and Harinder was able to demonstrate them prior to the end of the session.  Harinder demonstrated good  understanding of the education provided.     See EMR under Patient Instructions for exercises provided prior visit.    Assessment     Harinder completed session as noted above. Continued to work on normalizing end range ROM deficits. Continued lat and posterior cuff tightness as well as GH joint mobility restrictions. Continued with functional strength progressions with rate of force and power development within session today with no complaints of pain. Fatigued with [place] rope slam complex.    Harinder Is progressing well towards his goals.   Pt prognosis is Excellent.     Pt will continue to benefit from skilled outpatient physical therapy to address the deficits listed in the problem list box on initial evaluation, provide pt/family education and to maximize pt's level of independence in the home and community environment.     Pt's spiritual, cultural and educational needs considered and pt agreeable to plan of care and goals.     Anticipated barriers to  physical therapy: none    Goals: Goals:  Short Term Goals (4 Weeks):   1. Pt will be independent with HEP to supplement PT in improving functional use of R UE.  2. Pt will increase pain free R shoulder elevation PROM to >/= 160 deg to improve functional mobility of UE  3. Pt will increase R shoulder ER PROM in 90 deg abduction to >/=30 deg to improve functional mobility of UE  4. Pt will increase R elbow AAROM to WNL deg in 4 weeks to improve functional mobility of UE.  Mid-Term Goals (12-16 Weeks):   1. Pt will improve FOTO score to </=% limited to decrease perceived limitation with carrying, moving, and handling objects.  2. Pt will increase R shoulder PROM to WNL in all planes to improve functional use of R RUE.  3. Pt will increase R shoulder AROM to WFL in all planes to improve functional use of R RUE.  4. Pt will improve R shoulder MMTs to = 5/5 to promote equal use of B UEs in performing functional tasks.  5. Pt will lift 10 lb objects without pain to promote functional QOL.  6. Pt to report pain </= 0/10  Long Term Goals (24-36 weeks)  - pt will demonstrate at least 95% LSI with HHD for flexion, abduction, ER, and IR for improved stability  - pt will demonstrate at least 95% LSI with UE Y-balance for improved postural control  - pt will pass push-up and pull-up testing to demonstrate normalized endurance  - pt will be able to perform sport-specific activities with appropriate form, without instability or pain          Plan     Plan of care Certification: 6/14/2024 to 2/21/25.     Outpatient Physical Therapy 2 times weekly for 36 weeks to include the following interventions: Electrical Stimulation NMES, Manual Therapy, Moist Heat/ Ice, Neuromuscular Re-ed, Patient Education, Self Care, Therapeutic Activities, Therapeutic Exercise, and Dry Needling    Deniz Seth, PT, DPT, SCS

## 2024-10-25 ENCOUNTER — CLINICAL SUPPORT (OUTPATIENT)
Dept: REHABILITATION | Facility: HOSPITAL | Age: 34
End: 2024-10-25
Payer: COMMERCIAL

## 2024-10-25 DIAGNOSIS — M25.611 DECREASED ROM OF RIGHT SHOULDER: Primary | ICD-10-CM

## 2024-10-25 DIAGNOSIS — R29.898 DECREASED STRENGTH OF UPPER EXTREMITY: ICD-10-CM

## 2024-10-25 PROCEDURE — 97530 THERAPEUTIC ACTIVITIES: CPT

## 2024-10-25 PROCEDURE — 97140 MANUAL THERAPY 1/> REGIONS: CPT

## 2024-10-25 PROCEDURE — 97112 NEUROMUSCULAR REEDUCATION: CPT

## 2024-10-25 NOTE — PROGRESS NOTES
Physical Therapy Daily Treatment Note     Name: Harinder Elliott  [facility] Number: [id number]    Therapy Diagnosis:   Encounter Diagnoses   Name Primary?    Decreased ROM of right shoulder Yes    Decreased strength of upper extremity          Physician: Servando Nielson III, *    Visit Date: 10/25/2024    Physician Orders: PT Eval and Treat   Medical Diagnosis from Referral: Biceps tendonitis on right [M75.21], Glenoid labral tear, right, initial encounter [S43.431A], Instability of right shoulder joint [M25.311]   Evaluation Date: 6/14/2024  Authorization Period Expiration: 5/24/25  Plan of Care Expiration: 2/21/25  Visit # / Visits authorized: 27/ 40  FOTO 3/3 administered 9/23     Time In: 9:00 AM  Time Out: 9:50 AM     Total Billable Time: 50 minutes  Precautions: Standard    DOS 5/28/24  OPERATION:   right  Shoulder osteochondral allograft transplantation, humeral head ICRS Grade 4 - posterior: 2.5 x 20 cm) (CPT 44202 ) (CPT 69580/46254 knee comparison codes) (complex, -22 modifier)  2. Shoulder arthroscopic anterior capsulorrhaphy (CPT 34066) (complex, -22 modifier)  3. Shoulder arthroscopic anterior labral repair (CPT 02026) (complex, -22 modifier)  4. Shoulder arthroscopic posterior labral repair, capsulorrhaphy, 7:00 position (CPT 69729)  5. Shoulder arthroscopic extensive debridement (anterior, posterior glenohumeral joint) (CPT 24685)  6. Shoulder manipulation under anesthesia (CPT 84607)  7. Shoulder arthroscopic lysis of adhesions (CPT 47143):  8. Shoulder arthroscopic loose body removal (CPT 89172)  9 . Shoulder arthroscopic interpositional arthroplasty (73597)  10. Shoulder arthroscopic SLAP type 8 repair (CPT 47796)    POST OPERATIVE PLAN: We will follow the arthroscopic Bankart repair guidelines. We discussed with the patient's family after surgery. The patient will remain in a sling for 6 weeks. We will start PT at the 3 week crista.   Quality of tissue: Good      Subjective     Pt reports:  played golf again yesterday no issues  He was compliant with home exercise program.  Response to previous treatment: ongoing   Functional change: ongoing    Pain: 1/10  Location: right shoulder      Objective   DOS 24: 19 weeks 2 days as of 10/11    Shoulder Passive Range of Motion:   Shoulder Right Left   Flexion    170 180   ER at 0    60 85   ER at 90    60 105   IR at 90    30 60      Shoulder Active Range of Motion:   Shoulder Right Left   Flexion    165 180   ER at 0    45 80   HEBER    T2 T4   FIR    L3 T12        Harinder received therapeutic exercises to develop strength, endurance, ROM, flexibility, and posture for 0 minutes includin/2 kneeling BTB t-spine rotations 2x15B    Not today  Supine dowel ER AAROM at 45 and 90 3x10 5s holds  Pulleys flexion/scaption x 5'  Standing dowel ER at 90 deg flexion 3x10 5s holds  SL open books 25xB  Supine dowel flexion AAROM 3x10 5s holds  Supine dowel flexion AAROM 3x10 5s holds     Harinder received the following manual therapy techniques: Joint mobilizations were applied to the: R shoulder for 10 minutes, including:  Skilled PROM all directions  GH joint centering mobilizations  Grade II/III a/p, inferior GH joint mobs  Manual lat contract/relax  ER MWM with GH joint centering glide      Not today  Patient provided written and verbal consent to receive therapeutic dry needling at today's visit (see consent form scanned into chart). TDN performed to R bicep and anterior deltoid musculature in the supine position using Seirin J type needle sized .30x 40mm+estim.  TDN performed to reduce pain and muscle tension, promote blood flow, and improve ROM and function x 20 minutes. Pt tolerated tx well without adverse effects. The patient was educated on what to expect following the procedure and he verbalized understanding.    Harinder participated in neuromuscular re-education activities to improve: Coordination, Kinesthetic, Sense, Proprioception, and Posture for 10  "minutes. The following activities were included:  Pikes 3x10      Not today  Side-plank + ER plyo drops 3x30  Side-plank + horizontal ABD plyo drops 3x30  Prone T plyo drops 3x30  Quadruped wall ball RS: up/down, side/side, CW, CCW 2x10B  High plank shoulder taps 3x10B  3# Cable column D2 PNF 3x10B  10#  walk with 25# contralat farmer carry x 3 laps  15# quadruped acore CC rotational row 3x10B  2# quadruped prone T 3x10B  15# side-plank +CC row 3x10B  10# CC hip hinge-> lat pull 3x10  20# CC chop handle lifts 3x10B  Standing 90/90 plyo ball flips 3x30  Supine ER at 90/90 RTB 3x10  Prone W at end range 3x10 3" holds  Prone 90/90 over yoga block 3x10 3" holds  2# SL ER 4x15  Prone T on SB with contralat iso holds 10/10/10 x 3 rounds  OTB high row-> 90/90 ER 3x10  Supine ER at 90/90 RTB 3x10  Prone T 3x10  OTB ER-> press-> 90/90 body turn 3x10  GTB high rows 3x10  Body blade: at 0, at 90 flexion, at 90 deg ABD 30s x3 each  Quadruped shoulder taps 3x10  OTB ER-> serratus press 3x10  Quadruped rock backs 3x10  2# Side-lying horizontal ABD 3x10-15  2# SL ABD 3x10  OTB ER isotonics at 90 deg flexion 3x10  GTB IR isotonics at 90 deg flexion 3x10  Side-lying ER 3x10  Side-lying ABD AROM 3x10  RTB resisted supine flexion 3x10  YTB ER walkouts 3x6  OTB IR walkouts 3x6  RTB supine ER at 45 3x10 5" holds  Single arm wall slide 3x10  Wall ball RS: up/down, side/side, CW, CCW 3x10 each  Seated ER at 90 deg flexion 3x10  OTB horizontal ABD 3x10  Scapular clocks 2x6  Supine handcuffs at 90 deg GTB 3x10  Serratus wall slide 3x10  3# Prone row 3x10  2# Prone extension 3x10  Side-lying flexion AAROM supported on dowel 3x10       Harinder participated in dynamic functional therapeutic activities to improve functional performance for 30  minutes, including:  UBE 4' forward, 4' backwards to improve tissue extensibility and cartilage health  UE MB 6# plyo series: 30x 3  -staggered stance  -Single arm throws    14# MB rotational wall " slam 4x6B      Not today:  95# landmine thrusters 4x8  3# CC D2 PNF 3x10  [place] rope slams: low/high 30s x2 each  70# landmine bent over row 4x8B  70# landmine landmine press hold to reverse lunge 3x8  12# MB slam-> jam 3x8  15# renegade row 4x8B  55# landmine row-> rotational press 4x8B  23# cable column row-> press 3xB  Golf  rotational swing drill 3x10B  8# MB chest press-> jammer 3x6  Floor press   -15# 2x15  -20# 2x12  -25# x8-10    -8# MB rotational MB wall slam 3x8  2# scaption raise with contralat iso holds 10/10/10 x 3 rounds  27# 1/2 kneeling cable column high/low row 3x10  20# 3D strap rotations 3x10  35# BB bent over row 4x8  35# BB landmine press 4x8    Home Exercises Provided and Patient Education Provided     Education provided:   - HEP, POC, symptom management, recovery timeline, post-operative precautions    Written Home Exercises Provided: yes.  Exercises were reviewed and Harinder was able to demonstrate them prior to the end of the session.  Harinder demonstrated good  understanding of the education provided.     See EMR under Patient Instructions for exercises provided prior visit.    Assessment     Harinder completed session as noted above. Continued to work on normalizing end range ROM deficits. Continued lat and posterior cuff tightness as well as GH joint mobility restrictions. Continued with functional strength progressions with rate of force and power development within session today with no complaints of pain. Fatigued with [place] rope slam complex.    Harinder Is progressing well towards his goals.   Pt prognosis is Excellent.     Pt will continue to benefit from skilled outpatient physical therapy to address the deficits listed in the problem list box on initial evaluation, provide pt/family education and to maximize pt's level of independence in the home and community environment.     Pt's spiritual, cultural and educational needs considered and pt agreeable to plan of care and  goals.     Anticipated barriers to physical therapy: none    Goals: Goals:  Short Term Goals (4 Weeks):   1. Pt will be independent with HEP to supplement PT in improving functional use of R UE.  2. Pt will increase pain free R shoulder elevation PROM to >/= 160 deg to improve functional mobility of UE  3. Pt will increase R shoulder ER PROM in 90 deg abduction to >/=30 deg to improve functional mobility of UE  4. Pt will increase R elbow AAROM to WNL deg in 4 weeks to improve functional mobility of UE.  Mid-Term Goals (12-16 Weeks):   1. Pt will improve FOTO score to </=% limited to decrease perceived limitation with carrying, moving, and handling objects.  2. Pt will increase R shoulder PROM to WNL in all planes to improve functional use of R RUE.  3. Pt will increase R shoulder AROM to WFL in all planes to improve functional use of R RUE.  4. Pt will improve R shoulder MMTs to = 5/5 to promote equal use of B UEs in performing functional tasks.  5. Pt will lift 10 lb objects without pain to promote functional QOL.  6. Pt to report pain </= 0/10  Long Term Goals (24-36 weeks)  - pt will demonstrate at least 95% LSI with HHD for flexion, abduction, ER, and IR for improved stability  - pt will demonstrate at least 95% LSI with UE Y-balance for improved postural control  - pt will pass push-up and pull-up testing to demonstrate normalized endurance  - pt will be able to perform sport-specific activities with appropriate form, without instability or pain          Plan     Plan of care Certification: 6/14/2024 to 2/21/25.     Outpatient Physical Therapy 2 times weekly for 36 weeks to include the following interventions: Electrical Stimulation NMES, Manual Therapy, Moist Heat/ Ice, Neuromuscular Re-ed, Patient Education, Self Care, Therapeutic Activities, Therapeutic Exercise, and Dry Needling    Deniz Seth, PT, DPT, SCS

## 2024-11-01 ENCOUNTER — CLINICAL SUPPORT (OUTPATIENT)
Dept: REHABILITATION | Facility: HOSPITAL | Age: 34
End: 2024-11-01
Payer: COMMERCIAL

## 2024-11-01 DIAGNOSIS — M25.611 DECREASED ROM OF RIGHT SHOULDER: Primary | ICD-10-CM

## 2024-11-01 DIAGNOSIS — R29.898 DECREASED STRENGTH OF UPPER EXTREMITY: ICD-10-CM

## 2024-11-01 PROCEDURE — 97140 MANUAL THERAPY 1/> REGIONS: CPT

## 2024-11-01 PROCEDURE — 97112 NEUROMUSCULAR REEDUCATION: CPT

## 2024-11-01 PROCEDURE — 97530 THERAPEUTIC ACTIVITIES: CPT

## 2024-11-01 PROCEDURE — 97110 THERAPEUTIC EXERCISES: CPT

## 2024-11-08 ENCOUNTER — CLINICAL SUPPORT (OUTPATIENT)
Dept: REHABILITATION | Facility: HOSPITAL | Age: 34
End: 2024-11-08
Payer: COMMERCIAL

## 2024-11-08 DIAGNOSIS — F98.8 ATTENTION DEFICIT DISORDER, UNSPECIFIED HYPERACTIVITY PRESENCE: ICD-10-CM

## 2024-11-08 DIAGNOSIS — F41.0 PANIC ATTACKS: ICD-10-CM

## 2024-11-08 DIAGNOSIS — M25.611 DECREASED ROM OF RIGHT SHOULDER: Primary | ICD-10-CM

## 2024-11-08 DIAGNOSIS — R29.898 DECREASED STRENGTH OF UPPER EXTREMITY: ICD-10-CM

## 2024-11-08 DIAGNOSIS — F41.1 GAD (GENERALIZED ANXIETY DISORDER): Primary | ICD-10-CM

## 2024-11-08 PROCEDURE — 97112 NEUROMUSCULAR REEDUCATION: CPT

## 2024-11-08 PROCEDURE — 97530 THERAPEUTIC ACTIVITIES: CPT

## 2024-11-08 PROCEDURE — 97110 THERAPEUTIC EXERCISES: CPT

## 2024-11-08 PROCEDURE — 97140 MANUAL THERAPY 1/> REGIONS: CPT

## 2024-11-08 RX ORDER — LISDEXAMFETAMINE DIMESYLATE 20 MG/1
20 CAPSULE ORAL EVERY MORNING
Qty: 30 CAPSULE | Refills: 0 | Status: SHIPPED | OUTPATIENT
Start: 2024-11-08

## 2024-11-08 RX ORDER — ALPRAZOLAM 0.25 MG/1
0.25 TABLET ORAL DAILY PRN
Qty: 10 TABLET | Refills: 0 | Status: SHIPPED | OUTPATIENT
Start: 2024-11-08 | End: 2024-11-18

## 2024-11-08 NOTE — PROGRESS NOTES
Physical Therapy Daily Treatment Note     Name: Harinder Elliott  [facility] Number: [id number]    Therapy Diagnosis:   Encounter Diagnoses   Name Primary?    Decreased ROM of right shoulder Yes    Decreased strength of upper extremity            Physician: Servando Nielson III, *    Visit Date: 11/8/2024    Physician Orders: PT Eval and Treat   Medical Diagnosis from Referral: Biceps tendonitis on right [M75.21], Glenoid labral tear, right, initial encounter [S43.431A], Instability of right shoulder joint [M25.311]   Evaluation Date: 6/14/2024  Authorization Period Expiration: 5/24/25  Plan of Care Expiration: 2/21/25  Visit # / Visits authorized: 28/ 40  FOTO 3/3 administered 9/23     Time In: 9:05 AM  Time Out: 10:00 AM     Total Billable Time: 55 minutes  Precautions: Standard    DOS 5/28/24  OPERATION:   right  Shoulder osteochondral allograft transplantation, humeral head ICRS Grade 4 - posterior: 2.5 x 20 cm) (CPT 32177 ) (CPT 87153/31719 knee comparison codes) (complex, -22 modifier)  2. Shoulder arthroscopic anterior capsulorrhaphy (CPT 32354) (complex, -22 modifier)  3. Shoulder arthroscopic anterior labral repair (CPT 42624) (complex, -22 modifier)  4. Shoulder arthroscopic posterior labral repair, capsulorrhaphy, 7:00 position (CPT 27532)  5. Shoulder arthroscopic extensive debridement (anterior, posterior glenohumeral joint) (CPT 05111)  6. Shoulder manipulation under anesthesia (CPT 56577)  7. Shoulder arthroscopic lysis of adhesions (CPT 12664):  8. Shoulder arthroscopic loose body removal (CPT 63936)  9 . Shoulder arthroscopic interpositional arthroplasty (90357)  10. Shoulder arthroscopic SLAP type 8 repair (CPT 41202)    POST OPERATIVE PLAN: We will follow the arthroscopic Bankart repair guidelines. We discussed with the patient's family after surgery. The patient will remain in a sling for 6 weeks. We will start PT at the 3 week crista.   Quality of tissue: Good      Subjective     Pt  reports: feeling good no issues  He was compliant with home exercise program.  Response to previous treatment: ongoing   Functional change: ongoing    Pain: 1/10  Location: right shoulder      Objective   DOS 24: 19 weeks 2 days as of 10/11    Shoulder Passive Range of Motion:   Shoulder Right Left   Flexion    170 180   ER at 0    60 85   ER at 90    60 105   IR at 90    30 60      Shoulder Active Range of Motion:   Shoulder Right Left   Flexion    165 180   ER at 0    45 80   HEBER    T2 T4   FIR    L3 T12        Harinder received therapeutic exercises to develop strength, endurance, ROM, flexibility, and posture for 10 minutes includin/2 kneeling t-spine openers at wall 25xB       Not today  1/2 kneeling BTB t-spine rotations 2x15B  Supine dowel ER AAROM at 45 and 90 3x10 5s holds  Pulleys flexion/scaption x 5'  Standing dowel ER at 90 deg flexion 3x10 5s holds  SL open books 25xB  Supine dowel flexion AAROM 3x10 5s holds  Supine dowel flexion AAROM 3x10 5s holds     Harinder received the following manual therapy techniques: Joint mobilizations were applied to the: R shoulder for 10 minutes, including:  Skilled PROM all directions  GH joint centering mobilizations  Grade II/III a/p, inferior GH joint mobs  Manual lat contract/relax  ER MWM with GH joint centering glide      Not today  Patient provided written and verbal consent to receive therapeutic dry needling at today's visit (see consent form scanned into chart). TDN performed to R bicep and anterior deltoid musculature in the supine position using Seirin J type needle sized .30x 40mm+estim.  TDN performed to reduce pain and muscle tension, promote blood flow, and improve ROM and function x 20 minutes. Pt tolerated tx well without adverse effects. The patient was educated on what to expect following the procedure and he verbalized understanding.    Harinder participated in neuromuscular re-education activities to improve: Coordination, Kinesthetic, Sense,  "Proprioception, and Posture for 25 minutes. The following activities were included:  High plank SB walk overs 3x6B  OTB ER-> serratus press-> 90/90 body turn-> [place] press 3x10  1080 sprint UE eccentrics       Not today  Pikes 3x10  Side-plank + ER plyo drops 3x30  Side-plank + horizontal ABD plyo drops 3x30  Prone T plyo drops 3x30  Quadruped wall ball RS: up/down, side/side, CW, CCW 2x10B  High plank shoulder taps 3x10B  3# Cable column D2 PNF 3x10B  10#  walk with 25# contralat farmer carry x 3 laps  15# quadruped acore CC rotational row 3x10B  2# quadruped prone T 3x10B  15# side-plank +CC row 3x10B  10# CC hip hinge-> lat pull 3x10  20# CC chop handle lifts 3x10B  Standing 90/90 plyo ball flips 3x30  Supine ER at 90/90 RTB 3x10  Prone W at end range 3x10 3" holds  Prone 90/90 over yoga block 3x10 3" holds  2# SL ER 4x15  Prone T on SB with contralat iso holds 10/10/10 x 3 rounds  OTB high row-> 90/90 ER 3x10  Supine ER at 90/90 RTB 3x10  Prone T 3x10  OTB ER-> press-> 90/90 body turn 3x10  GTB high rows 3x10  Body blade: at 0, at 90 flexion, at 90 deg ABD 30s x3 each  Quadruped shoulder taps 3x10  OTB ER-> serratus press 3x10  Quadruped rock backs 3x10  2# Side-lying horizontal ABD 3x10-15  2# SL ABD 3x10  OTB ER isotonics at 90 deg flexion 3x10  GTB IR isotonics at 90 deg flexion 3x10  Side-lying ER 3x10  Side-lying ABD AROM 3x10  RTB resisted supine flexion 3x10  YTB ER walkouts 3x6  OTB IR walkouts 3x6  RTB supine ER at 45 3x10 5" holds  Single arm wall slide 3x10  Wall ball RS: up/down, side/side, CW, CCW 3x10 each  Seated ER at 90 deg flexion 3x10  OTB horizontal ABD 3x10  Scapular clocks 2x6  Supine handcuffs at 90 deg GTB 3x10  Serratus wall slide 3x10  3# Prone row 3x10  2# Prone extension 3x10  Side-lying flexion AAROM supported on dowel 3x10       Harinder participated in dynamic functional therapeutic activities to improve functional performance for 15  minutes, including:  10# DB Z press " 4x8-12  20# DB skull  4x8-12          Not today:  14# MB rotational wall slam 4x6B  UBE 4' forward, 4' backwards to improve tissue extensibility and cartilage health  UE MB 6# plyo series: 30x 3  -staggered stance  -Single arm throws  95# landmine thrusters 4x8  3# CC D2 PNF 3x10  [place] rope slams: low/high 30s x2 each  70# landmine bent over row 4x8B  70# landmine landmine press hold to reverse lunge 3x8  12# MB slam-> jam 3x8  15# renegade row 4x8B  55# landmine row-> rotational press 4x8B  23# cable column row-> press 3xB  Golf  rotational swing drill 3x10B  8# MB chest press-> jammer 3x6  Floor press   -15# 2x15  -20# 2x12  -25# x8-10    -8# MB rotational MB wall slam 3x8  2# scaption raise with contralat iso holds 10/10/10 x 3 rounds  27# 1/2 kneeling cable column high/low row 3x10  20# 3D strap rotations 3x10  35# BB bent over row 4x8  35# BB landmine press 4x8    Home Exercises Provided and Patient Education Provided     Education provided:   - HEP, POC, symptom management, recovery timeline, post-operative precautions    Written Home Exercises Provided: yes.  Exercises were reviewed and Harnider was able to demonstrate them prior to the end of the session.  Harinder demonstrated good  understanding of the education provided.     See EMR under Patient Instructions for exercises provided prior visit.    Assessment     Harinder completed session as noted above. Continued to work on normalizing end range ROM deficits. Improvement noted in lat flexibility and ROM. Continues to be restricted at end range 90/90 ER however to be expected. Worked on functional strengthening with continued emphasis on stability. Doing well  Harinder Is progressing well towards his goals.   Pt prognosis is Excellent.     Pt will continue to benefit from skilled outpatient physical therapy to address the deficits listed in the problem list box on initial evaluation, provide pt/family education and to maximize pt's level of  independence in the home and community environment.     Pt's spiritual, cultural and educational needs considered and pt agreeable to plan of care and goals.     Anticipated barriers to physical therapy: none    Goals: Goals:  Short Term Goals (4 Weeks):   1. Pt will be independent with HEP to supplement PT in improving functional use of R UE.  2. Pt will increase pain free R shoulder elevation PROM to >/= 160 deg to improve functional mobility of UE  3. Pt will increase R shoulder ER PROM in 90 deg abduction to >/=30 deg to improve functional mobility of UE  4. Pt will increase R elbow AAROM to WNL deg in 4 weeks to improve functional mobility of UE.  Mid-Term Goals (12-16 Weeks):   1. Pt will improve FOTO score to </=% limited to decrease perceived limitation with carrying, moving, and handling objects.  2. Pt will increase R shoulder PROM to WNL in all planes to improve functional use of R RUE.  3. Pt will increase R shoulder AROM to WFL in all planes to improve functional use of R RUE.  4. Pt will improve R shoulder MMTs to = 5/5 to promote equal use of B UEs in performing functional tasks.  5. Pt will lift 10 lb objects without pain to promote functional QOL.  6. Pt to report pain </= 0/10  Long Term Goals (24-36 weeks)  - pt will demonstrate at least 95% LSI with HHD for flexion, abduction, ER, and IR for improved stability  - pt will demonstrate at least 95% LSI with UE Y-balance for improved postural control  - pt will pass push-up and pull-up testing to demonstrate normalized endurance  - pt will be able to perform sport-specific activities with appropriate form, without instability or pain          Plan     Plan of care Certification: 6/14/2024 to 2/21/25.     Outpatient Physical Therapy 2 times weekly for 36 weeks to include the following interventions: Electrical Stimulation NMES, Manual Therapy, Moist Heat/ Ice, Neuromuscular Re-ed, Patient Education, Self Care, Therapeutic Activities, Therapeutic  Exercise, and Dry Needling    Deniz Seth, PT, DPT, SCS

## 2024-11-15 ENCOUNTER — CLINICAL SUPPORT (OUTPATIENT)
Dept: REHABILITATION | Facility: HOSPITAL | Age: 34
End: 2024-11-15
Payer: COMMERCIAL

## 2024-11-15 DIAGNOSIS — R29.898 DECREASED STRENGTH OF UPPER EXTREMITY: ICD-10-CM

## 2024-11-15 DIAGNOSIS — M25.611 DECREASED ROM OF RIGHT SHOULDER: Primary | ICD-10-CM

## 2024-11-15 PROCEDURE — 97530 THERAPEUTIC ACTIVITIES: CPT

## 2024-11-15 PROCEDURE — 97112 NEUROMUSCULAR REEDUCATION: CPT

## 2024-11-15 PROCEDURE — 97140 MANUAL THERAPY 1/> REGIONS: CPT

## 2024-11-15 PROCEDURE — 97110 THERAPEUTIC EXERCISES: CPT

## 2024-11-15 NOTE — PROGRESS NOTES
Physical Therapy Daily Treatment Note     Name: Harinder Elliott  [facility] Number: [id number]    Therapy Diagnosis:   Encounter Diagnoses   Name Primary?    Decreased ROM of right shoulder Yes    Decreased strength of upper extremity      Physician: Servando Nielson III, *    Visit Date: 11/15/2024    Physician Orders: PT Eval and Treat   Medical Diagnosis from Referral: Biceps tendonitis on right [M75.21], Glenoid labral tear, right, initial encounter [S43.431A], Instability of right shoulder joint [M25.311]   Evaluation Date: 6/14/2024  Authorization Period Expiration: 5/24/25  Plan of Care Expiration: 2/21/25  Visit # / Visits authorized: 30/ 40  FOTO 3/3 administered 9/23     Time In: 8:06 AM  Time Out: 9:00 AM     Total Billable Time: 54 minutes  Precautions: Standard    DOS 5/28/24  OPERATION:   right  Shoulder osteochondral allograft transplantation, humeral head ICRS Grade 4 - posterior: 2.5 x 20 cm) (CPT 02383 ) (CPT 49305/99353 knee comparison codes) (complex, -22 modifier)  2. Shoulder arthroscopic anterior capsulorrhaphy (CPT 73635) (complex, -22 modifier)  3. Shoulder arthroscopic anterior labral repair (CPT 95422) (complex, -22 modifier)  4. Shoulder arthroscopic posterior labral repair, capsulorrhaphy, 7:00 position (CPT 24276)  5. Shoulder arthroscopic extensive debridement (anterior, posterior glenohumeral joint) (CPT 25422)  6. Shoulder manipulation under anesthesia (CPT 87450)  7. Shoulder arthroscopic lysis of adhesions (CPT 51214):  8. Shoulder arthroscopic loose body removal (CPT 84286)  9 . Shoulder arthroscopic interpositional arthroplasty (31656)  10. Shoulder arthroscopic SLAP type 8 repair (CPT 21868)    POST OPERATIVE PLAN: We will follow the arthroscopic [employer] repair guidelines. We discussed with the patient's family after surgery. The patient will remain in a sling for 6 weeks. We will start PT at the 3 week crista.   Quality of tissue: Good      Subjective     Pt reports:  feeling good no issues. Soreness post previous tx but been able to lift 3x/week which has helped  He was compliant with home exercise program.  Response to previous treatment: ongoing   Functional change: ongoing    Pain: 1/10  Location: right shoulder      Objective   DOS 5/28/24: 24 weeks 3 days as of 11/15    Shoulder Passive Range of Motion:   Shoulder Right Left   Flexion    170 180   ER at 0    60 85   ER at 90    60 105   IR at 90    30 60      Shoulder Active Range of Motion:   Shoulder Right Left   Flexion    165 180   ER at 0    45 80   HEBER    T2 T4   FIR    L3 T12        Harinder received therapeutic exercises to develop strength, endurance, ROM, flexibility, and posture for 10 minutes including:  SL t-spine openers 25xB  BTB 1/2 kneeling t-spine openers at wall 25xB     Not today  Supine dowel ER AAROM at 45 and 90 3x10 5s holds  Pulleys flexion/scaption x 5'  Standing dowel ER at 90 deg flexion 3x10 5s holds  SL open books 25xB  Supine dowel flexion AAROM 3x10 5s holds  Supine dowel flexion AAROM 3x10 5s holds     Harinder received the following manual therapy techniques: Joint mobilizations were applied to the: R shoulder for 10 minutes, including:  Skilled PROM all directions  GH joint centering mobilizations  Grade II/III a/p, inferior GH joint mobs  Manual lat contract/relax  ER MWM with GH joint centering glide      Not today  Patient provided written and verbal consent to receive therapeutic dry needling at today's visit (see consent form scanned into chart). TDN performed to R bicep and anterior deltoid musculature in the supine position using Seirin J type needle sized .30x 40mm+estim.  TDN performed to reduce pain and muscle tension, promote blood flow, and improve ROM and function x 20 minutes. Pt tolerated tx well without adverse effects. The patient was educated on what to expect following the procedure and he verbalized understanding.    Harinder participated in neuromuscular re-education  activities to improve: Coordination, Kinesthetic, Sense, Proprioception, and Posture for 28 minutes. The following activities were included:  YTB ER at 90/90 3x10  1080 sprint UE eccentrics     Harinder participated in dynamic functional therapeutic activities to improve functional performance for 8  minutes, including:  UBE 4' forward, 4' backwards to improve tissue extensibility and cartilage health    Next visit  UE MB series  Modified side-plank + ER plyo drops 3x30B  Modified side-plank + horizontal ABD 3x30B      Not today:  10# DB Z press 4x8-12  20# DB skull  4x8-12  14# MB rotational wall slam 4x6B  UE MB 6# plyo series: 30x 3  -staggered stance  -Single arm throws  95# landmine thrusters 4x8  3# CC D2 PNF 3x10  Parson rope slams: low/high 30s x2 each  70# landmine bent over row 4x8B  70# landmine landmine press hold to reverse lunge 3x8  12# MB slam-> jam 3x8  15# renegade row 4x8B  55# landmine row-> rotational press 4x8B  23# cable column row-> press 3xB  Golf  rotational swing drill 3x10B  8# MB chest press-> jammer 3x6  Floor press   -15# 2x15  -20# 2x12  -25# x8-10  -8# MB rotational MB wall slam 3x8  2# scaption raise with contralat iso holds 10/10/10 x 3 rounds  27# 1/2 kneeling cable column high/low row 3x10  20# 3D strap rotations 3x10  35# BB bent over row 4x8  35# BB landmine press 4x8    Home Exercises Provided and Patient Education Provided     Education provided:   - HEP, POC, symptom management, recovery timeline, post-operative precautions    Written Home Exercises Provided: yes.  Exercises were reviewed and Harinder was able to demonstrate them prior to the end of the session.  Harinder demonstrated good  understanding of the education provided.     See EMR under Patient Instructions for exercises provided prior visit.    Assessment     Harinder completed session as noted above. Continued to work on normalizing end range ROM deficits. Improvement noted in lat flexibility and ROM.  Continues to be restricted at end range 90/90 ER however to be expected and improved ability to work through end range following PT assisted RS. Worked on functional strengthening with continued emphasis on stability. Doing well    Harinder Is progressing well towards his goals.   Pt prognosis is Excellent.     Pt will continue to benefit from skilled outpatient physical therapy to address the deficits listed in the problem list box on initial evaluation, provide pt/family education and to maximize pt's level of independence in the home and community environment.     Pt's spiritual, cultural and educational needs considered and pt agreeable to plan of care and goals.     Anticipated barriers to physical therapy: none    Goals: Goals:  Short Term Goals (4 Weeks):   1. Pt will be independent with HEP to supplement PT in improving functional use of R UE.  2. Pt will increase pain free R shoulder elevation PROM to >/= 160 deg to improve functional mobility of UE  3. Pt will increase R shoulder ER PROM in 90 deg abduction to >/=30 deg to improve functional mobility of UE  4. Pt will increase R elbow AAROM to WNL deg in 4 weeks to improve functional mobility of UE.  Mid-Term Goals (12-16 Weeks):   1. Pt will improve FOTO score to </=% limited to decrease perceived limitation with carrying, moving, and handling objects.  2. Pt will increase R shoulder PROM to WNL in all planes to improve functional use of R RUE.  3. Pt will increase R shoulder AROM to WFL in all planes to improve functional use of R RUE.  4. Pt will improve R shoulder MMTs to = 5/5 to promote equal use of B UEs in performing functional tasks.  5. Pt will lift 10 lb objects without pain to promote functional QOL.  6. Pt to report pain </= 0/10  Long Term Goals (24-36 weeks)  - pt will demonstrate at least 95% LSI with HHD for flexion, abduction, ER, and IR for improved stability  - pt will demonstrate at least 95% LSI with UE Y-balance for improved postural  control  - pt will pass push-up and pull-up testing to demonstrate normalized endurance  - pt will be able to perform sport-specific activities with appropriate form, without instability or pain          Plan     Plan of care Certification: 6/14/2024 to 2/21/25.     Outpatient Physical Therapy 2 times weekly for 36 weeks to include the following interventions: Electrical Stimulation NMES, Manual Therapy, Moist Heat/ Ice, Neuromuscular Re-ed, Patient Education, Self Care, Therapeutic Activities, Therapeutic Exercise, and Dry Needling    Deniz Seth, PT, DPT, SCS

## 2024-11-20 ENCOUNTER — PATIENT MESSAGE (OUTPATIENT)
Dept: GASTROENTEROLOGY | Facility: CLINIC | Age: 34
End: 2024-11-20
Payer: COMMERCIAL

## 2024-11-20 ENCOUNTER — TELEPHONE (OUTPATIENT)
Dept: GASTROENTEROLOGY | Facility: CLINIC | Age: 34
End: 2024-11-20
Payer: COMMERCIAL

## 2024-11-20 DIAGNOSIS — K92.1 HEMATOCHEZIA: ICD-10-CM

## 2024-11-20 DIAGNOSIS — K64.9 HEMORRHOIDS, UNSPECIFIED HEMORRHOID TYPE: Primary | ICD-10-CM

## 2024-11-20 RX ORDER — ESCITALOPRAM OXALATE 10 MG/1
TABLET ORAL
Qty: 45 TABLET | Refills: 2 | OUTPATIENT
Start: 2024-11-20

## 2024-11-20 RX ORDER — HYDROCORTISONE ACETATE 25 MG/1
25 SUPPOSITORY RECTAL EVERY 12 HOURS PRN
Qty: 12 SUPPOSITORY | Refills: 0 | Status: SHIPPED | OUTPATIENT
Start: 2024-11-20 | End: 2024-11-27

## 2024-11-21 ENCOUNTER — CLINICAL SUPPORT (OUTPATIENT)
Dept: REHABILITATION | Facility: HOSPITAL | Age: 34
End: 2024-11-21
Payer: COMMERCIAL

## 2024-11-21 DIAGNOSIS — R29.898 DECREASED STRENGTH OF UPPER EXTREMITY: ICD-10-CM

## 2024-11-21 DIAGNOSIS — M25.611 DECREASED ROM OF RIGHT SHOULDER: Primary | ICD-10-CM

## 2024-11-21 PROCEDURE — 97530 THERAPEUTIC ACTIVITIES: CPT

## 2024-11-21 PROCEDURE — 97140 MANUAL THERAPY 1/> REGIONS: CPT

## 2024-11-21 PROCEDURE — 97110 THERAPEUTIC EXERCISES: CPT

## 2024-11-21 NOTE — PROGRESS NOTES
Physical Therapy Daily Treatment Note     Name: Harinder Elliott  Clinic Number: [id number]    Therapy Diagnosis:   No diagnosis found.    Physician: Servando Neilson III, *    Visit Date: 11/21/2024    Physician Orders: PT Eval and Treat   Medical Diagnosis from Referral: Biceps tendonitis on right [M75.21], Glenoid labral tear, right, initial encounter [S43.431A], Instability of right shoulder joint [M25.311]   Evaluation Date: 6/14/2024  Authorization Period Expiration: 5/24/25  Plan of Care Expiration: 2/21/25  Visit # / Visits authorized: 30/ 40  FOTO 3/3 administered 9/23     Time In: 8:02AM  Time Out: 9:00 AM     Total Billable Time: 58 minutes  Precautions: Standard    DOS 5/28/24  OPERATION:   right  Shoulder osteochondral allograft transplantation, humeral head ICRS Grade 4 - posterior: 2.5 x 20 cm) (CPT 39190 ) (CPT 24043/63736 knee comparison codes) (complex, -22 modifier)  2. Shoulder arthroscopic anterior capsulorrhaphy (CPT 94049) (complex, -22 modifier)  3. Shoulder arthroscopic anterior labral repair (CPT 61613) (complex, -22 modifier)  4. Shoulder arthroscopic posterior labral repair, capsulorrhaphy, 7:00 position (CPT 87159)  5. Shoulder arthroscopic extensive debridement (anterior, posterior glenohumeral joint) (CPT 52213)  6. Shoulder manipulation under anesthesia (CPT 14024)  7. Shoulder arthroscopic lysis of adhesions (CPT 03205):  8. Shoulder arthroscopic loose body removal (CPT 18032)  9 . Shoulder arthroscopic interpositional arthroplasty (70825)  10. Shoulder arthroscopic SLAP type 8 repair (CPT 61919)    POST OPERATIVE PLAN: We will follow the arthroscopic [employer] repair guidelines. We discussed with the patient's family after surgery. The patient will remain in a sling for 6 weeks. We will start PT at the 3 week crista.   Quality of tissue: Good      Subjective     Pt reports: feeling good no issues. Soreness post previous tx but been able to lift 3x/week which has helped  He  was compliant with home exercise program.  Response to previous treatment: ongoing   Functional change: ongoing    Pain: 1/10  Location: right shoulder      Objective   DOS 5/28/24: 24 weeks 3 days as of 11/15    Shoulder Passive Range of Motion:   Shoulder Right Left   Flexion    170 180   ER at 0    60 85   ER at 90    60 105   IR at 90    30 60      Shoulder Active Range of Motion:   Shoulder Right Left   Flexion    165 180   ER at 0    45 80   HEBER    T2 T4   FIR    L3 T12        Harinder received therapeutic exercises to develop strength, endurance, ROM, flexibility, and posture for 10 minutes including:  SL t-spine openers 25xB  BTB 1/2 kneeling t-spine openers at wall 25xB     Not today  Supine dowel ER AAROM at 45 and 90 3x10 5s holds  Pulleys flexion/scaption x 5'  Standing dowel ER at 90 deg flexion 3x10 5s holds  SL open books 25xB  Supine dowel flexion AAROM 3x10 5s holds  Supine dowel flexion AAROM 3x10 5s holds     Harinder received the following manual therapy techniques: Joint mobilizations were applied to the: R shoulder for 10 minutes, including:  Skilled PROM all directions  GH joint centering mobilizations  Grade II/III a/p, inferior GH joint mobs  Manual lat contract/relax  ER MWM with GH joint centering glide      Not today  Patient provided written and verbal consent to receive therapeutic dry needling at today's visit (see consent form scanned into chart). TDN performed to R bicep and anterior deltoid musculature in the supine position using Seirin J type needle sized .30x 40mm+estim.  TDN performed to reduce pain and muscle tension, promote blood flow, and improve ROM and function x 20 minutes. Pt tolerated tx well without adverse effects. The patient was educated on what to expect following the procedure and he verbalized understanding.    Harinder participated in neuromuscular re-education activities to improve: Coordination, Kinesthetic, Sense, Proprioception, and Posture for 0 minutes. The  following activities were included:          Harinder participated in dynamic functional therapeutic activities to improve functional performance for 38  minutes, including:  UBE 4' forward, 4' backwards to improve tissue extensibility and cartilage health  Modified side-plank + ER plyo drops 3x30B  Modified side-plank + horizontal ABD plyo drops 3x30B  55# 1/2 kneeling landmine press 4x8B  25# renegade row 3x8B        Home Exercises Provided and Patient Education Provided     Education provided:   - HEP, POC, symptom management, recovery timeline, post-operative precautions    Written Home Exercises Provided: yes.  Exercises were reviewed and Harinder was able to demonstrate them prior to the end of the session.  Harinder demonstrated good  understanding of the education provided.     See EMR under Patient Instructions for exercises provided prior visit.    Assessment     Harinder completed session as noted above. Continued to work on normalizing end range ROM deficits. Improvement noted in lat flexibility and ROM. Continues to be restricted at end range 90/90 ER however to be expected and improved ability to work through end range following PT assisted RS. Worked on functional strengthening with continued emphasis on stability. Added single arm plyo series with no complaints of pain. May initiate throwing/OH serving progressions     Harinder Is progressing well towards his goals.   Pt prognosis is Excellent.     Pt will continue to benefit from skilled outpatient physical therapy to address the deficits listed in the problem list box on initial evaluation, provide pt/family education and to maximize pt's level of independence in the home and community environment.     Pt's spiritual, cultural and educational needs considered and pt agreeable to plan of care and goals.     Anticipated barriers to physical therapy: none    Goals: Goals:  Short Term Goals (4 Weeks):   1. Pt will be independent with HEP to supplement PT in  improving functional use of R UE.  2. Pt will increase pain free R shoulder elevation PROM to >/= 160 deg to improve functional mobility of UE  3. Pt will increase R shoulder ER PROM in 90 deg abduction to >/=30 deg to improve functional mobility of UE  4. Pt will increase R elbow AAROM to WNL deg in 4 weeks to improve functional mobility of UE.  Mid-Term Goals (12-16 Weeks):   1. Pt will improve FOTO score to </=% limited to decrease perceived limitation with carrying, moving, and handling objects.  2. Pt will increase R shoulder PROM to WNL in all planes to improve functional use of R RUE.  3. Pt will increase R shoulder AROM to WFL in all planes to improve functional use of R RUE.  4. Pt will improve R shoulder MMTs to = 5/5 to promote equal use of B UEs in performing functional tasks.  5. Pt will lift 10 lb objects without pain to promote functional QOL.  6. Pt to report pain </= 0/10  Long Term Goals (24-36 weeks)  - pt will demonstrate at least 95% LSI with HHD for flexion, abduction, ER, and IR for improved stability  - pt will demonstrate at least 95% LSI with UE Y-balance for improved postural control  - pt will pass push-up and pull-up testing to demonstrate normalized endurance  - pt will be able to perform sport-specific activities with appropriate form, without instability or pain          Plan     Plan of care Certification: 6/14/2024 to 2/21/25.     Outpatient Physical Therapy 2 times weekly for 36 weeks to include the following interventions: Electrical Stimulation NMES, Manual Therapy, Moist Heat/ Ice, Neuromuscular Re-ed, Patient Education, Self Care, Therapeutic Activities, Therapeutic Exercise, and Dry Needling    Deniz Seth, PT, DPT, SCS

## 2024-11-22 ENCOUNTER — LAB VISIT (OUTPATIENT)
Dept: LAB | Facility: HOSPITAL | Age: 34
End: 2024-11-22
Attending: INTERNAL MEDICINE
Payer: COMMERCIAL

## 2024-11-22 DIAGNOSIS — K64.9 HEMORRHOIDS, UNSPECIFIED HEMORRHOID TYPE: ICD-10-CM

## 2024-11-22 LAB
BASOPHILS # BLD AUTO: 0.03 K/UL (ref 0–0.2)
BASOPHILS NFR BLD: 0.6 % (ref 0–1.9)
DIFFERENTIAL METHOD BLD: ABNORMAL
EOSINOPHIL # BLD AUTO: 0 K/UL (ref 0–0.5)
EOSINOPHIL NFR BLD: 0.8 % (ref 0–8)
ERYTHROCYTE [DISTWIDTH] IN BLOOD BY AUTOMATED COUNT: 12.2 % (ref 11.5–14.5)
FERRITIN SERPL-MCNC: 67 NG/ML (ref 20–300)
HCT VFR BLD AUTO: 38.1 % (ref 40–54)
HGB BLD-MCNC: 13.3 G/DL (ref 14–18)
IMM GRANULOCYTES # BLD AUTO: 0.01 K/UL (ref 0–0.04)
IMM GRANULOCYTES NFR BLD AUTO: 0.2 % (ref 0–0.5)
IRON SERPL-MCNC: 142 UG/DL (ref 45–160)
LYMPHOCYTES # BLD AUTO: 1.4 K/UL (ref 1–4.8)
LYMPHOCYTES NFR BLD: 28.6 % (ref 18–48)
MCH RBC QN AUTO: 28.2 PG (ref 27–31)
MCHC RBC AUTO-ENTMCNC: 34.9 G/DL (ref 32–36)
MCV RBC AUTO: 81 FL (ref 82–98)
MONOCYTES # BLD AUTO: 0.4 K/UL (ref 0.3–1)
MONOCYTES NFR BLD: 8 % (ref 4–15)
NEUTROPHILS # BLD AUTO: 3.1 K/UL (ref 1.8–7.7)
NEUTROPHILS NFR BLD: 61.8 % (ref 38–73)
NRBC BLD-RTO: 0 /100 WBC
PLATELET # BLD AUTO: 274 K/UL (ref 150–450)
PMV BLD AUTO: 9.3 FL (ref 9.2–12.9)
RBC # BLD AUTO: 4.71 M/UL (ref 4.6–6.2)
SATURATED IRON: 36 % (ref 20–50)
TOTAL IRON BINDING CAPACITY: 397 UG/DL (ref 250–450)
TRANSFERRIN SERPL-MCNC: 268 MG/DL (ref 200–375)
WBC # BLD AUTO: 5.03 K/UL (ref 3.9–12.7)

## 2024-11-22 PROCEDURE — 83540 ASSAY OF IRON: CPT | Performed by: INTERNAL MEDICINE

## 2024-11-22 PROCEDURE — 82728 ASSAY OF FERRITIN: CPT | Performed by: INTERNAL MEDICINE

## 2024-11-22 PROCEDURE — 85025 COMPLETE CBC W/AUTO DIFF WBC: CPT | Performed by: INTERNAL MEDICINE

## 2024-11-22 PROCEDURE — 36415 COLL VENOUS BLD VENIPUNCTURE: CPT | Performed by: INTERNAL MEDICINE

## 2024-11-23 ENCOUNTER — PATIENT MESSAGE (OUTPATIENT)
Dept: GASTROENTEROLOGY | Facility: CLINIC | Age: 34
End: 2024-11-23
Payer: COMMERCIAL

## 2024-11-23 DIAGNOSIS — D50.9 MICROCYTIC ANEMIA: Primary | ICD-10-CM

## 2024-11-23 NOTE — PROGRESS NOTES
GI MA team    Please tell Harinder that he is slightly anemic but not iron deficient    I would like to repeat 12 hour fasting blood work in 2 weeks for repeat check orders have been placed thank you

## 2024-11-27 ENCOUNTER — CLINICAL SUPPORT (OUTPATIENT)
Dept: REHABILITATION | Facility: HOSPITAL | Age: 34
End: 2024-11-27
Payer: COMMERCIAL

## 2024-11-27 DIAGNOSIS — M25.611 DECREASED ROM OF RIGHT SHOULDER: Primary | ICD-10-CM

## 2024-11-27 DIAGNOSIS — R29.898 DECREASED STRENGTH OF UPPER EXTREMITY: ICD-10-CM

## 2024-11-27 PROCEDURE — 97530 THERAPEUTIC ACTIVITIES: CPT

## 2024-11-27 PROCEDURE — 97140 MANUAL THERAPY 1/> REGIONS: CPT

## 2024-11-27 PROCEDURE — 97110 THERAPEUTIC EXERCISES: CPT

## 2024-11-27 NOTE — PROGRESS NOTES
Physical Therapy Daily Treatment Note     Name: Harinder Elliott  [facility] Number: [id number]    Therapy Diagnosis:   Encounter Diagnoses   Name Primary?    Decreased ROM of right shoulder Yes    Decreased strength of upper extremity        Physician: Servando Nielson III, *    Visit Date: 11/27/2024    Physician Orders: PT Eval and Treat   Medical Diagnosis from Referral: Biceps tendonitis on right [M75.21], Glenoid labral tear, right, initial encounter [S43.431A], Instability of right shoulder joint [M25.311]   Evaluation Date: 6/14/2024  Authorization Period Expiration: 5/24/25  Plan of Care Expiration: 2/21/25  Visit # / Visits authorized: 30/ 40  FOTO 3/3 administered 9/23     Time In: 8:02AM  Time Out: 9:00 AM     Total Billable Time: 58 minutes  Precautions: Standard    DOS 5/28/24  OPERATION:   right  Shoulder osteochondral allograft transplantation, humeral head ICRS Grade 4 - posterior: 2.5 x 20 cm) (CPT 03344 ) (CPT 73893/50711 knee comparison codes) (complex, -22 modifier)  2. Shoulder arthroscopic anterior capsulorrhaphy (CPT 81843) (complex, -22 modifier)  3. Shoulder arthroscopic anterior labral repair (CPT 08028) (complex, -22 modifier)  4. Shoulder arthroscopic posterior labral repair, capsulorrhaphy, 7:00 position (CPT 37894)  5. Shoulder arthroscopic extensive debridement (anterior, posterior glenohumeral joint) (CPT 54764)  6. Shoulder manipulation under anesthesia (CPT 30946)  7. Shoulder arthroscopic lysis of adhesions (CPT 56985):  8. Shoulder arthroscopic loose body removal (CPT 00067)  9 . Shoulder arthroscopic interpositional arthroplasty (05916)  10. Shoulder arthroscopic SLAP type 8 repair (CPT 22285)    POST OPERATIVE PLAN: We will follow the arthroscopic Bankart repair guidelines. We discussed with the patient's family after surgery. The patient will remain in a sling for 6 weeks. We will start PT at the 3 week crista.   Quality of tissue: Good      Subjective     Pt reports:  feeling good no issues  He was compliant with home exercise program.  Response to previous treatment: ongoing   Functional change: ongoing    Pain: 1/10  Location: right shoulder      Objective   DOS 5/28/24: 24 weeks 3 days as of 11/15    Shoulder Passive Range of Motion:   Shoulder Right Left   Flexion    170 180   ER at 0    60 85   ER at 90    60 105   IR at 90    30 60      Shoulder Active Range of Motion:   Shoulder Right Left   Flexion    165 180   ER at 0    45 80   HEBER    T2 T4   FIR    L3 T12        Harinder received therapeutic exercises to develop strength, endurance, ROM, flexibility, and posture for 10 minutes including:  SL t-spine openers 25xB  BTB 1/2 kneeling t-spine openers at wall 25xB     Not today  Supine dowel ER AAROM at 45 and 90 3x10 5s holds  Pulleys flexion/scaption x 5'  Standing dowel ER at 90 deg flexion 3x10 5s holds  SL open books 25xB  Supine dowel flexion AAROM 3x10 5s holds  Supine dowel flexion AAROM 3x10 5s holds     Harinder received the following manual therapy techniques: Joint mobilizations were applied to the: R shoulder for 10 minutes, including:  Skilled PROM all directions  GH joint centering mobilizations  Grade II/III a/p, inferior GH joint mobs  Manual lat contract/relax  ER MWM with GH joint centering glide      Not today  Patient provided written and verbal consent to receive therapeutic dry needling at today's visit (see consent form scanned into chart). TDN performed to R bicep and anterior deltoid musculature in the supine position using Seirin J type needle sized .30x 40mm+estim.  TDN performed to reduce pain and muscle tension, promote blood flow, and improve ROM and function x 20 minutes. Pt tolerated tx well without adverse effects. The patient was educated on what to expect following the procedure and he verbalized understanding.    Harinder participated in neuromuscular re-education activities to improve: Coordination, Kinesthetic, Sense, Proprioception, and  Posture for 0 minutes. The following activities were included:  Supine ancor 5# ER over 1/2 foam 4x8        Harinder participated in dynamic functional therapeutic activities to improve functional performance for 38  minutes, including:  UBE 4' forward, 4' backwards to improve tissue extensibility and cartilage health  90/90 ER MB plyos' at wall 3x30  Multi-D OH MB plyos at wall 3x10  1/2 kneeling 2# MB single arm OH tramp toss 3x15  1/2 kneeling 2# MB single arm cross body tramp toss 3x15        Not today  Modified side-plank + ER plyo drops 3x30B  Modified side-plank + horizontal ABD plyo drops 3x30B  55# 1/2 kneeling landmine press 4x8B  25# renegade row 3x8B        Home Exercises Provided and Patient Education Provided     Education provided:   - HEP, POC, symptom management, recovery timeline, post-operative precautions    Written Home Exercises Provided: yes.  Exercises were reviewed and Harinder was able to demonstrate them prior to the end of the session.  Harinder demonstrated good  understanding of the education provided.     See EMR under Patient Instructions for exercises provided prior visit.    Assessment     Harinder completed session as noted above. Continued to work on normalizing end range ROM deficits. Improvement noted in lat flexibility and ROM. Continues to be restricted at end range 90/90 ER however to be expected and improved ability to work through end range following PT assisted RS. Worked on functional strengthening with continued emphasis on stability. Added single arm plyo series with no complaints of pain. May initiate throwing/OH serving progressions     Harinder Is progressing well towards his goals.   Pt prognosis is Excellent.     Pt will continue to benefit from skilled outpatient physical therapy to address the deficits listed in the problem list box on initial evaluation, provide pt/family education and to maximize pt's level of independence in the home and community environment.     Pt's  spiritual, cultural and educational needs considered and pt agreeable to plan of care and goals.     Anticipated barriers to physical therapy: none    Goals: Goals:  Short Term Goals (4 Weeks):   1. Pt will be independent with HEP to supplement PT in improving functional use of R UE.  2. Pt will increase pain free R shoulder elevation PROM to >/= 160 deg to improve functional mobility of UE  3. Pt will increase R shoulder ER PROM in 90 deg abduction to >/=30 deg to improve functional mobility of UE  4. Pt will increase R elbow AAROM to WNL deg in 4 weeks to improve functional mobility of UE.  Mid-Term Goals (12-16 Weeks):   1. Pt will improve FOTO score to </=% limited to decrease perceived limitation with carrying, moving, and handling objects.  2. Pt will increase R shoulder PROM to WNL in all planes to improve functional use of R RUE.  3. Pt will increase R shoulder AROM to WFL in all planes to improve functional use of R RUE.  4. Pt will improve R shoulder MMTs to = 5/5 to promote equal use of B UEs in performing functional tasks.  5. Pt will lift 10 lb objects without pain to promote functional QOL.  6. Pt to report pain </= 0/10  Long Term Goals (24-36 weeks)  - pt will demonstrate at least 95% LSI with HHD for flexion, abduction, ER, and IR for improved stability  - pt will demonstrate at least 95% LSI with UE Y-balance for improved postural control  - pt will pass push-up and pull-up testing to demonstrate normalized endurance  - pt will be able to perform sport-specific activities with appropriate form, without instability or pain          Plan     Plan of care Certification: 6/14/2024 to 2/21/25.     Outpatient Physical Therapy 2 times weekly for 36 weeks to include the following interventions: Electrical Stimulation NMES, Manual Therapy, Moist Heat/ Ice, Neuromuscular Re-ed, Patient Education, Self Care, Therapeutic Activities, Therapeutic Exercise, and Dry Needling    Deniz Seth, PT, DPT, SCS

## 2024-12-02 ENCOUNTER — TELEPHONE (OUTPATIENT)
Dept: SPORTS MEDICINE | Facility: CLINIC | Age: 34
End: 2024-12-02
Payer: COMMERCIAL

## 2024-12-02 ENCOUNTER — TELEPHONE (OUTPATIENT)
Dept: GASTROENTEROLOGY | Facility: CLINIC | Age: 34
End: 2024-12-02
Payer: COMMERCIAL

## 2024-12-02 ENCOUNTER — PATIENT MESSAGE (OUTPATIENT)
Dept: GASTROENTEROLOGY | Facility: CLINIC | Age: 34
End: 2024-12-02
Payer: COMMERCIAL

## 2024-12-02 DIAGNOSIS — Z98.890 S/P SHOULDER SURGERY: Primary | ICD-10-CM

## 2024-12-02 DIAGNOSIS — M19.011 PRIMARY OSTEOARTHRITIS OF RIGHT SHOULDER: ICD-10-CM

## 2024-12-02 DIAGNOSIS — S43.431A GLENOID LABRAL TEAR, RIGHT, INITIAL ENCOUNTER: ICD-10-CM

## 2024-12-02 NOTE — TELEPHONE ENCOUNTER
Spoke with patient.   Aware of his appointment with Aaliyah Arroyo NP in CRS on 12/9 at 10:30.   Confirmation mailed.   Marti

## 2024-12-02 NOTE — TELEPHONE ENCOUNTER
----- Message from Carline Copeland sent at 12/2/2024 11:45 AM CST -----  Regarding: Returning Missed Call  Contact: [phone]  Returning a Missed Call    Caller: Patient     Returning call to: RENNY PAUL    Caller can be reached @: [phone]

## 2024-12-06 ENCOUNTER — CLINICAL SUPPORT (OUTPATIENT)
Dept: REHABILITATION | Facility: HOSPITAL | Age: 34
End: 2024-12-06
Payer: COMMERCIAL

## 2024-12-06 ENCOUNTER — TELEPHONE (OUTPATIENT)
Dept: SURGERY | Facility: CLINIC | Age: 34
End: 2024-12-06
Payer: COMMERCIAL

## 2024-12-06 DIAGNOSIS — M25.611 DECREASED ROM OF RIGHT SHOULDER: Primary | ICD-10-CM

## 2024-12-06 DIAGNOSIS — R29.898 DECREASED STRENGTH OF UPPER EXTREMITY: ICD-10-CM

## 2024-12-06 PROCEDURE — 97530 THERAPEUTIC ACTIVITIES: CPT

## 2024-12-06 PROCEDURE — 97110 THERAPEUTIC EXERCISES: CPT

## 2024-12-06 PROCEDURE — 97112 NEUROMUSCULAR REEDUCATION: CPT

## 2024-12-06 PROCEDURE — 97140 MANUAL THERAPY 1/> REGIONS: CPT

## 2024-12-06 NOTE — PROGRESS NOTES
Physical Therapy Daily Treatment Note     Name: Harinder Elliott  [facility] Number: [id number]    Therapy Diagnosis:   Encounter Diagnoses   Name Primary?    Decreased ROM of right shoulder Yes    Decreased strength of upper extremity        Physician: Servando Nielson III, *    Visit Date: 12/6/2024    Physician Orders: PT Eval and Treat   Medical Diagnosis from Referral: Biceps tendonitis on right [M75.21], Glenoid labral tear, right, initial encounter [S43.431A], Instability of right shoulder joint [M25.311]   Evaluation Date: 6/14/2024  Authorization Period Expiration: 5/24/25  Plan of Care Expiration: 2/21/25  Visit # / Visits authorized: 33/ 40  FOTO 3/3 administered 9/23     Time In: 8:00 AM  Time Out: 8:45 AM     Total Billable Time: 45 minutes  Precautions: Standard    DOS 5/28/24  OPERATION:   right  Shoulder osteochondral allograft transplantation, humeral head ICRS Grade 4 - posterior: 2.5 x 20 cm) (CPT 39657 ) (CPT 46900/81329 knee comparison codes) (complex, -22 modifier)  2. Shoulder arthroscopic anterior capsulorrhaphy (CPT 40818) (complex, -22 modifier)  3. Shoulder arthroscopic anterior labral repair (CPT 70410) (complex, -22 modifier)  4. Shoulder arthroscopic posterior labral repair, capsulorrhaphy, 7:00 position (CPT 82610)  5. Shoulder arthroscopic extensive debridement (anterior, posterior glenohumeral joint) (CPT 52889)  6. Shoulder manipulation under anesthesia (CPT 08366)  7. Shoulder arthroscopic lysis of adhesions (CPT 12534):  8. Shoulder arthroscopic loose body removal (CPT 31775)  9 . Shoulder arthroscopic interpositional arthroplasty (26668)  10. Shoulder arthroscopic SLAP type 8 repair (CPT 58683)    POST OPERATIVE PLAN: We will follow the arthroscopic Bankart repair guidelines. We discussed with the patient's family after surgery. The patient will remain in a sling for 6 weeks. We will start PT at the 3 week crista.   Quality of tissue: Good      Subjective     Pt reports:  feeling good no issues. Increased anterior shoulder sx upon arrival  He was compliant with home exercise program.  Response to previous treatment: ongoing   Functional change: ongoing    Pain: 1/10  Location: right shoulder      Objective   DOS 5/28/24: 24 weeks 3 days as of 11/15    Shoulder Passive Range of Motion:   Shoulder Right Left   Flexion    170 180   ER at 0    60 85   ER at 90    60 105   IR at 90    30 60      Shoulder Active Range of Motion:   Shoulder Right Left   Flexion    165 180   ER at 0    45 80   HEBER    T2 T4   FIR    L3 T12        Harinder received therapeutic exercises to develop strength, endurance, ROM, flexibility, and posture for 10 minutes including:  SL t-spine openers 25xB  BTB 1/2 kneeling t-spine openers at wall 25xB     Not today  Supine dowel ER AAROM at 45 and 90 3x10 5s holds  Pulleys flexion/scaption x 5'  Standing dowel ER at 90 deg flexion 3x10 5s holds  SL open books 25xB  Supine dowel flexion AAROM 3x10 5s holds  Supine dowel flexion AAROM 3x10 5s holds     Harinder received the following manual therapy techniques: Joint mobilizations were applied to the: R shoulder for 15 minutes, including:  Skilled PROM all directions  GH joint centering mobilizations  Grade II/III a/p, inferior GH joint mobs  Manual lat contract/relax  ER MWM with GH joint centering glide      Not today  Patient provided written and verbal consent to receive therapeutic dry needling at today's visit (see consent form scanned into chart). TDN performed to R bicep and anterior deltoid musculature in the supine position using Seirin J type needle sized .30x 40mm+estim.  TDN performed to reduce pain and muscle tension, promote blood flow, and improve ROM and function x 20 minutes. Pt tolerated tx well without adverse effects. The patient was educated on what to expect following the procedure and he verbalized understanding.    Harinder participated in neuromuscular re-education activities to improve: Coordination,  Kinesthetic, Sense, Proprioception, and Posture for 10 minutes. The following activities were included:  Supine ancor 5# ER over 1/2 foam 4x8        Harinder participated in dynamic functional therapeutic activities to improve functional performance for 10  minutes, including:  UBE 4' forward, 4' backwards to improve tissue extensibility and cartilage health        Not today  90/90 ER MB plyos' at wall 3x30  Multi-D OH MB plyos at wall 3x10  1/2 kneeling 2# MB single arm OH tramp toss 3x15  1/2 kneeling 2# MB single arm cross body tramp toss 3x15  Modified side-plank + ER plyo drops 3x30B  Modified side-plank + horizontal ABD plyo drops 3x30B  55# 1/2 kneeling landmine press 4x8B  25# renegade row 3x8B        Home Exercises Provided and Patient Education Provided     Education provided:   - HEP, POC, symptom management, recovery timeline, post-operative precautions    Written Home Exercises Provided: yes.  Exercises were reviewed and Harinder was able to demonstrate them prior to the end of the session.  Harinder demonstrated good  understanding of the education provided.     See EMR under Patient Instructions for exercises provided prior visit.    Assessment     Harinder completed session as noted above. Continued to work on normalizing end range ROM deficits ROM has improved significantly. Reported increased anterior shoulder symptoms over the last week with no specific ABHINAV. TTP along coracoid process but majority of pain localized at incision site so could be due to superficial scar tissue formation. Will continue to progress as tolerated    Harinder Is progressing well towards his goals.   Pt prognosis is Excellent.     Pt will continue to benefit from skilled outpatient physical therapy to address the deficits listed in the problem list box on initial evaluation, provide pt/family education and to maximize pt's level of independence in the home and community environment.     Pt's spiritual, cultural and educational needs  considered and pt agreeable to plan of care and goals.     Anticipated barriers to physical therapy: none    Goals: Goals:  Short Term Goals (4 Weeks):   1. Pt will be independent with HEP to supplement PT in improving functional use of R UE.  2. Pt will increase pain free R shoulder elevation PROM to >/= 160 deg to improve functional mobility of UE  3. Pt will increase R shoulder ER PROM in 90 deg abduction to >/=30 deg to improve functional mobility of UE  4. Pt will increase R elbow AAROM to WNL deg in 4 weeks to improve functional mobility of UE.  Mid-Term Goals (12-16 Weeks):   1. Pt will improve FOTO score to </=% limited to decrease perceived limitation with carrying, moving, and handling objects.  2. Pt will increase R shoulder PROM to WNL in all planes to improve functional use of R RUE.  3. Pt will increase R shoulder AROM to WFL in all planes to improve functional use of R RUE.  4. Pt will improve R shoulder MMTs to = 5/5 to promote equal use of B UEs in performing functional tasks.  5. Pt will lift 10 lb objects without pain to promote functional QOL.  6. Pt to report pain </= 0/10  Long Term Goals (24-36 weeks)  - pt will demonstrate at least 95% LSI with HHD for flexion, abduction, ER, and IR for improved stability  - pt will demonstrate at least 95% LSI with UE Y-balance for improved postural control  - pt will pass push-up and pull-up testing to demonstrate normalized endurance  - pt will be able to perform sport-specific activities with appropriate form, without instability or pain          Plan     Plan of care Certification: 6/14/2024 to 2/21/25.     Outpatient Physical Therapy 2 times weekly for 36 weeks to include the following interventions: Electrical Stimulation NMES, Manual Therapy, Moist Heat/ Ice, Neuromuscular Re-ed, Patient Education, Self Care, Therapeutic Activities, Therapeutic Exercise, and Dry Needling    Deniz Seth, PT, DPT, SCS

## 2024-12-09 ENCOUNTER — LAB VISIT (OUTPATIENT)
Dept: LAB | Facility: HOSPITAL | Age: 34
End: 2024-12-09
Attending: INTERNAL MEDICINE
Payer: COMMERCIAL

## 2024-12-09 ENCOUNTER — OFFICE VISIT (OUTPATIENT)
Dept: SURGERY | Facility: CLINIC | Age: 34
End: 2024-12-09
Payer: COMMERCIAL

## 2024-12-09 VITALS
HEART RATE: 89 BPM | DIASTOLIC BLOOD PRESSURE: 86 MMHG | WEIGHT: 186.31 LBS | HEIGHT: 71 IN | SYSTOLIC BLOOD PRESSURE: 126 MMHG | BODY MASS INDEX: 26.08 KG/M2

## 2024-12-09 DIAGNOSIS — K92.1 HEMATOCHEZIA: ICD-10-CM

## 2024-12-09 DIAGNOSIS — K64.9 HEMORRHOIDS, UNSPECIFIED HEMORRHOID TYPE: ICD-10-CM

## 2024-12-09 DIAGNOSIS — D50.9 MICROCYTIC ANEMIA: ICD-10-CM

## 2024-12-09 DIAGNOSIS — K64.9 BLEEDING HEMORRHOIDS: Primary | ICD-10-CM

## 2024-12-09 LAB
25(OH)D3+25(OH)D2 SERPL-MCNC: 40 NG/ML (ref 30–96)
ALBUMIN SERPL BCP-MCNC: 4.4 G/DL (ref 3.5–5.2)
ALP SERPL-CCNC: 67 U/L (ref 40–150)
ALT SERPL W/O P-5'-P-CCNC: 15 U/L (ref 10–44)
ANION GAP SERPL CALC-SCNC: 8 MMOL/L (ref 8–16)
AST SERPL-CCNC: 17 U/L (ref 10–40)
BASOPHILS # BLD AUTO: 0.03 K/UL (ref 0–0.2)
BASOPHILS NFR BLD: 0.7 % (ref 0–1.9)
BILIRUB SERPL-MCNC: 0.7 MG/DL (ref 0.1–1)
BUN SERPL-MCNC: 15 MG/DL (ref 6–20)
CALCIUM SERPL-MCNC: 9.2 MG/DL (ref 8.7–10.5)
CHLORIDE SERPL-SCNC: 106 MMOL/L (ref 95–110)
CO2 SERPL-SCNC: 26 MMOL/L (ref 23–29)
CREAT SERPL-MCNC: 0.8 MG/DL (ref 0.5–1.4)
DIFFERENTIAL METHOD BLD: ABNORMAL
EOSINOPHIL # BLD AUTO: 0.1 K/UL (ref 0–0.5)
EOSINOPHIL NFR BLD: 1.7 % (ref 0–8)
ERYTHROCYTE [DISTWIDTH] IN BLOOD BY AUTOMATED COUNT: 12.4 % (ref 11.5–14.5)
EST. GFR  (NO RACE VARIABLE): >60 ML/MIN/1.73 M^2
FERRITIN SERPL-MCNC: 56 NG/ML (ref 20–300)
FOLATE SERPL-MCNC: 13 NG/ML (ref 4–24)
GLUCOSE SERPL-MCNC: 94 MG/DL (ref 70–110)
HCT VFR BLD AUTO: 40.3 % (ref 40–54)
HGB BLD-MCNC: 13.2 G/DL (ref 14–18)
IMM GRANULOCYTES # BLD AUTO: 0.01 K/UL (ref 0–0.04)
IMM GRANULOCYTES NFR BLD AUTO: 0.2 % (ref 0–0.5)
IRON SERPL-MCNC: 189 UG/DL (ref 45–160)
LYMPHOCYTES # BLD AUTO: 1.4 K/UL (ref 1–4.8)
LYMPHOCYTES NFR BLD: 34.1 % (ref 18–48)
MCH RBC QN AUTO: 27.7 PG (ref 27–31)
MCHC RBC AUTO-ENTMCNC: 32.8 G/DL (ref 32–36)
MCV RBC AUTO: 85 FL (ref 82–98)
MONOCYTES # BLD AUTO: 0.3 K/UL (ref 0.3–1)
MONOCYTES NFR BLD: 8.2 % (ref 4–15)
NEUTROPHILS # BLD AUTO: 2.3 K/UL (ref 1.8–7.7)
NEUTROPHILS NFR BLD: 55.1 % (ref 38–73)
NRBC BLD-RTO: 0 /100 WBC
PLATELET # BLD AUTO: 278 K/UL (ref 150–450)
PMV BLD AUTO: 9.1 FL (ref 9.2–12.9)
POTASSIUM SERPL-SCNC: 4.2 MMOL/L (ref 3.5–5.1)
PROT SERPL-MCNC: 7.1 G/DL (ref 6–8.4)
RBC # BLD AUTO: 4.77 M/UL (ref 4.6–6.2)
RETICS/RBC NFR AUTO: 1.3 % (ref 0.4–2)
SATURATED IRON: 48 % (ref 20–50)
SODIUM SERPL-SCNC: 140 MMOL/L (ref 136–145)
TOTAL IRON BINDING CAPACITY: 394 UG/DL (ref 250–450)
TRANSFERRIN SERPL-MCNC: 266 MG/DL (ref 200–375)
VIT B12 SERPL-MCNC: 407 PG/ML (ref 210–950)
WBC # BLD AUTO: 4.16 K/UL (ref 3.9–12.7)

## 2024-12-09 PROCEDURE — 82607 VITAMIN B-12: CPT | Performed by: INTERNAL MEDICINE

## 2024-12-09 PROCEDURE — 85025 COMPLETE CBC W/AUTO DIFF WBC: CPT | Performed by: INTERNAL MEDICINE

## 2024-12-09 PROCEDURE — 84466 ASSAY OF TRANSFERRIN: CPT | Performed by: INTERNAL MEDICINE

## 2024-12-09 PROCEDURE — 82746 ASSAY OF FOLIC ACID SERUM: CPT | Performed by: INTERNAL MEDICINE

## 2024-12-09 PROCEDURE — 99204 OFFICE O/P NEW MOD 45 MIN: CPT | Mod: S$GLB,,, | Performed by: NURSE PRACTITIONER

## 2024-12-09 PROCEDURE — 85045 AUTOMATED RETICULOCYTE COUNT: CPT | Performed by: INTERNAL MEDICINE

## 2024-12-09 PROCEDURE — 82728 ASSAY OF FERRITIN: CPT | Performed by: INTERNAL MEDICINE

## 2024-12-09 PROCEDURE — 84630 ASSAY OF ZINC: CPT | Performed by: INTERNAL MEDICINE

## 2024-12-09 PROCEDURE — 83020 HEMOGLOBIN ELECTROPHORESIS: CPT | Performed by: INTERNAL MEDICINE

## 2024-12-09 PROCEDURE — 82306 VITAMIN D 25 HYDROXY: CPT | Performed by: INTERNAL MEDICINE

## 2024-12-09 PROCEDURE — 80053 COMPREHEN METABOLIC PANEL: CPT | Performed by: INTERNAL MEDICINE

## 2024-12-09 PROCEDURE — 86258 DGP ANTIBODY EACH IG CLASS: CPT | Mod: 59 | Performed by: INTERNAL MEDICINE

## 2024-12-09 PROCEDURE — 82525 ASSAY OF COPPER: CPT | Performed by: INTERNAL MEDICINE

## 2024-12-09 PROCEDURE — 36415 COLL VENOUS BLD VENIPUNCTURE: CPT | Performed by: INTERNAL MEDICINE

## 2024-12-09 RX ORDER — FLUTICASONE PROPIONATE 50 MCG
1 SPRAY, SUSPENSION (ML) NASAL DAILY
COMMUNITY

## 2024-12-09 RX ORDER — HYDROCORTISONE ACETATE 25 MG/1
25 SUPPOSITORY RECTAL 2 TIMES DAILY
Qty: 28 SUPPOSITORY | Refills: 0 | Status: SHIPPED | OUTPATIENT
Start: 2024-12-09 | End: 2024-12-26

## 2024-12-09 NOTE — PROGRESS NOTES
CRS Office Visit History and Physical    Referring Md:   Graham Marino Md  [address] [place] [address]    SUBJECTIVE:     Chief Complaint: hemorrhoids    History of Present Illness:  The patient is a new patient to this practice.   Course is as follows:   Patient is a 34 y.o. male presents with hemorrhoids. A few weeks ago had some bleeding, BRB, in toilet and on TP with wiping. Then every 4th or 5th day he noticed some bleeding.   Used some prescription suppositories for about 6 days, did fine with this until he stopped the suppositories and the bleeding returned.   Usually occurs in the am  No proctalgia  Has a BM 2-3x per day. Variable stools  Mild straining  Sometime more than 5 mins on toilet on phone  is not currently taking fiber supplement or stool softener  Blood thinners: No    Last Colonoscopy: 2024   A 1 mm polyp was found in the descending colon. The polyp was        sessile. The polyp was removed with a cold snare. Resection and        retrieval were complete. Estimated blood loss was minimal.        Diverticula were found in the recto-sigmoid colon, sigmoid colon,        descending colon, transverse colon and ascending colon.        Non-bleeding internal hemorrhoids were found during retroflexion.        The hemorrhoids were mild.        The perianal and digital rectal examinations were normal.        The retroflexed view of the distal rectum and anal verge was normal        and showed no anal or rectal abnormalities.   Family history of colorectal cancer or IBD: no.    Review of patient's allergies indicates:  No Known Allergies    Past Medical History:   Diagnosis Date    Colon polyp     Depression     Migraine     Ureteral stricture      Past Surgical History:   Procedure Laterality Date    ARTHROSCOPIC DEBRIDEMENT OF SHOULDER Right 5/28/2024    Procedure: DEBRIDEMENT, SHOULDER, ARTHROSCOPIC;  Surgeon: Marsha Bowman MD;  Location: [facility];  Service: Orthopedics;  Laterality:  Right;    COLON SURGERY      colon polyp    COLONOSCOPY N/A 2/3/2021    Procedure: COLONOSCOPY;  Surgeon: Graham Marino MD;  Location: [facility] (4TH FLR);  Service: Endoscopy;  Laterality: N/A;  COVID + on 7/15/20 , per endo protocol no further testing needed at this time -SM    COLONOSCOPY N/A 7/25/2024    Procedure: COLONOSCOPY;  Surgeon: Graham Marino MD;  Location: [facility] (4TH FLR);  Service: Endoscopy;  Laterality: N/A;  referral: Dr. Mj Carrillo / Dr. Marino Pt, Hx colon polyps, past procedure w/ DR. Marino/ suflave - prep ins on portal - ER  7/23- preop call complete- JMT    ESOPHAGOGASTRODUODENOSCOPY N/A 2/3/2021    Procedure: EGD (ESOPHAGOGASTRODUODENOSCOPY);  Surgeon: Graham Marino MD;  [facility]  Service: Endoscopy;  Laterality: N/A;  COVID + on 7/15/20 , per endo protocol no further testing needed at this time -SM    FIXATION OF TENDON Right 5/28/2024    Procedure: FIXATION, TENDON;  Surgeon: Marsha Bowman MD;  Location: [facility] OR;  Service: Orthopedics;  Laterality: Right;    HERNIA REPAIR      KIDNEY SURGERY      SHOULDER ARTHROSCOPY W/ BANKHART PROCEDURE Right 5/28/2024    Procedure: ARTHROSCOPY, SHOULDER, WITH BANKART REPAIR;  Surgeon: Marsha Bowman MD;  Location: [facility] OR;  Service: Orthopedics;  Laterality: Right;    torn labrim      VASECTOMY      WISDOM TOOTH EXTRACTION       Family History   Problem Relation Name Age of Onset    Skin cancer Mother      Hypertension Father Clifford     Hyperlipidemia Father Clifford     Pulmonary fibrosis Maternal Grandfather      Hyperlipidemia Paternal Grandfather Claude     Heart disease Paternal Grandfather Claude     Celiac disease Sister      Heart disease Paternal Grandmother Racheal     Cirrhosis Neg Hx      Colon cancer Neg Hx      Colon polyps Neg Hx      Crohn's disease Neg Hx      Esophageal cancer Neg Hx      Inflammatory bowel disease Neg Hx      Liver cancer Neg Hx      Liver disease Neg Hx      Rectal cancer Neg Hx       "Stomach cancer Neg Hx      Ulcerative colitis Neg Hx      Lymphoma Neg Hx      Hemochromatosis Neg Hx      Marquis's disease Neg Hx      Tuberculosis Neg Hx      Scleroderma Neg Hx      Rheum arthritis Neg Hx      Multiple sclerosis Neg Hx      Melanoma Neg Hx      Lupus Neg Hx      Psoriasis Neg Hx       Social History     Tobacco Use    Smoking status: Never     Passive exposure: Never    Smokeless tobacco: Never   Substance Use Topics    Alcohol use: Yes     Alcohol/week: 3.0 standard drinks of alcohol     Types: 3 Glasses of wine per week     Comment: occasional    Drug use: No        Review of Systems:  Review of Systems   Constitutional:  Negative for chills and fever.       OBJECTIVE:     Vital Signs (Most Recent)  /86   Pulse 89   Ht 5' 11" (1.803 m)   Wt 84.5 kg (186 lb 4.6 oz)   BMI 25.98 kg/m²     Physical Exam:  General: White male in no distress   Neuro: Alert and oriented to person, place, and time.  Moves all extremities.     HEENT: No icterus.  Trachea midline  Respiratory: Respirations are even and unlabored, no cough or audible wheezing  Skin: Warm dry and intact, No visible rashes, no jaundice    Labs reviewed today:  Lab Results   Component Value Date    WBC 4.16 12/09/2024    HGB 13.2 (L) 12/09/2024    HCT 40.3 12/09/2024     12/09/2024    CHOL 149 03/14/2024    TRIG 44 03/14/2024    HDL 55 03/14/2024    ALT 15 12/09/2024    AST 17 12/09/2024     12/09/2024    K 4.2 12/09/2024     12/09/2024    CREATININE 0.8 12/09/2024    BUN 15 12/09/2024    CO2 26 12/09/2024    TSH 0.658 03/14/2024    HGBA1C 5.1 03/14/2024         Anorectal Exam:deferred      ASSESSMENT/PLAN:     Diagnoses and all orders for this visit:    Bleeding hemorrhoids  -     hydrocortisone (ANUSOL-HC) 25 mg suppository; Place 1 suppository (25 mg total) rectally 2 (two) times daily. for 14 days    33 yo M here with BRBPR. Likely hemorrhoidal. Last c scope 7/2024, small polypectomy and hemorrhoids  For now " I would recommend conservative therapy for hemorrhoids for  about a month, if he's still bleeding after that pt to RTC for probable RBL    The patient was instructed to:  Increase water intake to at least 8-10 glasses of water per day.  Take a daily fiber supplement (Konsyl, Benefiber, Metamucil) and increase dietary intake to 20-30 grams/day.  Avoid straining or spending >5min/event with bowel movements.  Anusol  suppositories BID x 2    Follow-up in clinic PRN    BAKARI Nichole  Colon and Rectal Surgery

## 2024-12-10 LAB
HGB A2 MFR BLD HPLC: 2.8 % (ref 2.2–3.2)
HGB FRACT BLD ELPH-IMP: NORMAL
HGB FRACT BLD ELPH-IMP: NORMAL

## 2024-12-11 ENCOUNTER — PATIENT MESSAGE (OUTPATIENT)
Dept: PSYCHIATRY | Facility: CLINIC | Age: 34
End: 2024-12-11
Payer: COMMERCIAL

## 2024-12-11 ENCOUNTER — OFFICE VISIT (OUTPATIENT)
Dept: PSYCHIATRY | Facility: CLINIC | Age: 34
End: 2024-12-11
Payer: COMMERCIAL

## 2024-12-11 DIAGNOSIS — F90.0 ATTENTION DEFICIT HYPERACTIVITY DISORDER (ADHD), PREDOMINANTLY INATTENTIVE TYPE: ICD-10-CM

## 2024-12-11 DIAGNOSIS — F41.1 GAD (GENERALIZED ANXIETY DISORDER): Primary | ICD-10-CM

## 2024-12-11 LAB
COPPER SERPL-MCNC: 750 UG/L (ref 665–1480)
GLIADIN PEPTIDE IGA SER-ACNC: 2.7 U/ML
GLIADIN PEPTIDE IGG SER-ACNC: 0.7 U/ML
IGA SERPL-MCNC: 251 MG/DL (ref 70–400)
TTG IGA SER-ACNC: 0.6 U/ML
TTG IGG SER-ACNC: <0.6 U/ML
ZINC SERPL-MCNC: 99 UG/DL (ref 60–130)

## 2024-12-11 PROCEDURE — 99214 OFFICE O/P EST MOD 30 MIN: CPT | Mod: 95,,, | Performed by: NURSE PRACTITIONER

## 2024-12-11 PROCEDURE — 3044F HG A1C LEVEL LT 7.0%: CPT | Mod: CPTII,95,, | Performed by: NURSE PRACTITIONER

## 2024-12-11 PROCEDURE — G2211 COMPLEX E/M VISIT ADD ON: HCPCS | Mod: 95,,, | Performed by: NURSE PRACTITIONER

## 2024-12-11 RX ORDER — ESCITALOPRAM OXALATE 10 MG/1
10 TABLET ORAL DAILY
Qty: 90 TABLET | Refills: 0 | Status: SHIPPED | OUTPATIENT
Start: 2024-12-11 | End: 2025-03-12

## 2024-12-12 ENCOUNTER — PATIENT MESSAGE (OUTPATIENT)
Dept: GASTROENTEROLOGY | Facility: CLINIC | Age: 34
End: 2024-12-12
Payer: COMMERCIAL

## 2024-12-12 NOTE — PROGRESS NOTES
"  Outpatient Psychiatry Follow-Up Visit (MD/NP)    12/11/2024     The patient location is: [place], [place]  The chief complaint leading to consultation is: Anxiety, panic attacks, inattention      Visit type: audio only    Face to Face time with patient: about 19 minutes  25 minutes of total time spent on the encounter, which includes face to face time and non-face to face time preparing to see the patient (eg, review of tests), Obtaining and/or reviewing separately obtained history, Documenting clinical information in the electronic or other health record, Independently interpreting results (not separately reported) and communicating results to the patient/family/caregiver, or Care coordination (not separately reported).         Each patient to whom he or she provides medical services by telemedicine is:  (1) informed of the relationship between the physician and patient and the respective role of any other health care provider with respect to management of the patient; and (2) notified that he or she may decline to receive medical services by telemedicine and may withdraw from such care at any time.    Notes:         Clinical Status of Patient:  Outpatient (Ambulatory)    Chief Complaint:  Harinder Elliott is a 34 y.o. male who presents today for follow-up of anxiety and panic attacks.  Met with patient.      Interval History and Content of Current Session:  Interim Events/Subjective Report/Content of Current Session:  Patient presented for virtual visit and reported was not able to log on video visit due to [employer] system error and we had a phone visit. He stated he is not taking adderall xr 10 mg po daily and is taking vyvanse 20 mg po daily for ADHD that is prescribed and managed by his PCP.    He stated he is still taking Lexapro 15 mg po daily to treat his anxiety but find himself feeling "tired and blah" and attributes his feelings to lexapro 15 mg p daily and wants to go down to lexapro 10 mg daily " "instead. Will decrease lexapro to 10 mg po daily and monitor for changes in his mood. We discussed serotonin withdrawals and how to manage them when decreasing his dosage. He reported managed anxiety at this time    He stated he had shoulder surgery about 6 months ago and is healing well. He stated he had a colonoscopy and has history of polyps and overall is doing well.    He denied feeling depressed and denied feelings of guilt, helplessness, hopelessness, or anhedonia. He reported adequate sleep. Patient denied SI/HI/AH/VH. Patient denied suicide ideation and no suicide plan or intent. He denied previous suicide attempts. He denied access to guns. No objective signs of ozzie, hypomania, or psychosis. He denied alcohol abuse or any type of illicit drug use. He reported managed ADHD with vyvanse 20 mg po daily that is managed by his PCP.       Psychiatric Review Of Systems - Is patient experiencing or having changes in:  sleep: no. Gets only about 6-7 hours of sleep per night  appetite: no  weight: no  energy/anergy: yes low  interest/pleasure/anhedonia: yes low motivation  Motivation: no  somatic symptoms: no  anxiety/panic: yes but managed  guilty/hopelessness: no  concentration: yes improving with vyvanse 20 mg po daily  S.I.B.s/risky behavior: no  Irritability: no  Racing thoughts:no  Impulsive behaviors: no  Paranoia:no  AVH:no  Suicidal thoughts/plan/intent: no    Current Medication:  Xanax (took 1/2 xanax) Rarely takes it  Lexapro 15 mg po daily  Vyvanse 20 mg po daily        Previous Medications:  Paxil (was not effective and didn't like the way he felt while taking it  Zoloft 75 mg po daily ineffective and "decreased sex drive and apathy".   Adderall 10 mg po daily       ADHD ROS     A. A persistent pattern of inattention and/or hyperactivity-impulsivity that interferes with functioning  or development, as characterized by (1) and/or (2):  1. Inattention: Six (or more) of the following symptoms have " "persisted for at least 6 months to a degree that is inconsistent with developmental level and that negatively impacts directly on social and academic/occupational activities:  Note: The symptoms are not solely a manifestation of oppositional behavior, defiance, hostility, or failure to understand tasks or instructions.      For older adolescents and adults (age 17 and older), at least five symptoms are required:      Patient endorses:   a. Often fails to give close attention to details or makes careless mistakes in schoolwork,  at work, or during other activities (e.g., overlooks or misses details, work is inaccurate). No   b. Often has difficulty sustaining attention in tasks or play activities (e.g., has difficulty remaining. Yes, "I have difficulties with readying. I find myself reading 2 pages while I am thinking of some else and I have to reread it again. My wife will be talking to me and I would not be listening to what she is saying. In zoom meetings it's difficult to pay attention. I find my self checking my emails".  focused during lectures, conversations, or lengthy reading).  c. Often does not seem to listen when spoken to directly (e.g., mind seems elsewhere, even in  the absence of any obvious distraction). Yes, "my wife will be talking to me and I would be focusing on something else and it looks like my brain is going about something else".  d. Often does not follow through on instructions and fails to finish schoolwork, chores, or duties. in the workplace (e.g., starts tasks but quickly loses focus and is easily sidetracked). Yes, "I get distracted and side tracked easily".  e. Often has difficulty organizing tasks and activities (e.g., difficulty managing sequential tasks;  difficulty keeping materials and belongings in order; messy, disorganized work; has poor time  management; fails to meet deadlines). Yes, "I have difficulties with organization in tasks. I make a list and I had to put more things " "on the to do list".   f. Often avoids, dislikes, or is reluctant to engage in tasks that require sustained mental  effort (e.g., schoolwork or homework; for older adolescents and adults, preparing reports,  completing forms, reviewing lengthy papers). Yes, "I have to force myself to do lengthy reports and I spend more time on them. I don't like putting the mental focus on them".  g. Often loses things necessary for tasks or activities (e.g., school materials, pencils, books, tools,  wallets, keys, paperwork, eyeglasses, mobile telephones). Yes,"I put things in the most random places. I lose things and I ask where they are. I lose my phone and pin my phone a lot".   h. Is often easily distracted by extraneous stimuli (for older adolescents and adults, may include  unrelated thoughts). Yes, "If I am going to focus on something that needs to be the only thing I am doing. I cannot have the TV on or anything in the background".  i. Is often forgetful in daily activities (e.g., doing chores, running errands; for older adolescents  and adults, returning calls, paying bills, keeping appointments).  Yes, "I set alarms on my phone to pay my bills. I procrastinate on paying the bills. I forget to pay them to the point the power was disconnected and may warnings to pay the water bill".     2. Hyperactivity and impulsivity: Six (or more) of the following symptoms have persisted for at least 6 months to a degree that is inconsistent with developmental level and that negatively impacts directly on social and academic/occupational activities:     Note: The symptoms are not solely a manifestation of oppositional behavior, defiance, hostility, or a failure to understand tasks or instructions. For older adolescents and adults (age 17 and older), at least five symptoms are required.     Patient endorses:   a. Often fidgets with or taps hands or feet or squirms in seat. Yes   b. Often leaves seat in situations when remaining seated is " "expected (e.g., leaves his or her place  in the classroom, in the office or other workplace, or in other situations that require remaining  in place). Yes sometimes  c. Often runs about or climbs in situations where it is inappropriate. (Note: In adolescents or  adults, may be limited to feeling restless.) No  d. Often unable to play or engage in leisure activities quietly. Yes, "I do feel I need to talk"  e. Is often on the go, acting as if driven by a motor (e.g., is unable to be or uncomfortable  being still for extended time, as in restaurants, meetings; may be experienced by others as  being restless or difficult to keep up with). Yes, "I don't like to sit still at home".   f. Often talks excessively. Yes,   g. Often blurts out an answer before a question has been completed (e.g., completes peoples  sentences; cannot wait for turn in conversation). Yes, "I finish people's sentences a lot which is a bad quality"  h. Often has difficulty waiting his or her turn (e.g., while waiting in line). Yes, "I hate waiting in line or in traffic"  i. Often interrupts or intrudes on others (e.g., butts into conversations, games, or activities; may  start using other peoples things without asking or receiving permission; for adolescents and  adults, may intrude into or take over what others are doing). Yes. "I finish their sentences"   B. Several inattentive or hyperactive-impulsive symptoms were present prior to age 12 years.  C. Several inattentive or hyperactive-impulsive symptoms are present in two or more settings (e.g., at home, school, or work; with friends or relatives; in other activities).  D. There is clear evidence that the symptoms interfere with, or reduce the quality of, social, academic, or occupational functioning.  E. The symptoms do not occur exclusively during the course of schizophrenia or another psychotic disorder and are not better explained by another mental disorder (e.g., mood disorder, anxiety " "disorder, dissociative disorder, personality disorder, substance intoxication or withdrawal).     Several inattentive or hyperactive-impulsive symptoms were present prior to 12 years: Y    He stated she was not diagnosed with ADHD in childhood but struggled with some subjects in childhood. He stated, "I had some issues with a teacher and thought I was not paying attention and caused patient and teacher that year". As an adult, "I feel overall very stressed and anxious because I cannot focus enough and I procrastinate. I forget to pay my pills on time and have to pay late fees, got warned few times of getting the water cut off, and one time I had the power cut off. There is work stress because of procrastination that leads to frustrating and working late at night. There were times I was in meetings without knowing what I need to do because I was not listening well and have to go ask my boss on what was said and what I need to do. I have increased anxiety due to not meeting the deadlines at times. I forget our friends birthday parties and that happened more than once that affected the relationship with our friends".     Patient reported attention deficit hyperactivity symptoms of having trouble focusing on meetings and conversations, being distracted during conversations, being disorganized, having trouble prioritizing tasks, avoiding lengthy mental tasks, being easily distracted, forgetfulness, and restless and fidgetiness.  Problems happen at home, the community, and occupationally. These symptoms have been happening over patient's entire life. Patient denied any history of heart palpitations, syncope, dizziness, dyspnea on exertion, shortness of breath, or chest pain. He denied any family history of arrhythmias, enlarged heart, or sudden cardiac death. No history of seizures. His blood pressure is within normal limits. We discussed stimulants and no stimulant treatment options. I discussed in detail the risks of " stimulants including risk of abuse, tolerance, dependence, insomnia, and anxiety. Patient verbalized understanding.      Psychotherapy:  Target symptoms: anxiety , panic attacks  Why chosen therapy is appropriate versus another modality: relevant to diagnosis, patient responds to this modality, evidence based practice  Outcome monitoring methods: self-report, observation  Therapeutic intervention type: insight oriented psychotherapy, behavior modifying psychotherapy, supportive psychotherapy  Topics discussed/themes: building skills sets for symptom management, symptom recognition  The patient's response to the intervention is accepting, motivated. The patient's progress toward treatment goals is good.   Duration of intervention: 5 minutes.    Review of Systems   PSYCHIATRIC: Pertinent items are noted in the narrative.  CONSTITUTIONAL: No weight gain or loss.   MUSCULOSKELETAL: No pain or stiffness of the joints.  NEUROLOGIC: No weakness, sensory changes, seizures, confusion, memory loss, tremor or other abnormal movements.  ENDOCRINE: No polydipsia or polyuria.  INTEGUMENTARY: No rashes or lacerations.  EYES: No exophthalmos, jaundice or blindness.  ENT: No dizziness, tinnitus or hearing loss.  RESPIRATORY: No shortness of breath.  CARDIOVASCULAR: No tachycardia or chest pain.  GASTROINTESTINAL: No nausea, vomiting, pain, constipation or diarrhea.  GENITOURINARY: No frequency, dysuria or sexual dysfunction.  HEMATOLOGIC/LYMPHATIC: No excessive bleeding, prolonged or excessive bleeding after dental extraction/injury.  ALLERGIC/IMMUNOLOGIC: No allergic response to materials, foods or animals at this time.    Past Medical, Family and Social History: The patient's past medical, family and social history have been reviewed and updated as appropriate within the electronic medical record - see encounter notes.    Compliance: yes    Side effects: sexual side effects and apathy with Zoloft 75 mg daily    Risk  "Parameters:  Patient reports no suicidal ideation  Patient reports no homicidal ideation  Patient reports no self-injurious behavior  Patient reports no violent behavior    Exam (detailed: at least 9 elements; comprehensive: all 15 elements)   Constitutional  Vitals:  Most recent vital signs, dated greater than 90 days prior to this appointment, were reviewed.   There were no vitals filed for this visit.       General:  unremarkable, age appropriate     Musculoskeletal  Muscle Strength/Tone:  not examined   Gait & Station:  non-ataxic     Psychiatric  Speech:  no latency; no press   Mood & Affect:  "Tired and blah"   congruent and appropriate   Thought Process:  normal and logical   Associations:  intact   Thought Content:  normal, no suicidality, no homicidality, delusions, or paranoia   Insight:  intact, has awareness of illness   Judgement: behavior is adequate to circumstances   Orientation:  grossly intact   Memory: intact for content of interview   Language: grossly intact, able to name, able to repeat   Attention Span & Concentration:  Easily distracted  but improved with adderall   Fund of Knowledge:  intact and appropriate to age and level of education, familiar with aspects of current personal life     Assessment and Diagnosis   Status/Progress: Based on the examination today, the patient's problem(s) is/are improved.  New problems have been presented today.   Co-morbidities, Diagnostic uncertainty and Lack of compliance are not complicating management of the primary condition.  There are no active rule-out diagnoses for this patient at this time.     General Impression: Doing well. Anxiety increasing and will increaser lexapro to 15 mg po daily for anxiety. Panic attacks are managed with medications and coping skills. He is interested in trying Focalin again and will increase Focalin to 10 mg po daily because adderall increased his anxiety. He is not interested in Strattera due to side effects. Future " "plan to try Qelbree if Focalin is not effective. 12/11/24 doing well and requested decreasing lexapro 15 mg daily to 10 mg po daily due to feeling "blah" and apathy that he attributes to lexapro 15 mg po daily. Will continue to monitor for changes in mood with decreasing lexapro to 10 mg po daily. Patient reported managed ADHD with vyvanse 20 mg po daily by his PCP. Anxiety and depressive symptoms are all managed at this time. Patient is doing well and patient is optimistic about his future and life.       Patient meets the criteria for generalized anxiety disorder; panic attacks, and some depressive symptoms that they don't meed the criteria for MDD because his depressive symptoms never lasted 2 weeks more days than not.     Diagnosis:     JOSE RAFAEL  ADHD            Intervention/Counseling/Treatment Plan     Treatment Plan/Recommendations:   Medication Management: Continue current medications. The risks and benefits of medication were discussed with the patient.  In person visit 1/2/2024  -Decrease Lexapro 10 mg po daily for anxiety and panic attacks  -Takes xanax 0.25 mg po daily prn rarely for panic attacks and his last refill was 8/10/22 # 10 tabs  -Takes vyvanse 20 mg po daily that is prescribed by his PCP for ADHD  Informed pt of the risks of continuous Benzodiazepine use including tolerance, dependence, dizziness, drowsiness, memory problems, and withdrawals that may be life threatening upon abrupt cessation. Also advised not to take Benzodiazepines with Opiates or other sedatives and also not to drive or operate heavy machinery while using Benzodiazepines. Avoid alcohol with Benzodiazapine. Patient agreed to take it and verbalized understanding  We discussed risk of Serotonin Syndrome including symptoms in order of appearance: diarrhea, restlessness, extreme agitation, autonomic instability, hyperthermia, rigidity, coma, and death.  Reviewed VS and all within the normal range  -Continue utilization of effective " CBT skills, positive self-talk, cognitive reframing, meditation, mindfulness, deep breathing, and relaxations skills.  --Discussed informed consent, diagnosis, risks and benefits of proposed treatment above vs alternative treatments vs no treatment, and potential side effects of these treatments. The patient expresses understanding of the above and displays the capacity to agree with this treatment given said understanding. Patient also agrees that, currently, the benefits outweigh the risks and would like to pursue treatment at this time. Answered all questions and discussed follow up. Encouraged patient to contact us with any questions or concerns.   -Encouraged Patient to keep future appointments.  -Take medications as prescribed   -Patient to present to Emergency Department for thoughts to harm herself or others    Return to Clinic: 1 month or earlier as needed      MINERVA Mills-BC

## 2024-12-13 ENCOUNTER — CLINICAL SUPPORT (OUTPATIENT)
Dept: REHABILITATION | Facility: HOSPITAL | Age: 34
End: 2024-12-13
Payer: COMMERCIAL

## 2024-12-13 DIAGNOSIS — M25.611 DECREASED ROM OF RIGHT SHOULDER: Primary | ICD-10-CM

## 2024-12-13 DIAGNOSIS — R29.898 DECREASED STRENGTH OF UPPER EXTREMITY: ICD-10-CM

## 2024-12-13 DIAGNOSIS — D64.9 ANEMIA, UNSPECIFIED TYPE: Primary | ICD-10-CM

## 2024-12-13 PROCEDURE — 97530 THERAPEUTIC ACTIVITIES: CPT

## 2024-12-13 PROCEDURE — 97140 MANUAL THERAPY 1/> REGIONS: CPT

## 2024-12-13 NOTE — PROGRESS NOTES
Physical Therapy Daily Treatment Note     Name: Harinder Elliott  Clinic Number: [id number]    Therapy Diagnosis:   No diagnosis found.      Physician: Servando Nielson III, *    Visit Date: 12/13/2024    Physician Orders: PT Eval and Treat   Medical Diagnosis from Referral: Biceps tendonitis on right [M75.21], Glenoid labral tear, right, initial encounter [S43.431A], Instability of right shoulder joint [M25.311]   Evaluation Date: 6/14/2024  Authorization Period Expiration: 5/24/25  Plan of Care Expiration: 2/21/25  Visit # / Visits authorized: 33/ 40  FOTO 3/3 administered 9/23     Time In: 8:00 AM  Time Out: 8:45 AM     Total Billable Time: 45 minutes  Precautions: Standard    DOS 5/28/24  OPERATION:   right  Shoulder osteochondral allograft transplantation, humeral head ICRS Grade 4 - posterior: 2.5 x 20 cm) (CPT 99759 ) (CPT 32002/89230 knee comparison codes) (complex, -22 modifier)  2. Shoulder arthroscopic anterior capsulorrhaphy (CPT 19748) (complex, -22 modifier)  3. Shoulder arthroscopic anterior labral repair (CPT 19467) (complex, -22 modifier)  4. Shoulder arthroscopic posterior labral repair, capsulorrhaphy, 7:00 position (CPT 57675)  5. Shoulder arthroscopic extensive debridement (anterior, posterior glenohumeral joint) (CPT 27854)  6. Shoulder manipulation under anesthesia (CPT 93737)  7. Shoulder arthroscopic lysis of adhesions (CPT 89392):  8. Shoulder arthroscopic loose body removal (CPT 78437)  9 . Shoulder arthroscopic interpositional arthroplasty (73271)  10. Shoulder arthroscopic SLAP type 8 repair (CPT 25298)    POST OPERATIVE PLAN: We will follow the arthroscopic Bank[employer] repair guidelines. We discussed with the patient's family after surgery. The patient will remain in a sling for 6 weeks. We will start PT at the 3 week crista.   Quality of tissue: Good      Subjective     Pt reports: feeling better this week  He was compliant with home exercise program.  Response to previous  treatment: ongoing   Functional change: ongoing    Pain: 1/10  Location: right shoulder      Objective   DOS 5/28/24: 24 weeks 3 days as of 11/15    Shoulder Passive Range of Motion:   Shoulder Right Left   Flexion    180 180   ER at 0    80 85   ER at 90    95 105   IR at 90    50 60      Shoulder Active Range of Motion:   Shoulder Right Left   Flexion    165 180   ER at 0    45 80   HEBER    T2 T4   FIR    L3 T12      Objective Testing 12/13/24  Strength: (average of 3 trials)   Right Left Deficit %   Scaption 24.3# 21.7# 111%   Shoulder ER at side 28.4# 21.4# 125%   Shoulder ER at 90-90 36.1# 24.4# 133%   Shoulder IR at 90-90 38.9# 30.3# 123%       Haridner received therapeutic exercises to develop strength, endurance, ROM, flexibility, and posture for 5 minutes including:    BTB 1/2 kneeling t-spine openers at wall 25xB     Not today  SL t-spine openers 25xB  Supine dowel ER AAROM at 45 and 90 3x10 5s holds  Pulleys flexion/scaption x 5'  Standing dowel ER at 90 deg flexion 3x10 5s holds  SL open books 25xB  Supine dowel flexion AAROM 3x10 5s holds  Supine dowel flexion AAROM 3x10 5s holds     Harinder received the following manual therapy techniques: Joint mobilizations were applied to the: R shoulder for 15 minutes, including:  Skilled PROM all directions  GH joint centering mobilizations  Grade II/III a/p, inferior GH joint mobs  Manual lat contract/relax  ER MWM with GH joint centering glide      Not today  Patient provided written and verbal consent to receive therapeutic dry needling at today's visit (see consent form scanned into chart). TDN performed to R bicep and anterior deltoid musculature in the supine position using Seirin J type needle sized .30x 40mm+estim.  TDN performed to reduce pain and muscle tension, promote blood flow, and improve ROM and function x 20 minutes. Pt tolerated tx well without adverse effects. The patient was educated on what to expect following the procedure and he verbalized  understanding.    Harinder participated in neuromuscular re-education activities to improve: Coordination, Kinesthetic, Sense, Proprioception, and Posture for 00 minutes. The following activities were included:    Not today  Supine ancor 5# ER over 1/2 foam 4x8        Harinder participated in dynamic functional therapeutic activities to improve functional performance for 40  minutes, including:  UBE 4' forward, 4' backwards to improve tissue extensibility and cartilage health  Physical performance testing: HH dynamometry  UE Y balance practice 3x3B  UECKC test 3x20B      Not today  90/90 ER MB plyos' at wall 3x30  Multi-D OH MB plyos at wall 3x10  1/2 kneeling 2# MB single arm OH tramp toss 3x15  1/2 kneeling 2# MB single arm cross body tramp toss 3x15  Modified side-plank + ER plyo drops 3x30B  Modified side-plank + horizontal ABD plyo drops 3x30B  55# 1/2 kneeling landmine press 4x8B  25# renegade row 3x8B        Home Exercises Provided and Patient Education Provided     Education provided:   - HEP, POC, symptom management, recovery timeline, post-operative precautions    Written Home Exercises Provided: yes.  Exercises were reviewed and Harinder was able to demonstrate them prior to the end of the session.  Harinder demonstrated good  understanding of the education provided.     See EMR under Patient Instructions for exercises provided prior visit.    Assessment     Harinder completed session as noted above with additional objective testing performed during visit today. Upon assessment demonstrates excellent UE strength LSI well above targeted cutoff threshold. Practiced remaining UE RTS with good form and no complaints of pain.    Harinder Is progressing well towards his goals.   Pt prognosis is Excellent.     Pt will continue to benefit from skilled outpatient physical therapy to address the deficits listed in the problem list box on initial evaluation, provide pt/family education and to maximize pt's level of independence  in the home and community environment.     Pt's spiritual, cultural and educational needs considered and pt agreeable to plan of care and goals.     Anticipated barriers to physical therapy: none    Goals: Goals:  Short Term Goals (4 Weeks):   1. Pt will be independent with HEP to supplement PT in improving functional use of R UE.  2. Pt will increase pain free R shoulder elevation PROM to >/= 160 deg to improve functional mobility of UE  3. Pt will increase R shoulder ER PROM in 90 deg abduction to >/=30 deg to improve functional mobility of UE  4. Pt will increase R elbow AAROM to WNL deg in 4 weeks to improve functional mobility of UE.  Mid-Term Goals (12-16 Weeks):   1. Pt will improve FOTO score to </=% limited to decrease perceived limitation with carrying, moving, and handling objects.  2. Pt will increase R shoulder PROM to WNL in all planes to improve functional use of R RUE.  3. Pt will increase R shoulder AROM to WFL in all planes to improve functional use of R RUE.  4. Pt will improve R shoulder MMTs to = 5/5 to promote equal use of B UEs in performing functional tasks.  5. Pt will lift 10 lb objects without pain to promote functional QOL.  6. Pt to report pain </= 0/10  Long Term Goals (24-36 weeks)  - pt will demonstrate at least 95% LSI with HHD for flexion, abduction, ER, and IR for improved stability  - pt will demonstrate at least 95% LSI with UE Y-balance for improved postural control  - pt will pass push-up and pull-up testing to demonstrate normalized endurance  - pt will be able to perform sport-specific activities with appropriate form, without instability or pain          Plan     Plan of care Certification: 6/14/2024 to 2/21/25.     Outpatient Physical Therapy 2 times weekly for 36 weeks to include the following interventions: Electrical Stimulation NMES, Manual Therapy, Moist Heat/ Ice, Neuromuscular Re-ed, Patient Education, Self Care, Therapeutic Activities, Therapeutic Exercise, and Dry  Ronny Seth, PT, DPT, SCS

## 2024-12-15 ENCOUNTER — HOSPITAL ENCOUNTER (OUTPATIENT)
Dept: RADIOLOGY | Facility: HOSPITAL | Age: 34
Discharge: HOME | End: 2024-12-15
Attending: ORTHOPAEDIC SURGERY
Payer: COMMERCIAL

## 2024-12-15 DIAGNOSIS — Z98.890 S/P SHOULDER SURGERY: ICD-10-CM

## 2024-12-15 DIAGNOSIS — M19.011 PRIMARY OSTEOARTHRITIS OF RIGHT SHOULDER: ICD-10-CM

## 2024-12-15 DIAGNOSIS — S43.431A GLENOID LABRAL TEAR, RIGHT, INITIAL ENCOUNTER: ICD-10-CM

## 2024-12-15 PROCEDURE — 73221 MRI JOINT UPR EXTREM W/O DYE: CPT | Mod: TC,RT

## 2024-12-15 PROCEDURE — 73221 MRI JOINT UPR EXTREM W/O DYE: CPT | Mod: 26,RT,, | Performed by: RADIOLOGY

## 2024-12-18 ENCOUNTER — PATIENT MESSAGE (OUTPATIENT)
Dept: GASTROENTEROLOGY | Facility: CLINIC | Age: 34
End: 2024-12-18
Payer: COMMERCIAL

## 2024-12-18 DIAGNOSIS — R89.9 ABNORMAL LABORATORY TEST RESULT: Primary | ICD-10-CM

## 2024-12-19 DIAGNOSIS — F98.8 ATTENTION DEFICIT DISORDER, UNSPECIFIED HYPERACTIVITY PRESENCE: ICD-10-CM

## 2024-12-20 ENCOUNTER — CLINICAL SUPPORT (OUTPATIENT)
Dept: REHABILITATION | Facility: HOSPITAL | Age: 34
End: 2024-12-20
Payer: COMMERCIAL

## 2024-12-20 DIAGNOSIS — M25.611 DECREASED ROM OF RIGHT SHOULDER: Primary | ICD-10-CM

## 2024-12-20 DIAGNOSIS — R29.898 DECREASED STRENGTH OF UPPER EXTREMITY: ICD-10-CM

## 2024-12-20 PROCEDURE — 97530 THERAPEUTIC ACTIVITIES: CPT

## 2024-12-20 RX ORDER — LISDEXAMFETAMINE DIMESYLATE 20 MG/1
20 CAPSULE ORAL EVERY MORNING
Qty: 30 CAPSULE | Refills: 0 | Status: SHIPPED | OUTPATIENT
Start: 2024-12-20

## 2024-12-27 NOTE — PROGRESS NOTES
Physical Therapy Daily Treatment Note     Name: Harinder Elliott  [facility] [place] Number: [id number]    Therapy Diagnosis:   Encounter Diagnoses   Name Primary?    Decreased ROM of right shoulder Yes    Decreased strength of upper extremity          Physician: Servando Nielson III, *    Visit Date: 12/20/2024    Physician Orders: PT Eval and Treat   Medical Diagnosis from Referral: Biceps tendonitis on right [M75.21], Glenoid labral tear, right, initial encounter [S43.431A], Instability of right shoulder joint [M25.311]   Evaluation Date: 6/14/2024  Authorization Period Expiration: 5/24/25  Plan of Care Expiration: 2/21/25  Visit # / Visits authorized: 33/ 40  FOTO 3/3 administered 9/23     Time In: 8:00 AM  Time Out: 8:45 AM     Total Billable Time: 45 minutes  Precautions: Standard    DOS 5/28/24  OPERATION:   right  Shoulder osteochondral allograft transplantation, humeral head ICRS Grade 4 - posterior: 2.5 x 20 cm) (CPT 45881 ) (CPT 27357/76665 knee comparison codes) (complex, -22 modifier)  2. Shoulder arthroscopic anterior capsulorrhaphy (CPT 19111) (complex, -22 modifier)  3. Shoulder arthroscopic anterior labral repair (CPT 03301) (complex, -22 modifier)  4. Shoulder arthroscopic posterior labral repair, capsulorrhaphy, 7:00 position (CPT 99651)  5. Shoulder arthroscopic extensive debridement (anterior, posterior glenohumeral joint) (CPT 60531)  6. Shoulder manipulation under anesthesia (CPT 48978)  7. Shoulder arthroscopic lysis of adhesions (CPT 12394):  8. Shoulder arthroscopic loose body removal (CPT 92618)  9 . Shoulder arthroscopic interpositional arthroplasty (36333)  10. Shoulder arthroscopic SLAP type 8 repair (CPT 54425)    POST OPERATIVE PLAN: We will follow the arthroscopic [employer] repair guidelines. We discussed with the patient's family after surgery. The patient will remain in a sling for 6 weeks. We will start PT at the 3 week crista.   Quality of tissue: Good      Subjective     Pt reports:  feeling better this week  He was compliant with home exercise program.  Response to previous treatment: ongoing   Functional change: ongoing    Pain: 1/10  Location: right shoulder      Objective   DOS 5/28/24: 24 weeks 3 days as of 11/15    Shoulder Passive Range of Motion:   Shoulder Right Left   Flexion    180 180   ER at 0    80 85   ER at 90    95 105   IR at 90    50 60      Shoulder Active Range of Motion:   Shoulder Right Left   Flexion    165 180   ER at 0    45 80   HEBER    T2 T4   FIR    L3 T12      Objective Testing 12/13/24  Strength: (average of 3 trials)   Right Left Deficit %   Scaption 24.3# 21.7# 111%   Shoulder ER at side 28.4# 21.4# 125%   Shoulder ER at 90-90 36.1# 24.4# 133%   Shoulder IR at 90-90 38.9# 30.3# 123%     Y balance: pass  UECKST: kapil Pizano participated in dynamic functional therapeutic activities to improve functional performance for 60  minutes, including:  UBE 4' forward, 4' backwards to improve tissue extensibility and cartilage health  Physical performance testing: HH dynamometry, UE Y balance, UECKCST    Home Exercises Provided and Patient Education Provided     Education provided:   - HEP, POC, symptom management, recovery timeline, post-operative precautions    Written Home Exercises Provided: yes.  Exercises were reviewed and Harinder was able to demonstrate them prior to the end of the session.  Harinder demonstrated good  understanding of the education provided.     See EMR under Patient Instructions for exercises provided prior visit.    Assessment     Harinder completed session as noted above with additional objective testing performed during visit today. Upon assessment demonstrates excellent UE strength LSI well above targeted cutoff threshold, excellent symmetry noted with all planes of UE Y balance with only minor deficits noted medially on surgical UE. At this time patient has met all previously established goals and is appropriate to discharge into gym based home  exercise program    Harinder Is progressing well towards his goals.   Pt prognosis is Excellent.     Pt will continue to benefit from skilled outpatient physical therapy to address the deficits listed in the problem list box on initial evaluation, provide pt/family education and to maximize pt's level of independence in the home and community environment.     Pt's spiritual, cultural and educational needs considered and pt agreeable to plan of care and goals.     Anticipated barriers to physical therapy: none    Goals: Goals:  Short Term Goals (4 Weeks): met  1. Pt will be independent with HEP to supplement PT in improving functional use of R UE.  2. Pt will increase pain free R shoulder elevation PROM to >/= 160 deg to improve functional mobility of UE  3. Pt will increase R shoulder ER PROM in 90 deg abduction to >/=30 deg to improve functional mobility of UE  4. Pt will increase R elbow AAROM to WNL deg in 4 weeks to improve functional mobility of UE.  Mid-Term Goals (12-16 Weeks): met  1. Pt will improve FOTO score to </=% limited to decrease perceived limitation with carrying, moving, and handling objects.  2. Pt will increase R shoulder PROM to WNL in all planes to improve functional use of R RUE.  3. Pt will increase R shoulder AROM to WFL in all planes to improve functional use of R RUE.  4. Pt will improve R shoulder MMTs to = 5/5 to promote equal use of B UEs in performing functional tasks.  5. Pt will lift 10 lb objects without pain to promote functional QOL.  6. Pt to report pain </= 0/10  Long Term Goals (24-36 weeks met  - pt will demonstrate at least 95% LSI with HHD for flexion, abduction, ER, and IR for improved stability  - pt will demonstrate at least 95% LSI with UE Y-balance for improved postural control  - pt will pass push-up and pull-up testing to demonstrate normalized endurance  - pt will be able to perform sport-specific activities with appropriate form, without instability or pain           Plan     Plan of care Certification: 6/14/2024 to 2/21/25.     Outpatient Physical Therapy 2 times weekly for 36 weeks to include the following interventions: Electrical Stimulation NMES, Manual Therapy, Moist Heat/ Ice, Neuromuscular Re-ed, Patient Education, Self Care, Therapeutic Activities, Therapeutic Exercise, and Dry Needling    Deniz Seth, PT, DPT, SCS

## 2025-02-17 ENCOUNTER — PATIENT MESSAGE (OUTPATIENT)
Dept: PSYCHIATRY | Facility: CLINIC | Age: 35
End: 2025-02-17
Payer: COMMERCIAL

## 2025-02-17 DIAGNOSIS — F90.0 ATTENTION DEFICIT HYPERACTIVITY DISORDER (ADHD), PREDOMINANTLY INATTENTIVE TYPE: Primary | ICD-10-CM

## 2025-02-17 DIAGNOSIS — F98.8 ATTENTION DEFICIT DISORDER, UNSPECIFIED HYPERACTIVITY PRESENCE: ICD-10-CM

## 2025-02-17 RX ORDER — LISDEXAMFETAMINE DIMESYLATE 20 MG/1
20 CAPSULE ORAL EVERY MORNING
Qty: 30 CAPSULE | Refills: 0 | Status: SHIPPED | OUTPATIENT
Start: 2025-02-17

## 2025-02-17 RX ORDER — DEXMETHYLPHENIDATE HYDROCHLORIDE 5 MG/1
5 TABLET ORAL 2 TIMES DAILY
Qty: 60 TABLET | Refills: 0 | Status: SHIPPED | OUTPATIENT
Start: 2025-02-17 | End: 2025-03-22

## 2025-04-03 ENCOUNTER — PATIENT MESSAGE (OUTPATIENT)
Dept: PSYCHIATRY | Facility: CLINIC | Age: 35
End: 2025-04-03
Payer: COMMERCIAL

## 2025-04-03 RX ORDER — ESCITALOPRAM OXALATE 10 MG/1
10 TABLET ORAL DAILY
Qty: 90 TABLET | Refills: 0 | Status: SHIPPED | OUTPATIENT
Start: 2025-04-03 | End: 2025-07-02

## 2025-04-21 DIAGNOSIS — F98.8 ATTENTION DEFICIT DISORDER, UNSPECIFIED HYPERACTIVITY PRESENCE: ICD-10-CM

## 2025-04-21 RX ORDER — LISDEXAMFETAMINE DIMESYLATE 20 MG/1
20 CAPSULE ORAL EVERY MORNING
Qty: 30 CAPSULE | Refills: 0 | Status: SHIPPED | OUTPATIENT
Start: 2025-04-21

## 2025-04-25 ENCOUNTER — OFFICE VISIT (OUTPATIENT)
Dept: URGENT CARE | Facility: CLINIC | Age: 35
End: 2025-04-25
Payer: COMMERCIAL

## 2025-04-25 VITALS
BODY MASS INDEX: 24.5 KG/M2 | HEIGHT: 71 IN | WEIGHT: 175 LBS | OXYGEN SATURATION: 98 % | DIASTOLIC BLOOD PRESSURE: 81 MMHG | SYSTOLIC BLOOD PRESSURE: 124 MMHG | RESPIRATION RATE: 20 BRPM | HEART RATE: 79 BPM | TEMPERATURE: 98 F

## 2025-04-25 DIAGNOSIS — H00.011 HORDEOLUM EXTERNUM OF RIGHT UPPER EYELID: Primary | ICD-10-CM

## 2025-04-25 PROCEDURE — 99213 OFFICE O/P EST LOW 20 MIN: CPT | Mod: S$GLB,,, | Performed by: FAMILY MEDICINE

## 2025-04-25 RX ORDER — GENTAMICIN SULFATE 3 MG/ML
1 SOLUTION/ DROPS OPHTHALMIC EVERY 4 HOURS
Qty: 5 ML | Refills: 0 | Status: SHIPPED | OUTPATIENT
Start: 2025-04-25

## 2025-04-25 NOTE — PROGRESS NOTES
09/05/20 0927   [facility] Note - Strip   Strip Reviewed by [facility] Nurse? Yes   [facility] Note - Communication   [facility] Nurse Communicated with Bedside Nurse Regarding: Maternal Vital Signs   [facility] Note - Notification   Nurse Notified? Yes  (No maternal pulse ox. Stefanie notified.)   Name of Nurse Stefanie      "Subjective:      Patient ID: Harinder Elliott is a 34 y.o. male.    Vitals:  height is 5' 11" (1.803 m) and weight is 79.4 kg (175 lb). His oral temperature is 98.2 °F (36.8 °C). His blood pressure is 124/81 and his pulse is 79. His respiration is 20 and oxygen saturation is 98%.     Chief Complaint: Eye Problem    This is a 34 y.o. male who presents today with a chief complaint of stye on upper right eye lid, right eye is watery began 2 days ago.  Pt has not tried OTC medications to help with discomfort.    Eye Problem   The right eye is affected. This is a new problem. The current episode started in the past 7 days. The problem occurs constantly. The problem has been gradually worsening. The injury mechanism is unknown. The pain is at a severity of 4/10. The pain is moderate. There is No known exposure to pink eye. He Wears contacts. Associated symptoms include blurred vision, eye redness and itching. Pertinent negatives include no eye discharge, double vision, fever, foreign body sensation, nausea, photophobia, recent URI or vomiting. He has tried nothing for the symptoms. The treatment provided no relief.       Constitution: Negative for fever.   Eyes:  Positive for eye itching, eye redness and blurred vision. Negative for eye discharge, photophobia and double vision.   Gastrointestinal:  Negative for nausea and vomiting.      Objective:     Physical Exam   Constitutional: He does not appear ill. No distress. normal  HENT:   Head: Normocephalic and atraumatic.   Eyes: Lids are everted and swept, no foreign bodies found. Right eye exhibits hordeolum (right upper mid position with surroudning edema and erythema noted). Right eye exhibits no discharge. No foreign body present in the right eye. Extraocular movement intact vision grossly intact gaze aligned appropriately   Abdominal: Normal appearance.   Neurological: He is alert.   Nursing note and vitals reviewed.    Assessment:     1. Hordeolum externum of " right upper eyelid        Plan:       Hordeolum externum of right upper eyelid  -     gentamicin (GARAMYCIN) 0.3 % ophthalmic solution; Place 1 drop into the right eye every 4 (four) hours.  Dispense: 5 mL; Refill: 0    Warm compresses to affected area. To see optometrist of persists or worsens

## 2025-05-28 ENCOUNTER — PATIENT MESSAGE (OUTPATIENT)
Dept: PRIMARY CARE CLINIC | Facility: CLINIC | Age: 35
End: 2025-05-28
Payer: COMMERCIAL

## 2025-06-11 NOTE — PROGRESS NOTES
[facility] Appointment      Subjective:      Patient ID: Harinder Elliott is a 35 y.o. male.    Chief Complaint: Annual Exam      HPI:    History of Present Illness    CHIEF COMPLAINT:  Harinder presents today with difficulty swallowing food in mid-chest area    DYSPHAGIA AND GERD:  He experiences dysphagia with food getting stuck in the mid-chest area, not in the throat. Symptoms progressively worsened over a few months, becoming painful and occasionally resulting in vomiting. He denies burning sensations or acidic symptoms. He has a history of acid reflux that resolved with medication years ago. Recently started Prilosec with significant improvement, reporting symptoms are now only 5% as severe as at their worst.    ENT:  He reports difficulty breathing through his nose. He has a minor deviated septum confirmed by ENT. He uses Nasacort every morning and performs nasal rinses nightly to reduce inflammation.    MEDICAL HISTORY:  History includes 2mm adenoma polyp removal in July last year, fatigue with normal extensive workup, shoulder surgery, and ruled out Celiac disease.    CURRENT MEDICATIONS:  He takes Vyvanse 20mg and Lexapro 10mg daily. Previously trialed Focalin but discontinued due to jitteriness and tension. Lexapro was reduced from 15mg due to excessive fatigue, with good response to current 10mg dosing.      ROS:  General: no fever, no chills, reports fatigue, no weight gain, no weight loss  Eyes: no vision changes, no redness, no discharge  ENT: no ear pain, reports nasal congestion, no sore throat  Cardiovascular: reports chest pain, no palpitations, no lower extremity edema  Respiratory: no cough, no shortness of breath  Gastrointestinal: no abdominal pain, no nausea, reports vomiting, no diarrhea, no constipation, no blood in stool, reports sense of lump/mass in throat when swallowing, reports difficulty swallowing  Genitourinary: no dysuria, no hematuria, no frequency  Musculoskeletal: no joint  pain, no muscle pain  Skin: no rash, no lesion  Neurological: no headache, no dizziness, no numbness, no tingling  Psychiatric: reports anxiety, no depression, no sleep difficulty  Allergic: reports frequent sneezing, reports rhinitis             6/13/2025 3/14/2024 10/12/2021   PHQ-9 Depression Patient Health Questionnaire   Over the last two weeks how often have you been bothered by little interest or pleasure in doing things 0 1 1    Over the last two weeks how often have you been bothered by feeling down, depressed or hopeless 0 0 1    Over the last two weeks how often have you been bothered by trouble falling or staying asleep, or sleeping too much  1 1    Over the last two weeks how often have you been bothered by feeling tired or having little energy  2 1    Over the last two weeks how often have you been bothered by a poor appetite or overeating  0 0    Over the last two weeks how often have you been bothered by feeling bad about yourself - or that you are a failure or have let yourself or your family down  1 1    Over the last two weeks how often have you been bothered by trouble concentrating on things, such as reading the newspaper or watching television  0 1    Over the last two weeks how often have you been bothered by moving or speaking so slowly that other people could have noticed.  0 0    Over the last two weeks how often have you been bothered by thoughts that you would be better off dead, or of hurting yourself  0 0    If you checked off any problems, how difficult have these problems made it for you to do your work, take care of things at home or get along with other people?   Somewhat difficult    PHQ-9 Score  5 6        Data saved with a previous flowsheet row definition          4/12/2024    11:16 AM 3/14/2024     2:10 PM 10/12/2021     8:41 AM   GAD7   1. Feeling nervous, anxious, or on edge? 1 1 2   2. Not being able to stop or control worrying? 1 1 2   3. Worrying too much about different  "things? 1 1 2   4. Trouble relaxing? 1 1 2   5. Being so restless that it is hard to sit still? 1 0 1   6. Becoming easily annoyed or irritable? 2 1 1   7. Feeling afraid as if something awful might happen? 1 0 1   8. If you checked off any problems, how difficult have these problems made it for you to do your work, take care of things at home, or get along with other people?   2   JOSE RAFAEL-7 Score 8 5 11         Objective:   Mental Health Screening  PHQ-9  Depression Patient Health Questionnaire (PHQ-9)  Over the last two weeks how often have you been bothered by little interest or pleasure in doing things: Not at all  Over the last two weeks how often have you been bothered by feeling down, depressed or hopeless: Not at all    JOSE RAFAEL-7       PHYSICAL EXAM   Vital Signs  /73   Pulse 76   Ht 5' 11" (1.803 m)   Wt 83 kg (182 lb 15.7 oz)   SpO2 100%   BMI 25.52 kg/m²     Physical Exam    General: Well-developed. Well-nourished. No acute distress.  Eyes: EOMI. Sclerae anicteric.  HENT: Normocephalic. Atraumatic. Nares patent. Moist oral mucosa.  Cardiovascular: Regular rate. Regular rhythm. No murmurs. No rubs. No gallops. Normal S1, S2.  Respiratory: Normal respiratory effort. Clear to auscultation bilaterally. No rales. No rhonchi. No wheezing.  Musculoskeletal: No  obvious deformity.  Extremities: No lower extremity edema.  Neurological: Alert & oriented x3. No slurred speech. Normal gait.  Psychiatric: Normal mood. Normal affect. Good insight. Good judgment.  Skin: Warm. Dry. No rash.       Assessment:   35 y.o. male here for primary care visit       Plan:   1. Attention deficit disorder, unspecified type  Overview:  Current regimen:  --Vyvanse 20 mg daily    Orders:  -     lisdexamfetamine (VYVANSE) 20 MG capsule; Take 1 capsule (20 mg total) by mouth every morning.  Dispense: 30 capsule; Refill: 0  -     lisdexamfetamine (VYVANSE) 20 MG capsule; Take 1 capsule (20 mg total) by mouth every morning.  Dispense: " 30 capsule; Refill: 0  -     lisdexamfetamine (VYVANSE) 20 MG capsule; Take 1 capsule (20 mg total) by mouth every morning.  Dispense: 30 capsule; Refill: 0    2. History of adenomatous polyp of colon  Overview:  Tubular adenoma (2 mm) found on colonoscopy from 2/2021  Also one 2 mm polyp with hyperplastic surface changes  Also one 4 mm hyperplastic polyp  Per guidelines, would be recommended for repeat colonoscopy at ~3 to 5 years >> repeated 7/25/24  One polyp at F/U colonoscopy from 7/2024 without dysplastic changes  *due for next colonoscopy in 5 years ~7/25/2029      3. Anxiety  Overview:  GAD7 score of 8 on 4/12/24    Current regimen:  --Lexapro 10 mg daily    Assessment & Plan:  He decreased from 15 mg to 10 mg and feels good on that dose currently      4. Chronic fatigue  Assessment & Plan:  Lab Results   Component Value Date    FERRITIN 56 12/09/2024     Lab Results   Component Value Date    UIBC 257 08/19/2009    IRON 189 (H) 12/09/2024    TRANSFERRIN 266 12/09/2024    TIBC 394 12/09/2024    FESATURATED 48 12/09/2024     Testosterone, Total (ng/dL)   Date Value   04/15/2024 691     Lab Results   Component Value Date    TSH 0.658 03/14/2024     AM cortisol was 11   IgA was 257 (not deficient)    --Celiac panel was ordered by GI and was negative    --no specific etiology of fatigue was found despite comprehensive set of labs    **he feels like it has improved some, and self manages by trying to increase exercise and be more active (he wonders if stressors were playing a role)      5. Esophageal dysphagia  Assessment & Plan:  Complains of solid food getting stuck over the past few months  --he started himself on Prilosec a few weeks ago and feels like it has gotten much better since starting it  --denies any antecedent history of acid reflux symptoms  Diff Dx includes esophageal stricture in setting of silent reflux, esophageal spasm, esophageal ring, achalasia  **for now since he is responding to Prilosec,  will continue PPI for at least 2 months and see if symptoms resolve >> if they persist or worsen will need further evaluation with either EGD or Barium swallow    Orders:  -     omeprazole (PRILOSEC) 40 MG capsule; Take 1 capsule (40 mg total) by mouth once daily.  Dispense: 90 capsule; Refill: 3      Follow up in about 1 year (around 6/13/2026).    Mj Carrillo MD/[facility]  Internal Medicine  [facility]

## 2025-06-11 NOTE — ASSESSMENT & PLAN NOTE
Lab Results   Component Value Date    FERRITIN 56 12/09/2024     Lab Results   Component Value Date    UIBC 257 08/19/2009    IRON 189 (H) 12/09/2024    TRANSFERRIN 266 12/09/2024    TIBC 394 12/09/2024    FESATURATED 48 12/09/2024     Testosterone, Total (ng/dL)   Date Value   04/15/2024 691     Lab Results   Component Value Date    TSH 0.658 03/14/2024     AM cortisol was 11   IgA was 257 (not deficient)    --Celiac panel was ordered by GI and was negative    --no specific etiology of fatigue was found despite comprehensive set of labs    **he feels like it has improved some, and self manages by trying to increase exercise and be more active (he wonders if stressors were playing a role)

## 2025-06-13 ENCOUNTER — OFFICE VISIT (OUTPATIENT)
Dept: PRIMARY CARE CLINIC | Facility: CLINIC | Age: 35
End: 2025-06-13
Attending: INTERNAL MEDICINE
Payer: COMMERCIAL

## 2025-06-13 VITALS
HEIGHT: 71 IN | WEIGHT: 183 LBS | BODY MASS INDEX: 25.62 KG/M2 | DIASTOLIC BLOOD PRESSURE: 73 MMHG | HEART RATE: 76 BPM | SYSTOLIC BLOOD PRESSURE: 111 MMHG | OXYGEN SATURATION: 100 %

## 2025-06-13 DIAGNOSIS — R13.19 ESOPHAGEAL DYSPHAGIA: ICD-10-CM

## 2025-06-13 DIAGNOSIS — R53.82 CHRONIC FATIGUE: ICD-10-CM

## 2025-06-13 DIAGNOSIS — F98.8 ATTENTION DEFICIT DISORDER, UNSPECIFIED TYPE: Primary | ICD-10-CM

## 2025-06-13 DIAGNOSIS — F41.9 ANXIETY: ICD-10-CM

## 2025-06-13 DIAGNOSIS — Z86.0101 HISTORY OF ADENOMATOUS POLYP OF COLON: ICD-10-CM

## 2025-06-13 RX ORDER — LISDEXAMFETAMINE DIMESYLATE 20 MG/1
20 CAPSULE ORAL EVERY MORNING
Qty: 30 CAPSULE | Refills: 0 | Status: SHIPPED | OUTPATIENT
Start: 2025-08-12 | End: 2025-09-11

## 2025-06-13 RX ORDER — OMEPRAZOLE 40 MG/1
40 CAPSULE, DELAYED RELEASE ORAL DAILY
Qty: 90 CAPSULE | Refills: 3 | Status: SHIPPED | OUTPATIENT
Start: 2025-06-13 | End: 2026-06-13

## 2025-06-13 RX ORDER — LISDEXAMFETAMINE DIMESYLATE 20 MG/1
20 CAPSULE ORAL EVERY MORNING
Qty: 30 CAPSULE | Refills: 0 | Status: SHIPPED | OUTPATIENT
Start: 2025-06-13 | End: 2025-07-13

## 2025-06-13 RX ORDER — LISDEXAMFETAMINE DIMESYLATE 20 MG/1
20 CAPSULE ORAL EVERY MORNING
Qty: 30 CAPSULE | Refills: 0 | Status: SHIPPED | OUTPATIENT
Start: 2025-07-13 | End: 2025-08-12

## 2025-06-13 NOTE — ASSESSMENT & PLAN NOTE
Complains of solid food getting stuck over the past few months  --he started himself on Prilosec a few weeks ago and feels like it has gotten much better since starting it  --denies any antecedent history of acid reflux symptoms  Diff Dx includes esophageal stricture in setting of silent reflux, esophageal spasm, esophageal ring, achalasia  **for now since he is responding to Prilosec, will continue PPI for at least 2 months and see if symptoms resolve >> if they persist or worsen will need further evaluation with either EGD or Barium swallow

## 2025-07-03 ENCOUNTER — PATIENT MESSAGE (OUTPATIENT)
Dept: PSYCHIATRY | Facility: CLINIC | Age: 35
End: 2025-07-03
Payer: COMMERCIAL

## 2025-07-03 RX ORDER — ESCITALOPRAM OXALATE 10 MG/1
10 TABLET ORAL DAILY
Qty: 90 TABLET | Refills: 0 | Status: SHIPPED | OUTPATIENT
Start: 2025-07-03 | End: 2025-10-01

## 2025-07-08 ENCOUNTER — PATIENT MESSAGE (OUTPATIENT)
Dept: OPTOMETRY | Facility: CLINIC | Age: 35
End: 2025-07-08
Payer: COMMERCIAL

## 2025-08-19 ENCOUNTER — OFFICE VISIT (OUTPATIENT)
Dept: OPHTHALMOLOGY | Facility: CLINIC | Age: 35
End: 2025-08-19
Payer: COMMERCIAL

## 2025-08-19 DIAGNOSIS — H52.13 MYOPIA OF BOTH EYES: Primary | ICD-10-CM

## 2025-08-19 PROCEDURE — 99999 PR PBB SHADOW E&M-EST. PATIENT-LVL III: CPT | Mod: PBBFAC,,, | Performed by: OPHTHALMOLOGY

## 2025-08-19 PROCEDURE — 99241 PR OFFICE CONSULT, COSMETIC: CPT | Mod: S$GLB,,, | Performed by: OPHTHALMOLOGY

## 2025-08-19 PROCEDURE — 99499 UNLISTED E&M SERVICE: CPT | Mod: S$GLB,,, | Performed by: OPHTHALMOLOGY

## 2025-08-20 ENCOUNTER — TELEPHONE (OUTPATIENT)
Dept: DERMATOLOGY | Facility: CLINIC | Age: 35
End: 2025-08-20
Payer: COMMERCIAL

## 2025-09-05 ENCOUNTER — OFFICE VISIT (OUTPATIENT)
Dept: OPHTHALMOLOGY | Facility: CLINIC | Age: 35
End: 2025-09-05
Payer: COMMERCIAL

## 2025-09-05 DIAGNOSIS — H52.13 MYOPIA OF BOTH EYES: Primary | ICD-10-CM

## 2025-09-05 PROCEDURE — 99999 PR PBB SHADOW E&M-EST. PATIENT-LVL III: CPT | Mod: PBBFAC,,, | Performed by: OPHTHALMOLOGY

## 2025-09-05 RX ORDER — PREDNISOLONE ACETATE 10 MG/ML
1 SUSPENSION/ DROPS OPHTHALMIC 4 TIMES DAILY
Qty: 10 ML | Refills: 3 | Status: SHIPPED | OUTPATIENT
Start: 2025-09-05 | End: 2025-09-25

## 2025-09-05 RX ORDER — DIAZEPAM 5 MG/1
5 TABLET ORAL
Qty: 5 TABLET | Refills: 0 | Status: SHIPPED | OUTPATIENT
Start: 2025-09-05 | End: 2025-09-15

## 2025-09-05 RX ORDER — MOXIFLOXACIN 5 MG/ML
1 SOLUTION/ DROPS OPHTHALMIC 4 TIMES DAILY
Qty: 3 ML | Refills: 3 | Status: SHIPPED | OUTPATIENT
Start: 2025-09-05 | End: 2025-09-15

## (undated) DEVICE — PROBE ARTHO ENERGY 90 DEG

## (undated) DEVICE — SPONGE COTTON TRAY 4X4IN

## (undated) DEVICE — NDL SPINAL 18GX3.5 SPINOCAN

## (undated) DEVICE — CANNULA ARTHRO 8.25MM X 7CM

## (undated) DEVICE — NDL HYPO STD REG BVL 18GX1.5IN

## (undated) DEVICE — GLOVE ORTHO PF SZ 8.5

## (undated) DEVICE — Device

## (undated) DEVICE — SUT HOOK CRESENT MED SP

## (undated) DEVICE — GLOVE BIOGEL SKINSENSE PI 7.0

## (undated) DEVICE — KIT FIBERTAK PERC INSRT 1.8MM

## (undated) DEVICE — SUT VICRYL PLUS 3-0 SH 18IN

## (undated) DEVICE — COVER LIGHT HANDLE 80/CA

## (undated) DEVICE — SYR B-D DISP CONTROL 10CC100/C

## (undated) DEVICE — SHAVER ULTRAFFR 4.2MM

## (undated) DEVICE — DRESSING XEROFORM NONADH 1X8IN

## (undated) DEVICE — SYR LUER LOCK 1CC

## (undated) DEVICE — GLOVE SURGEON SYN PF SZ 9

## (undated) DEVICE — SOL NACL IRR 1000ML BTL

## (undated) DEVICE — APPLICATOR STRL COT 2INNR 6IN

## (undated) DEVICE — SUT MCRYL PLUS 4-0 PS2 27IN

## (undated) DEVICE — CANNULA TRIPLEDAM 6MM X 7CM

## (undated) DEVICE — GOWN ECLIPSE REINF LV4 XLNG XL

## (undated) DEVICE — CANNULA TWIST IN FLEXIBLE

## (undated) DEVICE — COVER CAMERA OPERATING ROOM

## (undated) DEVICE — SUT HOOK LEFT 60 DEG

## (undated) DEVICE — TUBING SUC UNIV W/CONN 12FT

## (undated) DEVICE — GOWN ECLIPSE REINF LVL4 TWL XL

## (undated) DEVICE — DRAPE SURG W/TWL 17 5/8X23

## (undated) DEVICE — SUT HOOK RIGHT 60DEG

## (undated) DEVICE — BNDG COFLEX FOAM LF2 ST 6X5YD

## (undated) DEVICE — PAD ABDOMINAL STERILE 8X10IN

## (undated) DEVICE — SUT 1 36IN PDS II VIO MONO

## (undated) DEVICE — DECANTER 6 VIAL

## (undated) DEVICE — CONNECTOR TUBING STR 5 IN 1

## (undated) DEVICE — DRAPE STERI U-SHAPED 47X51IN

## (undated) DEVICE — SUT VICRYL PLUS 0 CT1 18IN

## (undated) DEVICE — SOL NACL IRR 3000ML

## (undated) DEVICE — KIT FIBERTAK CURVED SPEAR 1.8M

## (undated) DEVICE — DRAPE STERI INSTRUMENT 1018

## (undated) DEVICE — PAD ELECTRODE STER 1.5X3

## (undated) DEVICE — PAD DERMAPROX THCK 11X15X1IN

## (undated) DEVICE — KIT SHOULDER POSITIONER SPIDER

## (undated) DEVICE — SUPPORT SLING SHOT II MEDIUM

## (undated) DEVICE — SYR DISP LL 5CC

## (undated) DEVICE — BLADE SHAVER 4.5 6/BX

## (undated) DEVICE — TAPE MEDIPORE 4IN X 2YDS

## (undated) DEVICE — NDL HYPO REG 25G X 1 1/2

## (undated) DEVICE — GLOVE SENSICARE PI GRN 7.5

## (undated) DEVICE — BNDG COFLEX FOAM LF2 ST 4X5YD

## (undated) DEVICE — GOWN ECLIPSE REINF LV4 TWL 2XL

## (undated) DEVICE — ADHESIVE MASTISOL VIAL 48/BX

## (undated) DEVICE — STRIP MEDI WND CLSR 1/2X4IN

## (undated) DEVICE — WRAP SHLDR HIP ACCU THRM PACK